# Patient Record
Sex: MALE | Race: WHITE | Employment: OTHER | URBAN - METROPOLITAN AREA
[De-identification: names, ages, dates, MRNs, and addresses within clinical notes are randomized per-mention and may not be internally consistent; named-entity substitution may affect disease eponyms.]

---

## 2017-02-14 ENCOUNTER — HOSPITAL ENCOUNTER (OUTPATIENT)
Dept: DIABETES SERVICES | Age: 67
Discharge: HOME | End: 2017-02-14
Attending: NURSE PRACTITIONER
Payer: MEDICARE

## 2017-02-14 VITALS — HEIGHT: 72 IN | BODY MASS INDEX: 32.37 KG/M2 | WEIGHT: 239 LBS

## 2017-02-14 PROCEDURE — G0108 DIAB MANAGE TRN  PER INDIV: HCPCS

## 2017-02-14 NOTE — PROGRESS NOTES
Came for diabetes educational assessment today. Provided basic information on carbohydrates, proteins and fats. Educational need/plan: Will attend 2 nutrition/2 diabetes group sessions to address the following: diabetes disease process, nutritional management, physical activity, using medications, preventing complications, psychosocial adjustment, goal setting, problem solving, monitoring, behavior change strategies.

## 2017-03-01 ENCOUNTER — HOSPITAL ENCOUNTER (OUTPATIENT)
Dept: DIABETES SERVICES | Age: 67
Discharge: HOME | End: 2017-03-01
Payer: MEDICARE

## 2017-03-01 PROCEDURE — G0109 DIAB MANAGE TRN IND/GROUP: HCPCS

## 2017-03-01 NOTE — PROGRESS NOTES
Attended nutrition diabetes #1 group session today. Topics included: disease process and treatment; carbohydrate choices (emphasizing high fiber carbohydrates); proteins (emphasizing heart healthy choices) and fat food choices (emphasizing unsaturated fats); free foods; combination food choices; nutrition tips for persons with diabetes; snack ideas; resources for diabetes management. Two methods of meal planning were reviewed: carbohydrate choices and carbohydrate counting. Basics of exercise discussed. Voiced /demonstrated understanding of material covered. Goal for next session is: read food labels. Anticipated adherence is good. Problems/barriers may be: none identified at this time.

## 2017-03-09 ENCOUNTER — HOSPITAL ENCOUNTER (OUTPATIENT)
Dept: DIABETES SERVICES | Age: 67
Discharge: HOME | End: 2017-03-09
Payer: MEDICARE

## 2017-03-09 PROCEDURE — G0109 DIAB MANAGE TRN IND/GROUP: HCPCS

## 2017-03-09 NOTE — PROGRESS NOTES
Attended nutrition diabetes #2 group session today. Topics included: plate method for portion control; fiber and sodium guidelines; sugar substitutes; alcohol; eating out; recipe modification; label reading. Voiced/demonstrated understanding of material covered. Anticipated adherence is good. Problems/barriers: none identified. Recommend check pre-meal blood glucose and 2 hour post-prandial blood glucose to see how food choices affect blood glucose. Plan for follow up is attend diabetes medical # 1 class on 3/14/17.

## 2017-03-14 ENCOUNTER — HOSPITAL ENCOUNTER (OUTPATIENT)
Dept: DIABETES SERVICES | Age: 67
Discharge: HOME | End: 2017-03-14
Payer: MEDICARE

## 2017-03-14 PROCEDURE — G0109 DIAB MANAGE TRN IND/GROUP: HCPCS

## 2017-03-14 NOTE — PROGRESS NOTES
Participant attended Diabetes #1 session today. Topics included: Characteristics/pathophysiology type 1/type 2 diabetes; Goal/acceptable blood glucose ranges/Hgb A1C/interpreting/using results; Using medications safely; Sick day management; Prevention/detection/treatment of acute complications. - Verbalized understanding of material covered.   -Goal for next session Diabetes 2  -Anticipated adherence is  Good   -Problems/barriers may be none anticipated

## 2017-03-21 ENCOUNTER — HOSPITAL ENCOUNTER (OUTPATIENT)
Dept: DIABETES SERVICES | Age: 67
Discharge: HOME | End: 2017-03-21
Payer: MEDICARE

## 2017-03-21 PROCEDURE — G0109 DIAB MANAGE TRN IND/GROUP: HCPCS

## 2017-03-21 NOTE — PROGRESS NOTES
Participant attended Diabetes #2 session today. Topics included: Prevention/detection/treatment of chronic complications; sleep apnea; Developing strategies to promote health/change behavior/recommended screenings; Developing strategies to address psychosocial issues; Goal setting. Participants goal/support plan includes Nutrition Goal : To improve diet. I will not skip meals and I will have a protein with most meals and snacks starting 4-1-17 for 30 days one meal daily 3 days a week. Medical Goal : To prevent complications: I will schedule an annual eye exam before 6-15-17 and annually. Problems/barriers may be:none anticipated .   Plan for follow up/Recommendations: mail follow up survey in 3 months

## 2017-05-10 PROBLEM — N40.1 BPH ASSOCIATED WITH NOCTURIA: Status: ACTIVE | Noted: 2017-05-10

## 2017-05-10 PROBLEM — R35.1 BPH ASSOCIATED WITH NOCTURIA: Status: ACTIVE | Noted: 2017-05-10

## 2017-09-18 ENCOUNTER — HOSPITAL ENCOUNTER (OUTPATIENT)
Dept: GENERAL RADIOLOGY | Age: 67
Discharge: HOME | End: 2017-09-18
Attending: INTERNAL MEDICINE
Payer: MEDICARE

## 2017-09-18 DIAGNOSIS — R05.3 PERSISTENT COUGH: ICD-10-CM

## 2017-09-18 PROCEDURE — 71020 XR CHEST PA LAT: CPT

## 2018-07-25 PROBLEM — Z99.89 OSA ON CPAP: Status: ACTIVE | Noted: 2018-07-25

## 2018-07-25 PROBLEM — G47.33 OSA ON CPAP: Status: ACTIVE | Noted: 2018-07-25

## 2020-08-07 PROBLEM — E78.00 HYPERCHOLESTEROLEMIA: Status: ACTIVE | Noted: 2020-08-07

## 2021-04-12 PROBLEM — M25.562 CHRONIC PAIN OF BOTH KNEES: Status: ACTIVE | Noted: 2021-04-12

## 2021-04-12 PROBLEM — M25.561 CHRONIC PAIN OF BOTH KNEES: Status: ACTIVE | Noted: 2021-04-12

## 2021-04-12 PROBLEM — G89.29 CHRONIC PAIN OF BOTH KNEES: Status: ACTIVE | Noted: 2021-04-12

## 2021-04-12 PROBLEM — I82.531 CHRONIC DEEP VEIN THROMBOSIS (DVT) OF POPLITEAL VEIN OF RIGHT LOWER EXTREMITY (HCC): Status: ACTIVE | Noted: 2021-04-12

## 2021-08-23 ENCOUNTER — HOSPITAL ENCOUNTER (OUTPATIENT)
Dept: GENERAL RADIOLOGY | Age: 71
Discharge: HOME | End: 2021-08-23

## 2021-10-07 ENCOUNTER — HOSPITAL ENCOUNTER (OUTPATIENT)
Dept: PHYSICAL THERAPY | Age: 71
Discharge: HOME | End: 2021-10-07
Attending: INTERNAL MEDICINE
Payer: MEDICARE

## 2021-10-07 DIAGNOSIS — M13.80 SERONEGATIVE ARTHRITIS: ICD-10-CM

## 2021-10-07 PROCEDURE — 97163 PT EVAL HIGH COMPLEX 45 MIN: CPT

## 2021-10-07 NOTE — THERAPY EVALUATION
Goldie Ralph A. Paduano  : 1950      [place] / Product Type: Medicare /    [address] [place] [address] at [address] [place] [address]. [facility], [address] [place] [address]  [phone]   [phone]              OUTPATIENT PHYSICAL THERAPY:Initial Assessment: 10/7/2021    ICD-10: Treatment Diagnosis:   Pain in Left Shoulder (M25.512)  Stiffness of Left Shoulder not elsewhere specified (M25.612)  Pain in right knee (M25.561)  Pain in left knee (M25.562)   Muscle Weakness (M62.81)              Precautions/Allergies:   Patient has no known allergies. Fall Risk Score: 6 (? 5 = High Risk)  MD Orders: Eval and Treat  MEDICAL/REFERRING DIAGNOSIS:  Seronegative arthritis [M13.80]   DATE OF ONSET: chronic  REFERRING PHYSICIAN: Sonia Leon MD  RETURN PHYSICIAN APPOINTMENT: TBD by patient      INITIAL ASSESSMENT:   Mr. Yoan Chaudhary presents to physical therapy with decreased strength, ROM,balance and  functional mobility, . These S/S are consistent with referring diagnosis . Patient will benefit from skilled physical therapy for manual therapeutic techniques (as appropriate), therapeutic exercises and activities, neuromuscular re-education, and comprehensive home exercises program to address current impairments and functional limitations. Neisha Chaudhary will benefit from skilled PT (medically necessary) in order to address above deficits affecting participation in basic ADLs and overall functional tolerance. PROBLEM LIST (Impacting functional limitations):  · Decreased Strength  · Decreased ADL/Functional Activities  · Increased Pain  · Decreased Activity Tolerance  · Increased Shortness of Breath  · Decreased Flexibility/Joint Mobility  · Decreased [place] with Home Exercise Program INTERVENTIONS PLANNED:  1. Cold  2. Family Education  3. Home Exercise Program (HEP)  4. Manual Therapy  5. Neuromuscular Re-education/Strengthening  6.  Range of Motion (ROM)  7. Therapeutic Activites  8. Therapeutic Exercise/Strengthening  9. Transfer Training  10. Ultrasound   TREATMENT PLAN:  Effective Dates: 10/7/2021 TO 12/6/2021 (60 days). Frequency/Duration: 2 times a week for 60 Days  GOALS: (Goals have been discussed and agreed upon with patient.)     Short-Term Goals~4 weeks  Goal Met   1. Soco Patel will report <= minimal difficulty with don/doffing clothing as well as minimal/no difficulty. 1.  [] Date:   2. Soco Patel will be able to carry 15 pounds in hands for 100 feet. 2.  [] Date:   3. Soco Paetl will demonstrate demonstrate improvement in active left  shoulder flexion to >115 degrees to increase UE function and participation in ADLs. 3.  [] Date:   4. Soco Patel will show a greater than 8 point decrease on the DASH in order to show an increase in upper extremity function. 4.  [] Date:   5. Vickii Amend A. Paduano  Will improve TUG score by 3 seconds indicating improvement in gait ability. 5.  [] Date:   6. Vickii Amend A. Paduano  Will increase 30 sec STS test by 3 repetitions indicating increase in functional strength 6.  [] Date:         Long Term Goals~8 weeks Goal Met   1. Soco Patel will improve KOOS score by 5 points indicating improvement of function. 1.  [] Date:   2. Soco Patel will show a greater than 10 point decrease on the DASH in order to show an increase in upper extremity function 2. [] Date:   3. Soco Patel will report doing hair/bathing/dressing with minimal difficulty  in order to be independent with ADL's 3.  [] Date:   4. Soco Patel  Will be able to maintain walking speed within normative values for age. 4.  [] Date:            Outcome Measure: Tool Used: Disabilities of the Arm, Shoulder and Hand (DASH) Questionnaire - Quick Version    Score:  Initial: 33/55  Most Recent: X/55 (Date: -- )   Interpretation of Score:  The DASH is designed to measure the activities of daily living in person's with upper extremity dysfunction or pain. Each section is scored on a 1-5 scale, 5 representing the greatest disability. The scores of each section are added together for a total score of 55. Tool Used: Knee Injury and Osteoarthritis Outcome Form (KOOS-JR.)  SCORE: None (0) Mild (1) Moderate (2) Severe (3) Extreme (4)   Stiffness:         1. How severe is your knee stiffness after first waking in the morning? [] [] [x] [] []   Pain:         What amount of knee pain have you experienced in the last week   doing the following activities? 2. Twisting/pivoting on your knee: [] [] [x] [] []   3. Straightening knee fully: [] [] [x] [] []   4. Going up or down stairs [] [] [] [x] []   5. Standing upright [] [] [x] [] []   Function, daily living        6. Rising from sitting [] [] [x] [] []   7. Bending to floor/ an object [] [] [] [x] []     Score:  Initial: 16 (Interval: 47.487) 10/7/2021/28 Most Recent: TBD/28 (Date: -- )   Interpretation of Score: Questions each scored on a 4 point scale with 4 representing the worst possible score and 0 representing the best possible score. The higher the point total, the worse the knee pain translating to a lower percentage of patient functioning. Medical Necessity:   · Skilled intervention continues to be required due to deficits and impairments seen upon initial evaluation affecting patient's participation in ADLs and functional tasks. Reason for Services/Other Comments:  · Patient continues to require skilled intervention due to deficits and impairments seen upon initial evaluation affecting patient's participation in ADLs and functional tasks. Total Treatment Duration:  PT Patient Time In/Time Out  Time In: 5442  Time Out: [address] Potential For Stated Goals: good  Regarding Mordechai Shames A. Paduano's therapy, I certify that the treatment plan above will be carried out by a therapist or under their direction.   Thank you for this referral,  Ivonne Benton PT     Referring Physician Signature: Joe Gómez MD _______________________________ Date _____________            HISTORY:   History of Present Injury/Illness (Reason for Referral): Was diagnosed with RA 7 years ago. He retired due to difficulty performing job duties. Joint pain left shoulder, bilateral hand, ankles, feet and knees. Does some yard work, push mower, plays some with his grandchildren. Has fallen several times in his yard this summer. Used to walk on a regular basis, but not in two years.    -Present symptoms/complaints (on day of evaluation) Multiple joint pains, left shoulder stiffness, difficulty walking, feels unsteady. Pain Scale:  · Current: 6/10  · Best: 2/10  · Worst: 10/10    · Aggravating factors: Lifting, Overhead activities, Reaching for wallet, push/pull and walking, squatting, stairs  · Relieving factors: exercising  · Irritability: Medium (Onset of pain is equal to alleviation)    Dominant Side:  left  Past Medical History/Comorbidities:   Mr. Delvin Tamayo  has a past medical history of Acquired hypothyroidism (3/27/2016), Acute deep vein thrombosis (DVT) ([address] Bilateral knee pain (05/2020), Cataracts, bilateral, Chronic deep vein thrombosis (DVT) of popliteal vein of right lower extremity ([address] (4/12/2021), Chronic pain of both knees (4/12/2021), COVID-19 vaccine series declined (2021), Depression (3/20/2015), Diabetic eye exam ([address] (08/26/2020), Eye exam, routine (09/2020), Graves disease, Hypertension, Obesity (BMI 30.0-34.9) (3/27/2016), ITZ on CPAP (7/25/2018), Psychiatric disorder, Rheumatoid arthritis(714.0), Thyroid disease, and Uncontrolled diabetes mellitus ([address]  He also has no past medical history of Aneurysm ([address] Arrhythmia, Asthma, CAD (coronary artery disease), Cancer ([address] Chronic kidney disease, Chronic obstructive pulmonary disease ([facility] Coagulation disorder ([facility] Endocarditis, GERD (gastroesophageal reflux disease), Heart failure ([facility] Ill-defined condition, Liver disease, PUD (peptic ulcer disease), Rheumatic fever, Seizures ([facility] or Stroke ([facility] Mr. Veda Richard  has a past surgical history that includes hx knee arthroscopy (Left, 1990s); hx bunionectomy (Left, 12/2014); and hx cataract removal (Bilateral). Social History/Living Environment:    retired, , no stairs  Prior Level of Function/Work/Activity:  was walking daily until two years ago. Current Medications:    Current Outpatient Medications:     metFORMIN (GLUCOPHAGE) 1,000 mg tablet, TAKE ONE TABLET BY MOUTH TWICE A DAY WITH A MEAL, Disp: 180 Tablet, Rfl: 3    methotrexate (RHEUMATREX) 2.5 mg tablet, Take 10 Tablets by mouth every Tuesday. Take 5 tabs by mouth every Monday, Disp: 40 Tablet, Rfl: 0    hydrOXYchloroQUINE (PlaqueniL) 200 mg tablet, Take 1 Tablet by mouth two (2) times a day., Disp: 180 Tablet, Rfl: 1    atorvastatin (LIPITOR) 20 mg tablet, Take 1 Tablet by mouth nightly., Disp: 90 Tablet, Rfl: 3    warfarin (COUMADIN) 5 mg tablet, 5mg Qhs with 7.5mg [place], Tu, Th, Saturday, Disp: 114 Tablet, Rfl: 3    insulin regular (NOVOLIN R, HUMULIN R) 100 unit/mL injection, 5 units daily with largest meal (Patient not taking: Reported on 9/27/2021), Disp: 1 Vial, Rfl: 5    folic acid (FOLVITE) 1 mg tablet, Take 1 tablet by mouth once daily, Disp: 90 Tablet, Rfl: 1    sertraline (ZOLOFT) 100 mg tablet, Take 1 Tab by mouth daily. , Disp: 90 Tab, Rfl: 3    amitriptyline (ELAVIL) 25 mg tablet, Take 1 Tab by mouth nightly., Disp: 90 Tab, Rfl: 3    mupirocin (BACTROBAN) 2 % ointment, APPLY OINTMENT TO WOUND DAILY UNDER BANDAGE UNTIL HEALED., Disp: , Rfl:     amLODIPine (NORVASC) 5 mg tablet, TAKE ONE TABLET BY MOUTH ONE TIME DAILY, Disp: 90 Tab, Rfl: 3    lisinopriL (PRINIVIL, ZESTRIL) 20 mg tablet, TAKE ONE TABLET BY MOUTH ONE TIME DAILY, Disp: 90 Tab, Rfl: 3    glimepiride (AMARYL) 4 mg tablet, Take 1 Tab by mouth two (2) times a day., Disp: 180 Tab, Rfl: 3    levothyroxine (SYNTHROID) 150 mcg tablet, Take 1 Tab by mouth Daily (before breakfast). , Disp: 90 Tab, Rfl: 3        Ambulatory/Rehab Services H2 Model Falls Risk Assessment    Risk Factors:       (5)  History of Recent Falls [w/in 3 months] Ability to Rise from Chair:       (0)  Ability to rise in a single movement    Falls Prevention Plan:       Physical Limitations to Exercise (specify):  pain and weakness   Total: (5 or greater = High Risk): 5    ©2010 [facility] [place]. All Rights Reserved. [employer] Patent #[id number]. Federal Law prohibits the replication, distribution or use without written permission from [facility] of [address] [place] [address]       Date Last Reviewed:  10/7/2021   Number of Personal Factors/Comorbidities that affect the Plan of Care: 3+: HIGH COMPLEXITY      EXAMINATION:   Observation/Orthostatic Postural Assessment: Forward Head and Rounded Shoulders, decreased lumbar lordosis, arthritic changes bilateral hands.     ROM:    AROM/PROM         Joint: Eval Date: 10/7/2021  Re-Assess Date:  Re-Assess Date:    ACTIVE ROM (standing) RIGHT LEFT RIGHT LEFT RIGHT LEFT   Lumbar flexion  To ankles            Lumbar extension Scapula to heels            Knee flexion 125 120           Knee extension 10  0           Hip IR/ER 20/45 20/45           Hip flexion 110 105           HIP abduction 40 40           Hip extension 5 5           Shoulder Flexion 160 100           Shoulder Abduction 160 80           Shoulder Internal Rotation (Apley's) T 10 sacrum           Shoulder External Rotation (Apley's) T2 C7           Elbow ROM 0/140 0/ 140 ext/flex           PASSIVE ROM (supine)             Shoulder Flexion             Shoulder Abduction   [facility]      Shoulder Internal Rotation   [facility]    Shoulder External Rotation   [address]             [place]            Strength:    Joint: Eval Date: 10/7/2021  Re-Assess Date:  Re-Assess Date:     RIGHT LEFT RIGHT LEFT RIGHT LEFT   Shoulder Flexion 4/5 3/5           Shoulder Abduction  (C5) 4/5 3-/5           Shoulder Internal Rotation 4+/5 4-/5           Shoulder External Rotation 3+/5 3-/5           Elbow Flexion  (C6) 5/5 4/5           Elbow Extension (C7) 4+/5 4-/5           Wrist Flexion (C7) 5/5 5/5           Wrist Extension (C6) 5/5 5/5           Resisted Thumb Extension/Finger Abduction (C8/T1)             Resisted Cervical Rotation (C1):             Resisted Shoulder Shrug (C2, 3, 4):  4=  /5 4-/5            Strength 78 lbs 70 lbs            Knee extension 4/5 4/5           Knee flexion 4/5 4/5           Hip abduction 3+/5 3+/5           Hip  extension 3+/5 3+/5           Plantar flexion  3+ 3+                   Special Tests:     Weakness of  Left shoulder rotator cuff    Neurological Screen: Assessed @ Initial Visit    Radiating symptoms? Yes/No=glenohumeral region left   Functional Mobility:  Assessed @ Initial Visit   TUG: 10.21 seconds  30 sec Sit to Stand test: 7 x no hands        Body Structures Involved:    1. Joints  2. Muscles   Body Functions Affected:  1. Sensory/Pain  2. Neuromusculoskeletal  3. Movement Related Activities and Participation Affected:  1. Mobility  2. Self Care   Number of elements that affect the Plan of Care: 4+: HIGH COMPLEXITY   CLINICAL PRESENTATION:   Presentation: Evolving clinical presentation with changing clinical characteristics: MODERATE COMPLEXITY   CLINICAL DECISION MAKING:      Use of outcome tool(s) and clinical judgement create a POC that gives a: Questionable prediction of patient's progress: MODERATE COMPLEXITY     See associated treatment note for treatment provided today.     Future Appointments   Date Time Provider Ramona Andrews   10/25/2021  2:20 PM Melba Kilgore MD [facility]   10/26/2021 10:30  [address]  [address], [address] [facility]   11/29/2021 10:20 AM Taylor Eli Ankit Scott MD [address], PT            remember to save as eval

## 2021-10-13 ENCOUNTER — HOSPITAL ENCOUNTER (OUTPATIENT)
Dept: PHYSICAL THERAPY | Age: 71
Discharge: HOME | End: 2021-10-13
Attending: INTERNAL MEDICINE
Payer: MEDICARE

## 2021-10-13 PROCEDURE — 97110 THERAPEUTIC EXERCISES: CPT

## 2021-10-13 NOTE — PROGRESS NOTES
Goldie Ralph A. Paduano  : 1950  [place] / [place] / Product Type: Medicare /  [address] [facility] [address] at [address] [facility]. [facility], [address] [place] [address], [address] [place] [address]  [phone]   [phone]                                                          Sonia Leon MD      OUTPATIENT PHYSICAL THERAPY: Daily Treatment Note 10/13/2021 Visit Count:  2    Tx Diagnosis:  Pain in Left Shoulder (M25.512)  Stiffness of Left Shoulder not elsewhere specified (M25.612)  Pain in right knee (M25.561)  Pain in left knee (M25.562)   Muscle Weakness (M62.81)      Pre-treatment Symptoms/Complaints: Left shoulder is the biggest problem   Pain: Initial:did not rate/10  Medications Last Reviewed:  10/13/2021     Post Session: did not rate/10   Updated Objective Findings: See Initial Eval for more details. left shoulder flexion 90        TREATMENT:   THERAPEUTIC EXERCISE: (40 minutes):  Exercises per grid below to improve mobility, strength and balance. Required minimal visual, verbal and manual cues to promote proper body alignment and promote proper body posture. Progressed resistance and complexity of movement as indicated. Date:  10/13/2021 Date:   Date:     Activity/Exercise Parameters Parameters Parameters   Education HEP, POC, PT goals, anatomy/pathology, safe pain parameters     swiffer stick  Flexion on top of counter 30 x    Circles CCW       scifit 6 min L 6 hills     Shoulder shrugs 2 x 10 5 #     Isometric bilateral ER band shoulder 10 x     Sit to stand 2 x 10 18\" table                 THERAPEUTIC ACTIVITY: ( 0 minutes): Activities per gid below to improve functional movement related mobility, strength and balance to improve neuro-muscular carryover to daily functional activities for improving patient's quality of life. Required visual, verbal and manual cues to promote proper body alignment and promote proper body posture/mechanics.  Progressed resistance and complexity of movement as indicated. Date:  10/13/21 Date:   Date:     Activity/Exercise Parameters Parameters Parameters    scifit  6 min L 6 hills                                                                         MANUAL THERAPY: (0 minutes): Joint mobilization, Soft tissue mobilization was utilized and necessary because of the patient's restricted joint motion and restricted motion of soft tissue mobility. Date  10/13/2021    Technique Used Grade  Level # Time(s) Effect while being performed                                                                 HEP Log Date 1. As above in T ex 10/13/2021   2.  10/13/2021   3. 10/13/2021   4.    5.           [employer] [facility]  Treatment/Session Summary:    Response to Treatment: Pt demonstrated understanding of POC and initial HEP. No increase in pain or adverse reactions. Communication/Consultation:  POC, HEP, PT goals, Faxed initial evaluation to MD.   Equipment provided today: HEP Handout   Recommendations/Intent for next treatment session:   Next visit will focus on Quad strengthening Hip strengthening RTC strengthening range of motion left shoulder, manual techniques as needed, aerobic conditioning. Treatment Plan of Care Effective Dates: 10/7/2021 TO 12/12/2021 (60 days).   Frequency/Duration: 2 times a week for 60 Days             Total Treatment Billable Duration:   40  Rx   PT Patient Time In/Time Out  Time In: 1142  Time Out: 1227 [facility], PT    Future Appointments   Date Time Provider Ramona Andrews   10/13/2021 11:45 AM Margaret Anderson, PT [facility]   10/15/2021 10:30 AM Margaret Anderson, PT [facility]   10/20/2021  8:45 AM Margaret Anderson, PT [facility]   10/22/2021  9:00 AM Margaret Anderson, PT [facility]   10/25/2021  2:20 PM Stefany Guerrero MD [facility]   10/26/2021 10:30  [address]  [address]   10/27/2021  8:45 AM Papenfuss, Charm Brood, PT [facility]   10/29/2021  9:00 AM Papenfuss, Charm Brood, PT [facility]   11/3/2021  8:45 AM Papenfuss, Charm Brood, PT [facility]   11/5/2021  9:00 AM Papenfuss, Charm Brood, PT [facility]   11/10/2021  8:45 AM Papenfuss, Charm Brood, PT [facility]   11/12/2021  9:00 AM Papenfuss, Charm Brood, PT [facility]   11/17/2021  8:45 AM Papenfuss, Charm Brood, PT [facility]   11/19/2021  9:00 AM Papenfuss, Charm Brood, PT [facility]   11/29/2021 10:20 AM Neli Carrasco MD [facility]

## 2021-10-15 ENCOUNTER — HOSPITAL ENCOUNTER (OUTPATIENT)
Dept: PHYSICAL THERAPY | Age: 71
Discharge: HOME | End: 2021-10-15
Attending: INTERNAL MEDICINE
Payer: MEDICARE

## 2021-10-15 PROCEDURE — 97110 THERAPEUTIC EXERCISES: CPT

## 2021-10-15 NOTE — PROGRESS NOTES
Kimmy Pereyra A. Paduano  : 1950  [place] / Product Type: Medicare /  [id number] at 4 [facility]. [facility], [address] [place] [address], [address] [place] [address]  [phone]   [phone]                                                          Sera Monique MD      OUTPATIENT PHYSICAL THERAPY: Daily Treatment Note 10/15/2021 Visit Count:  3    Tx Diagnosis:  Pain in Left Shoulder (M25.512)  Stiffness of Left Shoulder not elsewhere specified (M25.612)  Pain in right knee (M25.561)  Pain in left knee (M25.562)   Muscle Weakness (M62.81)      Pre-treatment Symptoms/Complaints: Left shoulder is the biggest problem   Pain: Initial:did not rate/10  Medications Last Reviewed:  10/15/2021     Post Session: did not rate/10   Updated Objective Findings: See Initial Eval for more details. left shoulder flexion 90        TREATMENT:   THERAPEUTIC EXERCISE: (40 minutes):  Exercises per grid below to improve mobility, strength and balance. Required minimal visual, verbal and manual cues to promote proper body alignment and promote proper body posture. Progressed resistance and complexity of movement as indicated. Date:  10/13/2021 Date:  10/15/21 Date:     Activity/Exercise Parameters Parameters Parameters   Education HEP, POC, PT goals, anatomy/pathology, safe pain parameters     swiffer stick  Flexion on top of counter 30 x    Circles CCW       scifit 6 min L 6 hills 9 min L 6 hills    Shoulder shrugs 2 x 10 5 # 2 x 10 6#     Isometric bilateral ER band shoulder 10 x 10 x 15 sec holds    Sit to stand 2 x 10 18\" table     Table shoulder flexion stretch sitting on stool  4 min     Dead lifts  20# 5 x  30# 5 x  45# 2 x 5          THERAPEUTIC ACTIVITY: ( 0 minutes):  Activities per gid below to improve functional movement related mobility, strength and balance to improve neuro-muscular carryover to daily functional activities for improving patient's quality of life. Required visual, verbal and manual cues to promote proper body alignment and promote proper body posture/mechanics. Progressed resistance and complexity of movement as indicated. Date:  10/13/21 Date:   Date:     Activity/Exercise Parameters Parameters Parameters                                                                                          HEP Log Date 1. As above in T ex 10/13/2021   2.  10/15/2021   3. 10/15/2021   4.    5.           [employer] Portal  Treatment/Session Summary:    Response to Treatment: Decrease in shoulder pain with ex   Communication/Consultation:  Benefits of strength training   Equipment provided today: HEP Handout   Recommendations/Intent for next treatment session:   Next visit will focus on Quad strengthening Hip strengthening RTC strengthening range of motion left shoulder, manual techniques as needed, aerobic conditioning. Treatment Plan of Care Effective Dates: 10/7/2021 TO 12/12/2021 (60 days).   Frequency/Duration: 2 times a week for 60 Days             Total Treatment Billable Duration:   40  Rx   PT Patient Time In/Time Out  Time In: 1030  Time Out: 115 Angelique Zepeda PT    Future Appointments   Date Time Provider Ramona Andrews   10/20/2021  8:45 AM PapenfuFelicity sorenson, PT [facility] AND [place] [facility]   10/22/2021  9:00 AM PapenfuFelicity sorenson, PT [facility]   10/25/2021  2:20 PM Fadia Waldrop MD [facility]   10/26/2021 10:30  [address]  [address] [facility]   10/27/2021  8:45 AM PapenfussFelicity, PT [facility]   10/29/2021  9:00 AM PapenfussFelicity, PT [facility]   11/3/2021  8:45 AM PapenfussFelicity, PT [facility]   11/5/2021  9:00 AM PapenfussFelicity Janeth, PT [facility]   11/10/2021  8:45 AM PapenfussFelicity, PT [facility]   11/12/2021  9:00 AM [facility] Anderson[facility]   11/17/2021  8:45 AM Felicity Anderson, PT [facility]   11/19/2021 9:00 AM Suad Anderson, PT [facility]   11/29/2021 10:20 AM Wally Spencer MD [address] [facility]

## 2021-10-20 ENCOUNTER — HOSPITAL ENCOUNTER (OUTPATIENT)
Dept: PHYSICAL THERAPY | Age: 71
Discharge: HOME | End: 2021-10-20
Attending: INTERNAL MEDICINE
Payer: MEDICARE

## 2021-10-20 PROCEDURE — 97110 THERAPEUTIC EXERCISES: CPT

## 2021-10-20 PROCEDURE — 97530 THERAPEUTIC ACTIVITIES: CPT

## 2021-10-20 NOTE — PROGRESS NOTES
Sarajulian Jetty A. Paduano  : 1950  [place] / Product Type: Medicare /  [address] at 4 [facility]. [facility], [address] [place] [address], [facility], [address] [place] [address]  [phone]   [phone]                                                          Oumar Ruiz MD      OUTPATIENT PHYSICAL THERAPY: Daily Treatment Note 10/20/2021 Visit Count:  4    Tx Diagnosis:  Pain in Left Shoulder (M25.512)  Stiffness of Left Shoulder not elsewhere specified (M25.612)  Pain in right knee (M25.561)  Pain in left knee (M25.562)   Muscle Weakness (M62.81)      Pre-treatment Symptoms/Complaints: Left shoulder is the biggest problem, feels better all over after lifting   Pain: Initial:did not rate/10  Medications Last Reviewed:  10/20/2021     Post Session: did not rate/10   Updated Objective Findings: See Initial Eval for more details. left shoulder flexion 90        TREATMENT:   THERAPEUTIC EXERCISE: (30 minutes):  Exercises per grid below to improve mobility, strength and balance. Required minimal visual, verbal and manual cues to promote proper body alignment and promote proper body posture. Progressed resistance and complexity of movement as indicated.      Date:  10/13/2021 Date:  10/15/21 Date:  10/20/21   Activity/Exercise Parameters Parameters Parameters   Education HEP, POC, PT goals, anatomy/pathology, safe pain parameters  Benefits of strength training for health and wellness   swiffer stick  Flexion on top of counter 30 x    Circles CCW       scifit 6 min L 6 hills 9 min L 6 hills 10 min L 6 hills   Shoulder shrugs 2 x 10 5 # 2 x 10 6#     Isometric bilateral ER band shoulder 10 x 10 x 15 sec holds With shoulder flexion to 45 degrees 3 x 10    Sit to stand 2 x 10 18\" table     Table shoulder flexion stretch sitting on stool  4 min  2 min    Dead lifts  20# 5 x  30# 5 x  45# 2 x 5    Lat activation and hip hinge   Yellow band on pole 3 x 10 extensive manual and verbal cues         THERAPEUTIC ACTIVITY: ( 25 minutes): Activities per gid below to improve functional movement related mobility, strength and balance to improve neuro-muscular carryover to daily functional activities for improving patient's quality of life. Required visual, verbal and manual cues to promote proper body alignment and promote proper body posture/mechanics. Progressed resistance and complexity of movement as indicated. Date:  10/20/21 Date:   Date:     Activity/Exercise Parameters Parameters Parameters    Kaylynn Cruise carry  320' 10#        Dead lifts  Extensive manual and verbal cues    3 x 10 45#                                                                          HEP Log Date 1. As above in T ex 10/13/2021   2.  10/20/2021   3. 10/20/2021   4.    5.           [employer] Portal  Treatment/Session Summary:    Response to Treatment: Decrease in overall  pain with ex   Communication/Consultation:  Benefits of strength training   Equipment provided today: HEP Handout   Recommendations/Intent for next treatment session:   Next visit will focus on Quad strengthening Hip strengthening RTC strengthening range of motion left shoulder, manual techniques as needed, aerobic conditioning. Treatment Plan of Care Effective Dates: 10/7/2021 TO 12/12/2021 (60 days).   Frequency/Duration: 2 times a week for 60 Days             Total Treatment Billable Duration:   55  Rx   PT Patient Time In/Time Out  Time In: 0840  Time Out: [address], PT    Future Appointments   Date Time Provider Ramona Andrews   10/22/2021  9:00 AM Margaret Anderson, PT [facility] AND [facility]   10/25/2021  2:20 PM Stefany Guerrero MD [facility]   10/26/2021 10:30  [address]  [address] [facility]   10/27/2021  8:45 AM Margaret Anderson, PT [facility]   10/29/2021  9:00 AM Margaret Andreson, PT [facility]   11/3/2021  8:45 AM Margaret Anderson, PT [facility]   11/5/2021  9:00 AM Papenfuss, Prosper Raring, PT [facility]   11/10/2021  8:45 AM Papenfuss, Prosper Raring, PT [facility]   11/12/2021  9:00 AM Papenfuss, Prosper Raring, PT [facility]   11/17/2021  8:45 AM Papenfuss, Prosper Raring, PT [facility]   11/19/2021  9:00 AM Papenfuss, Prosper Raring, PT [facility]   11/29/2021 10:20 AM Ofelia Boyle MD [address] [place] [address]

## 2021-10-22 ENCOUNTER — HOSPITAL ENCOUNTER (OUTPATIENT)
Dept: PHYSICAL THERAPY | Age: 71
Discharge: HOME | End: 2021-10-22
Attending: INTERNAL MEDICINE
Payer: MEDICARE

## 2021-10-22 PROCEDURE — 97530 THERAPEUTIC ACTIVITIES: CPT

## 2021-10-22 PROCEDURE — 97110 THERAPEUTIC EXERCISES: CPT

## 2021-10-22 NOTE — PROGRESS NOTES
Benancio Left A. Paduano  : 1950  [place] / Product Type: Medicare /  [id number] at 4 [facility]. [facility], [address] [place] [address], [facility], [address] [place] [address]  [phone]   [phone]                                                          Arnoldo Gutiérrez MD      OUTPATIENT PHYSICAL THERAPY: Daily Treatment Note 10/22/2021 Visit Count:  5    Tx Diagnosis:  Pain in Left Shoulder (M25.512)  Stiffness of Left Shoulder not elsewhere specified (M25.612)  Pain in right knee (M25.561)  Pain in left knee (M25.562)   Muscle Weakness (M62.81)      Pre-treatment Symptoms/Complaints: Left shoulder is the biggest problem, feels better all over after lifting   Pain: Initial:did not rate/10  Medications Last Reviewed:  10/22/2021     Post Session: did not rate/10   Updated Objective Findings: See Initial Eval for more details. left shoulder flexion 90        TREATMENT:   THERAPEUTIC EXERCISE: (15 minutes):  Exercises per grid below to improve mobility, strength and balance. Required minimal visual, verbal and manual cues to promote proper body alignment and promote proper body posture. Progressed resistance and complexity of movement as indicated.      Date:  10/13/2021 Date:  10/15/21 Date:  10/20/21 10/22/21   Activity/Exercise Parameters Parameters Parameters    Education HEP, POC, PT goals, anatomy/pathology, safe pain parameters  Benefits of strength training for health and wellness    swiffer stick  Flexion on top of counter 30 x    Circles CCW        scifit 6 min L 6 hills 9 min L 6 hills 10 min L 6 hills 8 min hills L 6    Shoulder shrugs 2 x 10 5 # 2 x 10 6#      Isometric bilateral ER band shoulder 10 x 10 x 15 sec holds With shoulder flexion to 45 degrees 3 x 10     Sit to stand 2 x 10 18\" table      Table shoulder flexion stretch sitting on stool  4 min  2 min     Dead lifts  20# 5 x  30# 5 x  45# 2 x 5     Lat activation and hip hinge Yellow band on pole 3 x 10 extensive manual and verbal cues 3 x 10 yellow band on pole  Pole on back 30 x    Hip extensor activation with purple band sit to stand             THERAPEUTIC ACTIVITY: ( 25 minutes): Activities per gid below to improve functional movement related mobility, strength and balance to improve neuro-muscular carryover to daily functional activities for improving patient's quality of life. Required visual, verbal and manual cues to promote proper body alignment and promote proper body posture/mechanics. Progressed resistance and complexity of movement as indicated. Date:  10/20/21 Date:  10/22/21 Date:     Activity/Exercise Parameters Parameters Parameters    Vinay Hojennifer carry  320' 10#  320' 10 #               Dead lifts  Extensive manual and verbal cues    3 x 10 45#  5 x 45#    5 x 50#    3 x 5 55#      Walk back with bar     Pulling activity  10' 10 x 27#     Hip extensor activation with purple band sit to stand    10 x                                                    HEP Log Date 1. As above in T ex 10/13/2021   2.  10/22/2021   3. 10/22/2021   4.    5.           [employer] Portal  Treatment/Session Summary:    Response to Treatment: Decrease in overall  pain with ex, required much less cueing today. Communication/Consultation:  Benefits of strength training   Equipment provided today: HEP Handout   Recommendations/Intent for next treatment session:   Next visit will focus on Quad strengthening Hip strengthening RTC strengthening range of motion left shoulder, manual techniques as needed, aerobic conditioning. Treatment Plan of Care Effective Dates: 10/7/2021 TO 12/12/2021 (60 days).   Frequency/Duration: 2 times a week for 60 Days             Total Treatment Billable Duration:  40    Rx   PT Patient Time In/Time Out  Time In: 6074  Time Out: [address], PT    Future Appointments   Date Time Provider Ramona Andrews   10/22/2021  9:00 AM Britni Anderson, PT [facility]   10/25/2021  2:20 PM Virgie Boeck, MD [facility]   10/26/2021 10:30  [address]  [address] [facility]   10/27/2021  8:45 AM Britni Anderson, PT [facility]   10/29/2021  9:00 AM PapBritni zepeda, PT [facility]   11/3/2021  8:45 AM MarkfuBritni sorenson, PT [facility]   11/5/2021  9:00 AM Britni Anderson, PT [facility]   11/10/2021  8:45 AM MarkfuBritni sorenson, PT [facility]   11/12/2021  9:00 AM MarkfuBritni sorenson, PT [facility]   11/17/2021  8:45 AM PaphebertfuBritni sorenson, PT [facility]   11/19/2021  9:00 AM PaphebertfuBritni sorenson, PT [facility]   11/29/2021 10:20 AM Edward Robertson MD [address] [place] [address]

## 2021-10-27 ENCOUNTER — HOSPITAL ENCOUNTER (OUTPATIENT)
Dept: PHYSICAL THERAPY | Age: 71
Discharge: HOME | End: 2021-10-27
Attending: INTERNAL MEDICINE
Payer: MEDICARE

## 2021-10-27 PROCEDURE — 97110 THERAPEUTIC EXERCISES: CPT

## 2021-10-27 PROCEDURE — 97530 THERAPEUTIC ACTIVITIES: CPT

## 2021-10-27 NOTE — PROGRESS NOTES
Rica Saran A. Paduano  : 1950  [place] / Product Type: Medicare /  [address] [place] [address] at [address] [place] [address]. [facility], [address] [place] [address]  [phone]   [phone]                                                          Anjelica Felipe MD      OUTPATIENT PHYSICAL THERAPY: Daily Treatment Note 10/27/2021 Visit Count:  6    Tx Diagnosis:  Pain in Left Shoulder (M25.512)  Stiffness of Left Shoulder not elsewhere specified (M25.612)  Pain in right knee (M25.561)  Pain in left knee (M25.562)   Muscle Weakness (M62.81)      Pre-treatment Symptoms/Complaints: I went to see my doctor yesterday and I told her I feel almost normal.   Pain: Initial:did not rate/10  Medications Last Reviewed:  10/27/2021     Post Session: did not rate/10   Updated Objective Findings: Can bend down to lift barbell without back pain. TREATMENT:   THERAPEUTIC EXERCISE: (15 minutes):  Exercises per grid below to improve mobility, strength and balance. Required minimal visual, verbal and manual cues to promote proper body alignment and promote proper body posture. Progressed resistance and complexity of movement as indicated.      Date:  10/13/2021 Date:  10/15/21 Date:  10/20/21 10/22/21 10/27/21   Activity/Exercise Parameters Parameters Parameters     Education HEP, POC, PT goals, anatomy/pathology, safe pain parameters  Benefits of strength training for health and wellness     swiffer stick  Flexion on top of counter 30 x    Circles CCW         scifit 6 min L 6 hills 9 min L 6 hills 10 min L 6 hills 8 min hills L 6  8 min    Shoulder shrugs 2 x 10 5 # 2 x 10 6#       Isometric bilateral ER band shoulder 10 x 10 x 15 sec holds With shoulder flexion to 45 degrees 3 x 10      Sit to stand 2 x 10 18\" table       Table shoulder flexion stretch sitting on stool  4 min  2 min      Dead lifts  20# 5 x  30# 5 x  45# 2 x 5      Lat activation and hip hinge   Yellow band on pole 3 x 10 extensive manual and verbal cues 3 x 10 yellow band on pole  Pole on back 30 x  3 x 10 ea pole and banded resistance   Hip extensor activation with purple band sit to stand                      THERAPEUTIC ACTIVITY: ( 30 minutes): Activities per gid below to improve functional movement related mobility, strength and balance to improve neuro-muscular carryover to daily functional activities for improving patient's quality of life. Required visual, verbal and manual cues to promote proper body alignment and promote proper body posture/mechanics. Progressed resistance and complexity of movement as indicated. Date:  10/20/21 Date:  10/22/21 Date:  10/27/21   Activity/Exercise Parameters Parameters Parameters    Craig Grant carry  320' 10#  320' 10 #               Dead lifts  Extensive manual and verbal cues    3 x 10 45#  5 x 45#    5 x 50#    3 x 5 55#   Rack pull   1 x 5 45#    1 x 5 50#   Rack pulls:    1 x 5 55#   1 x 3 60#    1 x 5 65#   1 x 3 70#    Walk back with bar     Pulling activity  10' 10 x 27#  30# 10 x   Hip extensor activation with purple band sit to stand    10 x                                                    HEP Log Date 1. As above in T ex 10/13/2021   2.  10/27/2021   3. 10/27/2021   4.    5.           [employer] Portal  Treatment/Session Summary:    Response to Treatment: Increased lifting capacity, no pain complaints   Communication/Consultation:  Benefits of strength training   Equipment provided today: HEP Handout   Recommendations/Intent for next treatment session:   Next visit will focus on Quad strengthening Hip strengthening RTC strengthening range of motion left shoulder, manual techniques as needed, aerobic conditioning. Treatment Plan of Care Effective Dates: 10/7/2021 TO 12/12/2021 (60 days).   Frequency/Duration: 2 times a week for 60 Days             Total Treatment Billable Duration:  45    Rx   PT Patient Time In/Time Out  Time In: 5038  Time Out: [facility], PT    Future Appointments   Date Time Provider Ramona Andrews   10/27/2021  8:45 AM Tony Anderson, PT [facility]   10/29/2021  9:00 AM Tony Anderson, PT [facility]   11/3/2021  8:45 AM Tony Anderson, PT [facility]   11/5/2021  9:00 AM Tony Anderson, PT [facility]   11/10/2021  8:45 AM [facility]   11/12/2021  9:00 AM [facility] Anderson, [facility]   11/17/2021  8:45 AM Tony Anderson, PT [facility]   11/19/2021  9:00 AM [facility] Anderson, [facility]   11/29/2021 10:20 AM Roberto Smyth MD [facility]   2/28/2022  2:00 PM Sintia Phelan MD [facility]

## 2021-10-29 ENCOUNTER — HOSPITAL ENCOUNTER (OUTPATIENT)
Dept: PHYSICAL THERAPY | Age: 71
Discharge: HOME | End: 2021-10-29
Attending: INTERNAL MEDICINE
Payer: MEDICARE

## 2021-10-29 PROCEDURE — 97110 THERAPEUTIC EXERCISES: CPT

## 2021-10-29 PROCEDURE — 97530 THERAPEUTIC ACTIVITIES: CPT

## 2021-10-29 NOTE — PROGRESS NOTES
Lesly Ave A. Paduano  : 1950  [place] / Product Type: Medicare /  [address] [facility] at [address] [place] [address]. [address] [place] [address] [address], [id number] [place] [address] Road  [phone]   [phone]                                                          Eboni Cool MD      OUTPATIENT PHYSICAL THERAPY: Daily Treatment Note 10/29/2021 Visit Count:  7    Tx Diagnosis:  Pain in Left Shoulder (M25.512)  Stiffness of Left Shoulder not elsewhere specified (M25.612)  Pain in right knee (M25.561)  Pain in left knee (M25.562)   Muscle Weakness (M62.81)      Pre-treatment Symptoms/Complaints: I went to see my doctor yesterday and I told her I feel almost normal.   Pain: Initial:did not rate/10  Medications Last Reviewed:  10/29/2021     Post Session: did not rate/10   Updated Objective Findings: Can bend down to lift barbell without back pain. TREATMENT:   THERAPEUTIC EXERCISE: (15 minutes):  Exercises per grid below to improve mobility, strength and balance. Required minimal visual, verbal and manual cues to promote proper body alignment and promote proper body posture. Progressed resistance and complexity of movement as indicated.      Date:  10/13/2021 Date:  10/15/21 Date:  10/20/21 10/22/21 10/27/21 10/29/21   Activity/Exercise Parameters Parameters Parameters      Education HEP, POC, PT goals, anatomy/pathology, safe pain parameters  Benefits of strength training for health and wellness      swiffer stick  Flexion on top of counter 30 x    Circles CCW          scifit 6 min L 6 hills 9 min L 6 hills 10 min L 6 hills 8 min hills L 6  8 min  10 min L 6 hills   Shoulder shrugs 2 x 10 5 # 2 x 10 6#        Isometric bilateral ER band shoulder 10 x 10 x 15 sec holds With shoulder flexion to 45 degrees 3 x 10       Sit to stand 2 x 10 18\" table        Table shoulder flexion stretch sitting on stool  4 min  2 min       Dead lifts  20# 5 x  30# 5 x  45# 2 x 5       Lat activation and hip hinge   Yellow band on pole 3 x 10 extensive manual and verbal cues 3 x 10 yellow band on pole  Pole on back 30 x  3 x 10 ea pole and banded resistance 3 x 10 pole yellow band    Hip extensor activation with purple band sit to stand                        THERAPEUTIC ACTIVITY: ( 30 minutes): Activities per gid below to improve functional movement related mobility, strength and balance to improve neuro-muscular carryover to daily functional activities for improving patient's quality of life. Required visual, verbal and manual cues to promote proper body alignment and promote proper body posture/mechanics. Progressed resistance and complexity of movement as indicated. Date:  10/20/21 Date:  10/22/21 Date:  10/27/21 Date   10/29/21   Activity/Exercise Parameters Parameters Parameters     Radha Jones carry  320' 10#  320' 10 #             10# 320'     Dead lifts  Extensive manual and verbal cues    3 x 10 45#  5 x 45#    5 x 50#    3 x 5 55#   Rack pull   1 x 5 45#    1 x 5 50#   Rack pulls:    1 x 5 55#   1 x 3 60#    1 x 5 65#   1 x 3 70#  Rack pull    1 x 10 45#   1 x 5 55#   1 x 5 65#    Dead lift:             Walk back with bar     Pulling activity  10' 10 x 27#  30# 10 x     Hip extensor activation with purple band sit to stand    10 x        Sit to stand         10# 3 x 5                                             HEP Log Date 1. As above in T ex 10/13/2021   2.  10/29/2021   3. 10/29/2021   4.    5.           [employer] Portal  Treatment/Session Summary:    Response to Treatment: Increased lifting capacity, no pain complaints. Has a tendency to shift back on to heels during squat and dead lift compromising balance. Communication/Consultation:  Instructed to lift without shoes to improve balance.    Equipment provided today: HEP Handout   Recommendations/Intent for next treatment session:   Next visit will focus on [employer] strengthening Hip strengthening RTC strengthening range of motion left shoulder, manual techniques as needed, aerobic conditioning. Treatment Plan of Care Effective Dates: 10/7/2021 TO 12/12/2021 (60 days).   Frequency/Duration: 2 times a week for 60 Days             Total Treatment Billable Duration:  45    Rx   PT Patient Time In/Time Out  Time In: 2742  Time Out: [address], PT    Future Appointments   Date Time Provider Ramona Andrews   10/29/2021  9:00 AM Abby Anderson, PT [facility] [place] [facility]   11/3/2021  8:45 AM Abby Anderson, PT [facility]   11/5/2021  9:00 AM Abby Anderson, PT [facility]   11/10/2021  8:45 AM Abby Anderson, PT [facility]   11/12/2021  9:00 AM Abby Anderson, PT [facility]   11/17/2021  8:45 AM PapAbby zepeda, PT [facility]   11/19/2021  9:00 AM PapAbby zepeda, PT [facility]   11/29/2021 10:20 AM Narciso Duffy MD [facility]   2/28/2022  2:00 PM Josesito Ortiz MD [facility]

## 2021-11-03 ENCOUNTER — HOSPITAL ENCOUNTER (OUTPATIENT)
Dept: PHYSICAL THERAPY | Age: 71
Discharge: HOME | End: 2021-11-03
Attending: INTERNAL MEDICINE
Payer: MEDICARE

## 2021-11-03 PROCEDURE — 97110 THERAPEUTIC EXERCISES: CPT

## 2021-11-03 PROCEDURE — 97530 THERAPEUTIC ACTIVITIES: CPT

## 2021-11-03 NOTE — PROGRESS NOTES
Gaylin Jetty A. Paduano  : 1950  [place] / Product Type: Medicare /  [id number] at [address] [place]. [address], [address] [address]  [phone]   [phone]                                                          Oumar Ruiz MD      OUTPATIENT PHYSICAL THERAPY: Daily Treatment Note 11/3/2021 Visit Count:  8    Tx Diagnosis:  Pain in Left Shoulder (M25.512)  Stiffness of Left Shoulder not elsewhere specified (M25.612)  Pain in right knee (M25.561)  Pain in left knee (M25.562)   Muscle Weakness (M62.81)      Pre-treatment Symptoms/Complaints: Right knee sore today maybe weather   Pain: Initial:did not rate/10  Medications Last Reviewed:  11/3/2021     Post Session: did not rate/10   Updated Objective Findings: Can bend down to lift barbell without back pain. TREATMENT:   THERAPEUTIC EXERCISE: (12 minutes):  Exercises per grid below to improve mobility, strength and balance. Required minimal visual, verbal and manual cues to promote proper body alignment and promote proper body posture. Progressed resistance and complexity of movement as indicated.      Date:  10/13/2021 Date:  10/15/21 Date:  10/20/21 10/22/21 10/27/21 10/29/21 11/3/21   Activity/Exercise Parameters Parameters Parameters       Education HEP, POC, PT goals, anatomy/pathology, safe pain parameters  Benefits of strength training for health and wellness       swiffer stick  Flexion on top of counter 30 x    Circles CCW           scifit 6 min L 6 hills 9 min L 6 hills 10 min L 6 hills 8 min hills L 6  8 min  10 min L 6 hills 8 min L8   Shoulder shrugs 2 x 10 5 # 2 x 10 6#         Isometric bilateral ER band shoulder 10 x 10 x 15 sec holds With shoulder flexion to 45 degrees 3 x 10        Sit to stand 2 x 10 18\" table         Table shoulder flexion stretch sitting on stool  4 min  2 min        Dead lifts  20# 5 x  30# 5 x  45# 2 x 5 Lat activation and hip hinge   Yellow band on pole 3 x 10 extensive manual and verbal cues 3 x 10 yellow band on pole  Pole on back 30 x  3 x 10 ea pole and banded resistance 3 x 10 pole yellow band  3  X 10 yellow   Hip extensor activation with purple band sit to stand                          THERAPEUTIC ACTIVITY: ( 30 minutes): Activities per gid below to improve functional movement related mobility, strength and balance to improve neuro-muscular carryover to daily functional activities for improving patient's quality of life. Required visual, verbal and manual cues to promote proper body alignment and promote proper body posture/mechanics. Progressed resistance and complexity of movement as indicated. Date:  10/20/21 Date:  10/22/21 Date:  10/27/21 Date   10/29/21 Date  11/3/21   Activity/Exercise Parameters Parameters Parameters      Souleymane Cooler carry  320' 10#  65' 10 #             10# 65'   10# 65'    Dead lifts  Extensive manual and verbal cues    3 x 10 45#  5 x 45#    5 x 50#    3 x 5 55#   Rack pull   1 x 5 45#    1 x 5 50#   Rack pulls:    1 x 5 55#   1 x 3 60#    1 x 5 65#   1 x 3 70#  Rack pull    1 x 10 45#   1 x 5 55#   1 x 5 65#    Dead lift:    65# 3 x 5           Dead lift:    45# 1 x 10   65# 1 x 5   75# 1 x 3   in depth instruction    Walk back with bar     Pulling activity  10' 10 x 27#  30# 10 x       Hip extensor activation with purple band sit to stand    10 x          Sit to stand         10# 3 x 5   15# 2 x 10                                                HEP Log Date 1. As above in T ex 10/13/2021   2.  11/3/2021   3. 11/3/2021   4.    5.           [employer] Portal  Treatment/Session Summary:    Response to Treatment: Required indepth instruction for safe technique today, had difficulty achieving stable spine position during lifts. Reduced reps focus on techinque.    Communication/Consultation:  Review of Starting Strength dead lift videos   Equipment provided today: HEP Handout   Recommendations/Intent for next treatment session:   Next visit will focus on Quad strengthening Hip strengthening RTC strengthening range of motion left shoulder, manual techniques as needed, aerobic conditioning. Treatment Plan of Care Effective Dates: 10/7/2021 TO 12/12/2021 (60 days).   Frequency/Duration: 2 times a week for 60 Days             Total Treatment Billable Duration: 42    Rx   PT Patient Time In/Time Out  Time In: 0730  Time Out: 115 Angelique Zepeda, PT    Future Appointments   Date Time Provider Ramona Andrews   11/5/2021  9:00 AM Cherry Anderson, PT [facility]   11/10/2021  8:45 AM Cherry Anderson, PT [facility]   11/12/2021  9:00 AM Cherry Anderson, PT [facility]   11/17/2021  8:45 AM Cherry Anderson, PT [facility]   11/19/2021  9:00 AM Cherry Anderson, PT [facility]   11/29/2021 10:20 AM Mattie Marrufo MD [facility]   2/28/2022  2:00 PM Florie Oppenheim, MD [facility]

## 2021-11-05 ENCOUNTER — APPOINTMENT (OUTPATIENT)
Dept: PHYSICAL THERAPY | Age: 71
End: 2021-11-05
Attending: INTERNAL MEDICINE
Payer: MEDICARE

## 2021-11-10 ENCOUNTER — HOSPITAL ENCOUNTER (OUTPATIENT)
Dept: PHYSICAL THERAPY | Age: 71
Discharge: HOME | End: 2021-11-10
Attending: INTERNAL MEDICINE
Payer: MEDICARE

## 2021-11-10 PROCEDURE — 97110 THERAPEUTIC EXERCISES: CPT

## 2021-11-10 PROCEDURE — 97530 THERAPEUTIC ACTIVITIES: CPT

## 2021-11-10 NOTE — PROGRESS NOTES
Izabella Zunigaleisamaximo A. Paduano  : 1950  [place] / Product Type: Medicare /  Tiffanie Fling at [address] [place] [address]. [address], [id number] [place] [address] Road  [phone]   [phone]                                                          Keshav Vu MD      OUTPATIENT PHYSICAL THERAPY: Daily Treatment Note 11/10/2021 Visit Count:  9    Tx Diagnosis:  Pain in Left Shoulder (M25.512)  Stiffness of Left Shoulder not elsewhere specified (M25.612)  Pain in right knee (M25.561)  Pain in left knee (M25.562)   Muscle Weakness (M62.81)      Pre-treatment Symptoms/Complaints: Right knee sore today maybe weather   Pain: Initial:did not rate/10  Medications Last Reviewed:  11/10/2021     Post Session: did not rate/10   Updated Objective Findings: Can bend down to lift barbell without back pain. TREATMENT:   THERAPEUTIC EXERCISE: (12 minutes):  Exercises per grid below to improve mobility, strength and balance. Required minimal visual, verbal and manual cues to promote proper body alignment and promote proper body posture. Progressed resistance and complexity of movement as indicated.      Date:  10/13/2021 Date:  10/15/21 Date:  10/20/21 10/22/21 10/27/21 10/29/21 11/3/21 11/10/21   Activity/Exercise Parameters Parameters Parameters        Education HEP, POC, PT goals, anatomy/pathology, safe pain parameters  Benefits of strength training for health and wellness        swiffer stick  Flexion on top of counter 30 x    Circles CCW            scifit 6 min L 6 hills 9 min L 6 hills 10 min L 6 hills 8 min hills L 6  8 min  10 min L 6 hills 8 min L8 9 min L 8 hills   Shoulder shrugs 2 x 10 5 # 2 x 10 6#          Isometric bilateral ER band shoulder 10 x 10 x 15 sec holds With shoulder flexion to 45 degrees 3 x 10         Sit to stand 2 x 10 18\" table          Table shoulder flexion stretch sitting on stool  4 min  2 min         Dead lifts 20# 5 x  30# 5 x  45# 2 x 5         Lat activation and hip hinge   Yellow band on pole 3 x 10 extensive manual and verbal cues 3 x 10 yellow band on pole  Pole on back 30 x  3 x 10 ea pole and banded resistance 3 x 10 pole yellow band  3  X 10 yellow 2 x 10 yellow                    THERAPEUTIC ACTIVITY: ( 30 minutes): Activities per gid below to improve functional movement related mobility, strength and balance to improve neuro-muscular carryover to daily functional activities for improving patient's quality of life. Required visual, verbal and manual cues to promote proper body alignment and promote proper body posture/mechanics. Progressed resistance and complexity of movement as indicated. Date:  10/20/21 Date:  10/22/21 Date:  10/27/21 Date   10/29/21 Date  11/3/21 Date   11/10/21   Activity/Exercise Parameters Parameters Parameters       Ruben Savage carry  320' 10#  320' 8 #             10# 65'   10# 65'  15# 320'    Dead lifts  Extensive manual and verbal cues    3 x 10 45#  5 x 45#    5 x 50#    3 x 5 55#   Rack pull   1 x 5 45#    1 x 5 50#   Rack pulls:    1 x 5 55#   1 x 3 60#    1 x 5 65#   1 x 3 70#  Rack pull    1 x 10 45#   1 x 5 55#   1 x 5 65#    Dead lift:    65# 3 x 5           Dead lift:    45# 1 x 10   65# 1 x 5   75# 1 x 3   in depth instruction  Rack pull 45# 1 x 10    95# 1 x 5    100# 3 x 5     Walk back with bar     Pulling activity  10' 10 x 27#  30# 10 x      30# 10 x   Hip extensor activation with purple band sit to stand    10 x        10 x     Sit to stand         10# 3 x 5   15# 2 x 10  15# 1 x 10   1 x 5  17\"bench                                                     HEP Log Date 1.    As above in T ex 10/13/2021   2.  11/10/2021   3. 11/10/2021   4.    5.           [email or web address] Portal  Treatment/Session Summary:    Response to Treatment: Responds well to manual cues using band for hip extension, able to carry over muscle activation to rack pulls   Communication/Consultation:   stress positive achievements since beginning therapy. Equipment provided today: HEP Handout   Recommendations/Intent for next treatment session:   Next visit will focus on Quad strengthening Hip strengthening RTC strengthening range of motion left shoulder, manual techniques as needed, aerobic conditioning. Treatment Plan of Care Effective Dates: 10/7/2021 TO 12/12/2021 (60 days).   Frequency/Duration: 2 times a week for 60 Days             Total Treatment Billable Duration: 42    Rx   PT Patient Time In/Time Out  Time In: 0840  Time Out: 3630 [facility], PT    Future Appointments   Date Time Provider Ramona Andrews   11/12/2021  9:00 AM Silvia Anderson, PT [facility]   11/17/2021  8:45 AM Silvia Anderson, PT [facility]   11/19/2021  9:00 AM Miriam Johnson, PT [facility]   11/29/2021 10:20 AM Keshav Vu MD [facility]   2/28/2022  2:00 PM Nii Mendez MD [facility]

## 2021-11-12 ENCOUNTER — HOSPITAL ENCOUNTER (OUTPATIENT)
Dept: PHYSICAL THERAPY | Age: 71
Discharge: HOME | End: 2021-11-12
Attending: INTERNAL MEDICINE
Payer: MEDICARE

## 2021-11-12 PROCEDURE — 97110 THERAPEUTIC EXERCISES: CPT

## 2021-11-12 PROCEDURE — 97530 THERAPEUTIC ACTIVITIES: CPT

## 2021-11-12 NOTE — PROGRESS NOTES
Brenda Casco A. Paduano  : 1950      [place] / Product Type: Medicare /    [address] [place] [address] at [address] [place] [address]. [facility], [address], [facility], [address] [place] [address]  [phone]   [phone]              OUTPATIENT PHYSICAL THERAPY:Progress Report: 2021    ICD-10: Treatment Diagnosis:   Pain in Left Shoulder (M25.512)  Stiffness of Left Shoulder not elsewhere specified (M25.612)  Pain in right knee (M25.561)  Pain in left knee (M25.562)   Muscle Weakness (M62.81)              Precautions/Allergies:   Patient has no known allergies. Fall Risk Score: 6 (? 5 = High Risk)  MD Orders: Eval and Treat  MEDICAL/REFERRING DIAGNOSIS:  Seronegative arthritis   DATE OF ONSET: chronic  REFERRING PHYSICIAN: Luis Alfredo Mcallister MD  RETURN PHYSICIAN APPOINTMENT: TBD by patient      CURRENT ASSESSMENT:   Mr. Becki Galvan has improved on functional tests measuring walking speed and gross lower extremity strength performing a chair squat. However his self reported outcome scores have changed minimally at this time. The client has a long history of chronic pain and he understands that his function will improve prior to changes in pain sensitivity. Ely Galvan will benefit from continued skilled PT (medically necessary) in order to address above deficits affecting participation in basic ADLs and overall functional tolerance. PROBLEM LIST (Impacting functional limitations):  · Decreased Strength  · Decreased ADL/Functional Activities  · Increased Pain  · Decreased Activity Tolerance  · Increased Shortness of Breath  · Decreased Flexibility/Joint Mobility  · Decreased [place] with Home Exercise Program INTERVENTIONS PLANNED:  1. Cold  2. Family Education  3. Home Exercise Program (HEP)  4. Manual Therapy  5. Neuromuscular Re-education/Strengthening  6. Range of Motion (ROM)  7. Therapeutic Activites  8.  Therapeutic Exercise/Strengthening  9. Transfer Training  10. Ultrasound   TREATMENT PLAN:  Effective Dates: 10/7/2021 TO 12/6/2021 (60 days). Frequency/Duration: 2 times a week for 60 Days  GOALS: (Goals have been discussed and agreed upon with patient.)     Short-Term Goals~4 weeks  Goal Met   1. Robyn Kim will report <= minimal difficulty with don/doffing clothing as well as minimal/no difficulty. 1.  [] Date:   2. Robyn Kim will be able to carry 15 pounds in hands for 100 feet. 2.  [x] Date: 11/12/21   3. Robyn Kim will demonstrate demonstrate improvement in active left  shoulder flexion to >115 degrees to increase UE function and participation in ADLs. 3.  [] Date:   4. Robyn Kim will show a greater than 8 point decrease on the DASH in order to show an increase in upper extremity function. 4.  [] Date:   5. Drucilla Noel A. Paduano  Will improve TUG score by 3 seconds indicating improvement in gait ability. 5.  [x] Date: 11/12/21   6. Drucilla Noel A. Paduano  Will increase 30 sec STS test by 3 repetitions indicating increase in functional strength 6. [x] Date: 11/12/21         Long Term Goals~8 weeks Goal Met   1. Robyn Kim will improve KOOS score by 5 points indicating improvement of function. 1.  [] Date:   2. Robyn Kim will show a greater than 10 point decrease on the DASH in order to show an increase in upper extremity function 2. [] Date:   3. Robyn Kim will report doing hair/bathing/dressing with minimal difficulty  in order to be independent with ADL's 3. [x] Date: 11/12/21   4. Robyn Kim  Will be able to maintain walking speed within normative values for age. 4.  [x] Date: 11/12/21            Outcome Measure: Tool Used: Disabilities of the Arm, Shoulder and Hand (DASH) Questionnaire - Quick Version    Score:  Initial: 33/55  Most Recent: 31/55 (Date: 11/12/21 )   Interpretation of Score:  The DASH is designed to measure the activities of daily living in person's with upper extremity dysfunction or pain. Each section is scored on a 1-5 scale, 5 representing the greatest disability. The scores of each section are added together for a total score of 55. Tool Used: Knee Injury and Osteoarthritis Outcome Form (KOOS-JR.) 11/12/21  SCORE: None (0) Mild (1) Moderate (2) Severe (3) Extreme (4)   Stiffness:         1. How severe is your knee stiffness after first waking in the morning? [] [] [x] [] []   Pain:         What amount of knee pain have you experienced in the last week   doing the following activities? 2. Twisting/pivoting on your knee: [] [] [] [x] []   3. Straightening knee fully: [] [] [x] [] []   4. Going up or down stairs [] [] [] [] [x]   5. Standing upright [] [] [x] [] []   Function, daily living        6. Rising from sitting [] [] [x] [] []   7. Bending to floor/ an object [] [] [] [x] []     Score:  Initial: 16 (Interval: 47.487) 10/7/2021/28 Most Recent: 17/28 (Interval: 44.905) Date: 11/12/21      Interpretation of Score: Questions each scored on a 4 point scale with 4 representing the worst possible score and 0 representing the best possible score. The higher the point total, the worse the knee pain translating to a lower percentage of patient functioning. Medical Necessity:   · Skilled intervention continues to be required due to deficits and impairments seen upon initial evaluation affecting patient's participation in ADLs and functional tasks. Reason for Services/Other Comments:  · Patient continues to require skilled intervention due to deficits and impairments seen upon initial evaluation affecting patient's participation in ADLs and functional tasks. Total Treatment Duration: 40 min       Rehabilitation Potential For Stated Goals: good  Regarding Estrella Schuller A. Paduano's therapy, I certify that the treatment plan above will be carried out by a therapist or under their direction.   Thank you for this referral,  Niels Watson PT     Referring Physician Signature: Jhoana Rose MD _______________________________ Date _____________            HISTORY:   History of Present Injury/Illness (Reason for Referral): Was diagnosed with RA 7 years ago. He retired due to difficulty performing job duties. Joint pain left shoulder, bilateral hand, ankles, feet and knees. Does some yard work, push mower, plays some with his grandchildren. Has fallen several times in his yard this summer. Used to walk on a regular basis, but not in two years.    -Present symptoms/complaints (on day of reevaluation) Multiple joint pains, left shoulder stiffness, difficulty walking, feels unsteady. Pain Scale:  · Current: 4/10  · Best: 2/10  · Worst: 5/10 in last week  ·     · Aggravating factors: Lifting, Overhead activities, Reaching for wallet, push/pull and walking, squatting, stairs  · Relieving factors: exercising  · Irritability: Medium (Onset of pain is equal to alleviation)    Dominant Side:  left  Past Medical History/Comorbidities:   Mr. Kirill Bush  has a past medical history of Acquired hypothyroidism (3/27/2016), Acute deep vein thrombosis (DVT) ([address] Bilateral knee pain (05/2020), Cataracts, bilateral, Chronic deep vein thrombosis (DVT) of popliteal vein of right lower extremity ([address] (4/12/2021), Chronic pain of both knees (4/12/2021), COVID-19 vaccine series declined (2021), Depression (3/20/2015), Diabetic eye exam ([address] (08/26/2020), Eye exam, routine (09/2020), Graves disease, Hypertension, Obesity (BMI 30.0-34.9) (3/27/2016), ITZ on CPAP (7/25/2018), Psychiatric disorder, Rheumatoid arthritis(714.0), Thyroid disease, and Uncontrolled diabetes mellitus ([address]  He also has no past medical history of Aneurysm ([address] Arrhythmia, Asthma, CAD (coronary artery disease), Cancer ([facility] Chronic kidney disease, Chronic obstructive pulmonary disease ([facility] Coagulation disorder ([facility] Endocarditis, GERD (gastroesophageal reflux disease), Heart failure ([facility] Ill-defined condition, Liver disease, PUD (peptic ulcer disease), Rheumatic fever, Seizures ([facility] or Stroke ([facility] Mr. Kwesi Sanchez  has a past surgical history that includes hx knee arthroscopy (Left, 1990s); hx bunionectomy (Left, 12/2014); and hx cataract removal (Bilateral). Social History/Living Environment:    retired, , no stairs  Prior Level of Function/Work/Activity:  was walking daily until two years ago. Current Medications:    Current Outpatient Medications:     levothyroxine (SYNTHROID) 150 mcg tablet, Take 1 Tablet by mouth Daily (before breakfast). , Disp: 90 Tablet, Rfl: 3    methotrexate (RHEUMATREX) 2.5 mg tablet, Take 10 Tablets by mouth every Tuesday. , Disp: 40 Tablet, Rfl: 0    metFORMIN (GLUCOPHAGE) 1,000 mg tablet, TAKE ONE TABLET BY MOUTH TWICE A DAY WITH A MEAL, Disp: 180 Tablet, Rfl: 3    hydrOXYchloroQUINE (PlaqueniL) 200 mg tablet, Take 1 Tablet by mouth two (2) times a day., Disp: 180 Tablet, Rfl: 1    atorvastatin (LIPITOR) 20 mg tablet, Take 1 Tablet by mouth nightly., Disp: 90 Tablet, Rfl: 3    warfarin (COUMADIN) 5 mg tablet, 5mg Qhs with 7.5mg Sosa, Tu, Th, Saturday, Disp: 114 Tablet, Rfl: 3    folic acid (FOLVITE) 1 mg tablet, Take 1 tablet by mouth once daily, Disp: 90 Tablet, Rfl: 1    sertraline (ZOLOFT) 100 mg tablet, Take 1 Tab by mouth daily. , Disp: 90 Tab, Rfl: 3    amitriptyline (ELAVIL) 25 mg tablet, Take 1 Tab by mouth nightly., Disp: 90 Tab, Rfl: 3    mupirocin (BACTROBAN) 2 % ointment, APPLY OINTMENT TO WOUND DAILY UNDER BANDAGE UNTIL HEALED., Disp: , Rfl:     amLODIPine (NORVASC) 5 mg tablet, TAKE ONE TABLET BY MOUTH ONE TIME DAILY, Disp: 90 Tab, Rfl: 3    lisinopriL (PRINIVIL, ZESTRIL) 20 mg tablet, TAKE ONE TABLET BY MOUTH ONE TIME DAILY, Disp: 90 Tab, Rfl: 3    glimepiride (AMARYL) 4 mg tablet, Take 1 Tab by mouth two (2) times a day., Disp: 180 Tab, Rfl: 3           Date Last Reviewed:  11/12/2021   EXAMINATION: Observation/Orthostatic Postural Assessment: Forward Head and Rounded Shoulders, decreased lumbar lordosis, arthritic changes bilateral hands.     ROM:    AROM/PROM         Joint: Eval Date: 10/7/2021  Re-Assess Date: 11/12/21  Re-Assess Date:    ACTIVE ROM (standing) RIGHT LEFT RIGHT LEFT RIGHT LEFT   Lumbar flexion  To ankles            Lumbar extension Scapula to heels            Knee flexion 125 120           Knee extension 10  0           Hip IR/ER 20/45 20/45           Hip flexion 110 105           HIP abduction 40 40           Hip extension 5 5           Shoulder Flexion 160 100  170  110       Shoulder Abduction 160 80  170  114       Shoulder Internal Rotation (Apley's) T 10 sacrum  T 10  L 5       Shoulder External Rotation (Apley's) T2 C7  T2   C 7       Elbow ROM 0/140 0/ 140 ext/flex           PASSIVE ROM (supine)             Shoulder Flexion             Shoulder Abduction             Shoulder Internal Rotation     [facility]    Shoulder External Rotation     [facility]               [facility]            Strength:    Joint: Eval Date: 10/7/2021  Re-Assess Date: 11/12/21  Re-Assess Date:     RIGHT LEFT RIGHT LEFT RIGHT LEFT   Shoulder Flexion 4/5 3/5  4+  3+       Shoulder Abduction  (C5) 4/5 3-/5  4  3       Shoulder Internal Rotation 4+/5 4-/5  4+  4-       Shoulder External Rotation 3+/5 3-/5  3+  3       Elbow Flexion  (C6) 5/5 4/5  5  4+       Elbow Extension (C7) 4+/5 4-/5  5-  4       Wrist Flexion (C7) 5/5 5/5           Wrist Extension (C6) 5/5 5/5           Resisted Thumb Extension/Finger Abduction (C8/T1)             Resisted Cervical Rotation (C1):             Resisted Shoulder Shrug (C2, 3, 4):  4=  /5 4-/5  4+  4+        Strength 78 lbs 70 lbs   82  75       Knee extension 4/5 4/5  4+  4+       Knee flexion 4/5 4/5  4  4       Hip abduction 3+/5 3+/5  4-  4-    Hip  extension 3+/5 3+/5  4-  4-       Plantar flexion  3+ 3+  3+  3+               Special Tests:     Weakness of  Left shoulder rotator cuff    Neurological Screen: Assessed @ Initial Visit    Radiating symptoms? Yes/No=glenohumeral region left   Functional Mobility:  Assessed @ Initial Visit   TUG: 10.21 seconds (10/7/21)             7.53   30 sec Sit to Stand test: 7 x no hands (10/7/21)                                         11 x               (11/12/21)        Body Structures Involved:    1. Joints  2. Muscles   Body Functions Affected:  1. Sensory/Pain  2. Neuromusculoskeletal  3. Movement Related Activities and Participation Affected:  1. Mobility  2. Self Care     See associated treatment note for treatment provided today.     Future Appointments   Date Time Provider Ramona Andrews   11/17/2021  8:45 AM Fracisco Anderson, [facility]   11/19/2021  9:00 AM Malick Cunningham PT [facility]   11/29/2021 10:20 AM Lucianne Fabry, MD [facility]   2/28/2022  2:00 PM Venessa Deal MD [facility] OBDULIA Anderson, SHAWN            remember to save as todd

## 2021-11-12 NOTE — PROGRESS NOTES
Abbie Christmas A. Paduano  : 1950  [place] / Product Type: Medicare /  Suad Pilon at [address] [facility]. Doc Drew, [address], [address]  [phone]   [phone]                                                          Katelyn Crystal MD      OUTPATIENT PHYSICAL THERAPY: Daily Treatment Note 2021 Visit Count:  10    Tx Diagnosis:  Pain in Left Shoulder (M25.512)  Stiffness of Left Shoulder not elsewhere specified (M25.612)  Pain in right knee (M25.561)  Pain in left knee (M25.562)   Muscle Weakness (M62.81)      Pre-treatment Symptoms/Complaints: See progress note   Pain: Initial:did not rate/10  Medications Last Reviewed:  2021     Post Session:  See progress note   Updated Objective Findings: Can bend down to lift barbell without back pain. TREATMENT:   THERAPEUTIC EXERCISE: (12 minutes):  Exercises per grid below to improve mobility, strength and balance. Required minimal visual, verbal and manual cues to promote proper body alignment and promote proper body posture. Progressed resistance and complexity of movement as indicated.      Date:  10/13/2021 Date:  10/15/21 Date:  10/20/21 10/22/21 10/27/21 10/29/21 11/3/21 11/10/21 11/12/21   Activity/Exercise Parameters Parameters Parameters         Education HEP, POC, PT goals, anatomy/pathology, safe pain parameters  Benefits of strength training for health and wellness         swiffer stick  Flexion on top of counter 30 x    Circles CCW             scifit 6 min L 6 hills 9 min L 6 hills 10 min L 6 hills 8 min hills L 6  8 min  10 min L 6 hills 8 min L8 9 min L 8 hills 8 min   L 8 hills   Shoulder shrugs 2 x 10 5 # 2 x 10 6#           Isometric bilateral ER band shoulder 10 x 10 x 15 sec holds With shoulder flexion to 45 degrees 3 x 10          Sit to stand 2 x 10 18\" table           Table shoulder flexion stretch sitting on stool  4 min  2 min Dead lifts  20# 5 x  30# 5 x  45# 2 x 5          Lat activation and hip hinge   Yellow band on pole 3 x 10 extensive manual and verbal cues 3 x 10 yellow band on pole  Pole on back 30 x  3 x 10 ea pole and banded resistance 3 x 10 pole yellow band  3  X 10 yellow 2 x 10 yellow 2 x 10                      THERAPEUTIC ACTIVITY: ( 30 minutes): Activities per gid below to improve functional movement related mobility, strength and balance to improve neuro-muscular carryover to daily functional activities for improving patient's quality of life. Required visual, verbal and manual cues to promote proper body alignment and promote proper body posture/mechanics. Progressed resistance and complexity of movement as indicated. Date:  10/20/21 Date:  10/22/21 Date:  10/27/21 Date   10/29/21 Date  11/3/21 Date   11/10/21 Date  11/12/21   Activity/Exercise Parameters Parameters Parameters        Serge Alley carry  320' 10#  320' 8 #             10# 65'   10# 65'  15# 320'  20 # 320'    Dead lifts  Extensive manual and verbal cues    3 x 10 45#  5 x 45#    5 x 50#    3 x 5 55#   Rack pull   1 x 5 45#    1 x 5 50#   Rack pulls:    1 x 5 55#   1 x 3 60#    1 x 5 65#   1 x 3 70#  Rack pull    1 x 10 45#   1 x 5 55#   1 x 5 65#    Dead lift:    65# 3 x 5           Dead lift:    45# 1 x 10   65# 1 x 5   75# 1 x 3   in depth instruction  Rack pull 45# 1 x 10    95# 1 x 5    100# 3 x 5   Rack pull   45# 10 x       Walk back with bar     Pulling activity  10' 10 x 27#  30# 10 x      30# 10 x  30# 10 x    Hip extensor activation with purple band sit to stand    10 x        10 x   10 x     Sit to stand         10# 3 x 5   15# 2 x 10  15# 1 x 10   1 x 5  17\"bench   3 x 10                                        45#                  HEP Log Date 1.    As above in T ex 10/13/2021   2.  11/12/2021   3. 11/12/2021   4.    5.           [facility] Portal  Treatment/Session Summary:    Response to Treatment: Responds well to manual cues using band for hip extension, able to carry over muscle activation to rack pulls. See progress note   Communication/Consultation:   stress positive achievements since beginning therapy. Equipment provided today: HEP Handout   Recommendations/Intent for next treatment session:   Next visit will focus on Quad strengthening Hip strengthening RTC strengthening range of motion left shoulder, manual techniques as needed, aerobic conditioning. Treatment Plan of Care Effective Dates: 10/7/2021 TO 12/12/2021 (60 days).   Frequency/Duration: 2 times a week for 60 Days             Total Treatment Billable Duration: 42    Rx      Desirae Rosario, PT    Future Appointments   Date Time Provider Ramona Andrews   11/17/2021  8:45 AM Cherry Anderson, PT [facility]   11/19/2021  9:00 AM Anjel Coburn, PT [facility]   11/29/2021 10:20 AM Mattie Marrufo MD [facility]   2/28/2022  2:00 PM Florie Oppenheim, MD [facility]

## 2021-11-17 ENCOUNTER — HOSPITAL ENCOUNTER (OUTPATIENT)
Dept: PHYSICAL THERAPY | Age: 71
Discharge: HOME | End: 2021-11-17
Attending: INTERNAL MEDICINE
Payer: MEDICARE

## 2021-11-17 PROCEDURE — 97110 THERAPEUTIC EXERCISES: CPT

## 2021-11-17 PROCEDURE — 97530 THERAPEUTIC ACTIVITIES: CPT

## 2021-11-17 NOTE — PROGRESS NOTES
Anell Peaks A. Paduano  : 1950  [place] / Product Type: Medicare /  [address] [place] [address] at [address] [place] [address]. Nata ConcepcionJayce, [address], [address] [place] [address]  [phone]   [phone]                                                          Jaye Tirado MD      OUTPATIENT PHYSICAL THERAPY: Daily Treatment Note 2021 Visit Count:  11    Tx Diagnosis:  Pain in Left Shoulder (M25.512)  Stiffness of Left Shoulder not elsewhere specified (M25.612)  Pain in right knee (M25.561)  Pain in left knee (M25.562)   Muscle Weakness (M62.81)      Pre-treatment Symptoms/Complaints: See progress note   Pain: Initial:did not rate/10  Medications Last Reviewed:  2021     Post Session:  See progress note   Updated Objective Findings: Can bend down to lift barbell without back pain. TREATMENT:   THERAPEUTIC EXERCISE: (15 minutes):  Exercises per grid below to improve mobility, strength and balance. Required minimal visual, verbal and manual cues to promote proper body alignment and promote proper body posture. Progressed resistance and complexity of movement as indicated. 10/29/21 11/3/21 11/10/21 11/12/21 11/17/21     Activity/Exercise        Education     Benefits of strength training video   Ring press range of motion     20 x   scifit 10 min L 6 hills 8 min L8 9 min L 8 hills 8 min   L 8 hills 8 min    Shoulder shrugs        Isometric bilateral ER band shoulder        Sit to stand        Table shoulder flexion stretch sitting on stool        Dead lifts        Lat activation and hip hinge 3 x 10 pole yellow band  3  X 10 yellow 2 x 10 yellow 2 x 10     L stretch     5 x         THERAPEUTIC ACTIVITY: (40 minutes): Activities per gid below to improve functional movement related mobility, strength and balance to improve neuro-muscular carryover to daily functional activities for improving patient's quality of life.  Required visual, verbal and manual cues to promote proper body alignment and promote proper body posture/mechanics. Progressed resistance and complexity of movement as indicated. Date   10/29/21 Date  11/3/21 Date   11/10/21 Date  11/12/21 Date  11/17/21   Activity/Exercise         Ranell Ink carry  10# 65'   10# 320'  15# 320'  20 # 65'      Dead lifts  Rack pull    1 x 10 45#   1 x 5 55#   1 x 5 65#    Dead lift:    65# 3 x 5           Dead lift:    45# 1 x 10   65# 1 x 5   75# 1 x 3   in depth instruction  Rack pull 45# 1 x 10    95# 1 x 5    100# 3 x 5   Rack pull   45# 10 x   65#3 x   85# 2 x   95# 1 x   100# 3 x 5  Rack pull 45# 10 x   65# 5x    DL 75# 3 x   Rack pull 85# 2 x   100# 3 x 5    Walk back with bar       30# 10 x  30# 10 x   30# 10 x   Hip extensor activation with purple band sit to stand      10 x   10 x       Sit to stand   10# 3 x 5   15# 2 x 10  15# 1 x 10   1 x 5  17\"bench   3 x 10   20# 5 x    30# 2 x 17\"    30# 2 x 10 20.5\" table    Suit case carry           20#  140' ea side                                     HEP Log Date 1. As above in T ex 10/13/2021   2.  11/17/2021   3. 11/17/2021   4.    5.           [employer] Portal  Treatment/Session Summary:    Response to Treatment: Patient has difficutly with maintaining correct form with DL and has some back soreness, therefore went back to rack pull. Tried progressing to 30# squat from bench. Too difficult, had to go to higher table to complete. Balance and strength continue to be issues. Needs close supervision during lifts. Communication/Consultation:   patient watched [employer] video by [employer] called [employer] on the benefits of weight training during his bike session. Talked today about long term plans for his ex once therapy ends. Recommended looking into the [facility].    Equipment provided today: None today   Recommendations/Intent for next treatment session:   Next visit will focus on Quad strengthening Hip strengthening RTC strengthening range of motion left shoulder,  aerobic conditioning. Treatment Plan of Care Effective Dates: 10/7/2021 TO 12/12/2021 (60 days).   Frequency/Duration: 2 times a week for 60 Days             Total Treatment Billable Duration: 55    Rx   PT Patient Time In/Time Out  Time In: 0935  Time Out: [facility], PT    Future Appointments   Date Time Provider Ramona Juliana   11/19/2021  9:00 AM Fiona Hassan PT [facility]   11/29/2021 10:20 AM Franklyn Hunt MD [facility]   2/28/2022  2:00 PM Heather Duffy MD [facility]

## 2021-11-19 ENCOUNTER — APPOINTMENT (OUTPATIENT)
Dept: PHYSICAL THERAPY | Age: 71
End: 2021-11-19
Attending: INTERNAL MEDICINE
Payer: MEDICARE

## 2021-11-22 ENCOUNTER — HOSPITAL ENCOUNTER (OUTPATIENT)
Dept: PHYSICAL THERAPY | Age: 71
Discharge: HOME | End: 2021-11-22
Attending: INTERNAL MEDICINE
Payer: MEDICARE

## 2021-11-22 PROCEDURE — 97110 THERAPEUTIC EXERCISES: CPT

## 2021-11-22 PROCEDURE — 97530 THERAPEUTIC ACTIVITIES: CPT

## 2021-11-22 NOTE — PROGRESS NOTES
Nerissa Delaine A. Paduano  : 1950  [place] / Product Type: Medicare /  [address] at [address] [facility]. Nya Tessy, [address], [address]  [phone]   [phone]                                                          Olivia Arcos MD      OUTPATIENT PHYSICAL THERAPY: Daily Treatment Note 2021 Visit Count:  12    Tx Diagnosis:  Pain in Left Shoulder (M25.512)  Stiffness of Left Shoulder not elsewhere specified (M25.612)  Pain in right knee (M25.561)  Pain in left knee (M25.562)   Muscle Weakness (M62.81)      Pre-treatment Symptoms/Complaints: See progress note   Pain: Initial:did not rate/10  Medications Last Reviewed:  2021     Post Session:  See progress note   Updated Objective Findings: Can bend down to lift barbell without back pain. TREATMENT:   THERAPEUTIC EXERCISE: (15 minutes):  Exercises per grid below to improve mobility, strength and balance. Required minimal visual, verbal and manual cues to promote proper body alignment and promote proper body posture. Progressed resistance and complexity of movement as indicated. 10/29/21 11/3/21 11/10/21 11/12/21 11/17/21   11/22/21   Activity/Exercise         Education     Benefits of strength training video    Ring press range of motion     20 x    scifit 10 min L 6 hills 8 min L8 9 min L 8 hills 8 min   L 8 hills 8 min  8 min    Shoulder shrugs         Isometric bilateral ER band shoulder         Sit to stand         Table shoulder flexion stretch sitting on stool         Dead lifts         Lat activation and hip hinge 3 x 10 pole yellow band  3  X 10 yellow 2 x 10 yellow 2 x 10   10 x  Red band   L stretch     5 x 5 x   Shuttle squat      100# 3 x 10          THERAPEUTIC ACTIVITY: (25 minutes):  Activities per gid below to improve functional movement related mobility, strength and balance to improve neuro-muscular carryover to daily functional activities for improving patient's quality of life. Required visual, verbal and manual cues to promote proper body alignment and promote proper body posture/mechanics. Progressed resistance and complexity of movement as indicated. Date   10/29/21 Date  11/3/21 Date   11/10/21 Date  11/12/21 Date  11/17/21 Date  11/22/21   Activity/Exercise          Chencho Berkowitz carry  10# 65'   10# 320'  15# 320'  20 # 320'    20# 320'     Dead lifts  Rack pull    1 x 10 45#   1 x 5 55#   1 x 5 65#    Dead lift:    65# 3 x 5           Dead lift:    45# 1 x 10   65# 1 x 5   75# 1 x 3   in depth instruction  Rack pull 45# 1 x 10    95# 1 x 5    100# 3 x 5   Rack pull   45# 10 x   65#3 x   85# 2 x   95# 1 x   100# 3 x 5  Rack pull 45# 10 x   65# 5x    DL 75# 3 x   Rack pull 85# 2 x   100# 3 x 5  Rack pull   45# 10 x   75# 4 x    95# 3 x     Walk back with bar       30# 10 x  30# 10 x   30# 10 x  27# 10 x   Hip extensor activation with purple band sit to stand      10 x   10 x         Sit to stand   10# 3 x 5   15# 2 x 10  15# 1 x 10   1 x 5  17\"bench   3 x 10   20# 5 x    30# 2 x 17\"    30# 2 x 10 20.5\" table  25# 5 x   30# 3 x 5     Suit case carry           20#  140' ea side                                         HEP Log Date 1. As above in T ex 10/13/2021   2.  11/22/2021   3. 11/22/2021   4.    5.           "[employer] Portal  Treatment/Session Summary:    Response to Treatment: Mild complaints of soreness left shoulder   Communication/Consultation:   patient    Equipment provided today: None today   Recommendations/Intent for next treatment session:   Next visit will focus on Quad strengthening Hip strengthening RTC strengthening range of motion left shoulder,  aerobic conditioning. Treatment Plan of Care Effective Dates: 10/7/2021 TO 12/12/2021 (60 days).   Frequency/Duration: 2 times a week for 60 Days             Total Treatment Billable Duration: 40    Rx   PT Patient Time In/Time Out  Time In: 6985  Time Out: [address], PT    Future Appointments   Date Time Provider Ramona Andrews   11/29/2021 10:20 AM Gail Buck MD [address] [facility]   12/1/2021  9:15 AM Pap[facility] Sacks[facility]   12/8/2021  9:15 AM Papenfuss, Reina Sacks, PT [facility]   12/10/2021  9:45 AM Papenfuss, Reina Sacks, PT [facility]   2/28/2022  2:00 PM Mariza Garsia MD [facility]

## 2021-12-01 ENCOUNTER — HOSPITAL ENCOUNTER (OUTPATIENT)
Dept: PHYSICAL THERAPY | Age: 71
Discharge: HOME | End: 2021-12-01
Attending: INTERNAL MEDICINE
Payer: MEDICARE

## 2021-12-01 PROCEDURE — 97530 THERAPEUTIC ACTIVITIES: CPT

## 2021-12-01 PROCEDURE — 97110 THERAPEUTIC EXERCISES: CPT

## 2021-12-01 NOTE — PROGRESS NOTES
Soco Amend A. Paduano  : 1950  [place] / Product Type: Medicare /  Rober Goldberg at [address] [place] [address]. [facility], [address] [place] [address]  [phone]   [phone]                                                          Gail Buck MD      OUTPATIENT PHYSICAL THERAPY: Daily Treatment Note 2021 Visit Count:  13    Tx Diagnosis:  Pain in Left Shoulder (M25.512)  Stiffness of Left Shoulder not elsewhere specified (M25.612)  Pain in right knee (M25.561)  Pain in left knee (M25.562)   Muscle Weakness (M62.81)      Pre-treatment Symptoms/Complaints: Saw his MD this week, told her that her feels the best he has in years. Pain: Initial:did not rate/10  Medications Last Reviewed:  2021     Post Session:  No complaints   Updated Objective Findings: Can bend down to lift barbell without back pain. TREATMENT:   THERAPEUTIC EXERCISE: (15 minutes):  Exercises per grid below to improve mobility, strength and balance. Required minimal visual, verbal and manual cues to promote proper body alignment and promote proper body posture. Progressed resistance and complexity of movement as indicated. 10/29/21 11/3/21 11/10/21 11/12/21 11/17/21   11/22/21 12/1/21   Activity/Exercise          Education     Benefits of strength training video     Ring press range of motion     20 x     scifit 10 min L 6 hills 8 min L8 9 min L 8 hills 8 min   L 8 hills 8 min  8 min  8 min L 8   Shoulder shrugs          Isometric bilateral ER band shoulder          Sit to stand          Table shoulder flexion stretch sitting on stool          Dead lifts          Lat activation and hip hinge 3 x 10 pole yellow band  3  X 10 yellow 2 x 10 yellow 2 x 10   10 x  Red band    L stretch     5 x 5 x 10 x    Shuttle squat      100# 3 x 10  125# 3 x 10          THERAPEUTIC ACTIVITY: (25 minutes):  Activities per gid below to improve functional movement related mobility, strength and balance to improve neuro-muscular carryover to daily functional activities for improving patient's quality of life. Required visual, verbal and manual cues to promote proper body alignment and promote proper body posture/mechanics. Progressed resistance and complexity of movement as indicated. Date  11/17/21 Date  11/22/21 Date  12/1/21   Activity/Exercise       Rony Apple carry    20# 65'   20# 26'    Dead lifts  Rack pull 45# 10 x   65# 5x    DL 75# 3 x   Rack pull 85# 2 x   100# 3 x 5  Rack pull   45# 10 x   75# 4 x    95# 3 x   45# 10 x    65# 3 x   85# 2 x    100# 3 x 5    Walk back with bar   30# 10 x  27# 10 x  30# 15 x   Hip extensor activation with purple band sit to stand          Sit to stand   20# 5 x    30# 2 x 17\"    30# 2 x 10 20.5\" table  25# 5 x   30# 3 x 5   15# 10 x   20# 3 x 5    Suit case carry   20#  140' ea side       15# 140' ea                              HEP Log Date 1. As above in T ex 10/13/2021   2.  12/1/2021   3. 12/1/2021   4.    5.           [employer] Portal  Treatment/Session Summary:    Response to Treatment: No pain complaints   Communication/Consultation:   patient    Equipment provided today: None today   Recommendations/Intent for next treatment session:   Next visit will focus on Quad strengthening Hip strengthening RTC strengthening range of motion left shoulder,  aerobic conditioning. Treatment Plan of Care Effective Dates: 10/7/2021 TO 12/12/2021 (60 days).   Frequency/Duration: 2 times a week for 60 Days             Total Treatment Billable Duration: 40    Rx   PT Patient Time In/Time Out  Time In: 0915  Time Out: [address] [facility], PT    Future Appointments   Date Time Provider Ramona Andrews   12/8/2021  9:15 AM Isabelle Anderson, PT [facility]   12/10/2021  9:45 AM Isabelle Anderson, PT [facility]   2/14/2022  9:30 AM Dameon Alonso , 3 [facility]   2/21/2022 1:20 PM Angela Sunshine MD [facility]   2/28/2022  2:00 PM Kallie Ayala MD [place] [facility]

## 2021-12-08 ENCOUNTER — HOSPITAL ENCOUNTER (OUTPATIENT)
Dept: PHYSICAL THERAPY | Age: 71
Discharge: HOME | End: 2021-12-08
Attending: INTERNAL MEDICINE
Payer: MEDICARE

## 2021-12-08 PROCEDURE — 97530 THERAPEUTIC ACTIVITIES: CPT

## 2021-12-08 PROCEDURE — 97110 THERAPEUTIC EXERCISES: CPT

## 2021-12-10 ENCOUNTER — HOSPITAL ENCOUNTER (OUTPATIENT)
Dept: PHYSICAL THERAPY | Age: 71
Discharge: HOME | End: 2021-12-10
Attending: INTERNAL MEDICINE
Payer: MEDICARE

## 2021-12-10 PROCEDURE — 97110 THERAPEUTIC EXERCISES: CPT

## 2021-12-10 NOTE — THERAPY EVALUATION
Garnet Hutchinson A. Paduano  : 1950      [place] / [place] / Product Type: Medicare /    [id number] at [place] [address]. [facility], [address] [place] [address], [address] [place] [address]  [phone]   [phone]              OUTPATIENT PHYSICAL THERAPY:Initial Assessment: 12/10/2021    ICD-10: Treatment Diagnosis:   Pain in Left Shoulder (M25.512)  Stiffness of Left Shoulder not elsewhere specified (M25.612)  Pain in right knee (M25.561)  Pain in left knee (M25.562)   Muscle Weakness (M62.81)              Precautions/Allergies:   Patient has no known allergies. Fall Risk Score: 5 (? 5 = High Risk) fell a month ago  MD Orders: Eval and Treat  MEDICAL/REFERRING DIAGNOSIS:  Seronegative arthritis   DATE OF ONSET: chronic  REFERRING PHYSICIAN: Price Sauceda MD  RETURN PHYSICIAN APPOINTMENT: TBD by patient      INITIAL ASSESSMENT:   Mr. Key Moody has been discharged to a independent gym program. He has made excellent progress and achieved the majority of his goals. GOALS: (Goals have been discussed and agreed upon with patient.)     Short-Term Goals~4 weeks  Goal Met   1. Karlos Moody will report <= minimal difficulty with don/doffing clothing as well as minimal/no difficulty. 1.  [x] Date: 12/10/21   2. Karlos Moody will be able to carry 15 pounds in hands for 100 feet. 2.  [x] Date:   3. Karlos Moody will demonstrate demonstrate improvement in active left  shoulder flexion to >115 degrees to increase UE function and participation in ADLs. 3.  [x] Date:   4. Karlos Moody will show a greater than 8 point decrease on the DASH in order to show an increase in upper extremity function. 4.  [] Date:   5. Garnet Hutchinson A. Paduano  Will improve TUG score by 3 seconds indicating improvement in gait ability. 5.  [x] Date:   6. Garnet Hutchinson A. Paduano  Will increase 30 sec STS test by 3 repetitions indicating increase in functional strength 6.   [x] Date: Long Term Goals~8 weeks Goal Met   1. Keith Gayle will improve KOOS score by 5 points indicating improvement of function. 1.  [x] Date: 12/10/21   2. Keith Gayle will show a greater than 10 point decrease on the DASH in order to show an increase in upper extremity function 2. [] Date:   3. Keith Gayle will report doing hair/bathing/dressing with minimal difficulty  in order to be independent with ADL's 3. [x] Date:   4. Keith Gayle  Will be able to maintain walking speed within normative values for age. 4.  [x] Date:            Outcome Measure: Tool Used: Disabilities of the Arm, Shoulder and Hand (DASH) Questionnaire - Quick Version    Score:  Initial: 33/55  Most Recent:28/55 (Date:12/10/21)   Interpretation of Score: The DASH is designed to measure the activities of daily living in person's with upper extremity dysfunction or pain. Each section is scored on a 1-5 scale, 5 representing the greatest disability. The scores of each section are added together for a total score of 55. Tool Used: Knee Injury and Osteoarthritis Outcome Form (KOOS-JR.)  SCORE: None (0) Mild (1) Moderate (2) Severe (3) Extreme (4)   Stiffness:         1. How severe is your knee stiffness after first waking in the morning? [] [] [x] [] []   Pain:         What amount of knee pain have you experienced in the last week   doing the following activities? 2. Twisting/pivoting on your knee: [] [] [x] [] []   3. Straightening knee fully: [] [] [x] [] []   4. Going up or down stairs [] [] [] [x] []   5. Standing upright [x] [] [] [] []   Function, daily living        6. Rising from sitting [] [x] [] [] []   7.  Bending to floor/ an object [] [x] [] [] []     Score:  Initial: 16 (Interval: 47.487) 10/7/2021 Most Recent: 11 (Interval: 59.381) Date: 12/10/21   Interpretation of Score: Questions each scored on a 4 point scale with 4 representing the worst possible score and 0 representing the best possible score. The higher the point total, the worse the knee pain translating to a lower percentage of patient functioning. Thank you for this referral,  Shania Eller PT     Referring Physician Signature: Sonia Leon MD             HISTORY:   History of Present Injury/Illness (Reason for Referral): Was diagnosed with RA 7 years ago. He retired due to difficulty performing job duties. Joint pain left shoulder, bilateral hand, ankles, feet and knees. Does some yard work, push mower, plays some with his grandchildren. Has fallen several times in his yard this summer. Used to walk on a regular basis, but not in two years.    -Present symptoms/complaints (on day of evaluation) Multiple joint pains, left shoulder stiffness, difficulty walking, feels unsteady. Pain Scale:  · Current: 4/10  · Best: 1/10  · Worst: 7/10    · Aggravating factors: Lifting, Overhead activities and squatting  · Relieving factors: exercising  · Irritability: Medium (Onset of pain is equal to alleviation)    Dominant Side:  left  Past Medical History/Comorbidities:   Mr. Yoan Chaudhary  has a past medical history of Acquired hypothyroidism (3/27/2016), Acute deep vein thrombosis (DVT) ([address] Bilateral knee pain (05/2020), Cataracts, bilateral, Chronic deep vein thrombosis (DVT) of popliteal vein of right lower extremity ([address] (4/12/2021), Chronic pain of both knees (4/12/2021), COVID-19 vaccine series declined (2021), Depression (3/20/2015), Diabetic eye exam ([address] (08/26/2020), Eye exam, routine (09/2020), Graves disease, Hypertension, Obesity (BMI 30.0-34.9) (3/27/2016), ITZ on CPAP (7/25/2018), Psychiatric disorder, Rheumatoid arthritis(714.0), Thyroid disease, and Uncontrolled diabetes mellitus ([address]  He also has no past medical history of Aneurysm ([address] Arrhythmia, Asthma, CAD (coronary artery disease), Cancer ([address] Chronic kidney disease, Chronic obstructive pulmonary disease ([address] Coagulation disorder ([address] Endocarditis, GERD (gastroesophageal reflux disease), Heart failure (HCC), Ill-defined condition, Liver disease, PUD (peptic ulcer disease), Rheumatic fever, Seizures ([facility] or Stroke ([facility] Mr. Governor Shafer  has a past surgical history that includes hx knee arthroscopy (Left, 1990s); hx bunionectomy (Left, 12/2014); and hx cataract removal (Bilateral). Social History/Living Environment:    retired, , no stairs  Prior Level of Function/Work/Activity:  was walking daily until two years ago. Ambulatory/Rehab Services H2 Model Falls Risk Assessment    Risk Factors:       (5)  History of Recent Falls [w/in 3 months] Ability to Rise from Chair:       (0)  Ability to rise in a single movement    Falls Prevention Plan:       Physical Limitations to Exercise (specify):  pain and weakness   Total: (5 or greater = High Risk): 5    ©2010 [facility]. All Rights Reserved. [facility] [place] Patent #[id number]. Federal Law prohibits the replication, distribution or use without written permission from [facility] of [address] [place] [address]       Date Last Reviewed:  12/10/2021   EXAMINATION:   Observation/Orthostatic Postural Assessment: Forward Head and Rounded Shoulders, decreased lumbar lordosis, arthritic changes bilateral hands.     ROM:    AROM/PROM         Joint: Eval Date: 10/7/2021  Re-Assess Date: 12/10/21  Re-Assess Date:    ACTIVE ROM (standing) RIGHT LEFT RIGHT LEFT RIGHT LEFT   Lumbar flexion  To ankles            Lumbar extension Scapula to heels            Knee flexion 125 120  125  125       Knee extension 10  0  5  0       Hip IR/ER 20/45 20/45  20/60  20/60       Hip flexion 110 105  110  110       HIP abduction 40 40  40  40       Hip extension 5 5  10  10       Shoulder Flexion 160 100  170  125       Shoulder Abduction 160 80  170  125       Shoulder Internal Rotation (Apley's) T 10 sacrum  T10  L2       Shoulder External Rotation (Apley's) T2 C7  T2  C7      Elbow ROM 0/140 0/ 140 ext/flex  0/145  0/145       PASSIVE ROM (supine)             Shoulder Flexion             Shoulder Abduction             Shoulder Internal Rotation     [place]      Shoulder External Rotation   [address]               [place]            Strength:    Joint: Eval Date: 10/7/2021  Re-Assess Date: 12/10/21  Re-Assess Date:     RIGHT LEFT RIGHT LEFT RIGHT LEFT   Shoulder Flexion 4/5 3/5  4+  4-       Shoulder Abduction  (C5) 4/5 3-/5  4+  3+       Shoulder Internal Rotation 4+/5 4-/5  5  4+       Shoulder External Rotation 3+/5 3-/5  4  4       Elbow Flexion  (C6) 5/5 4/5  5  4+       Elbow Extension (C7) 4+/5 4-/5  5  5       Wrist Flexion (C7) 5/5 5/5  5  5       Wrist Extension (C6) 5/5 5/5  5  5       Resisted Thumb Extension/Finger Abduction (C8/T1)             Resisted Cervical Rotation (C1):             Resisted Shoulder Shrug (C2, 3, 4):  4=  /5 4-/5 5-  5-        Strength 78 lbs 70 lbs   87 lbs   82 lbs       Knee extension 4/5 4/5  5-  5-       Knee flexion 4/5 4/5  5-  5-       Hip abduction 3+/5 3+/5  4  4       Hip  extension 3+/5 3+/5  4  4       Plantar flexion  3+ 3+  4  4             Functional Mobility:  Can stand without use of hands   TUG: 10.21 seconds 10/7/21              6.38 seconds  12/10/21  30 sec Sit to Stand test: 7 x no hands (10/7/21)                                         12 x               (12/10/21)          See associated treatment note for treatment provided today.     Future Appointments   Date Time Provider Ramona Andrews   12/10/2021  9:45 AM Nora Anderson Notice, PT [facility]   2/14/2022  9:30  [address] LAB [address] [facility]   2/21/2022  1:20 PM Serg Knott  [address]   2/28/2022  2:00 PM Bernie Arias MD [facility] SIM OBDULIA Melendez Notice Justin, PT

## 2021-12-10 NOTE — PROGRESS NOTES
Wannetta Cutting A. Paduano  : 1950  [place] / Product Type: Medicare /  Cait Orozco at [address] [facility]. [address] [place], [address]  [phone]   [phone]                                                          Yehuda Albarran MD      OUTPATIENT PHYSICAL THERAPY: Daily Treatment Note 12/10/2021 Visit Count:  15    Tx Diagnosis:  Pain in Left Shoulder (M25.512)  Stiffness of Left Shoulder not elsewhere specified (M25.612)  Pain in right knee (M25.561)  Pain in left knee (M25.562)   Muscle Weakness (M62.81)      Pre-treatment Symptoms/Complaints: See DC note   Pain: Initial:did not rate/10  Medications Last Reviewed:  12/10/2021     Post Session:  No complaints   Updated Objective Findings: See DC note        TREATMENT:   THERAPEUTIC EXERCISE: (15 minutes):  Exercises per grid below to improve mobility, strength and balance. Required minimal visual, verbal and manual cues to promote proper body alignment and promote proper body posture. Progressed resistance and complexity of movement as indicated. 10/29/21 11/3/21 11/10/21 11/12/21 11/17/21   11/22/21 12/1/21 12/8/21 12/10/21   Activity/Exercise            Education     Benefits of strength training video    Review of HEP   Ring press range of motion     20 x       scifit 10 min L 6 hills 8 min L8 9 min L 8 hills 8 min   L 8 hills 8 min  8 min  8 min L 8 8 min 8 min   Shoulder shrugs            Isometric bilateral ER band shoulder            Sit to stand            Table shoulder flexion stretch sitting on stool            Dead lifts            Lat activation and hip hinge 3 x 10 pole yellow band  3  X 10 yellow 2 x 10 yellow 2 x 10   10 x  Red band      L stretch     5 x 5 x 10 x  10 x 10 x   Shuttle squat      100# 3 x 10  125# 3 x 10  125# 3 x 10 125# 10 x  150# 3 x 10         THERAPEUTIC ACTIVITY: (25 minutes):  Activities per gid below to improve functional movement related mobility, strength and balance to improve neuro-muscular carryover to daily functional activities for improving patient's quality of life. Required visual, verbal and manual cues to promote proper body alignment and promote proper body posture/mechanics. Progressed resistance and complexity of movement as indicated. Date  11/17/21 Date  11/22/21 Date  12/1/21 Date  12/8/21 Date  12/8/21   Activity/Exercise         Lowanda Beatris carry    20# 65'   20# 26'  20# 280'  20# 26'     Dead lifts  Rack pull 45# 10 x   65# 5x    DL 75# 3 x   Rack pull 85# 2 x   100# 3 x 5  Rack pull   45# 10 x   75# 4 x    95# 3 x   45# 10 x    65# 3 x   85# 2 x    100# 3 x 5  Rack pull 45# 10 x 75# 3 x 10  Rack pull 45# 10 x   75# 3 x 10     Walk back with bar   30# 10 x  27# 10 x  30# 15 x  30# 15 x  30# 10 x   Hip extensor activation with purple band sit to stand              Sit to stand   20# 5 x    30# 2 x 17\"    30# 2 x 10 20.5\" table  25# 5 x   30# 3 x 5   15# 10 x   20# 3 x 5  20# 3 x 10  2 x 10    Suit case carry   20#  140' ea side       15# 140' ea                                      HEP Log Date 1. As above in T ex 10/13/2021   2.  12/10/2021   3. 12/10/2021   4.    5.           [facility] Portal  Treatment/Session Summary:    Response to Treatment: Patient has achieved majority of goals   Communication/Consultation:   DC plan   Equipment provided today: None today   Recommendations/Intent for next treatment session:   Attend community gym          Treatment Plan of Care Effective Dates: 10/7/2021 TO 12/12/2021 (60 days).   Frequency/Duration: 2 times a week for 60 Days             Total Treatment Billable Duration: 40    Rx      Franklin Ebbs, PT    Future Appointments   Date Time Provider Ramona Andrews   2/14/2022  9:30  [address] [facility] [address] [facility]   2/21/2022  1:20 PM Pastor Zaman  [address], [address]   2/28/2022  2:00 PM Zabrina Perkins Joy Chua MD [facility] SIM

## 2022-03-18 PROBLEM — Z99.89 OSA ON CPAP: Status: ACTIVE | Noted: 2018-07-25

## 2022-03-18 PROBLEM — G47.33 OSA ON CPAP: Status: ACTIVE | Noted: 2018-07-25

## 2022-03-19 PROBLEM — M25.562 CHRONIC PAIN OF BOTH KNEES: Status: ACTIVE | Noted: 2021-04-12

## 2022-03-19 PROBLEM — M25.561 CHRONIC PAIN OF BOTH KNEES: Status: ACTIVE | Noted: 2021-04-12

## 2022-03-19 PROBLEM — G89.29 CHRONIC PAIN OF BOTH KNEES: Status: ACTIVE | Noted: 2021-04-12

## 2022-03-19 PROBLEM — I82.531 CHRONIC DEEP VEIN THROMBOSIS (DVT) OF POPLITEAL VEIN OF RIGHT LOWER EXTREMITY (HCC): Status: ACTIVE | Noted: 2021-04-12

## 2022-03-19 PROBLEM — R35.1 BPH ASSOCIATED WITH NOCTURIA: Status: ACTIVE | Noted: 2017-05-10

## 2022-03-19 PROBLEM — N40.1 BPH ASSOCIATED WITH NOCTURIA: Status: ACTIVE | Noted: 2017-05-10

## 2022-03-19 PROBLEM — E78.00 HYPERCHOLESTEROLEMIA: Status: ACTIVE | Noted: 2020-08-07

## 2022-05-09 PROBLEM — E11.9 TYPE 2 DIABETES MELLITUS (HCC): Status: ACTIVE | Noted: 2022-05-09

## 2022-05-20 ENCOUNTER — HOSPITAL ENCOUNTER (OUTPATIENT)
Dept: GENERAL RADIOLOGY | Age: 72
Discharge: HOME | End: 2022-05-20

## 2022-05-20 DIAGNOSIS — M17.0 PRIMARY OSTEOARTHRITIS OF BOTH KNEES: ICD-10-CM

## 2022-07-03 DIAGNOSIS — I10 ESSENTIAL HYPERTENSION: ICD-10-CM

## 2022-07-03 DIAGNOSIS — E11.9 TYPE 2 DIABETES MELLITUS WITHOUT COMPLICATION, WITHOUT LONG-TERM CURRENT USE OF INSULIN (HCC): Primary | ICD-10-CM

## 2022-07-03 RX ORDER — AMLODIPINE BESYLATE 5 MG/1
TABLET ORAL
Qty: 90 TABLET | Refills: 3 | Status: SHIPPED | OUTPATIENT
Start: 2022-07-03

## 2022-07-03 RX ORDER — LISINOPRIL 20 MG/1
TABLET ORAL
Qty: 90 TABLET | Refills: 3 | Status: SHIPPED | OUTPATIENT
Start: 2022-07-03

## 2022-07-03 NOTE — TELEPHONE ENCOUNTER
Requested Prescriptions     Signed Prescriptions Disp Refills    lisinopril (PRINIVIL;ZESTRIL) 20 MG tablet 90 tablet 3     Sig: TAKE ONE TABLET BY MOUTH ONE TIME DAILY     Authorizing Provider: Louann Landau SCHMIDT    amLODIPine (NORVASC) 5 MG tablet 90 tablet 3     Sig: TAKE ONE TABLET BY MOUTH ONE TIME DAILY     Authorizing Provider: Louann Landau SCHMIDT    metFORMIN (GLUCOPHAGE) 1000 MG tablet 180 tablet 3     Sig: TAKE ONE TABLET BY MOUTH TWICE A DAY WITH A MEAL     Authorizing Provider: Serina Aden

## 2022-07-18 ENCOUNTER — OFFICE VISIT (OUTPATIENT)
Dept: INTERNAL MEDICINE CLINIC | Facility: CLINIC | Age: 72
End: 2022-07-18
Payer: MEDICARE

## 2022-07-18 VITALS
BODY MASS INDEX: 28.23 KG/M2 | SYSTOLIC BLOOD PRESSURE: 122 MMHG | DIASTOLIC BLOOD PRESSURE: 80 MMHG | WEIGHT: 208.4 LBS | HEIGHT: 72 IN

## 2022-07-18 DIAGNOSIS — I10 ESSENTIAL HYPERTENSION: ICD-10-CM

## 2022-07-18 DIAGNOSIS — M06.9 RHEUMATOID ARTHRITIS INVOLVING MULTIPLE JOINTS (HCC): ICD-10-CM

## 2022-07-18 DIAGNOSIS — M25.561 CHRONIC PAIN OF RIGHT KNEE: ICD-10-CM

## 2022-07-18 DIAGNOSIS — E89.0 HYPOTHYROIDISM FOLLOWING RADIOIODINE THERAPY: ICD-10-CM

## 2022-07-18 DIAGNOSIS — E78.00 HYPERCHOLESTEROLEMIA: ICD-10-CM

## 2022-07-18 DIAGNOSIS — G89.29 CHRONIC PAIN OF RIGHT KNEE: ICD-10-CM

## 2022-07-18 DIAGNOSIS — E11.9 TYPE 2 DIABETES MELLITUS WITHOUT COMPLICATION, WITHOUT LONG-TERM CURRENT USE OF INSULIN (HCC): Primary | ICD-10-CM

## 2022-07-18 PROCEDURE — 2022F DILAT RTA XM EVC RTNOPTHY: CPT | Performed by: INTERNAL MEDICINE

## 2022-07-18 PROCEDURE — 3051F HG A1C>EQUAL 7.0%<8.0%: CPT | Performed by: INTERNAL MEDICINE

## 2022-07-18 PROCEDURE — G8417 CALC BMI ABV UP PARAM F/U: HCPCS | Performed by: INTERNAL MEDICINE

## 2022-07-18 PROCEDURE — 3017F COLORECTAL CA SCREEN DOC REV: CPT | Performed by: INTERNAL MEDICINE

## 2022-07-18 PROCEDURE — 1123F ACP DISCUSS/DSCN MKR DOCD: CPT | Performed by: INTERNAL MEDICINE

## 2022-07-18 PROCEDURE — 1036F TOBACCO NON-USER: CPT | Performed by: INTERNAL MEDICINE

## 2022-07-18 PROCEDURE — 99214 OFFICE O/P EST MOD 30 MIN: CPT | Performed by: INTERNAL MEDICINE

## 2022-07-18 PROCEDURE — G8427 DOCREV CUR MEDS BY ELIG CLIN: HCPCS | Performed by: INTERNAL MEDICINE

## 2022-07-18 ASSESSMENT — PATIENT HEALTH QUESTIONNAIRE - PHQ9
SUM OF ALL RESPONSES TO PHQ QUESTIONS 1-9: 0
2. FEELING DOWN, DEPRESSED OR HOPELESS: 0
1. LITTLE INTEREST OR PLEASURE IN DOING THINGS: 0
SUM OF ALL RESPONSES TO PHQ QUESTIONS 1-9: 0
SUM OF ALL RESPONSES TO PHQ9 QUESTIONS 1 & 2: 0

## 2022-07-18 NOTE — PROGRESS NOTES
HPI: Lori Dos Santose. Paduano (: 1950)    Has been having more pain in right knee and harder to walk and mobility is worsening  Also with pain in left knee and shoulder    RA is under better control with meds and seeing Rheum  Needs labs again in Aug    Will give Handicap placard    Discussed going to eval at [facility] prior to knee surgery      Problem List:  Patient Active Problem List   Diagnosis    STEFFEN on CPAP    Essential hypertension    Depression    Hypercholesterolemia    Uncontrolled type 2 diabetes mellitus with diabetic polyneuropathy, without long-term current use of insulin ([address]    Obesity (BMI 30.0-34. 9)    BPH associated with nocturia    Chronic deep vein thrombosis (DVT) of popliteal vein of right lower extremity (HCC)    Chronic pain of both knees    Rheumatoid arthritis involving multiple joints (HCC)    Hypothyroidism following radioiodine therapy    Rheumatoid arthritis ([address]    Type 2 diabetes mellitus ([address]       History:  Past Medical History:   Diagnosis Date    Acquired hypothyroidism 3/27/2016    Acute deep vein thrombosis (DVT) (HCC)     right    Bilateral knee pain 2020    Dr. Mayra Talbert     Cataracts, bilateral     Chronic deep vein thrombosis (DVT) of popliteal vein of right lower extremity ([address] 2021    Chronic pain of both knees 2021    COVID-19 vaccine series declined     Depression 3/20/2015    Diabetic eye exam [facility]) 2020    [facility] exam, routine 2020    [facility]    Graves disease     S/p Ablation therapy    Hypertension     managed with meds    Obesity (BMI 30.0-34.9) 3/27/2016    STEFFEN on CPAP 2018    Psychiatric disorder     anxiety    Rheumatoid arthritis(714.0)     Thyroid disease     rad.  uptake- takes synthroid daily    Uncontrolled diabetes mellitus (HCC)        Allergies:  No Known Allergies    Current Medications:  Current Outpatient Medications   Medication Sig Dispense Refill    lisinopril (PRINIVIL;ZESTRIL) 20 MG tablet TAKE ONE TABLET BY MOUTH ONE TIME DAILY 90 tablet 3    amLODIPine (NORVASC) 5 MG tablet TAKE ONE TABLET BY MOUTH ONE TIME DAILY 90 tablet 3    metFORMIN (GLUCOPHAGE) 1000 MG tablet TAKE ONE TABLET BY MOUTH TWICE A DAY WITH A MEAL 180 tablet 3    amitriptyline (ELAVIL) 25 MG tablet Take 25 mg by mouth      atorvastatin (LIPITOR) 20 MG tablet Take 20 mg by mouth      folic acid (FOLVITE) 1 MG tablet Take 1 tablet by mouth once daily      glimepiride (AMARYL) 4 MG tablet Take 4 mg by mouth 2 times daily      hydroxychloroquine (PLAQUENIL) 200 MG tablet Take 200 mg by mouth 2 times daily      levothyroxine (SYNTHROID) 150 MCG tablet Take 150 mcg by mouth every morning (before breakfast)      methotrexate (RHEUMATREX) 2.5 MG chemo tablet Take 25 mg by mouth      mupirocin (BACTROBAN) 2 % ointment APPLY OINTMENT TO WOUND DAILY UNDER BANDAGE UNTIL HEALED. sertraline (ZOLOFT) 100 MG tablet Take 100 mg by mouth daily       No current facility-administered medications for this visit. Review of Systems:  Review of Systems   Constitutional:  Positive for fatigue. Negative for unexpected weight change. Cardiovascular:  Negative for leg swelling. Musculoskeletal:  Positive for arthralgias. All other systems reviewed and are negative. Vitals:  /80   Ht 6' (1.829 m)   Wt 208 lb 6.4 oz (94.5 kg)   BMI 28.26 kg/m²     Physical Exam:  Physical Exam  Vitals reviewed. Constitutional:       Appearance: Normal appearance. HENT:      Head: Normocephalic and atraumatic. Cardiovascular:      Rate and Rhythm: Normal rate and regular rhythm. Heart sounds: Normal heart sounds. Pulmonary:      Effort: Pulmonary effort is normal.      Breath sounds: Normal breath sounds. Musculoskeletal:         General: Deformity present. Normal range of motion. Cervical back: Normal range of motion and neck supple. Comments: Right knee scar and knee brace   Skin:     General: Skin is warm and dry. Neurological:      General: No focal deficit present. Mental Status: He is alert and oriented to person, place, and time. Psychiatric:         Mood and Affect: Mood normal.         Behavior: Behavior normal.         Thought Content: Thought content normal.         Judgment: Judgment normal.        Assessment/Plan:   Kat Lopez was seen today for follow-up. Diagnoses and all orders for this visit:    Type 2 diabetes mellitus without complication, without long-term current use of insulin ([facility])  Need to update labs and continue to watch diet  Rheumatoid arthritis involving multiple joints ([facility]  Follow with Rheum and will get labs drawn  Essential hypertension  BP controlled  Chronic pain of right knee    Needs to have surgery on his right knee- has seen Dr Michelle Barajas in the past and told then he needed surgery  Now pt realizes he needs it and ready to be evaluated    Sleep apnea with CPAP use- using nightly with good benefit  Current medications are therapeutic at this time; continue as prescribed.         Gabe Vigil MD

## 2022-08-01 DIAGNOSIS — G89.29 CHRONIC PAIN OF RIGHT KNEE: ICD-10-CM

## 2022-08-01 DIAGNOSIS — I10 ESSENTIAL HYPERTENSION: ICD-10-CM

## 2022-08-01 DIAGNOSIS — M06.9 RHEUMATOID ARTHRITIS INVOLVING MULTIPLE JOINTS (HCC): ICD-10-CM

## 2022-08-01 DIAGNOSIS — E78.00 HYPERCHOLESTEROLEMIA: ICD-10-CM

## 2022-08-01 DIAGNOSIS — E89.0 HYPOTHYROIDISM FOLLOWING RADIOIODINE THERAPY: ICD-10-CM

## 2022-08-01 DIAGNOSIS — M25.561 CHRONIC PAIN OF RIGHT KNEE: ICD-10-CM

## 2022-08-01 DIAGNOSIS — E11.9 TYPE 2 DIABETES MELLITUS WITHOUT COMPLICATION, WITHOUT LONG-TERM CURRENT USE OF INSULIN (HCC): ICD-10-CM

## 2022-08-01 LAB
EST. AVERAGE GLUCOSE BLD GHB EST-MCNC: 151 MG/DL
HBA1C MFR BLD: 6.9 % (ref 4.8–5.6)

## 2022-08-02 LAB
ALBUMIN SERPL-MCNC: 4.2 G/DL (ref 3.2–4.6)
ALBUMIN/GLOB SERPL: 1.3 {RATIO} (ref 1.2–3.5)
ALP SERPL-CCNC: 78 U/L (ref 50–136)
ALT SERPL-CCNC: 30 U/L (ref 12–65)
ANION GAP SERPL CALC-SCNC: 2 MMOL/L (ref 7–16)
AST SERPL-CCNC: 16 U/L (ref 15–37)
BASOPHILS # BLD: 0.2 K/UL (ref 0–0.2)
BASOPHILS NFR BLD: 2 % (ref 0–2)
BILIRUB SERPL-MCNC: 0.4 MG/DL (ref 0.2–1.1)
BUN SERPL-MCNC: 23 MG/DL (ref 8–23)
CALCIUM SERPL-MCNC: 9.7 MG/DL (ref 8.3–10.4)
CHLORIDE SERPL-SCNC: 108 MMOL/L (ref 98–107)
CHOLEST SERPL-MCNC: 101 MG/DL
CO2 SERPL-SCNC: 28 MMOL/L (ref 21–32)
CREAT SERPL-MCNC: 1.1 MG/DL (ref 0.8–1.5)
CRP SERPL-MCNC: <0.3 MG/DL (ref 0–0.9)
DIFFERENTIAL METHOD BLD: ABNORMAL
EOSINOPHIL # BLD: 0.2 K/UL (ref 0–0.8)
EOSINOPHIL NFR BLD: 3 % (ref 0.5–7.8)
ERYTHROCYTE [DISTWIDTH] IN BLOOD BY AUTOMATED COUNT: 14.9 % (ref 11.9–14.6)
ERYTHROCYTE [SEDIMENTATION RATE] IN BLOOD: 6 MM/HR
GLOBULIN SER CALC-MCNC: 3.2 G/DL (ref 2.3–3.5)
GLUCOSE SERPL-MCNC: 109 MG/DL (ref 65–100)
HCT VFR BLD AUTO: 42.9 % (ref 41.1–50.3)
HDLC SERPL-MCNC: 56 MG/DL (ref 40–60)
HDLC SERPL: 1.8 {RATIO}
HGB BLD-MCNC: 13.2 G/DL (ref 13.6–17.2)
IMM GRANULOCYTES # BLD AUTO: 0.1 K/UL (ref 0–0.5)
IMM GRANULOCYTES NFR BLD AUTO: 1 % (ref 0–5)
LDLC SERPL CALC-MCNC: 26 MG/DL
LYMPHOCYTES # BLD: 1.3 K/UL (ref 0.5–4.6)
LYMPHOCYTES NFR BLD: 17 % (ref 13–44)
MCH RBC QN AUTO: 32.2 PG (ref 26.1–32.9)
MCHC RBC AUTO-ENTMCNC: 30.8 G/DL (ref 31.4–35)
MCV RBC AUTO: 104.6 FL (ref 79.6–97.8)
MONOCYTES # BLD: 1.1 K/UL (ref 0.1–1.3)
MONOCYTES NFR BLD: 14 % (ref 4–12)
NEUTS SEG # BLD: 4.9 K/UL (ref 1.7–8.2)
NEUTS SEG NFR BLD: 63 % (ref 43–78)
NRBC # BLD: 0 K/UL (ref 0–0.2)
PLATELET # BLD AUTO: 296 K/UL (ref 150–450)
PMV BLD AUTO: 10 FL (ref 9.4–12.3)
POTASSIUM SERPL-SCNC: 5.1 MMOL/L (ref 3.5–5.1)
PROT SERPL-MCNC: 7.4 G/DL (ref 6.3–8.2)
RBC # BLD AUTO: 4.1 M/UL (ref 4.23–5.6)
SODIUM SERPL-SCNC: 138 MMOL/L (ref 138–145)
TRIGL SERPL-MCNC: 95 MG/DL (ref 35–150)
TSH, 3RD GENERATION: 0.44 UIU/ML (ref 0.36–3.74)
URATE SERPL-MCNC: 5.5 MG/DL (ref 2.6–6)
VLDLC SERPL CALC-MCNC: 19 MG/DL (ref 6–23)
WBC # BLD AUTO: 7.7 K/UL (ref 4.3–11.1)

## 2022-08-08 ENCOUNTER — OFFICE VISIT (OUTPATIENT)
Dept: RHEUMATOLOGY | Age: 72
End: 2022-08-08
Payer: MEDICARE

## 2022-08-08 VITALS
HEIGHT: 72 IN | RESPIRATION RATE: 16 BRPM | SYSTOLIC BLOOD PRESSURE: 122 MMHG | HEART RATE: 84 BPM | DIASTOLIC BLOOD PRESSURE: 64 MMHG | BODY MASS INDEX: 28.33 KG/M2 | WEIGHT: 209.2 LBS

## 2022-08-08 DIAGNOSIS — M06.00 SERONEGATIVE RHEUMATOID ARTHRITIS (HCC): Primary | ICD-10-CM

## 2022-08-08 DIAGNOSIS — M17.11 PRIMARY OSTEOARTHRITIS OF RIGHT KNEE: ICD-10-CM

## 2022-08-08 DIAGNOSIS — Z79.631 LONG TERM METHOTREXATE USER: ICD-10-CM

## 2022-08-08 DIAGNOSIS — Z79.899 LONG-TERM USE OF PLAQUENIL: ICD-10-CM

## 2022-08-08 DIAGNOSIS — Z23 ENCOUNTER FOR VACCINATION: ICD-10-CM

## 2022-08-08 PROCEDURE — 3017F COLORECTAL CA SCREEN DOC REV: CPT | Performed by: INTERNAL MEDICINE

## 2022-08-08 PROCEDURE — 20611 DRAIN/INJ JOINT/BURSA W/US: CPT | Performed by: INTERNAL MEDICINE

## 2022-08-08 PROCEDURE — 1123F ACP DISCUSS/DSCN MKR DOCD: CPT | Performed by: INTERNAL MEDICINE

## 2022-08-08 PROCEDURE — 1036F TOBACCO NON-USER: CPT | Performed by: INTERNAL MEDICINE

## 2022-08-08 PROCEDURE — G8417 CALC BMI ABV UP PARAM F/U: HCPCS | Performed by: INTERNAL MEDICINE

## 2022-08-08 PROCEDURE — 99214 OFFICE O/P EST MOD 30 MIN: CPT | Performed by: INTERNAL MEDICINE

## 2022-08-08 PROCEDURE — G8427 DOCREV CUR MEDS BY ELIG CLIN: HCPCS | Performed by: INTERNAL MEDICINE

## 2022-08-08 NOTE — PROGRESS NOTES
8/8/22      SUBJECTIVE:  Mannie PRESLEYBharat Brooks is a 80 y.o. male that is here for follow-up of:  Seronegative rheumatoid arthritis Dx ~2016   MTX, HCQ (started Oct 2021)   Failed: humira, simponi aria - improvement in pain but not swelling - changed due to cost/convenience   PMH:  - R DVT - 2020 - ongoing coumadin use  - Graves s/p ablation  - HTN HL DM2      Dx: rheumatoid arthritis ~2015.  ---  Last OV May 2022. MTX maintained at mtx 25 mg -split weekly + FA 1 mg with  mg bid. Continues to feel well    Labs reviewed 8-1-2022: CBC with diff, CMP ESR CRP  Ophtho: completed in June 2022 and normal - [facility]    Pain and swelling in DIPs with activity     No joint swelling. Chronic R knee pain - pain with overexerting - worse if climbing ladder/mowing lawn, with walking, more walking than usual.  Pain is increased and he asks for steroid injection today. Previously did left knee steroid injection in May 2022 with significant improvement in symptoms. He does not think that repeat steroid injection in left knee is appropriate today. Using braces as needed. MRI R knee completed 2020 from [facility].        Am stiffness : less than 15 mins      REVIEW OF SYSTEMS:  A total of 10 systems (musculoskeletal system as stated in the HPI and the following 9 systems) were reviewed with patient today and were negative except as stated in the HPI and except for the following (depicted with an \"X\"):        \"X\" Constitutional  \"X\" HEENT /Mouth  \"X\" Cardiovascular and  Respiratory (2 systems)  \"X\" Gastrointestinal    Fever/chills   Hair loss   Shortness of breath   Upset stomach    Falls   Dry mouth   Coughing   Diarrhea / constipation    Wt loss   Mouth sores   Wheezing   Heartburn    Wt gain   Ringing ears   Chest pain   Dark or bloody stools    Night sweats   Diff. swallowing  X None of above   Nausea or vomiting   x None of above  X None of above     X None of above                \"X\" Integumentary  \"X\" Neurological \"X\" Genitourinary  \"X\" Psychiatric   x Easy bruising   Numbness/ tingling   Female problems   Depression    Rashes   Weakness   Problems with urination   Feeling anxious    Sun sensitivity   Headaches  X None of above   Problems sleeping    None of above  X None of above     x None of above         The following portions of the patient's history were reviewed and updated as appropriate:   Current Outpatient Medications   Medication Sig Dispense Refill    methotrexate (RHEUMATREX) 2.5 MG chemo tablet Take 10 tablets by mouth once a week 120 tablet 0    lisinopril (PRINIVIL;ZESTRIL) 20 MG tablet TAKE ONE TABLET BY MOUTH ONE TIME DAILY 90 tablet 3    amLODIPine (NORVASC) 5 MG tablet TAKE ONE TABLET BY MOUTH ONE TIME DAILY 90 tablet 3    metFORMIN (GLUCOPHAGE) 1000 MG tablet TAKE ONE TABLET BY MOUTH TWICE A DAY WITH A MEAL 180 tablet 3    amitriptyline (ELAVIL) 25 MG tablet Take 25 mg by mouth      atorvastatin (LIPITOR) 20 MG tablet Take 20 mg by mouth      folic acid (FOLVITE) 1 MG tablet Take 1 tablet by mouth once daily      glimepiride (AMARYL) 4 MG tablet Take 4 mg by mouth 2 times daily      hydroxychloroquine (PLAQUENIL) 200 MG tablet Take 200 mg by mouth 2 times daily      levothyroxine (SYNTHROID) 150 MCG tablet Take 150 mcg by mouth every morning (before breakfast)      mupirocin (BACTROBAN) 2 % ointment APPLY OINTMENT TO WOUND DAILY UNDER BANDAGE UNTIL HEALED. sertraline (ZOLOFT) 100 MG tablet Take 100 mg by mouth daily       No current facility-administered medications for this visit. PHYSICAL EXAM:  Vitals:    08/08/22 0909   BP: 122/64   Pulse: 84   Resp: 16   Weight: 209 lb 3.2 oz (94.9 kg)   Height: 6' (1.829 m)       CONSTITUTIONAL:  The patient was in NAD.  ENT:  The OPC, MMM, lips/teeth/gums without abnormalities. NECK:  No masses or thyromegaly  LYMPH NODES:  No cervical or axillary lymphadenopathy. CV:  RRR no r/m/g. Normal peripheral pulses  RESP:  CTA bilaterally.   Normal respiratory effort. GI:  Abdomen soft, non tender, no hepatosplenomegaly  Skin:  No rashes, nodules, or other lesions. MUSCULOSKELETAL :  All joints including neck, back bilateral shoulders, elbows, wrists, MCP's, PIP's, DIP's, hips, knees, ankles, toes are within normal limits including normal gross examination, no synovitis, no tenderness, n'l ROM EXCEPT:   Bony changes bilateral knees  Heberden's and lawanda's nodules    T: 6  S: 2    CDAI  Date:  [facility] Pt-Global MD-Global Score:  8-2022  6 2 4  5  17 - mtx, hcq  5/2022  3 4 4  4  15  2/2022  10 0 4  4  18 - MTX 25,  \"    11/2021 12 0 5.5  5  MTX 25,  \"  9/2021  8 0 5.5  5  18.5 MTX 20 mg    Scoring:  Remission CDAI ? 2.8   Low Disease Activity CDAI > 2.8 and ? 10   Moderate Disease Activity CDAI > 10 and ? 22   High Disease Activity CDAI > 22   A CDAI reduction of 6.5 represents moderate improvement      ASSESSMENT AND PLAN:  Keven Chakraborty VY Farley is a 80 y.o. male that is here for evaluation of rheumatoid arthritis. his problems include:    1. Seronegative arthritis  2. Long-term methotrexate, Plaquenil use      Cont mtx to 10 tabs split dosing plus daily folic acid,  mg bid. Request ophthalmology evaluation from [facility] summer 2022  Encouraged flu, COVID vaccines and reviewed timing with methotrexate  Labs in 12, 24 weeks      4. Primary knee osteoarthritis right  X-ray bilateral knees May 2022 shows moderate to severe joint space narrowing medial compartment in particular  Declines physical therapy at this time but will let me know if he chooses in the future  Using sterile technique the right knee was sterilized with chloraprep. Risks included pain bleeding infection were discussed with pt who was agreeable to injection. Ethyl chloride was used for anesthetic and 25G 1.5\" needle used to inject 1% lidocaine 1mL + depot medrol 40 mg into the right knee. Post injection instructions given to pt.  The procedure was well tolerated    Standing Labs in 12, 24 weeks with office visit at 25 weeks    [address] Rheumatology  [address] [place] [address] [address]  [phone]

## 2022-08-09 RX ORDER — METHYLPREDNISOLONE ACETATE 40 MG/ML
40 INJECTION, SUSPENSION INTRA-ARTICULAR; INTRALESIONAL; INTRAMUSCULAR; SOFT TISSUE ONCE
Status: SHIPPED | OUTPATIENT
Start: 2022-08-09

## 2022-08-10 ENCOUNTER — TELEPHONE (OUTPATIENT)
Dept: RHEUMATOLOGY | Age: 72
End: 2022-08-10

## 2022-08-20 DIAGNOSIS — M06.00 SERONEGATIVE RHEUMATOID ARTHRITIS (HCC): Primary | ICD-10-CM

## 2022-08-22 RX ORDER — HYDROXYCHLOROQUINE SULFATE 200 MG/1
200 TABLET, FILM COATED ORAL 2 TIMES DAILY
Qty: 180 TABLET | Refills: 1 | Status: SHIPPED | OUTPATIENT
Start: 2022-08-22

## 2022-08-22 NOTE — TELEPHONE ENCOUNTER
Pt tx for seroneg RA. Does labs at PCP. Last labs 8/1/22. Last ov 8/8/22. Requested refill of hcq as pended below. Thank you.

## 2022-09-17 ENCOUNTER — TELEPHONE (OUTPATIENT)
Dept: INTERNAL MEDICINE CLINIC | Facility: CLINIC | Age: 72
End: 2022-09-17

## 2022-09-17 DIAGNOSIS — E11.9 TYPE 2 DIABETES MELLITUS WITHOUT COMPLICATION, WITHOUT LONG-TERM CURRENT USE OF INSULIN (HCC): Primary | ICD-10-CM

## 2022-09-17 DIAGNOSIS — E78.00 HYPERCHOLESTEROLEMIA: ICD-10-CM

## 2022-09-17 RX ORDER — ATORVASTATIN CALCIUM 20 MG/1
20 TABLET, FILM COATED ORAL NIGHTLY
Qty: 90 TABLET | Refills: 3 | Status: SHIPPED | OUTPATIENT
Start: 2022-09-17

## 2022-09-18 ENCOUNTER — TELEPHONE (OUTPATIENT)
Dept: INTERNAL MEDICINE CLINIC | Facility: CLINIC | Age: 72
End: 2022-09-18

## 2022-09-18 NOTE — TELEPHONE ENCOUNTER
Requested Prescriptions     Signed Prescriptions Disp Refills    atorvastatin (LIPITOR) 20 MG tablet 90 tablet 3     Sig: Take 1 tablet by mouth at bedtime     Authorizing Provider: Marcia Goldberg

## 2022-10-08 ENCOUNTER — TELEPHONE (OUTPATIENT)
Dept: INTERNAL MEDICINE CLINIC | Facility: CLINIC | Age: 72
End: 2022-10-08

## 2022-10-08 RX ORDER — NAPROXEN 500 MG/1
500 TABLET ORAL 2 TIMES DAILY WITH MEALS
Qty: 180 TABLET | Refills: 1 | Status: SHIPPED | OUTPATIENT
Start: 2022-10-08

## 2022-10-09 NOTE — TELEPHONE ENCOUNTER
Requested Prescriptions     Signed Prescriptions Disp Refills    naproxen (NAPROSYN) 500 MG tablet 180 tablet 1     Sig: Take 1 tablet by mouth 2 times daily (with meals)     Authorizing Provider: Mariangel Harris

## 2022-10-22 ENCOUNTER — TELEPHONE (OUTPATIENT)
Dept: INTERNAL MEDICINE CLINIC | Facility: CLINIC | Age: 72
End: 2022-10-22

## 2022-10-22 DIAGNOSIS — M06.9 RHEUMATOID ARTHRITIS INVOLVING MULTIPLE JOINTS (HCC): Primary | ICD-10-CM

## 2022-10-23 NOTE — TELEPHONE ENCOUNTER
Orders Placed This Encounter   Procedures    Sedimentation Rate     Standing Status:   Future     Standing Expiration Date:   10/22/2023    CBC with Auto Differential     Standing Status:   Future     Standing Expiration Date:   10/22/2023    C-Reactive Protein     Standing Status:   Future     Standing Expiration Date:   10/22/2023    Comprehensive Metabolic Panel     Standing Status:   Future     Standing Expiration Date:   10/22/2023     Lab order placed.

## 2022-10-24 DIAGNOSIS — M06.9 RHEUMATOID ARTHRITIS INVOLVING MULTIPLE JOINTS (HCC): ICD-10-CM

## 2022-10-25 LAB
ALBUMIN SERPL-MCNC: 4 G/DL (ref 3.2–4.6)
ALBUMIN/GLOB SERPL: 1.4 {RATIO} (ref 0.4–1.6)
ALP SERPL-CCNC: 72 U/L (ref 50–136)
ALT SERPL-CCNC: 31 U/L (ref 12–65)
ANION GAP SERPL CALC-SCNC: 12 MMOL/L (ref 2–11)
AST SERPL-CCNC: 17 U/L (ref 15–37)
BASOPHILS # BLD: 0.1 K/UL (ref 0–0.2)
BASOPHILS NFR BLD: 2 % (ref 0–2)
BILIRUB SERPL-MCNC: 0.2 MG/DL (ref 0.2–1.1)
BUN SERPL-MCNC: 25 MG/DL (ref 8–23)
CALCIUM SERPL-MCNC: 9.7 MG/DL (ref 8.3–10.4)
CHLORIDE SERPL-SCNC: 110 MMOL/L (ref 101–110)
CO2 SERPL-SCNC: 21 MMOL/L (ref 21–32)
CREAT SERPL-MCNC: 1.1 MG/DL (ref 0.8–1.5)
CRP SERPL-MCNC: 0.4 MG/DL (ref 0–0.9)
DIFFERENTIAL METHOD BLD: ABNORMAL
EOSINOPHIL # BLD: 0.3 K/UL (ref 0–0.8)
EOSINOPHIL NFR BLD: 4 % (ref 0.5–7.8)
ERYTHROCYTE [DISTWIDTH] IN BLOOD BY AUTOMATED COUNT: 15 % (ref 11.9–14.6)
ERYTHROCYTE [SEDIMENTATION RATE] IN BLOOD: 13 MM/HR
GLOBULIN SER CALC-MCNC: 2.9 G/DL (ref 2.8–4.5)
GLUCOSE SERPL-MCNC: 132 MG/DL (ref 65–100)
HCT VFR BLD AUTO: 38.4 % (ref 41.1–50.3)
HGB BLD-MCNC: 12.2 G/DL (ref 13.6–17.2)
IMM GRANULOCYTES # BLD AUTO: 0.1 K/UL (ref 0–0.5)
IMM GRANULOCYTES NFR BLD AUTO: 1 % (ref 0–5)
LYMPHOCYTES # BLD: 1.4 K/UL (ref 0.5–4.6)
LYMPHOCYTES NFR BLD: 17 % (ref 13–44)
MCH RBC QN AUTO: 32.5 PG (ref 26.1–32.9)
MCHC RBC AUTO-ENTMCNC: 31.8 G/DL (ref 31.4–35)
MCV RBC AUTO: 102.4 FL (ref 82–102)
MONOCYTES # BLD: 1.2 K/UL (ref 0.1–1.3)
MONOCYTES NFR BLD: 14 % (ref 4–12)
NEUTS SEG # BLD: 5.1 K/UL (ref 1.7–8.2)
NEUTS SEG NFR BLD: 62 % (ref 43–78)
NRBC # BLD: 0 K/UL (ref 0–0.2)
PLATELET # BLD AUTO: 319 K/UL (ref 150–450)
PMV BLD AUTO: 9.7 FL (ref 9.4–12.3)
POTASSIUM SERPL-SCNC: 4.6 MMOL/L (ref 3.5–5.1)
PROT SERPL-MCNC: 6.9 G/DL (ref 6.3–8.2)
RBC # BLD AUTO: 3.75 M/UL (ref 4.23–5.6)
SODIUM SERPL-SCNC: 143 MMOL/L (ref 133–143)
WBC # BLD AUTO: 8.2 K/UL (ref 4.3–11.1)

## 2022-10-27 DIAGNOSIS — M06.00 SERONEGATIVE RHEUMATOID ARTHRITIS (HCC): ICD-10-CM

## 2022-10-27 NOTE — TELEPHONE ENCOUNTER
Dr. Eric Holder patient. Pt called requesting refill of MTX. He is tx for RA. Last visit 8/8/22, next visit 2/13/23. He does labs with PCP.  Labs done 10/24/22 copied below:    Component      Latest Ref Rng & Units 10/24/2022 10/24/2022          10:29 AM 10:29 AM   CRP      0.0 - 0.9 mg/dL 0.4    Sed Rate, Automated      15 mm/hr  13 (L)     Component      Latest Ref Rng & Units 10/24/2022 10/24/2022          10:29 AM 10:29 AM   WBC      4.3 - 11.1 K/uL  8.2   RBC      4.23 - 5.6 M/uL  3.75 (L)   Hemoglobin Quant      13.6 - 17.2 g/dL  12.2 (L)   Hematocrit      41.1 - 50.3 %  38.4 (L)   MCV      82 - 102 FL  102.4 (H)   MCH      26.1 - 32.9 PG  32.5   MCHC      31.4 - 35.0 g/dL  31.8   RDW      11.9 - 14.6 %  15.0 (H)   Platelet Count      105 - 450 K/uL  319   MPV      9.4 - 12.3 FL  9.7   Nucleated Red Blood Cells      0.0 - 0.2 K/uL  0.00   Differential Type        AUTOMATED   Seg Neutrophils      43 - 78 %  62   Lymphocytes      13 - 44 %  17   Monocytes      4.0 - 12.0 %  14 (H)   Eosinophils %      0.5 - 7.8 %  4   Basophils      0.0 - 2.0 %  2   Immature Granulocytes      0.0 - 5.0 %  1   Segs Absolute      1.7 - 8.2 K/UL  5.1   Absolute Lymph #      0.5 - 4.6 K/UL  1.4   Absolute Mono #      0.1 - 1.3 K/UL  1.2   Absolute Eos #      0.0 - 0.8 K/UL  0.3   Basophils Absolute      0.0 - 0.2 K/UL  0.1   Absolute Immature Granulocyte      0.0 - 0.5 K/UL  0.1   Sodium      133 - 143 mmol/L 143    Potassium      3.5 - 5.1 mmol/L 4.6    Chloride      101 - 110 mmol/L 110    CO2      21 - 32 mmol/L 21    Anion Gap      2 - 11 mmol/L 12 (H)    Glucose, Random      65 - 100 mg/dL 132 (H)    BUN,BUNPL      8 - 23 MG/DL 25 (H)    Creatinine      0.8 - 1.5 MG/DL 1.10    Est, Glom Filt Rate      >60 ml/min/1.73m2 >60    CALCIUM, SERUM, [id number]      8.3 - 10.4 MG/DL 9.7    Bilirubin      0.2 - 1.1 MG/DL 0.2    ALT      12 - 65 U/L 31    AST      15 - 37 U/L 17    Alk Phosphatase      50 - 136 U/L 72    Total Protein 6.3 - 8.2 g/dL 6.9    Albumin      3.2 - 4.6 g/dL 4.0    Globulin      2.8 - 4.5 g/dL 2.9    ALBUMIN/GLOBULIN RATIO      0.4 - 1.6   1.4

## 2022-11-05 ENCOUNTER — TELEPHONE (OUTPATIENT)
Dept: INTERNAL MEDICINE CLINIC | Facility: CLINIC | Age: 72
End: 2022-11-05

## 2022-11-05 RX ORDER — ERYTHROMYCIN 5 MG/G
OINTMENT OPHTHALMIC
Qty: 1 G | Refills: 0 | Status: SHIPPED | OUTPATIENT
Start: 2022-11-05 | End: 2022-11-15

## 2022-11-07 NOTE — TELEPHONE ENCOUNTER
Pt with right lower eyelid lesion c/w stye or skin tag. Requested Prescriptions     Signed Prescriptions Disp Refills    erythromycin (ROMYCIN) 5 MG/GM ophthalmic ointment 1 g 0     Sig: Apply 1 inch ribbon to affected every 3 hours as needed     Authorizing Provider: Myrtie Nageotte     Follow up with ophthalmologist without improvement throughout the week.     Juvenal Guzmán NP, APRN - CNP

## 2022-11-22 ENCOUNTER — OFFICE VISIT (OUTPATIENT)
Dept: INTERNAL MEDICINE CLINIC | Facility: CLINIC | Age: 72
End: 2022-11-22
Payer: MEDICARE

## 2022-11-22 VITALS
DIASTOLIC BLOOD PRESSURE: 70 MMHG | SYSTOLIC BLOOD PRESSURE: 124 MMHG | HEIGHT: 72 IN | WEIGHT: 207.8 LBS | BODY MASS INDEX: 28.15 KG/M2

## 2022-11-22 DIAGNOSIS — G47.33 OSA ON CPAP: ICD-10-CM

## 2022-11-22 DIAGNOSIS — I10 ESSENTIAL HYPERTENSION: ICD-10-CM

## 2022-11-22 DIAGNOSIS — I82.501 CHRONIC DEEP VEIN THROMBOSIS (DVT) OF RIGHT LOWER EXTREMITY, UNSPECIFIED VEIN (HCC): ICD-10-CM

## 2022-11-22 DIAGNOSIS — G89.29 CHRONIC PAIN OF BOTH KNEES: ICD-10-CM

## 2022-11-22 DIAGNOSIS — M06.9 RHEUMATOID ARTHRITIS INVOLVING MULTIPLE JOINTS (HCC): ICD-10-CM

## 2022-11-22 DIAGNOSIS — M25.561 CHRONIC PAIN OF BOTH KNEES: ICD-10-CM

## 2022-11-22 DIAGNOSIS — E11.9 TYPE 2 DIABETES MELLITUS WITHOUT COMPLICATION, WITHOUT LONG-TERM CURRENT USE OF INSULIN (HCC): Primary | ICD-10-CM

## 2022-11-22 DIAGNOSIS — Z99.89 OSA ON CPAP: ICD-10-CM

## 2022-11-22 DIAGNOSIS — E89.0 HYPOTHYROIDISM FOLLOWING RADIOIODINE THERAPY: ICD-10-CM

## 2022-11-22 DIAGNOSIS — E78.00 HYPERCHOLESTEROLEMIA: ICD-10-CM

## 2022-11-22 DIAGNOSIS — M25.562 CHRONIC PAIN OF BOTH KNEES: ICD-10-CM

## 2022-11-22 PROCEDURE — 3074F SYST BP LT 130 MM HG: CPT | Performed by: INTERNAL MEDICINE

## 2022-11-22 PROCEDURE — 99214 OFFICE O/P EST MOD 30 MIN: CPT | Performed by: INTERNAL MEDICINE

## 2022-11-22 PROCEDURE — 3078F DIAST BP <80 MM HG: CPT | Performed by: INTERNAL MEDICINE

## 2022-11-22 PROCEDURE — 1036F TOBACCO NON-USER: CPT | Performed by: INTERNAL MEDICINE

## 2022-11-22 PROCEDURE — G8427 DOCREV CUR MEDS BY ELIG CLIN: HCPCS | Performed by: INTERNAL MEDICINE

## 2022-11-22 PROCEDURE — G8484 FLU IMMUNIZE NO ADMIN: HCPCS | Performed by: INTERNAL MEDICINE

## 2022-11-22 PROCEDURE — 1123F ACP DISCUSS/DSCN MKR DOCD: CPT | Performed by: INTERNAL MEDICINE

## 2022-11-22 PROCEDURE — 3017F COLORECTAL CA SCREEN DOC REV: CPT | Performed by: INTERNAL MEDICINE

## 2022-11-22 PROCEDURE — 3044F HG A1C LEVEL LT 7.0%: CPT | Performed by: INTERNAL MEDICINE

## 2022-11-22 PROCEDURE — 2022F DILAT RTA XM EVC RTNOPTHY: CPT | Performed by: INTERNAL MEDICINE

## 2022-11-22 PROCEDURE — G8417 CALC BMI ABV UP PARAM F/U: HCPCS | Performed by: INTERNAL MEDICINE

## 2022-11-22 ASSESSMENT — PATIENT HEALTH QUESTIONNAIRE - PHQ9
SUM OF ALL RESPONSES TO PHQ9 QUESTIONS 1 & 2: 0
2. FEELING DOWN, DEPRESSED OR HOPELESS: 0
1. LITTLE INTEREST OR PLEASURE IN DOING THINGS: 0
SUM OF ALL RESPONSES TO PHQ QUESTIONS 1-9: 0

## 2022-11-22 NOTE — PROGRESS NOTES
HPI: Dudley Side III (: 1950)    Has spoken with Dr Gurpreet Gorman and may be looking at knee surgery in near future  Has been able to lose wt and get his blood sugar under control  But very limited mobility due to knee pain and recently had a fall while cutting grass    Ortho wants DVT recheck as well on right leg  Problem List:  Patient Active Problem List   Diagnosis    STEFFEN on CPAP    Essential hypertension    Depression    Hypercholesterolemia    Obesity (BMI 30.0-34. 9)    BPH associated with nocturia    Chronic deep vein thrombosis (DVT) of popliteal vein of right lower extremity (HCC)    Chronic pain of both knees    Rheumatoid arthritis involving multiple joints (HCC)    Hypothyroidism following radioiodine therapy    Type 2 diabetes mellitus (HCC)    Chronic deep vein thrombosis (DVT) of right lower extremity ([address]       History:  Past Medical History:   Diagnosis Date    Acquired hypothyroidism 3/27/2016    Acute deep vein thrombosis (DVT) (HCC)     right    Bilateral knee pain 2020    Dr. Job Odell     Cataracts, bilateral     Chronic deep vein thrombosis (DVT) of popliteal vein of right lower extremity ([address] 2021    Chronic pain of both knees 2021    COVID-19 vaccine series declined     Depression 3/20/2015    Diabetic eye exam [facility]) 2020    [facility] exam, routine 2020    [facility]    Graves disease     S/p Ablation therapy    Hypertension     managed with meds    Obesity (BMI 30.0-34.9) 3/27/2016    STEFFEN on CPAP 2018    Psychiatric disorder     anxiety    Rheumatoid arthritis(714.0)     Thyroid disease     rad.  uptake- takes synthroid daily    Uncontrolled diabetes mellitus        Allergies:  No Known Allergies    Current Medications:  Current Outpatient Medications   Medication Sig Dispense Refill    methotrexate (RHEUMATREX) 2.5 MG chemo tablet Take 10 tablets by mouth once a week 120 tablet 0    naproxen (NAPROSYN) 500 MG tablet Take 1 tablet by mouth 2 times daily (with meals) 180 tablet 1    atorvastatin (LIPITOR) 20 MG tablet Take 1 tablet by mouth at bedtime 90 tablet 3    hydroxychloroquine (PLAQUENIL) 200 MG tablet Take 1 tablet by mouth 2 times daily 180 tablet 1    lisinopril (PRINIVIL;ZESTRIL) 20 MG tablet TAKE ONE TABLET BY MOUTH ONE TIME DAILY 90 tablet 3    amLODIPine (NORVASC) 5 MG tablet TAKE ONE TABLET BY MOUTH ONE TIME DAILY 90 tablet 3    metFORMIN (GLUCOPHAGE) 1000 MG tablet TAKE ONE TABLET BY MOUTH TWICE A DAY WITH A MEAL 180 tablet 3    amitriptyline (ELAVIL) 25 MG tablet Take 25 mg by mouth      folic acid (FOLVITE) 1 MG tablet Take 1 tablet by mouth once daily      glimepiride (AMARYL) 4 MG tablet Take 4 mg by mouth 2 times daily      levothyroxine (SYNTHROID) 150 MCG tablet Take 150 mcg by mouth every morning (before breakfast)      mupirocin (BACTROBAN) 2 % ointment APPLY OINTMENT TO WOUND DAILY UNDER BANDAGE UNTIL HEALED.  sertraline (ZOLOFT) 100 MG tablet Take 100 mg by mouth daily       No current facility-administered medications for this visit. Review of Systems:  Review of Systems   Constitutional:  Positive for unexpected weight change. Intentional wt loss   Musculoskeletal:  Positive for arthralgias and gait problem. All other systems reviewed and are negative. Vitals:  /70   Ht 6' (1.829 m)   Wt 207 lb 12.8 oz (94.3 kg)   BMI 28.18 kg/m²     Physical Exam:  Physical Exam  Vitals reviewed. Constitutional:       Appearance: Normal appearance. HENT:      Head: Normocephalic and atraumatic. Eyes:      Extraocular Movements: Extraocular movements intact. Pupils: Pupils are equal, round, and reactive to light. Cardiovascular:      Rate and Rhythm: Normal rate and regular rhythm. Heart sounds: Normal heart sounds. Pulmonary:      Effort: Pulmonary effort is normal.      Breath sounds: Normal breath sounds.    Musculoskeletal: General: Deformity present. Cervical back: Normal range of motion and neck supple. Comments: Both knees with decrease ROM   Skin:     General: Skin is warm and dry. Neurological:      General: No focal deficit present. Mental Status: He is alert and oriented to person, place, and time. Psychiatric:         Mood and Affect: Mood normal.         Behavior: Behavior normal.         Thought Content: Thought content normal.         Judgment: Judgment normal.        Assessment/Plan:   Renzo Murrell was seen today for follow-up. Diagnoses and all orders for this visit:    Type 2 diabetes mellitus without complication, without long-term current use of insulin (HCC)  Continue to keep A1C less than 7%  Essential hypertension  BP controlled  Hypercholesterolemia  On statin therapy with Lipitor  STEFFEN on CPAP  Using nightly with good benefit  Hypothyroidism following radioiodine therapy  Continue meds  Rheumatoid arthritis involving multiple joints (HCC)  Follow with Rheum  Recently had labs  Chronic deep vein thrombosis (DVT) of right lower extremity, unspecified vein ([facility]  -     Vascular duplex lower extremity venous right; Future  Update ultrasound  Chronic pain of both knees    Will need replacement in near future  Follow with Ortho      Current medications are therapeutic at this time; continue as prescribed.         Davina Zambrano MD

## 2022-12-22 DIAGNOSIS — U07.1 COVID-19: Primary | ICD-10-CM

## 2022-12-23 NOTE — TELEPHONE ENCOUNTER
Pt COVID npositive via rapid test today. Requested Prescriptions     Signed Prescriptions Disp Refills    nirmatrelvir/ritonavir (PAXLOVID) 20 x 150 MG & 10 x 100MG TBPK 30 tablet 0     Sig: Take 3 tablets (two 150 mg nirmatrelvir and one 100 mg ritonavir tablets) by mouth every 12 hours for 5 days. Authorizing Provider: Madison Price     Recommended he sleep prone or on side. Deep breathing exercises and/or incentive spirometry hourly on the waking hour and every 3 hours at night. Pulse oximeter purchase recommended; readings < 90% oxygen saturation require in person evaluation in ER - he is agreeable. Encouraged he continue to self monitor their symptoms in home isolation. Avoid leaving home unless completely necessary. Mask at all times and maintain social distance (6 ft) in public. Rest; drink plenty of fluids. Supplement immune system with Vitamin D 2000 international units  daily, Vitamin C 500mg daily and Zinc 50mg daily. If you have trouble breathing, a fever > 100.4 without reduction with medication (Tylenol/Advil/ibuprofen), or any other concerning symptoms, please travel to urgent care and/or ER for consultation.

## 2022-12-29 ENCOUNTER — APPOINTMENT (OUTPATIENT)
Dept: GENERAL RADIOLOGY | Age: 72
End: 2022-12-29
Payer: MEDICARE

## 2022-12-29 ENCOUNTER — HOSPITAL ENCOUNTER (EMERGENCY)
Age: 72
Discharge: HOME | End: 2022-12-30
Attending: EMERGENCY MEDICINE
Payer: MEDICARE

## 2022-12-29 ENCOUNTER — APPOINTMENT (OUTPATIENT)
Dept: CT IMAGING | Age: 72
End: 2022-12-29
Payer: MEDICARE

## 2022-12-29 DIAGNOSIS — E86.0 DEHYDRATION: ICD-10-CM

## 2022-12-29 DIAGNOSIS — N17.9 AKI (ACUTE KIDNEY INJURY) (HCC): Primary | ICD-10-CM

## 2022-12-29 DIAGNOSIS — R40.4 TRANSIENT ALTERATION OF AWARENESS: ICD-10-CM

## 2022-12-29 LAB
APPEARANCE UR: CLEAR
BACTERIA URNS QL MICRO: ABNORMAL /HPF
BASOPHILS # BLD: 0.1 K/UL (ref 0–0.2)
BASOPHILS NFR BLD: 1 % (ref 0–2)
BILIRUB UR QL: NEGATIVE
CASTS URNS QL MICRO: ABNORMAL /LPF
COLOR UR: YELLOW
CRYSTALS URNS QL MICRO: 0 /LPF
DIFFERENTIAL METHOD BLD: ABNORMAL
EOSINOPHIL # BLD: 0.1 K/UL (ref 0–0.8)
EOSINOPHIL NFR BLD: 1 % (ref 0.5–7.8)
EPI CELLS #/AREA URNS HPF: ABNORMAL /HPF
ERYTHROCYTE [DISTWIDTH] IN BLOOD BY AUTOMATED COUNT: 14 % (ref 11.9–14.6)
GLUCOSE UR STRIP.AUTO-MCNC: NEGATIVE MG/DL
HCT VFR BLD AUTO: 35.9 % (ref 41.1–50.3)
HGB BLD-MCNC: 11.8 G/DL (ref 13.6–17.2)
HGB UR QL STRIP: NEGATIVE
IMM GRANULOCYTES # BLD AUTO: 0 K/UL (ref 0–0.5)
IMM GRANULOCYTES NFR BLD AUTO: 0 % (ref 0–5)
KETONES UR QL STRIP.AUTO: 15 MG/DL
LACTATE SERPL-SCNC: 2.6 MMOL/L (ref 0.4–2)
LEUKOCYTE ESTERASE UR QL STRIP.AUTO: NEGATIVE
LYMPHOCYTES # BLD: 1 K/UL (ref 0.5–4.6)
LYMPHOCYTES NFR BLD: 9 % (ref 13–44)
MCH RBC QN AUTO: 32 PG (ref 26.1–32.9)
MCHC RBC AUTO-ENTMCNC: 32.9 G/DL (ref 31.4–35)
MCV RBC AUTO: 97.3 FL (ref 82–102)
MONOCYTES # BLD: 0.6 K/UL (ref 0.1–1.3)
MONOCYTES NFR BLD: 6 % (ref 4–12)
MUCOUS THREADS URNS QL MICRO: 0 /LPF
NEUTS SEG # BLD: 9.3 K/UL (ref 1.7–8.2)
NEUTS SEG NFR BLD: 84 % (ref 43–78)
NITRITE UR QL STRIP.AUTO: NEGATIVE
NRBC # BLD: 0 K/UL (ref 0–0.2)
OTHER OBSERVATIONS: ABNORMAL
PH UR STRIP: 5.5 [PH] (ref 5–9)
PLATELET # BLD AUTO: 359 K/UL (ref 150–450)
PMV BLD AUTO: 9.9 FL (ref 9.4–12.3)
PROT UR STRIP-MCNC: 100 MG/DL
RBC # BLD AUTO: 3.69 M/UL (ref 4.23–5.6)
RBC #/AREA URNS HPF: ABNORMAL /HPF
SP GR UR REFRACTOMETRY: >=1.03 (ref 1–1.02)
TSH, 3RD GENERATION: 0.75 UIU/ML (ref 0.58–3.7)
UROBILINOGEN UR QL STRIP.AUTO: 0.2 EU/DL (ref 0.2–1)
WBC # BLD AUTO: 11.1 K/UL (ref 4.3–11.1)
WBC URNS QL MICRO: 0 /HPF

## 2022-12-29 PROCEDURE — 99285 EMERGENCY DEPT VISIT HI MDM: CPT

## 2022-12-29 PROCEDURE — 85025 COMPLETE CBC W/AUTO DIFF WBC: CPT

## 2022-12-29 PROCEDURE — 84443 ASSAY THYROID STIM HORMONE: CPT

## 2022-12-29 PROCEDURE — 81001 URINALYSIS AUTO W/SCOPE: CPT

## 2022-12-29 PROCEDURE — 80053 COMPREHEN METABOLIC PANEL: CPT

## 2022-12-29 PROCEDURE — 96361 HYDRATE IV INFUSION ADD-ON: CPT

## 2022-12-29 PROCEDURE — 71045 X-RAY EXAM CHEST 1 VIEW: CPT

## 2022-12-29 PROCEDURE — 83605 ASSAY OF LACTIC ACID: CPT

## 2022-12-29 PROCEDURE — 96360 HYDRATION IV INFUSION INIT: CPT

## 2022-12-29 PROCEDURE — 2580000003 HC RX 258: Performed by: EMERGENCY MEDICINE

## 2022-12-29 PROCEDURE — 70450 CT HEAD/BRAIN W/O DYE: CPT

## 2022-12-29 RX ORDER — 0.9 % SODIUM CHLORIDE 0.9 %
1000 INTRAVENOUS SOLUTION INTRAVENOUS
Status: COMPLETED | OUTPATIENT
Start: 2022-12-29 | End: 2022-12-29

## 2022-12-29 RX ORDER — 0.9 % SODIUM CHLORIDE 0.9 %
1000 INTRAVENOUS SOLUTION INTRAVENOUS
Status: COMPLETED | OUTPATIENT
Start: 2022-12-30 | End: 2022-12-30

## 2022-12-29 RX ADMIN — SODIUM CHLORIDE 1000 ML: 9 INJECTION, SOLUTION INTRAVENOUS at 23:37

## 2022-12-29 RX ADMIN — SODIUM CHLORIDE 1000 ML: 9 INJECTION, SOLUTION INTRAVENOUS at 22:13

## 2022-12-29 ASSESSMENT — PAIN - FUNCTIONAL ASSESSMENT: PAIN_FUNCTIONAL_ASSESSMENT: NONE - DENIES PAIN

## 2022-12-30 VITALS
BODY MASS INDEX: 27.22 KG/M2 | OXYGEN SATURATION: 98 % | RESPIRATION RATE: 23 BRPM | HEART RATE: 86 BPM | HEIGHT: 72 IN | WEIGHT: 201 LBS | SYSTOLIC BLOOD PRESSURE: 120 MMHG | DIASTOLIC BLOOD PRESSURE: 78 MMHG | TEMPERATURE: 99 F

## 2022-12-30 LAB
ALBUMIN SERPL-MCNC: 4.1 G/DL (ref 3.2–4.6)
ALBUMIN/GLOB SERPL: 1.1 {RATIO} (ref 0.4–1.6)
ALP SERPL-CCNC: 71 U/L (ref 45–117)
ALT SERPL-CCNC: 28 U/L (ref 13–61)
ANION GAP SERPL CALC-SCNC: 16 MMOL/L (ref 2–11)
AST SERPL-CCNC: 24 U/L (ref 15–37)
BILIRUB SERPL-MCNC: 0.3 MG/DL (ref 0.2–1.1)
BUN SERPL-MCNC: 29 MG/DL (ref 7–18)
CALCIUM SERPL-MCNC: 10.2 MG/DL (ref 8.3–10.4)
CHLORIDE SERPL-SCNC: 103 MMOL/L (ref 98–107)
CO2 SERPL-SCNC: 23 MMOL/L (ref 21–32)
CREAT SERPL-MCNC: 1.72 MG/DL (ref 0.8–1.5)
GLOBULIN SER CALC-MCNC: 3.6 G/DL (ref 2.8–4.5)
GLUCOSE SERPL-MCNC: 162 MG/DL (ref 65–100)
LACTATE SERPL-SCNC: 1.3 MMOL/L (ref 0.4–2)
POTASSIUM SERPL-SCNC: 4.6 MMOL/L (ref 3.5–5.1)
PROT SERPL-MCNC: 7.7 G/DL (ref 6.4–8.2)
SODIUM SERPL-SCNC: 142 MMOL/L (ref 133–143)

## 2022-12-30 ASSESSMENT — PAIN SCALES - GENERAL: PAINLEVEL_OUTOF10: 0

## 2022-12-30 ASSESSMENT — PAIN - FUNCTIONAL ASSESSMENT: PAIN_FUNCTIONAL_ASSESSMENT: 0-10

## 2022-12-30 NOTE — ED NOTES
I have reviewed discharge instructions with the patient. The patient verbalized understanding. Patient left ED via Discharge Method: ambulatory to Home with family. Opportunity for questions and clarification provided. Patient given 0 scripts. To continue your aftercare when you leave the hospital, you may receive an automated call from our care team to check in on how you are doing. This is a free service and part of our promise to provide the best care and service to meet your aftercare needs.  If you have questions, or wish to unsubscribe from this service please call [phone]. Thank you for Choosing our [facility] Emergency Department.         Marcelo Nath RN  12/30/22 0021

## 2022-12-30 NOTE — ED TRIAGE NOTES
Pt daughter states a week ago today pt was sitting in chair at home and was disoriented to where he was and daughter states pt left arm was droopy and left side of face had a droop. Daughter states pt tested positive for COVID on 12/22. Pt spouse states pt was disoriented today, wife says they were driving to [address] and pt was confused as to where they were going and why. Spouse also states pt night and bedtime medications were already gone this evening and spouse states pt wanted to take tomorrow medications tonight. Pt ambulatory to triage. Pt alert and oriented x4.

## 2022-12-30 NOTE — DISCHARGE INSTRUCTIONS
You had an acute kidney injury noticed here in the emergency department today. You were given 2 L of IV fluids in the ER and you need to have your kidney function rechecked on Monday at your family doctor's office. If you begin developing any new or concerning symptoms in the meantime you should return immediately to the ER.

## 2022-12-30 NOTE — ED PROVIDER NOTES
Emergency Department Provider Note                   PCP:                Minnie Boateng MD               Age: 71 y.o. Sex: male       ICD-10-CM    1. HITESH (acute kidney injury) ([facility] [address]  N17.9       2. Dehydration  E86.0       3. Transient alteration of awareness  R40.4           DISPOSITION Decision To Discharge 12/29/2022 11:19:42 PM       MDM  Number of Diagnoses or Management Options  HITESH (acute kidney injury) ([facility] [address]  Dehydration  Transient alteration of awareness  Diagnosis management comments: Hypoglycemia, electrolyte abnormality, UTI, dehydration, hyperammonemia, acute liver failure, uremia, dementia, sepsis, pneumonia, hyponatremia, hypertensive encephalopathy,    CVA, TIA, brain tumor, subarachnoid hemorrhage, CNS vasculitis, meningitis, encephalitis,    Polysubstance abuse, alcohol withdrawal, acute alcohol intoxication,    Acute psychosis, psychiatric illness, hallucinations, suicidal ideations, homicidal ideations,    Multiple sclerosis, thyrotoxicosis, addisonian crisis,            Amount and/or Complexity of Data Reviewed  Clinical lab tests: ordered and reviewed  Tests in the radiology section of CPT®: ordered and reviewed  Tests in the medicine section of CPT®: reviewed and ordered  Review and summarize past medical records: yes  Independent visualization of images, tracings, or specimens: yes       Complexity of Problem: 1 acute illness with systemic symptoms. (4)  The patients assessment required an independent historian: I spoke with a family member. I have conducted an independent ordering and review of Labs. I have conducted an independent ordering and review of EKG. I have conducted an independent ordering and review of X-rays. I have reviewed records from an external source: provider visit notes from PCP. Considerations: Shared decision making was utilized in the care of this patient. Patient was discharged risks and benefits of hospitalization were discussed.    ED were doing. Episode lasted for about an hour today and then by the time he arrived to the ER his symptoms had improved he is able to answer questions better. Daughter states that over the past year she has noticed brief episodes similar to this lasting for about a minute at a time but never anything quite as long. Patient has no acute complaints. All other systems reviewed and are negative unless otherwise stated in the history of present illness section. Review of Systems   Psychiatric/Behavioral:  Positive for confusion. All other systems reviewed and are negative. Past Medical History:   Diagnosis Date    Acquired hypothyroidism 3/27/2016    Acute deep vein thrombosis (DVT) (HCC)     right    Bilateral knee pain 05/2020    Dr. Yaneli Glover     Cataracts, bilateral     Chronic deep vein thrombosis (DVT) of popliteal vein of right lower extremity ([address] 4/12/2021    Chronic pain of both knees 4/12/2021    COVID-19 vaccine series declined 2021    Depression 3/20/2015    Diabetic eye exam [facility]) 08/26/2020    [facility]    [facility]    Graves disease     S/p Ablation therapy    Hypertension     managed with meds    Obesity (BMI 30.0-34.9) 3/27/2016    STEFFEN on CPAP 7/25/2018    Psychiatric disorder     anxiety    Rheumatoid arthritis(714.0)     Thyroid disease     rad.  uptake- takes synthroid daily    Uncontrolled diabetes mellitus         Past Surgical History:   Procedure Laterality Date    BUNIONECTOMY Left 12/2014    CATARACT REMOVAL Bilateral     KNEE ARTHROSCOPY Left 1990s        Family History   Problem Relation Age of Onset    Other Son         esophagus stretched    No Known Problems Daughter     Hypertension Brother     Hypertension Sister     Osteoarthritis Father     Hypertension Father     No Known Problems Son     Hypertension Mother     Osteoarthritis Mother     Diabetes Father     Lung Cancer Father         Social History     Socioeconomic History    Marital status:      Spouse name: None    Number of children: None    Years of education: None    Highest education level: None   Tobacco Use    Smoking status: Former     Packs/day: 0.50     Types: Cigarettes     Quit date: 2012     Years since quittin.0    Smokeless tobacco: Never    Tobacco comments:     Quit smoking: former smoker \"Smoke when [employer] nervous\"   Substance and Sexual Activity    Alcohol use: No    Drug use: No        Allergies: Patient has no known allergies. Previous Medications    AMITRIPTYLINE (ELAVIL) 25 MG TABLET    Take 25 mg by mouth    AMLODIPINE (NORVASC) 5 MG TABLET    TAKE ONE TABLET BY MOUTH ONE TIME DAILY    ATORVASTATIN (LIPITOR) 20 MG TABLET    Take 1 tablet by mouth at bedtime    FOLIC ACID (FOLVITE) 1 MG TABLET    Take 1 tablet by mouth once daily    GLIMEPIRIDE (AMARYL) 4 MG TABLET    Take 4 mg by mouth 2 times daily    HYDROXYCHLOROQUINE (PLAQUENIL) 200 MG TABLET    Take 1 tablet by mouth 2 times daily    LEVOTHYROXINE (SYNTHROID) 150 MCG TABLET    Take 150 mcg by mouth every morning (before breakfast)    LISINOPRIL (PRINIVIL;ZESTRIL) 20 MG TABLET    TAKE ONE TABLET BY MOUTH ONE TIME DAILY    METFORMIN (GLUCOPHAGE) 1000 MG TABLET    TAKE ONE TABLET BY MOUTH TWICE A DAY WITH A MEAL    METHOTREXATE (RHEUMATREX) 2.5 MG CHEMO TABLET    Take 10 tablets by mouth once a week    MUPIROCIN (BACTROBAN) 2 % OINTMENT    APPLY OINTMENT TO WOUND DAILY UNDER BANDAGE UNTIL HEALED. NAPROXEN (NAPROSYN) 500 MG TABLET    Take 1 tablet by mouth 2 times daily (with meals)    SERTRALINE (ZOLOFT) 100 MG TABLET    Take 100 mg by mouth daily        Vitals signs and nursing note reviewed.    Patient Vitals for the past 4 hrs:   Temp Pulse Resp BP SpO2   22 2218 -- 85 22 122/80 98 %   228 -- 84 23 127/70 98 %   228 -- 87 22 117/66 97 %   228 -- 85 15 118/65 98 %   22 -- 85 16 125/76 97 %   22 -- 87 27 124/68 98 %   22 1947 99 °F (37.2 °C) 94 16 108/88 97 %          Physical Exam     GENERAL:The patient has Body mass index is 27.26 kg/m². Well-hydrated. VITAL SIGNS: Heart rate, blood pressure, respiratory rate reviewed as recorded in  nurse's notes  EYES: Pupils reactive. Extraocular motion intact. No conjunctival redness or drainage. EARS: No external masses or lesions. NOSE: No nasal drainage or epistaxis. MOUTH/THROAT: Pharynx clear; airway patent. NECK: Supple, no meningeal signs. Trachea midline. No masses or thyromegaly. LUNGS: Breath sounds clear and equal bilaterally no accessory muscle use. CHEST: No deformity  CARDIOVASCULAR: Regular rate and rhythm  ABDOMEN: Soft without tenderness. No palpable masses or organomegaly. No  peritoneal signs. No rigidity. EXTREMITIES: No clubbing or cyanosis. No joint swelling. Normal muscle tone. No  restricted range of motion appreciated. NEUROLOGIC: Sensation is grossly intact. Cranial nerve exam reveals face is  symmetrical, tongue is midline speech is clear. SKIN: No rash or petechiae. Good skin turgor palpated. PSYCHIATRIC: Alert and oriented. Appropriate behavior and judgment. Procedures    ED EKG Interpretation  EKG was interpreted in the absence of a cardiologist.    Rate: 88  EKG Interpretation: EKG Interpretation: sinus rhythm  ST Segments: Normal ST segments - NO STEMI    Results for orders placed or performed during the hospital encounter of 12/29/22   XR CHEST PORTABLE    Narrative    EXAM: XR CHEST PORTABLE    HISTORY: Altered Mental Status. TECHNIQUE: Frontal chest.    COMPARISON: 9/18/2017     FINDINGS:   The cardiac silhouette, mediastinum, and pulmonary vasculature are within normal  limits. There is no consolidation, pleural effusion, or pneumothorax. No significant osseous abnormalities are observed. Impression    No evidence of an acute intrathoracic process.      CT HEAD WO CONTRAST    Narrative    CT HEAD WO CONTRAST 12/29/2022 9:08 PM    HISTORY: Altered mental status. COMPARISON: None available. TECHNIQUE: Multiple axial images obtained through the brain without intravenous  contrast.  Radiation dose reduction techniques were used for this study: All CT  scans performed at this facility use one or all of the following: Automated  exposure control, adjustment of the mA and/or kVp according to patient's size,  iterative reconstruction. FINDINGS: No areas of abnormal attenuation are seen in the brain. There is no CT  evidence of acute hemorrhage or infarction. The ventricles are normal in size. There are no extra-axial fluid collections. No masses are seen. The sinuses are  clear. There are no bony lesions. Impression    No CT evidence of acute intracranial abnormality.      CBC with Auto Differential   Result Value Ref Range    WBC 11.1 4.3 - 11.1 K/uL    RBC 3.69 (L) 4.23 - 5.60 M/uL    Hemoglobin 11.8 (L) 13.6 - 17.2 g/dL    Hematocrit 35.9 (L) 41.1 - 50.3 %    MCV 97.3 82.0 - 102.0 FL    MCH 32.0 26.1 - 32.9 PG    MCHC 32.9 31.4 - 35.0 g/dL    RDW 14.0 11.9 - 14.6 %    Platelets 655 045 - 866 K/uL    MPV 9.9 9.4 - 12.3 FL    nRBC 0.00 0.0 - 0.2 K/uL    Differential Type AUTOMATED      Seg Neutrophils 84 (H) 43 - 78 %    Lymphocytes 9 (L) 13 - 44 %    Monocytes 6 4.0 - 12.0 %    Eosinophils % 1 0.5 - 7.8 %    Basophils 1 0.0 - 2.0 %    Immature Granulocytes 0 0.0 - 5.0 %    Segs Absolute 9.3 (H) 1.7 - 8.2 K/UL    Absolute Lymph # 1.0 0.5 - 4.6 K/UL    Absolute Mono # 0.6 0.1 - 1.3 K/UL    Absolute Eos # 0.1 0.0 - 0.8 K/UL    Basophils Absolute 0.1 0.0 - 0.2 K/UL    Absolute Immature Granulocyte 0.0 0.0 - 0.5 K/UL   CMP   Result Value Ref Range    Sodium 142 133 - 143 mmol/L    Potassium 4.6 3.5 - 5.1 mmol/L    Chloride 103 98 - 107 mmol/L    CO2 23 21 - 32 mmol/L    Anion Gap 16 (H) 2 - 11 mmol/L    Glucose 162 (H) 65 - 100 mg/dL    BUN 29 (H) 7.0 - 18.0 MG/DL    Creatinine 1.72 (H) 0.8 - 1.5 MG/DL    Est, Glom Filt Rate 42 (L) >60 ml/min/1.73m2    Calcium 10.2 8.3 - 10.4 MG/DL    Total Bilirubin 0.3 0.2 - 1.1 MG/DL    ALT 28 13.0 - 61.0 U/L    AST 24 15 - 37 U/L    Alk Phosphatase 71 45.0 - 117.0 U/L    Total Protein 7.7 6.4 - 8.2 g/dL    Albumin 4.1 3.2 - 4.6 g/dL    Globulin 3.6 2.8 - 4.5 g/dL    Albumin/Globulin Ratio 1.1 0.4 - 1.6     Lactic Acid Now and in 2 Hours   Result Value Ref Range    Lactic Acid 2.60 (H) 0.4 - 2.0 mmol/L   Urinalysis w rflx microscopic   Result Value Ref Range    Color, UA YELLOW      Appearance CLEAR      Specific Gravity, UA >=1.030 1.001 - 1.023    pH, Urine 5.5 5.0 - 9.0      Protein,  (A) NEG mg/dL    Glucose, UA Negative mg/dL    Ketones, Urine 15 (A) NEG mg/dL    Bilirubin Urine Negative NEG      Blood, Urine Negative NEG      Urobilinogen, Urine 0.2 0.2 - 1.0 EU/dL    Nitrite, Urine Negative NEG      Leukocyte Esterase, Urine Negative NEG     TSH   Result Value Ref Range    TSH, 3RD GENERATION 0.753 0.58 - 3.70 uIU/mL   Urinalysis, Micro   Result Value Ref Range    WBC, UA 0 0 /hpf    RBC, UA 0-3 0 /hpf    Epithelial Cells UA 0-3 0 /hpf    BACTERIA, URINE 1+ (H) 0 /hpf    Casts HYALINE 0 /lpf    Crystals 0 0 /LPF    Mucus, UA 0 0 /lpf    Other observations RESULTS VERIFIED MANUALLY          XR CHEST PORTABLE   Final Result   No evidence of an acute intrathoracic process. CT HEAD WO CONTRAST   Final Result   No CT evidence of acute intracranial abnormality. NIH Stroke Scale  Interval: Baseline  Level of Consciousness (1a): Alert  LOC Questions (1b): Answers both correctly  LOC Commands (1c): Performs both tasks correctly  Best Gaze (2): Normal  Visual (3): (!) Partial hemianopia  Facial Palsy (4): Normal symmetrical movement  Motor Arm, Left (5a): No drift  Motor Arm, Right (5b): No drift  Motor Leg, Left (6a): No drift  Motor Leg, Right (6b):  No drift  Limb Ataxia (7): Absent  Sensory (8): Normal  Best Language (9): No aphasia  Dysarthria (10): Normal  Extinction and Inattention (11): No abnormality  Total: 1                Voice dictation software was used during the making of this note. This software is not perfect and grammatical and other typographical errors may be present. This note has not been completely proofread for errors.        Valentín Funez,   12/29/22 3936

## 2023-01-01 ENCOUNTER — APPOINTMENT (OUTPATIENT)
Dept: CT IMAGING | Age: 73
DRG: 177 | End: 2023-01-01
Payer: MEDICARE

## 2023-01-01 ENCOUNTER — APPOINTMENT (OUTPATIENT)
Dept: GENERAL RADIOLOGY | Age: 73
DRG: 177 | End: 2023-01-01
Payer: MEDICARE

## 2023-01-01 ENCOUNTER — HOSPITAL ENCOUNTER (EMERGENCY)
Age: 73
Discharge: ANOTHER HOSPITAL | DRG: 177 | End: 2023-01-01
Attending: EMERGENCY MEDICINE
Payer: MEDICARE

## 2023-01-01 VITALS
BODY MASS INDEX: 27.22 KG/M2 | HEIGHT: 72 IN | OXYGEN SATURATION: 95 % | HEART RATE: 89 BPM | RESPIRATION RATE: 17 BRPM | DIASTOLIC BLOOD PRESSURE: 71 MMHG | SYSTOLIC BLOOD PRESSURE: 114 MMHG | WEIGHT: 201 LBS | TEMPERATURE: 99.5 F

## 2023-01-01 DIAGNOSIS — R41.82 ALTERED MENTAL STATUS, UNSPECIFIED ALTERED MENTAL STATUS TYPE: Primary | ICD-10-CM

## 2023-01-01 LAB
ALBUMIN SERPL-MCNC: 4 G/DL (ref 3.2–4.6)
ALBUMIN/GLOB SERPL: 1.2 (ref 0.4–1.6)
ALP SERPL-CCNC: 72 U/L (ref 45–117)
ALT SERPL-CCNC: 64 U/L (ref 13–61)
ANION GAP SERPL CALC-SCNC: 15 MMOL/L (ref 2–11)
APPEARANCE UR: NORMAL
AST SERPL-CCNC: 51 U/L (ref 15–37)
BASOPHILS # BLD: 0.1 K/UL (ref 0–0.2)
BASOPHILS NFR BLD: 1 % (ref 0–2)
BILIRUB SERPL-MCNC: 0.2 MG/DL (ref 0.2–1.1)
BILIRUB UR QL: NEGATIVE
BUN SERPL-MCNC: 17 MG/DL (ref 7–18)
CALCIUM SERPL-MCNC: 9.6 MG/DL (ref 8.3–10.4)
CHLORIDE SERPL-SCNC: 102 MMOL/L (ref 98–107)
CO2 SERPL-SCNC: 22 MMOL/L (ref 21–32)
COLOR UR: YELLOW
CREAT SERPL-MCNC: 0.98 MG/DL (ref 0.8–1.5)
DIFFERENTIAL METHOD BLD: ABNORMAL
EOSINOPHIL # BLD: 0.1 K/UL (ref 0–0.8)
EOSINOPHIL NFR BLD: 2 % (ref 0.5–7.8)
ERYTHROCYTE [DISTWIDTH] IN BLOOD BY AUTOMATED COUNT: 13.9 % (ref 11.9–14.6)
GLOBULIN SER CALC-MCNC: 3.3 G/DL (ref 2.8–4.5)
GLUCOSE SERPL-MCNC: 123 MG/DL (ref 65–100)
GLUCOSE UR STRIP.AUTO-MCNC: NEGATIVE MG/DL
HCT VFR BLD AUTO: 32.2 % (ref 41.1–50.3)
HGB BLD-MCNC: 10.6 G/DL (ref 13.6–17.2)
HGB UR QL STRIP: NEGATIVE
IMM GRANULOCYTES # BLD AUTO: 0.1 K/UL (ref 0–0.5)
IMM GRANULOCYTES NFR BLD AUTO: 1 % (ref 0–5)
KETONES UR QL STRIP.AUTO: NEGATIVE MG/DL
LACTATE SERPL-SCNC: 1.2 MMOL/L (ref 0.4–2)
LEUKOCYTE ESTERASE UR QL STRIP.AUTO: NEGATIVE
LYMPHOCYTES # BLD: 0.6 K/UL (ref 0.5–4.6)
LYMPHOCYTES NFR BLD: 10 % (ref 13–44)
MCH RBC QN AUTO: 31.4 PG (ref 26.1–32.9)
MCHC RBC AUTO-ENTMCNC: 32.9 G/DL (ref 31.4–35)
MCV RBC AUTO: 95.3 FL (ref 82–102)
MONOCYTES # BLD: 1.5 K/UL (ref 0.1–1.3)
MONOCYTES NFR BLD: 23 % (ref 4–12)
NEUTS SEG # BLD: 4.2 K/UL (ref 1.7–8.2)
NEUTS SEG NFR BLD: 64 % (ref 43–78)
NITRITE UR QL STRIP.AUTO: NEGATIVE
NRBC # BLD: 0 K/UL (ref 0–0.2)
PH UR STRIP: 5.5 (ref 5–9)
PLATELET # BLD AUTO: 350 K/UL (ref 150–450)
PMV BLD AUTO: 9.5 FL (ref 9.4–12.3)
POTASSIUM SERPL-SCNC: 4.6 MMOL/L (ref 3.5–5.1)
PROT SERPL-MCNC: 7.3 G/DL (ref 6.4–8.2)
PROT UR STRIP-MCNC: NEGATIVE MG/DL
RBC # BLD AUTO: 3.38 M/UL (ref 4.23–5.6)
SODIUM SERPL-SCNC: 139 MMOL/L (ref 133–143)
SP GR UR REFRACTOMETRY: >=1.03 (ref 1–1.02)
TSH, 3RD GENERATION: 0.68 UIU/ML (ref 0.58–3.7)
UROBILINOGEN UR QL STRIP.AUTO: 0.2 EU/DL (ref 0.2–1)
WBC # BLD AUTO: 6.6 K/UL (ref 4.3–11.1)

## 2023-01-01 PROCEDURE — 83605 ASSAY OF LACTIC ACID: CPT

## 2023-01-01 PROCEDURE — 84443 ASSAY THYROID STIM HORMONE: CPT

## 2023-01-01 PROCEDURE — 2580000003 HC RX 258: Performed by: EMERGENCY MEDICINE

## 2023-01-01 PROCEDURE — 71045 X-RAY EXAM CHEST 1 VIEW: CPT

## 2023-01-01 PROCEDURE — 85025 COMPLETE CBC W/AUTO DIFF WBC: CPT

## 2023-01-01 PROCEDURE — 80053 COMPREHEN METABOLIC PANEL: CPT

## 2023-01-01 PROCEDURE — 70450 CT HEAD/BRAIN W/O DYE: CPT

## 2023-01-01 PROCEDURE — 6370000000 HC RX 637 (ALT 250 FOR IP): Performed by: EMERGENCY MEDICINE

## 2023-01-01 PROCEDURE — 81003 URINALYSIS AUTO W/O SCOPE: CPT

## 2023-01-01 RX ORDER — ASPIRIN 81 MG/1
324 TABLET, CHEWABLE ORAL
Status: COMPLETED | OUTPATIENT
Start: 2023-01-01 | End: 2023-01-01

## 2023-01-01 RX ORDER — SODIUM CHLORIDE, SODIUM LACTATE, POTASSIUM CHLORIDE, AND CALCIUM CHLORIDE .6; .31; .03; .02 G/100ML; G/100ML; G/100ML; G/100ML
1000 INJECTION, SOLUTION INTRAVENOUS ONCE
Status: COMPLETED | OUTPATIENT
Start: 2023-01-01 | End: 2023-01-01

## 2023-01-01 RX ADMIN — ASPIRIN 81 MG 324 MG: 81 TABLET ORAL at 22:06

## 2023-01-01 RX ADMIN — SODIUM CHLORIDE, POTASSIUM CHLORIDE, SODIUM LACTATE AND CALCIUM CHLORIDE 1000 ML: 600; 310; 30; 20 INJECTION, SOLUTION INTRAVENOUS at 19:26

## 2023-01-01 ASSESSMENT — ENCOUNTER SYMPTOMS
DIARRHEA: 0
VOMITING: 0
EYE DISCHARGE: 0
CHEST TIGHTNESS: 0
COUGH: 0
EYE ITCHING: 0
ABDOMINAL PAIN: 0
SHORTNESS OF BREATH: 0
NAUSEA: 0

## 2023-01-01 ASSESSMENT — PAIN - FUNCTIONAL ASSESSMENT: PAIN_FUNCTIONAL_ASSESSMENT: 0-10

## 2023-01-01 ASSESSMENT — PAIN SCALES - GENERAL: PAINLEVEL_OUTOF10: 5

## 2023-01-01 NOTE — ED TRIAGE NOTES
Pt arrives for continued AMS that has been present for the past few day. Pt was seen for this last Friday for the same and diagnosed with renal insufficiency, was give fluids and dispo'd home. As above, pt arrives for same complaints with family. Reports poor PO intake for this time.

## 2023-01-02 ENCOUNTER — APPOINTMENT (OUTPATIENT)
Dept: CT IMAGING | Age: 73
DRG: 177 | End: 2023-01-02
Attending: FAMILY MEDICINE
Payer: MEDICARE

## 2023-01-02 ENCOUNTER — HOSPITAL ENCOUNTER (INPATIENT)
Age: 73
LOS: 1 days | Discharge: HOME | DRG: 177 | End: 2023-01-03
Attending: FAMILY MEDICINE | Admitting: FAMILY MEDICINE
Payer: MEDICARE

## 2023-01-02 ENCOUNTER — APPOINTMENT (OUTPATIENT)
Dept: MRI IMAGING | Age: 73
DRG: 177 | End: 2023-01-02
Attending: FAMILY MEDICINE
Payer: MEDICARE

## 2023-01-02 DIAGNOSIS — I63.9 ACUTE CEREBROVASCULAR ACCIDENT (CVA) (HCC): Primary | ICD-10-CM

## 2023-01-02 DIAGNOSIS — Z09 HOSPITAL DISCHARGE FOLLOW-UP: ICD-10-CM

## 2023-01-02 PROBLEM — Z99.89 OSA ON CPAP: Status: ACTIVE | Noted: 2018-07-25

## 2023-01-02 PROBLEM — G93.40 ENCEPHALOPATHY: Status: ACTIVE | Noted: 2023-01-02

## 2023-01-02 PROBLEM — E11.9 TYPE 2 DIABETES MELLITUS (HCC): Status: ACTIVE | Noted: 2022-05-09

## 2023-01-02 PROBLEM — R79.89 ELEVATED LFTS: Status: ACTIVE | Noted: 2023-01-02

## 2023-01-02 PROBLEM — G47.33 OSA ON CPAP: Status: ACTIVE | Noted: 2018-07-25

## 2023-01-02 PROBLEM — D64.9 NORMOCYTIC ANEMIA: Status: ACTIVE | Noted: 2023-01-02

## 2023-01-02 PROBLEM — E78.00 HYPERCHOLESTEROLEMIA: Status: ACTIVE | Noted: 2020-08-07

## 2023-01-02 LAB
AMMONIA PLAS-SCNC: <10 UMOL/L (ref 24.9–68)
AMPHET UR QL SCN: NEGATIVE
B PERT DNA SPEC QL NAA+PROBE: NOT DETECTED
BENZODIAZ UR QL: NEGATIVE
BORDETELLA PARAPERTUSSIS BY PCR: NOT DETECTED
C PNEUM DNA SPEC QL NAA+PROBE: NOT DETECTED
CANNABINOIDS UR QL SCN: NEGATIVE
COCAINE UR QL SCN: NEGATIVE
FLUAV SUBTYP SPEC NAA+PROBE: NOT DETECTED
FLUBV RNA SPEC QL NAA+PROBE: NOT DETECTED
FOLATE SERPL-MCNC: 17.8 NG/ML (ref 3.1–17.5)
GLUCOSE BLD STRIP.AUTO-MCNC: 118 MG/DL (ref 65–100)
GLUCOSE BLD STRIP.AUTO-MCNC: 133 MG/DL (ref 65–100)
GLUCOSE BLD STRIP.AUTO-MCNC: 147 MG/DL (ref 65–100)
GLUCOSE BLD STRIP.AUTO-MCNC: 85 MG/DL (ref 65–100)
GLUCOSE BLD STRIP.AUTO-MCNC: 90 MG/DL (ref 65–100)
HADV DNA SPEC QL NAA+PROBE: NOT DETECTED
HCOV 229E RNA SPEC QL NAA+PROBE: NOT DETECTED
HCOV HKU1 RNA SPEC QL NAA+PROBE: NOT DETECTED
HCOV NL63 RNA SPEC QL NAA+PROBE: NOT DETECTED
HCOV OC43 RNA SPEC QL NAA+PROBE: NOT DETECTED
HIV 1+2 AB+HIV1 P24 AG SERPL QL IA: NONREACTIVE
HIV 1/2 RESULT COMMENT: NORMAL
HMPV RNA SPEC QL NAA+PROBE: NOT DETECTED
HPIV1 RNA SPEC QL NAA+PROBE: NOT DETECTED
HPIV2 RNA SPEC QL NAA+PROBE: NOT DETECTED
HPIV3 RNA SPEC QL NAA+PROBE: NOT DETECTED
HPIV4 RNA SPEC QL NAA+PROBE: NOT DETECTED
M PNEUMO DNA SPEC QL NAA+PROBE: NOT DETECTED
OPIATES UR QL: NEGATIVE
RPR SER QL: NONREACTIVE
RSV RNA SPEC QL NAA+PROBE: NOT DETECTED
RV+EV RNA SPEC QL NAA+PROBE: NOT DETECTED
SARS-COV-2 RNA RESP QL NAA+PROBE: DETECTED
SERVICE CMNT-IMP: ABNORMAL
SERVICE CMNT-IMP: NORMAL
SERVICE CMNT-IMP: NORMAL
TSH, 3RD GENERATION: 0.64 UIU/ML (ref 0.36–3.74)
VIT B12 SERPL-MCNC: 339 PG/ML (ref 193–986)

## 2023-01-02 PROCEDURE — 97161 PT EVAL LOW COMPLEX 20 MIN: CPT

## 2023-01-02 PROCEDURE — 99222 1ST HOSP IP/OBS MODERATE 55: CPT | Performed by: PSYCHIATRY & NEUROLOGY

## 2023-01-02 PROCEDURE — 6360000004 HC RX CONTRAST MEDICATION: Performed by: FAMILY MEDICINE

## 2023-01-02 PROCEDURE — 80307 DRUG TEST PRSMV CHEM ANLYZR: CPT

## 2023-01-02 PROCEDURE — 97535 SELF CARE MNGMENT TRAINING: CPT

## 2023-01-02 PROCEDURE — 94760 N-INVAS EAR/PLS OXIMETRY 1: CPT

## 2023-01-02 PROCEDURE — 6370000000 HC RX 637 (ALT 250 FOR IP): Performed by: FAMILY MEDICINE

## 2023-01-02 PROCEDURE — 87389 HIV-1 AG W/HIV-1&-2 AB AG IA: CPT

## 2023-01-02 PROCEDURE — 2580000003 HC RX 258: Performed by: FAMILY MEDICINE

## 2023-01-02 PROCEDURE — 84443 ASSAY THYROID STIM HORMONE: CPT

## 2023-01-02 PROCEDURE — 0202U NFCT DS 22 TRGT SARS-COV-2: CPT

## 2023-01-02 PROCEDURE — 97530 THERAPEUTIC ACTIVITIES: CPT

## 2023-01-02 PROCEDURE — 82746 ASSAY OF FOLIC ACID SERUM: CPT

## 2023-01-02 PROCEDURE — 86592 SYPHILIS TEST NON-TREP QUAL: CPT

## 2023-01-02 PROCEDURE — 70551 MRI BRAIN STEM W/O DYE: CPT

## 2023-01-02 PROCEDURE — 70496 CT ANGIOGRAPHY HEAD: CPT

## 2023-01-02 PROCEDURE — 82140 ASSAY OF AMMONIA: CPT

## 2023-01-02 PROCEDURE — 82962 GLUCOSE BLOOD TEST: CPT

## 2023-01-02 PROCEDURE — 82607 VITAMIN B-12: CPT

## 2023-01-02 PROCEDURE — 6360000002 HC RX W HCPCS: Performed by: FAMILY MEDICINE

## 2023-01-02 PROCEDURE — 36415 COLL VENOUS BLD VENIPUNCTURE: CPT

## 2023-01-02 PROCEDURE — 97165 OT EVAL LOW COMPLEX 30 MIN: CPT

## 2023-01-02 PROCEDURE — 1100000000 HC RM PRIVATE

## 2023-01-02 RX ORDER — ASPIRIN 300 MG/1
300 SUPPOSITORY RECTAL DAILY
Status: DISCONTINUED | OUTPATIENT
Start: 2023-01-02 | End: 2023-01-03 | Stop reason: HOSPADM

## 2023-01-02 RX ORDER — LEVOTHYROXINE SODIUM 0.07 MG/1
150 TABLET ORAL
Status: DISCONTINUED | OUTPATIENT
Start: 2023-01-02 | End: 2023-01-03 | Stop reason: HOSPADM

## 2023-01-02 RX ORDER — ONDANSETRON 2 MG/ML
4 INJECTION INTRAMUSCULAR; INTRAVENOUS EVERY 6 HOURS PRN
Status: DISCONTINUED | OUTPATIENT
Start: 2023-01-02 | End: 2023-01-03 | Stop reason: HOSPADM

## 2023-01-02 RX ORDER — FOLIC ACID 1 MG/1
1 TABLET ORAL DAILY
Status: DISCONTINUED | OUTPATIENT
Start: 2023-01-02 | End: 2023-01-03 | Stop reason: HOSPADM

## 2023-01-02 RX ORDER — DEXTROSE MONOHYDRATE 100 MG/ML
INJECTION, SOLUTION INTRAVENOUS CONTINUOUS PRN
Status: DISCONTINUED | OUTPATIENT
Start: 2023-01-02 | End: 2023-01-02

## 2023-01-02 RX ORDER — DEXTROSE MONOHYDRATE 100 MG/ML
INJECTION, SOLUTION INTRAVENOUS CONTINUOUS PRN
Status: DISCONTINUED | OUTPATIENT
Start: 2023-01-02 | End: 2023-01-03 | Stop reason: HOSPADM

## 2023-01-02 RX ORDER — GLIPIZIDE 5 MG/1
10 TABLET ORAL
Status: DISCONTINUED | OUTPATIENT
Start: 2023-01-02 | End: 2023-01-03 | Stop reason: HOSPADM

## 2023-01-02 RX ORDER — HYDROXYCHLOROQUINE SULFATE 200 MG/1
200 TABLET, FILM COATED ORAL 2 TIMES DAILY
Status: DISCONTINUED | OUTPATIENT
Start: 2023-01-02 | End: 2023-01-02

## 2023-01-02 RX ORDER — ACETAMINOPHEN 325 MG/1
650 TABLET ORAL EVERY 4 HOURS PRN
Status: DISCONTINUED | OUTPATIENT
Start: 2023-01-02 | End: 2023-01-03 | Stop reason: HOSPADM

## 2023-01-02 RX ORDER — ASPIRIN 81 MG/1
81 TABLET ORAL DAILY
Status: DISCONTINUED | OUTPATIENT
Start: 2023-01-02 | End: 2023-01-03 | Stop reason: HOSPADM

## 2023-01-02 RX ORDER — HYDROXYCHLOROQUINE SULFATE 200 MG/1
200 TABLET, FILM COATED ORAL 2 TIMES DAILY
Status: DISCONTINUED | OUTPATIENT
Start: 2023-01-02 | End: 2023-01-03 | Stop reason: HOSPADM

## 2023-01-02 RX ORDER — SODIUM CHLORIDE 9 MG/ML
INJECTION, SOLUTION INTRAVENOUS CONTINUOUS
Status: DISCONTINUED | OUTPATIENT
Start: 2023-01-02 | End: 2023-01-03 | Stop reason: HOSPADM

## 2023-01-02 RX ORDER — INSULIN LISPRO 100 [IU]/ML
0-4 INJECTION, SOLUTION INTRAVENOUS; SUBCUTANEOUS NIGHTLY
Status: DISCONTINUED | OUTPATIENT
Start: 2023-01-02 | End: 2023-01-03 | Stop reason: HOSPADM

## 2023-01-02 RX ORDER — INSULIN LISPRO 100 [IU]/ML
0-8 INJECTION, SOLUTION INTRAVENOUS; SUBCUTANEOUS
Status: DISCONTINUED | OUTPATIENT
Start: 2023-01-02 | End: 2023-01-03 | Stop reason: HOSPADM

## 2023-01-02 RX ORDER — ENOXAPARIN SODIUM 100 MG/ML
40 INJECTION SUBCUTANEOUS DAILY
Status: DISCONTINUED | OUTPATIENT
Start: 2023-01-02 | End: 2023-01-03 | Stop reason: HOSPADM

## 2023-01-02 RX ORDER — LISINOPRIL 20 MG/1
20 TABLET ORAL DAILY
Status: DISCONTINUED | OUTPATIENT
Start: 2023-01-02 | End: 2023-01-03 | Stop reason: HOSPADM

## 2023-01-02 RX ORDER — ATORVASTATIN CALCIUM 40 MG/1
40 TABLET, FILM COATED ORAL NIGHTLY
Status: DISCONTINUED | OUTPATIENT
Start: 2023-01-02 | End: 2023-01-03

## 2023-01-02 RX ORDER — POLYETHYLENE GLYCOL 3350 17 G/17G
17 POWDER, FOR SOLUTION ORAL DAILY PRN
Status: DISCONTINUED | OUTPATIENT
Start: 2023-01-02 | End: 2023-01-03 | Stop reason: HOSPADM

## 2023-01-02 RX ORDER — SERTRALINE HYDROCHLORIDE 100 MG/1
100 TABLET, FILM COATED ORAL DAILY
Status: DISCONTINUED | OUTPATIENT
Start: 2023-01-02 | End: 2023-01-03 | Stop reason: HOSPADM

## 2023-01-02 RX ORDER — ATORVASTATIN CALCIUM 10 MG/1
20 TABLET, FILM COATED ORAL NIGHTLY
Status: DISCONTINUED | OUTPATIENT
Start: 2023-01-02 | End: 2023-01-02 | Stop reason: SDUPTHER

## 2023-01-02 RX ORDER — AMLODIPINE BESYLATE 5 MG/1
5 TABLET ORAL DAILY
Status: DISCONTINUED | OUTPATIENT
Start: 2023-01-02 | End: 2023-01-03 | Stop reason: HOSPADM

## 2023-01-02 RX ORDER — ONDANSETRON 4 MG/1
4 TABLET, ORALLY DISINTEGRATING ORAL EVERY 8 HOURS PRN
Status: DISCONTINUED | OUTPATIENT
Start: 2023-01-02 | End: 2023-01-03 | Stop reason: HOSPADM

## 2023-01-02 RX ADMIN — ENOXAPARIN SODIUM 40 MG: 100 INJECTION SUBCUTANEOUS at 08:51

## 2023-01-02 RX ADMIN — IOPAMIDOL 70 ML: 755 INJECTION, SOLUTION INTRAVENOUS at 12:23

## 2023-01-02 RX ADMIN — ATORVASTATIN CALCIUM 40 MG: 40 TABLET, FILM COATED ORAL at 20:26

## 2023-01-02 RX ADMIN — ATORVASTATIN CALCIUM 40 MG: 40 TABLET, FILM COATED ORAL at 02:33

## 2023-01-02 RX ADMIN — FOLIC ACID 1 MG: 1 TABLET ORAL at 08:50

## 2023-01-02 RX ADMIN — LEVOTHYROXINE SODIUM 150 MCG: 0.07 TABLET ORAL at 07:21

## 2023-01-02 RX ADMIN — SODIUM CHLORIDE: 900 INJECTION, SOLUTION INTRAVENOUS at 20:51

## 2023-01-02 RX ADMIN — SERTRALINE 100 MG: 100 TABLET, FILM COATED ORAL at 08:49

## 2023-01-02 RX ADMIN — AMLODIPINE BESYLATE 5 MG: 5 TABLET ORAL at 08:49

## 2023-01-02 RX ADMIN — SODIUM CHLORIDE: 900 INJECTION, SOLUTION INTRAVENOUS at 02:33

## 2023-01-02 RX ADMIN — HYDROXYCHLOROQUINE SULFATE 200 MG: 200 TABLET ORAL at 20:26

## 2023-01-02 RX ADMIN — LISINOPRIL 20 MG: 20 TABLET ORAL at 08:50

## 2023-01-02 RX ADMIN — GLIPIZIDE 10 MG: 5 TABLET ORAL at 07:26

## 2023-01-02 RX ADMIN — ASPIRIN 81 MG: 81 TABLET, COATED ORAL at 08:49

## 2023-01-02 NOTE — ED NOTES
TRANSFER - OUT REPORT:    Verbal report given to Asha DE JESUS on Van Carp III  being transferred to [facility] for routine progression of patient care       Report consisted of patient's Situation, Background, Assessment and   Recommendations(SBAR). Information from the following report(s) Nurse Handoff Report, ED Encounter Summary, ED SBAR, [facility], and Recent Results was reviewed with the receiving nurse. Lines:   Peripheral IV Right Forearm (Active)        Opportunity for questions and clarification was provided.       Patient transported with:  Jean Marie Torres RN  01/01/23 2537

## 2023-01-02 NOTE — DISCHARGE INSTRUCTIONS

## 2023-01-02 NOTE — PROGRESS NOTES
ACUTE OCCUPATIONAL THERAPY GOALS:   (Developed with and agreed upon by patient and/or caregiver.)  1. Patient will perform grooming with SPV and adaptive equipment as needed. 2. Patient will perform upper body dressing with SPV and adaptive equipment as needed. 3. Patient will perform lower body dressing with SPV and adaptive equipment as needed. 4. Patient will perform bathing with SPV and adaptive equipment as needed. 5. Patient will perform toileting and toilet transfer with SPV and adaptive equipment as needed. 6. Patient will perform ADL functional mobility and tranfers in room with SPV and adaptive equipment as needed. 7. Patient/family to demonstrate knowledge of home safety and DME recommendations. Timeframe: 7 visits     OCCUPATIONAL THERAPY Initial Assessment, Daily Note, and AM       OT Visit Days: 1  Acknowledge Orders  Time  OT Charge Capture  Rehab Caseload Tracker      Arina Orozco III is a 71 y.o. male   PRIMARY DIAGNOSIS: Acute cerebrovascular accident (CVA) ([facility] [address]  Acute cerebrovascular accident (CVA) ([facility] [address] [I63.9]       Reason for Referral: Generalized Muscle Weakness (M62.81)  Other lack of cordination (R27.8)  Difficulty in walking, Not elsewhere classified (R26.2)  Inpatient: Payor: MEDICARE / Plan: MEDICARE PART A AND B / Product Type: *No Product type* /     ASSESSMENT:     REHAB RECOMMENDATIONS:   Recommendation to date pending progress:  Setting:  Home Health Therapy    Equipment:    To Be Determined     ASSESSMENT:  Mr. Bushra Chow is admitted for the above diagnoses and presents with overall deficits in strength, functional mobility and ADL performance. At baseline, he lives with spouse in a 1 level home with 1 step to enter. His children are involved as well. He reports independence with ADLs and independence with mobility. Per family, he has had ongoing balance and confusion challenges for the last 10 days and was C19+ on 12/22/22.  Today, he was able to perform bed mobility and functional household mobility with CGA. Family has been assisting him to the bathroom as needed during this admission. He was left in recliner chair with daughter right beside him with all needs met and in reach. Pt is functioning below his baseline and will benefit from skilled OT during hospital stay. Anticipate pt will benefit from [facility] therapy and family assistance upon discharge.      [address] [facility] [address] [address] AM-PAC 6 Clicks Daily Activity Inpatient Short Form:    AM-PAC Daily Activity Inpatient   How much help for putting on and taking off regular lower body clothing?: A Little  How much help for Bathing?: A Little  How much help for Toileting?: A Little  How much help for putting on and taking off regular upper body clothing?: None  How much help for taking care of personal grooming?: None  How much help for eating meals?: None  AM-PAC Inpatient Daily Activity Raw Score: 21  AM-PAC Inpatient ADL T-Scale Score : 44.27  ADL Inpatient CMS 0-100% Score: 32.79  ADL Inpatient CMS G-Code Modifier : CJ           SUBJECTIVE:     Mr. Weber Draft states, \"That MRI was very loud\"     Social/Functional Lives With: Spouse  Type of Home: House  Home Layout: One level  Home Access: Stairs to enter without rails  Entrance Stairs - Number of Steps: 1  Bathroom Shower/Tub: Tub/Shower unit  Bathroom Equipment: Grab bars in shower  Home Equipment: [employer]  ADL Assistance: Independent  Ambulation Assistance: Independent    OBJECTIVE:     Clint Hamming / M[place] Jg / Veria Proud: None    RESTRICTIONS/PRECAUTIONS:  Restrictions/Precautions: Fall Risk    PAIN: VITALS / O2:   Pre Treatment:          Post Treatment: no c/o pain during session       Vitals          Oxygen            GROSS EVALUATION: INTACT IMPAIRED   (See Comments)   UE AROM [x] []   UE PROM [x] []   Strength []  Generalized weakness     Posture / Balance []  CGA for occasional steadying assist with no AD, somewhat shuffling gait   Sensation []     Coordination [x]       Tone [] Edema []    Activity Tolerance []  Decreased from baseline     Hand Dominance R [] L []      COGNITION/  PERCEPTION: INTACT IMPAIRED   (See Comments)   Orientation [] Oriented to person, Oriented to situation, Oriented to time, Disoriented to place   Vision [x]     Hearing [x]     Cognition  []  Increased confusion over the last 10 days or so, per family; today he felt somewhat \"foggy\" after MRI   Perception []       MOBILITY: I Mod I S SBA CGA Min Mod Max Total  NT x2 Comments:   Bed Mobility    Rolling [] [] [] [] [] [] [] [] [] [] []    Supine to Sit [] [] [] [] [x] [] [] [] [] [] []    Scooting [] [] [] [] [] [] [] [] [] [] []    Sit to Supine [] [] [] [] [x] [] [] [] [] [] []    Transfers    Sit to Stand [] [] [] [] [x] [] [] [] [] [] []    Bed to Chair [] [] [] [] [x] [] [] [] [] [] []    Stand to Sit [] [] [] [] [x] [] [] [] [] [] []    Tub/Shower [] [] [] [] [] [] [] [] [] [] []     Toilet [] [] [] [] [x] [] [] [] [] [] []      [] [] [] [] [] [] [] [] [] [] []    I=Independent, Mod I=Modified Independent, S=Supervision/Setup, SBA=Standby Assistance, CGA=Contact Guard Assistance, Min=Minimal Assistance, Mod=Moderate Assistance, Max=Maximal Assistance, Total=Total Assistance, NT=Not Tested    ACTIVITIES OF DAILY LIVING: I Mod I S SBA CGA Min Mod Max Total NT Comments   BASIC ADLs:              Upper Body Bathing  [] [] [] [x] [] [] [] [] [] []    Lower Body Bathing [] [] [] [] [x] [] [] [] [] []    Toileting [] [] [] [] [x] [] [] [] [] []    Upper Body Dressing [] [] [x] [] [] [] [] [] [] []    Lower Body Dressing [] [] [] [] [x] [] [] [] [] []    Feeding [x] [] [] [] [] [] [] [] [] []    Grooming [] [] [x] [] [] [] [] [] [] []    Personal Device Care [] [] [] [] [] [] [] [] [] []    Functional Mobility [] [] [] [] [x] [] [] [] [] []    I=Independent, Mod I=Modified Independent, S=Supervision/Setup, SBA=Standby Assistance, CGA=Contact Guard Assistance, Min=Minimal Assistance, Mod=Moderate Assistance, Max=Maximal Assistance, Total=Total Assistance, NT=Not Tested    PLAN:   FREQUENCY/DURATION   OT Plan of Care: 3 times/week for duration of hospital stay or until stated goals are met, whichever comes first.    PROBLEM LIST:   (Skilled intervention is medically necessary to address:)  Decreased ADL/Functional Activities  Decreased Activity Tolerance  Decreased Balance  Decreased Cognition  Decreased Coordination  Decreased Gait Ability  Decreased Safety Awareness  Decreased Strength  Decreased Transfer Abilities   INTERVENTIONS PLANNED:  (Benefits and precautions of occupational therapy have been discussed with the patient.)  Self Care Training  Therapeutic Activity  Therapeutic Exercise/HEP  Neuromuscular Re-education  Manual Therapy  Education         TREATMENT:     EVALUATION: LOW COMPLEXITY: (Untimed Charge)    TREATMENT:   Co-Treatment PT/OT necessary due to patient's decreased overall endurance/tolerance levels, as well as need for high level skilled assistance to complete functional transfers/mobility and functional tasks  Self Care (15 minutes): Patient participated in lower body dressing ADLs in unsupported sitting and standing with minimal visual and verbal cueing to increase independence, increase activity tolerance, and increase safety awareness. Patient also participated in functional mobility and functional transfer training to increase independence, increase activity tolerance, and increase safety awareness. TREATMENT GRID:  N/A    AFTER TREATMENT PRECAUTIONS: Bed/Chair Locked, Call light within reach, Chair, Needs within reach, RN notified, and Visitors at bedside    INTERDISCIPLINARY COLLABORATION:  RN/ PCT and PT/ PTA    EDUCATION:  Education Given To: Patient; Family  Education Provided: Role of Therapy;Plan of Care;ADL Adaptive Strategies;Transfer Training;Energy Conservation;Equipment; Fall Prevention Strategies  Education Method: Demonstration;Verbal  Barriers to Learning: None  Education Outcome: Verbalized understanding;Demonstrated understanding;Continued education needed    TOTAL TREATMENT DURATION AND TIME:  Time In: 1020  Time Out: 805 Parveen Shannon  Minutes: 757 Artem Medina

## 2023-01-02 NOTE — PROGRESS NOTES
Hospitalist Progress Note   Admit Date:  2023 12:04 AM   Name:  Jeoffrey Gottron III   Age:  71 y.o. Sex:  male  :  1950   MRN:  [id number]   Room:  [facility]/    Presenting Complaint: No chief complaint on file. Reason(s) for Admission: Acute cerebrovascular accident (CVA) [facility]) [I63.9]     Hospital Course:   71year old CM PMH  Acquired hypothyroidism, Acute deep vein thrombosis (DVT) (HCC), Bilateral knee pain, Cataracts, bilateral, Chronic deep vein thrombosis (DVT) of popliteal vein of right lower extremity (HCC), Chronic pain of both knees, COVID-19 vaccine series declined, Depression, Diabetic eye exam ([facility] [address] Eye exam, routine, Graves disease, Hypertension, Obesity (BMI 30.0-34.9), STEFFEN on CPAP, Psychiatric disorder, Rheumatoid arthritis(714.0), Thyroid disease, and Uncontrolled diabetes mellitus admitted  for acute encephalopathy, noted to test positive fro COVID  which is when AMS was noted. He received Paxlovid for 5 days, last dose . Per family, confusion did improve but then returned past few days. CT head unremarkable. Subjective & 24hr Events (23): Alert and oriented to name, knows he is in the hospital but unsure of which one. Pleasant. Moving all extremities. Assessment & Plan:     Principal Problem:    Acute encephalopathy(HCC)  Plan: CT head unremarkable  MRI brain pending  CTA head/neck  ASA, statin  Neuro checks per unit standard  Telemetry  Echo  Neuro consult  Folate, vitamin b 12 normal findings, RPR, TSH, ammonia normal findings.   UA normal    Active Problems:    Elevated LFTs  Plan: likely from statin, Paxlovid (statin held while on Paxlovid)  No abd pain, will obtain US liver   Trend      Normocytic anemia  Plan: 10.6  Trend      Encephalopathy  Plan: as listed in #1      STEFFEN on CPAP  Plan: CPAP ongoing      Essential hypertension  Plan: controlled, continue Norvasc, ACE      Depression  Plan: Zoloft ongoing Hypercholesterolemia  Plan: statin      Obesity (BMI 30.0-34. 9)  Plan: education      Hypothyroidism following radioiodine therapy  Plan: TSH normal  Continue with Synthroid      Type 2 diabetes mellitus ([facility] [address]  Plan: SSI  Hold oral agents for now      Anticipated discharge needs:      Pending PT/OT eval    Diet:  ADULT DIET; Regular; 3 carb choices (45 gm/meal)  DVT PPx: Lovenox SQ  Code status: Full Code    Hospital Problems:  Principal Problem:    Acute cerebrovascular accident (CVA) ([facility] [address]  Active Problems:    Elevated LFTs    Normocytic anemia    Encephalopathy    STEFFEN on CPAP    Essential hypertension    Depression    Hypercholesterolemia    Obesity (BMI 30.0-34. 9)    Hypothyroidism following radioiodine therapy    Type 2 diabetes mellitus ([facility] [address]  Resolved Problems:    * No resolved hospital problems. *      Objective:   Patient Vitals for the past 24 hrs:   Temp Pulse Resp BP SpO2   01/02/23 0753 98.8 °F (37.1 °C) 85 18 129/65 95 %   01/02/23 0413 98.4 °F (36.9 °C) 86 18 (!) 145/77 95 %   01/02/23 0101 -- 90 18 -- 96 %   01/02/23 0007 98.6 °F (37 °C) 89 18 108/84 97 %       Oxygen Therapy  SpO2: 95 %  Pulse Oximeter Device Mode: Intermittent  O2 Device: None (Room air)    Estimated body mass index is 27.26 kg/m² as calculated from the following:    Height as of 1/1/23: 6' (1.829 m). Weight as of 1/1/23: 201 lb (91.2 kg). Intake/Output Summary (Last 24 hours) at 1/2/2023 1031  Last data filed at 1/2/2023 0517  Gross per 24 hour   Intake --   Output 550 ml   Net -550 ml         Physical Exam:   Blood pressure 129/65, pulse 85, temperature 98.8 °F (37.1 °C), temperature source Oral, resp. rate 18, SpO2 95 %. General:    Well nourished. Head:  Normocephalic, atraumatic  CV:   RRR. No m/r/g. No jugular venous distension. Lungs:   CTAB. No wheezing, rhonchi, or rales. Symmetric expansion. Abdomen:   Soft, nontender, nondistended. Extremities: No cyanosis or clubbing.   No edema  Skin:     No rashes and normal coloration. Warm and dry. Neuro:  CN II-XII grossly intact. Sensation intact. Alert and oriented x 1  Psych:  Normal mood and affect.       I have personally reviewed labs and tests showing:  Recent Labs:  Recent Results (from the past 48 hour(s))   CBC with Auto Differential    Collection Time: 01/01/23  7:05 PM   Result Value Ref Range    WBC 6.6 4.3 - 11.1 K/uL    RBC 3.38 (L) 4.23 - 5.60 M/uL    Hemoglobin 10.6 (L) 13.6 - 17.2 g/dL    Hematocrit 32.2 (L) 41.1 - 50.3 %    MCV 95.3 82.0 - 102.0 FL    MCH 31.4 26.1 - 32.9 PG    MCHC 32.9 31.4 - 35.0 g/dL    RDW 13.9 11.9 - 14.6 %    Platelets 062 721 - 440 K/uL    MPV 9.5 9.4 - 12.3 FL    nRBC 0.00 0.0 - 0.2 K/uL    Differential Type AUTOMATED      Seg Neutrophils 64 43 - 78 %    Lymphocytes 10 (L) 13 - 44 %    Monocytes 23 (H) 4.0 - 12.0 %    Eosinophils % 2 0.5 - 7.8 %    Basophils 1 0.0 - 2.0 %    Immature Granulocytes 1 0.0 - 5.0 %    Segs Absolute 4.2 1.7 - 8.2 K/UL    Absolute Lymph # 0.6 0.5 - 4.6 K/UL    Absolute Mono # 1.5 (H) 0.1 - 1.3 K/UL    Absolute Eos # 0.1 0.0 - 0.8 K/UL    Basophils Absolute 0.1 0.0 - 0.2 K/UL    Absolute Immature Granulocyte 0.1 0.0 - 0.5 K/UL   CMP    Collection Time: 01/01/23  7:05 PM   Result Value Ref Range    Sodium 139 133 - 143 mmol/L    Potassium 4.6 3.5 - 5.1 mmol/L    Chloride 102 98 - 107 mmol/L    CO2 22 21 - 32 mmol/L    Anion Gap 15 (H) 2 - 11 mmol/L    Glucose 123 (H) 65 - 100 mg/dL    BUN 17 7.0 - 18.0 MG/DL    Creatinine 0.98 0.8 - 1.5 MG/DL    Est, Glom Filt Rate >60 >60 ml/min/1.73m2    Calcium 9.6 8.3 - 10.4 MG/DL    Total Bilirubin 0.2 0.2 - 1.1 MG/DL    ALT 64 (H) 13.0 - 61.0 U/L    AST 51 (H) 15 - 37 U/L    Alk Phosphatase 72 45.0 - 117.0 U/L    Total Protein 7.3 6.4 - 8.2 g/dL    Albumin 4.0 3.2 - 4.6 g/dL    Globulin 3.3 2.8 - 4.5 g/dL    Albumin/Globulin Ratio 1.2 0.4 - 1.6     Lactic Acid Now and in 2 Hours    Collection Time: 01/01/23  7:05 PM   Result Value Ref Range    Lactic Acid 1.20 0.4 - 2.0 mmol/L   TSH    Collection Time: 01/01/23  7:05 PM   Result Value Ref Range    TSH, 3RD GENERATION 0.684 0.58 - 3.70 uIU/mL   Urinalysis w rflx microscopic    Collection Time: 01/01/23  8:50 PM   Result Value Ref Range    Color, UA YELLOW      Appearance SLIGHTLY CLOUDY      Specific Gravity, UA >=1.030 1.001 - 1.023    pH, Urine 5.5 5.0 - 9.0      Protein, UA Negative NEG mg/dL    Glucose, UA Negative mg/dL    Ketones, Urine Negative NEG mg/dL    Bilirubin Urine Negative NEG      Blood, Urine Negative NEG      Urobilinogen, Urine 0.2 0.2 - 1.0 EU/dL    Nitrite, Urine Negative NEG      Leukocyte Esterase, Urine Negative NEG     Ammonia    Collection Time: 01/02/23 12:51 AM   Result Value Ref Range    Ammonia <10 (L) 24.9 - 68 UMOL/L   Vitamin B12    Collection Time: 01/02/23 12:51 AM   Result Value Ref Range    Vitamin B-12 339 193 - 986 pg/mL   Folate    Collection Time: 01/02/23 12:51 AM   Result Value Ref Range    Folate 17.8 (H) 3.1 - 17.5 ng/mL   RPR    Collection Time: 01/02/23 12:51 AM   Result Value Ref Range    RPR NONREACTIVE NR   TSH    Collection Time: 01/02/23 12:51 AM   Result Value Ref Range    TSH, 3RD GENERATION 0.644 0.358 - 3.740 uIU/mL   POCT Glucose    Collection Time: 01/02/23  2:29 AM   Result Value Ref Range    POC Glucose 90 65 - 100 mg/dL    Performed by: Leland    HIV 1/2 Ag/Ab, 4TH Generation,W Rflx Confirm    Collection Time: 01/02/23  4:09 AM   Result Value Ref Range    HIV 1/2 Interp NONREACTIVE NR      HIV 1/2 Result Comment SEE NOTE     POCT Glucose    Collection Time: 01/02/23  7:54 AM   Result Value Ref Range    POC Glucose 118 (H) 65 - 100 mg/dL    Performed by: Surya Ignacio        I have personally reviewed imaging studies showing: Other Studies:  US ABDOMEN LIMITED Specify organ?  LIVER    (Results Pending)   MRI brain without contrast    (Results Pending)       Current Meds:  Current Facility-Administered Medications   Medication Dose Route Frequency    amLODIPine (NORVASC) tablet 5 mg  5 mg Oral Daily    folic acid (FOLVITE) tablet 1 mg  1 mg Oral Daily    [Held by provider] glipiZIDE (GLUCOTROL) tablet 10 mg  10 mg Oral BID AC    hydroxychloroquine (PLAQUENIL) tablet 200 mg  200 mg Oral BID    levothyroxine (SYNTHROID) tablet 150 mcg  150 mcg Oral QAM AC    lisinopril (PRINIVIL;ZESTRIL) tablet 20 mg  20 mg Oral Daily    [Held by provider] metFORMIN (GLUCOPHAGE) tablet 1,000 mg  1,000 mg Oral BID WC    sertraline (ZOLOFT) tablet 100 mg  100 mg Oral Daily    ondansetron (ZOFRAN-ODT) disintegrating tablet 4 mg  4 mg Oral Q8H PRN    Or    ondansetron (ZOFRAN) injection 4 mg  4 mg IntraVENous Q6H PRN    polyethylene glycol (GLYCOLAX) packet 17 g  17 g Oral Daily PRN    aspirin EC tablet 81 mg  81 mg Oral Daily    Or    aspirin suppository 300 mg  300 mg Rectal Daily    atorvastatin (LIPITOR) tablet 40 mg  40 mg Oral Nightly    0.9 % sodium chloride infusion   IntraVENous Continuous    enoxaparin (LOVENOX) injection 40 mg  40 mg SubCUTAneous Daily    glucose chewable tablet 16 g  4 tablet Oral PRN    dextrose bolus 10% 125 mL  125 mL IntraVENous PRN    Or    dextrose bolus 10% 250 mL  250 mL IntraVENous PRN    glucagon (rDNA) injection 1 mg  1 mg SubCUTAneous PRN    dextrose 10 % infusion   IntraVENous Continuous PRN       Signed:  Tere Mathur, APRN - CNP

## 2023-01-02 NOTE — H&P
[facility] Hospitalist Initial History and Physical Note    Patient: Tate Lang III Date: 1/2/2023  male, 71 y.o. Admit Date: 1/2/2023  Attending: Maura Henderson MD     ASSESSMENT AND PLAN:     Principal Problem:    Acute cerebrovascular accident (CVA) [facility])  Plan: Concerning given clinical findings. CT head unremarkable. - Not a candidate for tPA given onset of symptoms days ago  - Observe on remote telemetry.   - Neurochecks Q4H, bedside swallow test.   - Check A1c, lipids, TSH  - MRI brain, TTE with bubble study in AM.  - PT/OT/ST consults. - Start ASA 81 mg daily, high intensity statin. - Permissive hypertension up to  unless patient has substantial symptoms  - Smoking/diabetes education if necessary  - Depression Screening prior to discharge   Active Problems:    Encephalopathy  Plan: Possibly related to CVA. MRI brain, UA unremarkable. UDS/HIV/RPR/B12/folate/ammonia pending. Elevated LFTs  Plan: Mildly elevated, follow CMP. RUQ US. Normocytic anemia  Plan: Follow CBC    STEFFEN on CPAP  Plan: Stable. Essential hypertension  Plan: Stable. Continue home meds. Depression  Plan: Stable. Continue home meds. Hypercholesterolemia  Plan: Stable. Continue home meds. Obesity (BMI 30.0-34. 9)  Plan: Stable. Hypothyroidism following radioiodine therapy  Plan: Stable. Continue home meds. Type 2 diabetes mellitus ([address]  Plan: Stable. Continue home meds. DVT Prophylaxis: Lovenox      Code Status: FULL CODE      Disposition: Admit to remote tele for evaluation and treatment as per above. Anticipated discharge: 2-3 days     CHIEF COMPLAINT:  confusion, facial droop    HISTORY OF PRESENT ILLNESS:      Patient Active Problem List   Diagnosis    STEFFEN on CPAP    Essential hypertension    Depression    Hypercholesterolemia    Obesity (BMI 30.0-34. 9)    BPH associated with nocturia    Chronic deep vein thrombosis (DVT) of popliteal vein of right lower extremity (HCC)    Chronic pain of both knees    Rheumatoid arthritis involving multiple joints (HCC)    Hypothyroidism following radioiodine therapy    Type 2 diabetes mellitus (HCC)    Chronic deep vein thrombosis (DVT) of right lower extremity (HCC)    Acute cerebrovascular accident (CVA) ([facility] [address]    Elevated LFTs    Normocytic anemia    Encephalopathy       Yenifer Stein III is a 71 y.o. male, with a history of  has a past medical history of Acquired hypothyroidism, Acute deep vein thrombosis (DVT) ([facility] [address] Bilateral knee pain, Cataracts, bilateral, Chronic deep vein thrombosis (DVT) of popliteal vein of right lower extremity ([address] Chronic pain of both knees, COVID-19 vaccine series declined, Depression, Diabetic eye exam ([facility] [address] Eye exam, routine, Graves disease, Hypertension, Obesity (BMI 30.0-34.9), STEFFEN on CPAP, Psychiatric disorder, Rheumatoid arthritis(714.0), Thyroid disease, and Uncontrolled diabetes mellitus. ,  has a past surgical history that includes Bunionectomy (Left, 12/2014); Knee arthroscopy (Left, 1990s); and Cataract removal (Bilateral). who presents to the ER with report of confusion. Per report, the patient's family noticed that he had some left facial droop and confusion over a week ago. This seemed to improve, but the symptoms have come back over the past few days. Patient is unable to provide additional significant history.  Denies any pain, nausea, fever, chills    Allergy  No Known Allergies    Medication list  Medications Prior to Admission: methotrexate (RHEUMATREX) 2.5 MG chemo tablet, Take 10 tablets by mouth once a week  naproxen (NAPROSYN) 500 MG tablet, Take 1 tablet by mouth 2 times daily (with meals)  atorvastatin (LIPITOR) 20 MG tablet, Take 1 tablet by mouth at bedtime  hydroxychloroquine (PLAQUENIL) 200 MG tablet, Take 1 tablet by mouth 2 times daily  lisinopril (PRINIVIL;ZESTRIL) 20 MG tablet, TAKE ONE TABLET BY MOUTH ONE TIME DAILY  amLODIPine (NORVASC) 5 MG tablet, TAKE ONE TABLET BY MOUTH ONE TIME DAILY  metFORMIN (GLUCOPHAGE) 1000 MG tablet, TAKE ONE TABLET BY MOUTH TWICE A DAY WITH A MEAL  amitriptyline (ELAVIL) 25 MG tablet, Take 25 mg by mouth  folic acid (FOLVITE) 1 MG tablet, Take 1 tablet by mouth once daily  glimepiride (AMARYL) 4 MG tablet, Take 4 mg by mouth 2 times daily  levothyroxine (SYNTHROID) 150 MCG tablet, Take 150 mcg by mouth every morning (before breakfast)  mupirocin (BACTROBAN) 2 % ointment, APPLY OINTMENT TO WOUND DAILY UNDER BANDAGE UNTIL HEALED. sertraline (ZOLOFT) 100 MG tablet, Take 100 mg by mouth daily     Past Medical History   Past Medical History:   Diagnosis Date    Acquired hypothyroidism 3/27/2016    Acute deep vein thrombosis (DVT) (HCC)     right    Bilateral knee pain 2020    Dr. Kassi Hinkle     Cataracts, bilateral     Chronic deep vein thrombosis (DVT) of popliteal vein of right lower extremity ([address] 2021    Chronic pain of both knees 2021    COVID-19 vaccine series declined     Depression 3/20/2015    Diabetic eye exam [facility]) 2020    [facility] exam, routine 2020    [place] [facility]    Graves disease     S/p Ablation therapy    Hypertension     managed with meds    Obesity (BMI 30.0-34.9) 3/27/2016    STEFFEN on CPAP 2018    Psychiatric disorder     anxiety    Rheumatoid arthritis(714.0)     Thyroid disease     rad.  uptake- takes synthroid daily    Uncontrolled diabetes mellitus        Past Surgical History:   Procedure Laterality Date    BUNIONECTOMY Left 2014    CATARACT REMOVAL Bilateral     KNEE ARTHROSCOPY Left        Social History   Social History     Socioeconomic History    Marital status:    Tobacco Use    Smoking status: Former     Packs/day: 0.50     Types: Cigarettes     Quit date: 2012     Years since quittin.0    Smokeless tobacco: Never    Tobacco comments:     Quit smoking: former smoker \"Smoke when [employer] nervous\"   Substance and Sexual Activity    Alcohol use: No    Drug use: No       Family History: Family History   Problem Relation Age of Onset    Other Son         esophagus stretched    No Known Problems Daughter     Hypertension Brother     Hypertension Sister     Osteoarthritis Father     Hypertension Father     No Known Problems Son     Hypertension Mother     Osteoarthritis Mother     Diabetes Father     Lung Cancer Father        REVIEW OF SYSTEMS:   A 14 point review of systems was taken and pertinent positive as per HPI. PHYSICAL EXAMINATION:  Vital 24 Hour Range Most Recent Value  Temperature Temp  Min: 98.6 °F (37 °C)  Max: 99.5 °F (37.5 °C) 98.6 °F (37 °C)    Pulse Pulse  Min: 86  Max: 89 89  Respiratory Resp  Min: 17  Max: 18 18  Blood Pressure BP  Min: 108/84  Max: 132/61 108/84  Pulse Oximetry SpO2  Min: 95 %  Max: 98 % 97 %  O2 No data recorded      Vital Most Recent Value First Value  Weight      Height      BMI   N/A    Physical Exam:   General:     No acute distress, disoriented  Eyes:   No palpebral pallor or scleral icterus. ENT:   External auricular and nasal exam within normal limits. Cardiovascular: No cyanosis or edema of extremities. Respiratory:    No respiratory distress or accessory muscle use. Gastrointestinal:   Not actively vomiting, abdomen non-distended   Skin:      Normal color. No rashes, lesions, or jaundice. Neurologic:  CN II-XII grossly intact and symmetrical.      Moving all four extremities well with no focal deficits.   Psychiatric:  Disoriented    Intake/Output last 3 shifts:  No intake or output data in the 24 hours ending 01/02/23 0042     Labs:  Lab Results   Component Value Date     01/01/2023    K 4.6 01/01/2023     01/01/2023    CO2 22 01/01/2023    BUN 17 01/01/2023    CREATININE 0.98 01/01/2023    GLUCOSE 123 (H) 01/01/2023    CALCIUM 9.6 01/01/2023    PROT 7.3 01/01/2023    LABALBU 4.0 01/01/2023    BILITOT 0.2 01/01/2023    ALKPHOS 72 01/01/2023    AST 51 (H) 01/01/2023    ALT 64 (H) 01/01/2023    LABGLOM >60 01/01/2023    GFRAA >60 08/01/2022    AGRATIO 2.3 (H) 05/09/2022    GLOB 3.3 01/01/2023      No results found for: MG  Lab Results   Component Value Date    CALCIUM 9.6 01/01/2023        Lab Results   Component Value Date    WBC 6.6 01/01/2023    HGB 10.6 (L) 01/01/2023    HCT 32.2 (L) 01/01/2023    MCV 95.3 01/01/2023     01/01/2023        Lab Results   Component Value Date    INR 2.0 06/21/2021    INR 1.8 04/12/2021    INR 2.5 12/14/2020        Lab Results   Component Value Date    CKTOTAL 141 10/20/2020        Imagining & Other Studies  CT HEAD WO CONTRAST  Narrative: CT HEAD WITHOUT CONTRAST, 1/1/2023     History: Altered mental status. Comparison: CT head without contrast 12/29/2022     Technique:   5 mm axial scans from the skull base to the vertex. All CT scans  performed at this facility use one or all of the following: Automated exposure  control, adjustment of the mA and/or kVp according to patient's size, iterative  reconstruction. Findings:  No evidence of intracranial hemorrhage is seen. No abnormal  extra-axial fluid collections are seen. Age-related chronic cortical volume  loss is seen. No evidence for acute hydrocephalus is seen. No evidence of  midline shift or herniation is seen. No abnormal edema pattern is seen in a  vascular distribution to suggest large artery infarction. Evaluation with bone windows shows no acute osseous changes. The visualized  sinuses, middle ears, and mastoid air cells are well aerated. Impression: 1. No acute intracranial process evident by noncontrast CT study of the head. This report was made using voice transcription. Despite my best efforts to avoid  any, transcription errors may persist. If there is any question about the  accuracy of the report or need for clarification, then please call [phone], or text me through perfectserv for clarification or correction.    XR CHEST PORTABLE  Narrative: CHEST X-RAY, single portable view  1/1/2023    History: Altered mental status. Technique: Single frontal view of the chest.    Comparison: Chest x-ray 12/29/2020    Findings: The cardiac silhouette is mildly enlarged although grossly unchanged from the  prior study. The lungs are expanded without evidence for pneumothorax. No  evolving consolidative airspace process or pleural effusion is seen. Impression: 1. Stable mild cardiomegaly without evolving acute changes evident by plain  film. This report was made using voice transcription. Despite my best efforts to avoid  any, transcription errors may persist. If there is any question about the  accuracy of the report or need for clarification, then please call [phone], or text me through [employer] for clarification or correction. No results found for this or any previous visit (from the past 4464 hour(s)).      Yoko Castellanos MD  1/2/2023 12:42 AM

## 2023-01-02 NOTE — PROGRESS NOTES
SPEECH LANGUAGE PATHOLOGY NOTE    Orders received for dysphagia evaluation. Patient off floor for imaging this afternoon. Speech therapy will follow up for evaluation tomorrow. Discussed with RN.         Mohinder [address] CCC-SLP

## 2023-01-02 NOTE — PROGRESS NOTES
This RN called lab about patient stat HIV screen. Lab stated that they could not see the order and to discontinue and reorder it. This RN discontinued the order. When attempting to reorder, \"HIV Screen\" was not an option in the order search bar. Perfectserve sent to MD who agreed to reorder the screening.

## 2023-01-02 NOTE — CONSULTS
[facility] Neurology [facility]  [address] [facility], [place]  [address]  [place], [address] [facility]            Shaquille Mendoza III is a 71 y.o. male who presents on referral from Dr. Munira Llanes in the [facility] service secondary to concern with regards to change in neurologic function. Concern with regards left-sided weakness. Multiple other underlying diagnoses recent diagnosis of COVID-19. Prior active problems included obstructive sleep apnea hypertension mood disorder BPH DVT rheumatoid arthritis elevated LFTs normocytic anemia and encephalopathic change  Patient's daughter describes her finding of her dad lying down seemingly weak on the left side but for the most part encephalopathic  No history of prior focal neurologic dysfunction      Past Medical History:   Diagnosis Date    Acquired hypothyroidism 3/27/2016    Acute deep vein thrombosis (DVT) (HCC)     right    Bilateral knee pain 05/2020    Dr. Marium Eldridge     Cataracts, bilateral     Chronic deep vein thrombosis (DVT) of popliteal vein of right lower extremity ([address] 4/12/2021    Chronic pain of both knees 4/12/2021    COVID-19 vaccine series declined 2021    Depression 3/20/2015    Diabetic eye exam [facility]) 08/26/2020    [facility] exam, routine 09/2020    [facility]    Graves disease     S/p Ablation therapy    Hypertension     managed with meds    Obesity (BMI 30.0-34.9) 3/27/2016    STEFFEN on CPAP 7/25/2018    Psychiatric disorder     anxiety    Rheumatoid arthritis(714.0)     Thyroid disease     rad.  uptake- takes synthroid daily    Uncontrolled diabetes mellitus        Past Surgical History:   Procedure Laterality Date    BUNIONECTOMY Left 12/2014    CATARACT REMOVAL Bilateral     KNEE ARTHROSCOPY Left 1990s        Family History   Problem Relation Age of Onset    Other Son         esophagus stretched    No Known Problems Daughter     Hypertension Brother     Hypertension Sister     Osteoarthritis Father     Hypertension Father     No Known Problems Son     Hypertension Mother     Osteoarthritis Mother     Diabetes Father     Lung Cancer Father         Social History     Socioeconomic History    Marital status:    Tobacco Use    Smoking status: Former     Packs/day: 0.50     Types: Cigarettes     Quit date: 2012     Years since quittin.0    Smokeless tobacco: Never    Tobacco comments:     Quit smoking: former smoker \"Smoke when [employer] nervous\"   Substance and Sexual Activity    Alcohol use: No    Drug use: No         Current Facility-Administered Medications   Medication Dose Route Frequency Provider Last Rate Last Admin    amLODIPine (NORVASC) tablet 5 mg  5 mg Oral Daily Hipolito Cardona MD   5 mg at 69/35/05 0060    folic acid (FOLVITE) tablet 1 mg  1 mg Oral Daily Hipolito Cardona MD   1 mg at 23 0850    [Held by provider] glipiZIDE (GLUCOTROL) tablet 10 mg  10 mg Oral BID AC Hipolito Cardona MD   10 mg at 23 7254    hydroxychloroquine (PLAQUENIL) tablet 200 mg  200 mg Oral BID Hipolito Cardona MD        levothyroxine (SYNTHROID) tablet 150 mcg  150 mcg Oral QAM AC Hipolito Cardona MD   150 mcg at 23 0721    lisinopril (PRINIVIL;ZESTRIL) tablet 20 mg  20 mg Oral Daily Hipolito Cardona MD   20 mg at 23 0850    [Held by provider] metFORMIN (GLUCOPHAGE) tablet 1,000 mg  1,000 mg Oral BID WC Hipolito Cardona MD        sertraline (ZOLOFT) tablet 100 mg  100 mg Oral Daily Hipolito Cardona MD   100 mg at 23 0849    ondansetron (ZOFRAN-ODT) disintegrating tablet 4 mg  4 mg Oral Q8H PRN Hipolito Cardona MD        Or    ondansetron [facility]) injection 4 mg  4 mg IntraVENous Q6H PRN Hipolito Cardona MD        polyethylene glycol (GLYCOLAX) packet 17 g  17 g Oral Daily PRN Hipolito Cardona MD        aspirin EC tablet 81 mg  81 mg Oral Daily Hipolito Cardona MD   81 mg at 23 3798    Or    aspirin suppository 300 mg  300 mg Rectal Daily Hipolito Cardona MD        atorvastatin (LIPITOR) tablet 40 mg  40 mg Oral Nightly Hipolito Cardona MD   40 mg at 01/02/23 0233    0.9 % sodium chloride infusion   IntraVENous Continuous Julia Arias MD 75 mL/hr at 01/02/23 0233 New Bag at 01/02/23 0233    enoxaparin (LOVENOX) injection 40 mg  40 mg SubCUTAneous Daily Julia Arias MD   40 mg at 01/02/23 0851    glucose chewable tablet 16 g  4 tablet Oral PRN Julia Arias MD        insulin lispro (HUMALOG) injection vial 0-8 Units  0-8 Units SubCUTAneous TID WC SWATHI Nguyen CNP        insulin lispro (HUMALOG) injection vial 0-4 Units  0-4 Units SubCUTAneous Nightly SWATHI Nguyen - CNP        glucose chewable tablet 16 g  4 tablet Oral PRN SWATHI Nguyen - CNP        dextrose bolus 10% 125 mL  125 mL IntraVENous PRN SWATHI Nguyen - CNP        Or    dextrose bolus 10% 250 mL  250 mL IntraVENous PRN SWATHI Nguyen - CNP        glucagon (rDNA) injection 1 mg  1 mg SubCUTAneous PRN SWATHI Nguyen - CNP        dextrose 10 % infusion   IntraVENous Continuous PRN SWATHI Nguyen - LILIANA        acetaminophen (TYLENOL) tablet 650 mg  650 mg Oral Q4H PRN SWATHI Nguyen - CNP            No Known Allergies    Review of Systems    /79   Pulse 81   Temp 98.2 °F (36.8 °C) (Oral)   Resp 18   SpO2 96%     Neurologic Exam  The patient is alert cooperative and oriented. Cranial nerve examination normal visual fields. Normal random eye movements. No ptosis. No lid lag  Face moves strongly symmetrically and equally  Speech is normal but he is mildly hypophonic  Motor  Upper extremities  Normal bulk strength tone and symmetric arm function as observed on camera without drift  Lower extremities able to get up well from a chair without support  Casual standing balance in front of the camera is normal  Fine motor coordination is normal in the hands bilaterally    Peripheral sensation light touch appreciated subjectively is normal  Vital signs are reviewed      Most recent MRI   Reviewed  no acute infarct.   Diffusion-weighted imaging shows no area of restricted diffusion there is a modest amount of deep white matter T2 prolongation and there is moderate generalized atrophy.  Otherwise the study is unremarkable with no acute pathology await however formal interpretation by neuroradiology       Most recent MRA   No results found for this or any previous visit.        Most recent CTA  Results for orders placed during the hospital encounter of 01/01/23    CT HEAD WO CONTRAST    Narrative  CT HEAD WITHOUT CONTRAST, 1/1/2023    History: Altered mental status.    Comparison: CT head without contrast 12/29/2022    Technique:   5 mm axial scans from the skull base to the vertex. All CT scans  performed at this facility use one or all of the following: Automated exposure  control, adjustment of the mA and/or kVp according to patient's size, iterative  reconstruction.    Findings:  No evidence of intracranial hemorrhage is seen. No abnormal  extra-axial fluid collections are seen.  Age-related chronic cortical volume  loss is seen.  No evidence for acute hydrocephalus is seen.  No evidence of  midline shift or herniation is seen.  No abnormal edema pattern is seen in a  vascular distribution to suggest large artery infarction.    Evaluation with bone windows shows no acute osseous changes.  The visualized  sinuses, middle ears, and mastoid air cells are well aerated.    Impression  1.  No acute intracranial process evident by noncontrast CT study of the head.      This report was made using voice transcription. Despite my best efforts to avoid  any, transcription errors may persist. If there is any question about the  accuracy of the report or need for clarification, then please call [phone], or text me through perfectserv for clarification or correction.       Most recent Echo  No results found for this or any previous visit.         Most recent lipid panels  Lab Results   Component Value Date/Time    CHOL 101 08/01/2022 09:30 AM    HDL 56 08/01/2022 09:30 AM    VLDL 17  02/14/2022 12:43 PM       Most recent Hgb A1C  Lab Results   Component Value Date/Time    NPU0URDJ 7.6 11/09/2020 01:40 PM         Assessment/Plan: This appears to be more an acute toxic metabolic encephalopathy related to effects of COVID-19 and he does have a degree of mild renal failure.   Agree with metabolic work-up for his anemia hypothyroidism  No additional neurologic work-up is required        Wilfrido Kitchen MD

## 2023-01-02 NOTE — PROGRESS NOTES
ACUTE PHYSICAL THERAPY GOALS:   (Developed with and agreed upon by patient and/or caregiver.)  STG:  (1.)Mr. Luis James will move from supine to sit and sit to supine  with SUPERVISION within 2-5 treatment day(s). (2.)Mr. Luis James will transfer from bed to chair and chair to bed with SUPERVISION using the least restrictive device within 2-5 treatment day(s). (3.)Mr. Luis James will ambulate with SUPERVISION for 500 feet with the least restrictive device within 2-5 treatment day(s). ________________________________________________________________________________________________     PHYSICAL THERAPY Initial Assessment  (Link to Caseload Tracking: PT Visit Days : 1  Acknowledge Orders  Time In/Out  PT Charge Capture  Rehab Caseload Tracker    Shaquille Mendoza III is a 71 y.o. male   PRIMARY DIAGNOSIS: Acute cerebrovascular accident (CVA) ([facility] [address]  Acute cerebrovascular accident (CVA) ([facility] [address] [I63.9]       Reason for Referral: Difficulty in walking, Not elsewhere classified (R26.2)  Other abnormalities of gait and mobility (R26.89)  Inpatient: Payor: MEDICARE / Plan: MEDICARE PART A AND B / Product Type: *No Product type* /     ASSESSMENT:     REHAB RECOMMENDATIONS:   Recommendation to date pending progress:  Setting:  [facility] 13:    None     ASSESSMENT:  Mr. Luis James was admitted with AMS 1/1/23. He presents with some confusion, decreased gait ability and decreased endurance. Was initially seen in ER 12/29/22 for AMS; was diagnosed with renal insufficiency, given fluids and sent home. Family reports continued episodes of AMS and limited p.o. intake. CT unremarkable. MRI results pending. At baseline, patient ambulates independently with shuffling gait per family and no AD. Has had a couple of recent episodes of LOB and near falls per family. Independent with ADLs. Lives with spouse in one level home.  Today, patient performed all transfers and gait with CGA to min A. Gait was shuffling and unsteady. Delayed response when answering questions. Knew he was in the hospital but unsure of which one. Family present and supportive. Will follow to increase independence with functional mobility. [address] [address] AM-PAC 6 Clicks Basic Mobility Inpatient Short Form  AM-PAC Mobility Inpatient   How much difficulty turning over in bed?: None  How much difficulty sitting down on / standing up from a chair with arms?: A Little  How much difficulty moving from lying on back to sitting on side of bed?: None  How much help from another person moving to and from a bed to a chair?: A Little  How much help from another person needed to walk in hospital room?: A Little  How much help from another person for climbing 3-5 steps with a railing?: A Little  AM-[facility] Inpatient Mobility Raw Score : 20  AM-PAC Inpatient T-Scale Score : 47.67  Mobility Inpatient CMS 0-100% Score: 35.83  Mobility Inpatient CMS G-Code Modifier : CJ    SUBJECTIVE:   Mr. Miles Farley was agreeable to therapy.      Social/Functional Lives With: Spouse  Type of Home: House  Home Layout: One level  Home Access: Stairs to enter without rails  Entrance Stairs - Number of Steps: 1  Bathroom Shower/Tub: Tub/Shower unit  Bathroom Equipment: Grab bars in shower  Home Equipment: [employer]  ADL Assistance: Independent  Ambulation Assistance: Independent    OBJECTIVE:     PAIN: D[facility] / O2: Estephania Postin / Alex Im / DRAINS:   Pre Treatment:   Pain Assessment: None - Denies Pain      Post Treatment: 0 Vitals        Oxygen      None    RESTRICTIONS/PRECAUTIONS:  Restrictions/Precautions: Fall Risk                 GROSS EVALUATION: Intact Impaired (Comments):   AROM [x]     PROM []    Strength []  Generally decreased   Balance [] Sitting - Static: Good  Sitting - Dynamic: Good  Standing - Static: Fair, +  Standing - Dynamic: Fair, +   Posture [] Forward Head  Rounded Shoulders   Sensation [x]     Coordination []      Tone []     Edema []    Activity Tolerance []  decreased []      COGNITION/  PERCEPTION: Intact Impaired (Comments):   Orientation []  Some confusion; knew he was in hospital but unsure of which one   Vision [x]     Hearing [x]     Cognition  []       MOBILITY: I Mod I S SBA CGA Min Mod Max Total  NT x2 Comments:   Bed Mobility    Rolling [] [] [] [] [] [] [] [] [] [] []    Supine to Sit [] [] [] [] [] [] [] [] [] [x] [] Sitting EOB upon contact   Scooting [] [] [] [] [] [] [] [] [] [] []    Sit to Supine [] [] [] [] [] [] [] [] [] [x] []    Transfers    Sit to Stand [] [] [] [] [x] [] [] [] [] [] []    Bed to Chair [] [] [] [] [x] [] [] [] [] [] []    Stand to Sit [] [] [] [] [x] [] [] [] [] [] []     [] [] [] [] [] [] [] [] [] [] []    I=Independent, Mod I=Modified Independent, S=Supervision, SBA=Standby Assistance, CGA=Contact Guard Assistance,   Min=Minimal Assistance, Mod=Moderate Assistance, Max=Maximal Assistance, Total=Total Assistance, NT=Not Tested    GAIT: I Mod I S SBA CGA Min Mod Max Total  NT x2 Comments:   Level of Assistance [] [] [] [] [x] [x] [] [] [] [] []    Distance 150 feet    DME None    Gait Quality Decreased karla , Decreased step clearance, Decreased step length, and Shuffling     Weightbearing Status Restrictions/Precautions  Restrictions/Precautions: Fall Risk    Stairs      I=Independent, Mod I=Modified Independent, S=Supervision, SBA=Standby Assistance, CGA=Contact Guard Assistance,   Min=Minimal Assistance, Mod=Moderate Assistance, Max=Maximal Assistance, Total=Total Assistance, NT=Not Tested    PLAN:   FREQUENCY AND DURATION: Daily for duration of hospital stay or until stated goals are met, whichever comes first.    THERAPY PROGNOSIS: Good    PROBLEM LIST:   (Skilled intervention is medically necessary to address:)  Decreased Activity Tolerance  Decreased Balance  Decreased Cognition  Decreased Coordination  Decreased Gait Ability  Decreased Safety Awareness  Decreased Transfer Abilities INTERVENTIONS PLANNED:   (Benefits and precautions of physical therapy have been discussed with the patient.)  Therapeutic Activity  Gait Training  Education       TREATMENT:   EVALUATION: LOW COMPLEXITY: (Untimed Charge)    TREATMENT:   Therapeutic Activity (10 Minutes): Therapeutic activity included Scooting, Transfer Training, Ambulation on level ground, Sitting balance , and Standing balance to improve functional Activity tolerance, Balance, Coordination, Mobility, and Strength. TREATMENT GRID:  N/A    AFTER TREATMENT PRECAUTIONS: Bed/Chair Locked, Call light within reach, Chair, Needs within reach, RN notified, and Visitors at bedside    INTERDISCIPLINARY COLLABORATION:  RN/ PCT and OT/ [facility]    EDUCATION: Education Given To: Patient; Family  Education Provided: Role of Therapy;Plan of Care;Transfer Training; Fall Prevention Strategies  Education Method: Demonstration;Verbal  Education Outcome: Verbalized understanding;Demonstrated understanding    TIME IN/OUT:  Time In: 1020  Time Out: [address] Shannon  Minutes: Du Michael PT

## 2023-01-02 NOTE — ED PROVIDER NOTES
Emergency Department Provider Note                   PCP:                Liusito Dowd MD               Age: 71 y.o. Sex: male       ICD-10-CM    1. Altered mental status, unspecified altered mental status type  R41.82           DISPOSITION Decision To Transfer 01/01/2023 09:41:59 PM       MDM  Number of Diagnoses or Management Options  Altered mental status, unspecified altered mental status type: new, needed workup  Diagnosis management comments: 80-year-old male patient here with concerns over recurrent altered mental status  Recent episode of volume depletion/dehydration. Family ports that he has had minimal p.o. intake over the last 48 hours    I reviewed the chest x-ray today and found no acute findings  I reviewed patient's EKG and compared with prior tracings and there are no interval changes  I reviewed office records from his primary care provider       Amount and/or Complexity of Data Reviewed  Clinical lab tests: ordered and reviewed  Tests in the radiology section of CPT®: ordered and reviewed  Tests in the medicine section of CPT®: ordered and reviewed  Decide to obtain previous medical records or to obtain history from someone other than the patient: yes  Obtain history from someone other than the patient: yes  Review and summarize past medical records: yes  Discuss the patient with other providers: yes  Independent visualization of images, tracings, or specimens: yes    Risk of Complications, Morbidity, and/or Mortality  Presenting problems: high  Diagnostic procedures: moderate  Management options: moderate  General comments: Elements of this note have been dictated via voice recognition software. Text and phrases may be limited by the accuracy of the software. The chart has been reviewed, but errors may still be present. Patient Progress  Patient progress: stable     Complexity of Problem: 2 or more stable chronic illnesses.  (4)  Complexity of Problem: 1 acute complicated illness or injury. (4)  The patients assessment required an independent historian: I spoke with a family member. I have conducted an independent ordering and review of Labs. I have conducted an independent ordering and review of EKG. I have conducted an independent ordering and review of X-rays. I have conducted an independent ordering and review of CT Scan. I have reviewed records from an external source: provider visit notes from PCP. Considerations: Hospitalization was considered. I discussed disposition with another provider. Patient was admitted I have communication with admitting physician. Orders Placed This Encounter   Procedures    XR CHEST PORTABLE    CT HEAD WO CONTRAST    CBC with Auto Differential    CMP    TSH    Urinalysis w rflx microscopic    Pulse Oximetry    EKG 12 Lead    Saline lock IV        Medications   aspirin chewable tablet 324 mg (has no administration in time range)   lactated ringers bolus (1,000 mLs IntraVENous New Bag 1/1/23 1926)       New Prescriptions    No medications on file        Miles Dexter is a 71 y.o. male who presents to the Emergency Department with chief complaint of    Chief Complaint   Patient presents with    Altered Mental Status      29-year-old male patient presents to the ER with concerns over recurrent altered mental status confusion  Patient's recent past medical history significant for COVID-19 which was treated with Paxil bid on or about 12/22/2022  Completed that medication without incident  Family reports that the patient had an episode of left-sided weakness and decreased responsiveness about 10 days ago. There is no seizure activity    Patient was seen in our department on 12/29/2022  Review of those records indicate that the patient had an acute kidney injury and likely fairly significant volume depletion  No other acute findings on his work-up  After IV hydration the patient improved.   After reviewing disposition options, the patient decided to go home    Since that time, he has had minimal p.o. intake remains with intermittent episodes of being confused  Family states that he remains far from his normal baseline  He has been less compliant with his medications and treatments, specifically not wearing his CPAP at night    The history is provided by the patient and a relative. Altered Mental Status  Presenting symptoms: confusion, disorientation and memory loss    Severity:  Moderate  Most recent episode:  2 days ago  Episode history:  Multiple  Timing:  Constant  Progression:  Waxing and waning  Chronicity:  New  Context: recent illness    Context: not dementia, not drug use, not head injury, not homeless, not nursing home resident and not recent change in medication    Associated symptoms: decreased appetite    Associated symptoms: no abdominal pain, no fever, no headaches, no nausea, no palpitations, no rash, no seizures and no vomiting      All other systems reviewed and are negative unless otherwise stated in the history of present illness section. Review of Systems   Constitutional:  Positive for decreased appetite. Negative for chills and fever. HENT:  Negative for hearing loss, sneezing and tinnitus. Eyes:  Negative for discharge and itching. Respiratory:  Negative for cough, chest tightness and shortness of breath. Cardiovascular:  Negative for chest pain and palpitations. Gastrointestinal:  Negative for abdominal pain, diarrhea, nausea and vomiting. Endocrine: Negative for cold intolerance, heat intolerance, polydipsia, polyphagia and polyuria. Genitourinary:  Negative for dysuria, hematuria and urgency. Musculoskeletal:  Positive for arthralgias. Negative for myalgias. Skin:  Negative for rash and wound. Allergic/Immunologic: Negative for immunocompromised state. Neurological:  Negative for seizures, syncope and headaches. Hematological: Negative.     Psychiatric/Behavioral:  Positive for confusion and memory loss. Negative for dysphoric mood and self-injury. The patient is not nervous/anxious. All other systems reviewed and are negative. Past Medical History:   Diagnosis Date    Acquired hypothyroidism 3/27/2016    Acute deep vein thrombosis (DVT) (HCC)     right    Bilateral knee pain 2020    Dr. Vinny Sultana     Cataracts, bilateral     Chronic deep vein thrombosis (DVT) of popliteal vein of right lower extremity ([address] 2021    Chronic pain of both knees 2021    COVID-19 vaccine series declined     Depression 3/20/2015    Diabetic eye exam [facility]) 2020    [facility] exam, routine 2020    [facility]    Graves disease     S/p Ablation therapy    Hypertension     managed with meds    Obesity (BMI 30.0-34.9) 3/27/2016    STEFFEN on CPAP 2018    Psychiatric disorder     anxiety    Rheumatoid arthritis(714.0)     Thyroid disease     rad. uptake- takes synthroid daily    Uncontrolled diabetes mellitus         Past Surgical History:   Procedure Laterality Date    BUNIONECTOMY Left 2014    CATARACT REMOVAL Bilateral     KNEE ARTHROSCOPY Left         Family History   Problem Relation Age of Onset    Other Son         esophagus stretched    No Known Problems Daughter     Hypertension Brother     Hypertension Sister     Osteoarthritis Father     Hypertension Father     No Known Problems Son     Hypertension Mother     Osteoarthritis Mother     Diabetes Father     Lung Cancer Father         Social History     Socioeconomic History    Marital status:      Spouse name: None    Number of children: None    Years of education: None    Highest education level: None   Tobacco Use    Smoking status: Former     Packs/day: 0.50     Types: Cigarettes     Quit date: 2012     Years since quittin.0    Smokeless tobacco: Never    Tobacco comments:     Quit smoking: former smoker \"Smoke when [employer] nervous\"   Substance and Sexual Activity    Alcohol use: No    Drug use:  No Allergies: Patient has no known allergies. Previous Medications    AMITRIPTYLINE (ELAVIL) 25 MG TABLET    Take 25 mg by mouth    AMLODIPINE (NORVASC) 5 MG TABLET    TAKE ONE TABLET BY MOUTH ONE TIME DAILY    ATORVASTATIN (LIPITOR) 20 MG TABLET    Take 1 tablet by mouth at bedtime    FOLIC ACID (FOLVITE) 1 MG TABLET    Take 1 tablet by mouth once daily    GLIMEPIRIDE (AMARYL) 4 MG TABLET    Take 4 mg by mouth 2 times daily    HYDROXYCHLOROQUINE (PLAQUENIL) 200 MG TABLET    Take 1 tablet by mouth 2 times daily    LEVOTHYROXINE (SYNTHROID) 150 MCG TABLET    Take 150 mcg by mouth every morning (before breakfast)    LISINOPRIL (PRINIVIL;ZESTRIL) 20 MG TABLET    TAKE ONE TABLET BY MOUTH ONE TIME DAILY    METFORMIN (GLUCOPHAGE) 1000 MG TABLET    TAKE ONE TABLET BY MOUTH TWICE A DAY WITH A MEAL    METHOTREXATE (RHEUMATREX) 2.5 MG CHEMO TABLET    Take 10 tablets by mouth once a week    MUPIROCIN (BACTROBAN) 2 % OINTMENT    APPLY OINTMENT TO WOUND DAILY UNDER BANDAGE UNTIL HEALED. NAPROXEN (NAPROSYN) 500 MG TABLET    Take 1 tablet by mouth 2 times daily (with meals)    SERTRALINE (ZOLOFT) 100 MG TABLET    Take 100 mg by mouth daily        Vitals signs and nursing note reviewed. Patient Vitals for the past 4 hrs:   Temp Pulse Resp BP SpO2   01/01/23 1839 99.5 °F (37.5 °C) 86 18 132/61 98 %          Physical Exam  Vitals and nursing note reviewed. Constitutional:       General: He is in acute distress. Appearance: Normal appearance. He is well-developed and normal weight. Comments: Oral temperature 99.5   HENT:      Head: Normocephalic and atraumatic. Right Ear: External ear normal.      Left Ear: External ear normal.      Nose: Nose normal.      Mouth/Throat:      Mouth: Mucous membranes are moist.      Pharynx: Oropharynx is clear. Eyes:      General: No scleral icterus. Extraocular Movements: Extraocular movements intact.       Conjunctiva/sclera: Conjunctivae normal.      Pupils: Pupils are equal, round, and reactive to light. Cardiovascular:      Rate and Rhythm: Normal rate and regular rhythm. Pulses: Normal pulses. Heart sounds: Normal heart sounds. Pulmonary:      Effort: Pulmonary effort is normal. No respiratory distress. Breath sounds: Normal breath sounds. Abdominal:      General: Abdomen is flat. Bowel sounds are normal. There is no distension. Palpations: Abdomen is soft. There is no mass. Tenderness: There is no abdominal tenderness. Musculoskeletal:         General: No deformity or signs of injury. Normal range of motion. Cervical back: Normal range of motion and neck supple. Skin:     General: Skin is warm and dry. Capillary Refill: Capillary refill takes less than 2 seconds. Neurological:      General: No focal deficit present. Mental Status: He is alert. He is confused. GCS: GCS eye subscore is 4. GCS verbal subscore is 4. GCS motor subscore is 6. Psychiatric:         Mood and Affect: Mood and affect normal.         Speech: Speech is delayed. Behavior: Behavior is slowed. Behavior is cooperative. Cognition and Memory: He exhibits impaired recent memory.         EKG 12 Lead    Date/Time: 1/1/2023 7:36 PM  Performed by: Rao White MD  Authorized by: SANDRA Heller     ECG reviewed by ED Physician in the absence of a cardiologist: yes    Previous ECG:     Previous ECG:  Compared to current    Similarity:  No change  Interpretation:     Interpretation: non-specific    Rate:     ECG rate:  86    ECG rate assessment: normal    Rhythm:     Rhythm: sinus rhythm    Ectopy:     Ectopy: none    QRS:     QRS axis:  Left    QRS conduction: non-specific conduction delay    ST segments:     ST segments:  Normal  T waves:     T waves: normal    Comments:      No acute ischemic changes  No significant interval change from prior tracing      ED EKG Interpretation  EKG was interpreted in the absence of a cardiologist.      Results for orders placed or performed during the hospital encounter of 01/01/23   XR CHEST PORTABLE    Narrative    CHEST X-RAY, single portable view  1/1/2023    History: Altered mental status. Technique: Single frontal view of the chest.    Comparison: Chest x-ray 12/29/2020    Findings: The cardiac silhouette is mildly enlarged although grossly unchanged from the  prior study. The lungs are expanded without evidence for pneumothorax. No  evolving consolidative airspace process or pleural effusion is seen. Impression    1. Stable mild cardiomegaly without evolving acute changes evident by plain  film. This report was made using voice transcription. Despite my best efforts to avoid  any, transcription errors may persist. If there is any question about the  accuracy of the report or need for clarification, then please call [phone], or text me through [employer] for clarification or correction. CT HEAD WO CONTRAST    Narrative    CT HEAD WITHOUT CONTRAST, 1/1/2023     History: Altered mental status. Comparison: CT head without contrast 12/29/2022     Technique:   5 mm axial scans from the skull base to the vertex. All CT scans  performed at this facility use one or all of the following: Automated exposure  control, adjustment of the mA and/or kVp according to patient's size, iterative  reconstruction. Findings:  No evidence of intracranial hemorrhage is seen. No abnormal  extra-axial fluid collections are seen. Age-related chronic cortical volume  loss is seen. No evidence for acute hydrocephalus is seen. No evidence of  midline shift or herniation is seen. No abnormal edema pattern is seen in a  vascular distribution to suggest large artery infarction. Evaluation with bone windows shows no acute osseous changes. The visualized  sinuses, middle ears, and mastoid air cells are well aerated. Impression    1.   No acute intracranial process evident by noncontrast CT study of the head. This report was made using voice transcription. Despite my best efforts to avoid  any, transcription errors may persist. If there is any question about the  accuracy of the report or need for clarification, then please call [phone], or text me through [employer] for clarification or correction.     CBC with Auto Differential   Result Value Ref Range    WBC 6.6 4.3 - 11.1 K/uL    RBC 3.38 (L) 4.23 - 5.60 M/uL    Hemoglobin 10.6 (L) 13.6 - 17.2 g/dL    Hematocrit 32.2 (L) 41.1 - 50.3 %    MCV 95.3 82.0 - 102.0 FL    MCH 31.4 26.1 - 32.9 PG    MCHC 32.9 31.4 - 35.0 g/dL    RDW 13.9 11.9 - 14.6 %    Platelets 921 947 - 583 K/uL    MPV 9.5 9.4 - 12.3 FL    nRBC 0.00 0.0 - 0.2 K/uL    Differential Type AUTOMATED      Seg Neutrophils 64 43 - 78 %    Lymphocytes 10 (L) 13 - 44 %    Monocytes 23 (H) 4.0 - 12.0 %    Eosinophils % 2 0.5 - 7.8 %    Basophils 1 0.0 - 2.0 %    Immature Granulocytes 1 0.0 - 5.0 %    Segs Absolute 4.2 1.7 - 8.2 K/UL    Absolute Lymph # 0.6 0.5 - 4.6 K/UL    Absolute Mono # 1.5 (H) 0.1 - 1.3 K/UL    Absolute Eos # 0.1 0.0 - 0.8 K/UL    Basophils Absolute 0.1 0.0 - 0.2 K/UL    Absolute Immature Granulocyte 0.1 0.0 - 0.5 K/UL   CMP   Result Value Ref Range    Sodium 139 133 - 143 mmol/L    Potassium 4.6 3.5 - 5.1 mmol/L    Chloride 102 98 - 107 mmol/L    CO2 22 21 - 32 mmol/L    Anion Gap 15 (H) 2 - 11 mmol/L    Glucose 123 (H) 65 - 100 mg/dL    BUN 17 7.0 - 18.0 MG/DL    Creatinine 0.98 0.8 - 1.5 MG/DL    Est, Glom Filt Rate >60 >60 ml/min/1.73m2    Calcium 9.6 8.3 - 10.4 MG/DL    Total Bilirubin 0.2 0.2 - 1.1 MG/DL    ALT 64 (H) 13.0 - 61.0 U/L    AST 51 (H) 15 - 37 U/L    Alk Phosphatase 72 45.0 - 117.0 U/L    Total Protein 7.3 6.4 - 8.2 g/dL    Albumin 4.0 3.2 - 4.6 g/dL    Globulin 3.3 2.8 - 4.5 g/dL    Albumin/Globulin Ratio 1.2 0.4 - 1.6     Lactic Acid Now and in 2 Hours   Result Value Ref Range    Lactic Acid 1.20 0.4 - 2.0 mmol/L   TSH Result Value Ref Range    TSH, 3RD GENERATION 0.684 0.58 - 3.70 uIU/mL   Urinalysis w rflx microscopic   Result Value Ref Range    Color, UA YELLOW      Appearance SLIGHTLY CLOUDY      Specific Gravity, UA >=1.030 1.001 - 1.023    pH, Urine 5.5 5.0 - 9.0      Protein, UA Negative NEG mg/dL    Glucose, UA Negative mg/dL    Ketones, Urine Negative NEG mg/dL    Bilirubin Urine Negative NEG      Blood, Urine Negative NEG      Urobilinogen, Urine 0.2 0.2 - 1.0 EU/dL    Nitrite, Urine Negative NEG      Leukocyte Esterase, Urine Negative NEG          CT HEAD WO CONTRAST   Final Result   1. No acute intracranial process evident by noncontrast CT study of the head. This report was made using voice transcription. Despite my best efforts to avoid   any, transcription errors may persist. If there is any question about the   accuracy of the report or need for clarification, then please call [phone], or text me through [employer] for clarification or correction. XR CHEST PORTABLE   Final Result   1. Stable mild cardiomegaly without evolving acute changes evident by plain   film. This report was made using voice transcription. Despite my best efforts to avoid   any, transcription errors may persist. If there is any question about the   accuracy of the report or need for clarification, then please call [phone], or text me through [employer] for clarification or correction. Voice dictation software was used during the making of this note. This software is not perfect and grammatical and other typographical errors may be present. This note has not been completely proofread for errors.      Shahbaz Doe MD  01/01/23 2619

## 2023-01-02 NOTE — PROGRESS NOTES
0700 glipizide not loaded in pyxis. Pharmacy called and stated they would sent up a dose. Dayshift RN aware and agreed to administer.

## 2023-01-02 NOTE — PROGRESS NOTES
Patient and wife do not know what medications patient is currently taking. Wife instructed to bring a list of patient medications to the hospital for medication reconciliation.

## 2023-01-02 NOTE — CARE COORDINATION
SW met with patient who confirmed demographic information, lives with his wife and has local family for support. Patient is seen by Dr. Eleno Gutierrez for primary care and is current. Patient uses a cane to ambulate, has \"stumbled\" a lot recently, and is independent at home. Patient seen by therapy, recommendation for [facility] at discharge. Patient given choice and referral sent to [address] [place] [address] for PT/OT/RN. ASSESSMENT NOTE    Attending Physician: Melanie Sotomayor MD  Admit Problem: Acute cerebrovascular accident (CVA) [facility]) [I63.9]  Date/Time of Admission: 1/2/2023 12:04 AM  Problem List:  Patient Active Problem List   Diagnosis    STEFFEN on CPAP    Essential hypertension    Depression    Hypercholesterolemia    Obesity (BMI 30.0-34. 9)    BPH associated with nocturia    Chronic deep vein thrombosis (DVT) of popliteal vein of right lower extremity (HCC)    Chronic pain of both knees    Rheumatoid arthritis involving multiple joints (HCC)    Hypothyroidism following radioiodine therapy    Type 2 diabetes mellitus (HCC)    Chronic deep vein thrombosis (DVT) of right lower extremity (HCC)    Acute cerebrovascular accident (CVA) ([facility] [address]    Elevated LFTs    Normocytic anemia    Encephalopathy       Service Assessment  Patient Orientation Alert and Oriented, Person, Place, Situation   Cognition Alert   History Provided By Patient   Primary Caregiver Self   Accompanied By/Relationship     Support Systems Spouse/Significant Other, Chris Goyal [phone] is:     PCP Verified by CM Yes   Last Visit to PCP     Prior Functional Level Independent in ADLs/IADLs   Current Functional Level Independent in ADLs/IADLs   Can patient return to prior living arrangement Yes   Ability to make needs known: Good   Family able to assist with home care needs: Yes   Would you like for me to discuss the discharge plan with any other family members/significant others, and if so, who?  Yes   Financial Resources [employer] None   CM/SW Referral       Social/Functional History  Lives With Spouse   Type of [address] [facility] One level   Home Access Stairs to enter without rails   Entrance Stairs - Number of Steps 1   Entrance Stairs - Rails     Bathroom Shower/Tub Tub/Shower unit   Bathroom Toilet     Bathroom Equipment Grab bars in shower   [address] Work     Driving     Shopping          Other (Comment)     Homemaking Responsibilities     Meal Prep Responsibility     Laundry Responsibility     Cleaning Responsibility     Bill Paying/Finance [id number] Management     Other (Comment)     Ambulation Assistance Independent   Transfer Assistance     Active      Patient's  Info     Mode of Transportation     Education     Occupation     Type of Occupation       Discharge Planning   Type of [address] [facility] Spouse/Significant Other, Children   Current Services Prior To Admission     Potential Assistance Needed     DME     DME     DME Ordered? Potential Assistance Purchasing Medications     Meds-to-Beds: Does the patient want to have any new prescriptions delivered to bedside prior to discharge? Type of Home Care Services     Patient expects to be discharged to: House   Follow Up Appointment: Best Day/Time     One/Two Story Residence:     # of Interior Steps     Height of Each Step (in)     [employer] Available     History of Falls? Services At/After Discharge  Transition of Care Consult (CM Consult):      Internal Home Health     Internal Hospice     Reason Outside Agency [address]     Partner SNF     Reason Why Partner SNF Not Chosen     Internal Comfort Care     Reason Outside [address] Discharge     [address] Provided? Mode of Transport at [facility] Time of Discharge     Confirm Follow Up Transport       Condition of Participation: Discharge Planning  The plan for Transition of Care is related to the following treatment goals: [facility] [place] PT/OT/RN   The Patient and/or Patient Representative was provided with a Choice of Provider? Patient   Name of the Patient Representative who was provided with the Choice of Provider and agrees with the Discharge Plan? The Patient and/or Patient Representative Agree with the Discharge Plan? Yes   Freedom of Choice list was provided with basic dialogue that supports the individualized plan of care/goals, treatment preferences, and shares the quality data associated with the providers?  Yes     Documentation for Discharge Appeal  Discharge Appealed by     Date notified by [employer]IO of appeal request:     Time notified by QIO of appeal request:     Detailed Notice of Discharge given to:     Date Notice of Discharge given:     Time Notice of Discharge given:     Date records sent to [address]     Time records sent to [address]     Date Notified of Outcome     Time Notified of Outcome     Outcome of appeal           MIRYAM Marley 01/02/23 1:32 PM      Gerald Bethea LMSW    [address] [facility]

## 2023-01-03 ENCOUNTER — APPOINTMENT (OUTPATIENT)
Dept: ULTRASOUND IMAGING | Age: 73
DRG: 177 | End: 2023-01-03
Attending: FAMILY MEDICINE
Payer: MEDICARE

## 2023-01-03 VITALS
DIASTOLIC BLOOD PRESSURE: 75 MMHG | RESPIRATION RATE: 22 BRPM | TEMPERATURE: 97.7 F | HEART RATE: 73 BPM | OXYGEN SATURATION: 94 % | SYSTOLIC BLOOD PRESSURE: 118 MMHG

## 2023-01-03 LAB
ALBUMIN SERPL-MCNC: 2.8 G/DL (ref 3.2–4.6)
ALBUMIN/GLOB SERPL: 0.7 (ref 0.4–1.6)
ALP SERPL-CCNC: 61 U/L (ref 50–136)
ALT SERPL-CCNC: 65 U/L (ref 12–65)
ANION GAP SERPL CALC-SCNC: 9 MMOL/L (ref 2–11)
AST SERPL-CCNC: 38 U/L (ref 15–37)
BILIRUB SERPL-MCNC: 0.3 MG/DL (ref 0.2–1.1)
BUN SERPL-MCNC: 20 MG/DL (ref 8–23)
CALCIUM SERPL-MCNC: 8.5 MG/DL (ref 8.3–10.4)
CHLORIDE SERPL-SCNC: 107 MMOL/L (ref 101–110)
CHOLEST SERPL-MCNC: 108 MG/DL
CO2 SERPL-SCNC: 22 MMOL/L (ref 21–32)
CREAT SERPL-MCNC: 0.89 MG/DL (ref 0.8–1.5)
ERYTHROCYTE [DISTWIDTH] IN BLOOD BY AUTOMATED COUNT: 13.6 % (ref 11.9–14.6)
EST. AVERAGE GLUCOSE BLD GHB EST-MCNC: 154 MG/DL
GLOBULIN SER CALC-MCNC: 4 G/DL (ref 2.8–4.5)
GLUCOSE SERPL-MCNC: 127 MG/DL (ref 65–100)
HBA1C MFR BLD: 7 % (ref 4.8–5.6)
HCT VFR BLD AUTO: 30.7 % (ref 41.1–50.3)
HDLC SERPL-MCNC: 43 MG/DL (ref 40–60)
HDLC SERPL: 2.5
HGB BLD-MCNC: 10.1 G/DL (ref 13.6–17.2)
LDLC SERPL CALC-MCNC: 53.6 MG/DL
MCH RBC QN AUTO: 31.6 PG (ref 26.1–32.9)
MCHC RBC AUTO-ENTMCNC: 32.9 G/DL (ref 31.4–35)
MCV RBC AUTO: 95.9 FL (ref 82–102)
NRBC # BLD: 0 K/UL (ref 0–0.2)
PLATELET # BLD AUTO: 350 K/UL (ref 150–450)
PMV BLD AUTO: 9.3 FL (ref 9.4–12.3)
POTASSIUM SERPL-SCNC: 4 MMOL/L (ref 3.5–5.1)
PROT SERPL-MCNC: 6.8 G/DL (ref 6.3–8.2)
RBC # BLD AUTO: 3.2 M/UL (ref 4.23–5.6)
SODIUM SERPL-SCNC: 138 MMOL/L (ref 133–143)
TRIGL SERPL-MCNC: 57 MG/DL (ref 35–150)
VLDLC SERPL CALC-MCNC: 11.4 MG/DL (ref 6–23)
WBC # BLD AUTO: 7.6 K/UL (ref 4.3–11.1)

## 2023-01-03 PROCEDURE — 2580000003 HC RX 258: Performed by: FAMILY MEDICINE

## 2023-01-03 PROCEDURE — 76705 ECHO EXAM OF ABDOMEN: CPT

## 2023-01-03 PROCEDURE — 80061 LIPID PANEL: CPT

## 2023-01-03 PROCEDURE — 6370000000 HC RX 637 (ALT 250 FOR IP): Performed by: NURSE PRACTITIONER

## 2023-01-03 PROCEDURE — 83036 HEMOGLOBIN GLYCOSYLATED A1C: CPT

## 2023-01-03 PROCEDURE — 6360000002 HC RX W HCPCS: Performed by: FAMILY MEDICINE

## 2023-01-03 PROCEDURE — 36415 COLL VENOUS BLD VENIPUNCTURE: CPT

## 2023-01-03 PROCEDURE — 80053 COMPREHEN METABOLIC PANEL: CPT

## 2023-01-03 PROCEDURE — 85027 COMPLETE CBC AUTOMATED: CPT

## 2023-01-03 PROCEDURE — 6370000000 HC RX 637 (ALT 250 FOR IP): Performed by: FAMILY MEDICINE

## 2023-01-03 RX ORDER — ATORVASTATIN CALCIUM 40 MG/1
80 TABLET, FILM COATED ORAL NIGHTLY
Status: DISCONTINUED | OUTPATIENT
Start: 2023-01-03 | End: 2023-01-03 | Stop reason: HOSPADM

## 2023-01-03 RX ORDER — ATORVASTATIN CALCIUM 80 MG/1
80 TABLET, FILM COATED ORAL NIGHTLY
Qty: 30 TABLET | Refills: 3 | Status: SHIPPED | OUTPATIENT
Start: 2023-01-03 | End: 2023-04-27 | Stop reason: SDUPTHER

## 2023-01-03 RX ADMIN — LISINOPRIL 20 MG: 20 TABLET ORAL at 09:54

## 2023-01-03 RX ADMIN — ENOXAPARIN SODIUM 40 MG: 100 INJECTION SUBCUTANEOUS at 09:54

## 2023-01-03 RX ADMIN — FOLIC ACID 1 MG: 1 TABLET ORAL at 09:54

## 2023-01-03 RX ADMIN — SERTRALINE 100 MG: 100 TABLET, FILM COATED ORAL at 09:54

## 2023-01-03 RX ADMIN — ACETAMINOPHEN 650 MG: 325 TABLET ORAL at 10:02

## 2023-01-03 RX ADMIN — LEVOTHYROXINE SODIUM 150 MCG: 0.07 TABLET ORAL at 05:26

## 2023-01-03 RX ADMIN — HYDROXYCHLOROQUINE SULFATE 200 MG: 200 TABLET ORAL at 09:54

## 2023-01-03 RX ADMIN — SODIUM CHLORIDE: 900 INJECTION, SOLUTION INTRAVENOUS at 07:24

## 2023-01-03 RX ADMIN — AMLODIPINE BESYLATE 5 MG: 5 TABLET ORAL at 09:54

## 2023-01-03 RX ADMIN — ASPIRIN 81 MG: 81 TABLET, COATED ORAL at 09:54

## 2023-01-03 ASSESSMENT — PAIN SCALES - GENERAL: PAINLEVEL_OUTOF10: 5

## 2023-01-03 ASSESSMENT — PAIN DESCRIPTION - LOCATION: LOCATION: HEAD

## 2023-01-03 NOTE — CARE COORDINATION
Patient care plan reviewed in interdisciplinary rounds with the following: MD, nursing, case management, therapy, and nutrition services. Pt is medically stable and ready for discharge home today. CM met with patient and wife to confirm plans. Pt will return home with his wife and home health services from [address] [place] [address]. CM spoke with Faiza Kenzie at [facility] to advise of discharge today. No additional discharge planning needs noted. 01/02/23 1158   Service Assessment   Patient Orientation Alert and Oriented;Person;Place;Situation   Cognition Alert   History Provided By Patient   Primary Caregiver Self   Support Systems Spouse/Significant Other;Children   PCP Verified by CM Yes   Prior Functional Level Independent in ADLs/IADLs   Current Functional Level Independent in ADLs/IADLs   Can patient return to prior living arrangement Yes   Ability to make needs known: Good   Family able to assist with home care needs: Yes   Would you like for me to discuss the discharge plan with any other family members/significant others, and if so, who? Yes   Financial Resources Medicare   Community Resources None   Social/Functional History   Lives With Spouse   Type of [address] One level   [address] to enter without rails   Entrance Stairs - Number of Steps 1   Bathroom Shower/Tub Tub/Shower unit   [employer] Grab bars in [address] [facility]   Discharge Planning   Patient expects to be discharged to: House   Condition of Participation: Discharge [address] for Transition of Care is related to the following treatment goals: [facility] PT/OT/RN   The Patient and/or Patient Representative was provided with a Choice of Provider? Patient   The Patient and/Or Patient Representative agree with the Discharge Plan?  Yes   Freedom of Choice list was provided with basic dialogue that supports the patient's individualized plan of care/goals, treatment preferences, and shares the quality data associated with the providers?   Yes

## 2023-01-03 NOTE — PROGRESS NOTES
Pt discharged to home with spouse. PIV removed. To follow up with PCP as instructed. Return to ED with worsening symptoms   No new complaints at this time.  Lauren diet, ambulatory in room

## 2023-01-03 NOTE — DISCHARGE SUMMARY
Hospitalist Discharge Summary   Admit Date:  2023 12:04 AM   DC Note date: 1/3/2023  Name:  Valerie Ham III   Age:  71 y.o. Sex:  male  :  1950   MRN:  [id number]   Room:  [facility]  PCP:  Vannessa De La Garza MD    Presenting Complaint: No chief complaint on file. Initial Admission Diagnosis: Acute cerebrovascular accident (CVA) ([facility] [I63.9]     Problem List for this Hospitalization (present on admission):    Principal Problem:    Acute cerebrovascular accident (CVA) ([facility]  Active Problems:    Elevated LFTs    Normocytic anemia    Encephalopathy    STEFFEN on CPAP    Essential hypertension    Depression    Hypercholesterolemia    Obesity (BMI 30.0-34. 9)    Hypothyroidism following radioiodine therapy    Type 2 diabetes mellitus ([facility]  Resolved Problems:    * No resolved hospital problems. *      Hospital Course:  71year old CM PMH  Acquired hypothyroidism, Acute deep vein thrombosis (DVT) (HCC), Bilateral knee pain, Cataracts, bilateral, Chronic deep vein thrombosis (DVT) of popliteal vein of right lower extremity (HCC), Chronic pain of both knees, COVID-19 vaccine series declined, Depression, Diabetic eye exam ([facility] Eye exam, routine, Graves disease, Hypertension, Obesity (BMI 30.0-34.9), STEFFEN on CPAP, Psychiatric disorder, Rheumatoid arthritis(714.0), Thyroid disease, and Uncontrolled diabetes mellitus admitted 1/2 for acute encephalopathy, noted to test positive fro COVID  which is when AMS was noted. He received Paxlovid for 5 days, last dose . Per family, confusion did improve but then returned past few days. CT head unremarkable. Seen by neuro, felt to be COVID related. MRI brain no acute findings. 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 Patent posterior communicating arteries bilaterally. Atherosclerosis at the carotid siphon bilaterally and at the carotid bulbs   bilaterally.  No hemodynamically significant stenosis in the cervical segments of   the internal carotid arteries. I did speak with vascular surgery, no surgical intervention, continue with ASA and high dose statin. US hepatomegaly, ALT normal on dc day, AST improved. No abdominal pain. Respiratory panel remains + for COVID. Day of dc afebrile, no leukocytosis. Disposition: home  Diet: ADULT DIET; Regular; 3 carb choices (45 gm/meal)  Code Status: Full Code    Follow Ups:   Contact information for after-discharge care     Discharge [facility]) . Service: [address] [place] [address] information:  [address] [place] [address]  [phone]                           Time spent in patient discharge and coordination 45 minutes. Follow up labs/diagnostics (ultimately defer to outpatient provider):  Per PCP    Plan was discussed with nursing, CM, family. All questions answered. Patient was stable at time of discharge. Instructions given to call a physician or return if any concerns.     Current Discharge Medication List        CONTINUE these medications which have CHANGED    Details   atorvastatin (LIPITOR) 80 MG tablet Take 1 tablet by mouth nightly  Qty: 30 tablet, Refills: 3           CONTINUE these medications which have NOT CHANGED    Details   methotrexate (RHEUMATREX) 2.5 MG chemo tablet Take 10 tablets by mouth once a week  Qty: 120 tablet, Refills: 0    Associated Diagnoses: Seronegative rheumatoid arthritis (HCC)      naproxen (NAPROSYN) 500 MG tablet Take 1 tablet by mouth 2 times daily (with meals)  Qty: 180 tablet, Refills: 1      hydroxychloroquine (PLAQUENIL) 200 MG tablet Take 1 tablet by mouth 2 times daily  Qty: 180 tablet, Refills: 1    Associated Diagnoses: Seronegative rheumatoid arthritis (HCC)      lisinopril (PRINIVIL;ZESTRIL) 20 MG tablet TAKE ONE TABLET BY MOUTH ONE TIME DAILY  Qty: 90 tablet, Refills: 3 Associated Diagnoses: Essential hypertension      amLODIPine (NORVASC) 5 MG tablet TAKE ONE TABLET BY MOUTH ONE TIME DAILY  Qty: 90 tablet, Refills: 3    Associated Diagnoses: Essential hypertension      metFORMIN (GLUCOPHAGE) 1000 MG tablet TAKE ONE TABLET BY MOUTH TWICE A DAY WITH A MEAL  Qty: 180 tablet, Refills: 3    Associated Diagnoses: Type 2 diabetes mellitus without complication, without long-term current use of insulin (HCC)      folic acid (FOLVITE) 1 MG tablet Take 1 tablet by mouth once daily      glimepiride (AMARYL) 4 MG tablet Take 4 mg by mouth 2 times daily      levothyroxine (SYNTHROID) 150 MCG tablet Take 150 mcg by mouth every morning (before breakfast)           STOP taking these medications       amitriptyline (ELAVIL) 25 MG tablet Comments:   Reason for Stopping:         mupirocin (BACTROBAN) 2 % ointment Comments:   Reason for Stopping:         sertraline (ZOLOFT) 100 MG tablet Comments:   Reason for Stopping:               Procedures done this admission:  * No surgery found *    Consults this admission:  IP CONSULT TO NEUROLOGY  IP CONSULT HOME HEALTH    Echocardiogram results:  No results found for this or any previous visit.      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 Patent posterior communicating arteries bilaterally. Atherosclerosis at the carotid siphon bilaterally and at the carotid bulbs bilaterally. No hemodynamically significant stenosis in the cervical segments of the internal carotid arteries.     CT HEAD WO CONTRAST    Result Date: 1/1/2023  1.  No acute intracranial process evident by noncontrast CT study of the head. This report was made using voice transcription. Despite my best efforts to avoid any, transcription errors may persist. If there is any question about the accuracy of the report or need for clarification, then please call  [phone], or text me through [email or web address] for clarification or correction. CT HEAD WO CONTRAST    Result Date: 12/29/2022  No CT evidence of acute intracranial abnormality. XR CHEST PORTABLE    Result Date: 1/1/2023  1. Stable mild cardiomegaly without evolving acute changes evident by plain film. This report was made using voice transcription. Despite my best efforts to avoid any, transcription errors may persist. If there is any question about the accuracy of the report or need for clarification, then please call [phone], or text me through [email or web address] for clarification or correction. XR CHEST PORTABLE    Result Date: 12/29/2022  No evidence of an acute intrathoracic process. US ABDOMEN LIMITED Specify organ? LIVER    Result Date: 1/3/2023  Hepatomegaly with heterogeneous hepatic echotexture. MRI brain without contrast    Result Date: 1/2/2023  NO ACUTE INTRACRANIAL ABNORMALITY IDENTIFIED.        Labs: Results:       BMP, Mg, Phos Recent Labs     01/01/23  1905 01/03/23  0609    138   K 4.6 4.0    107   CO2 22 22   ANIONGAP 15* 9   BUN 17 20   CREATININE 0.98 0.89   LABGLOM >60 >60   CALCIUM 9.6 8.5   GLUCOSE 123* 127*      CBC Recent Labs     01/01/23  1905 01/03/23  0609   WBC 6.6 7.6   RBC 3.38* 3.20*   HGB 10.6* 10.1*   HCT 32.2* 30.7*   MCV 95.3 95.9   MCH 31.4 31.6   MCHC 32.9 32.9   RDW 13.9 13.6    350   MPV 9.5 9.3*   NRBC 0.00 0.00   SEGS 64  --    LYMPHOPCT 10*  --    EOSRELPCT 2  --    MONOPCT 23*  --    BASOPCT 1  --    IMMGRAN 1  --    SEGSABS 4.2  --    LYMPHSABS 0.6  --    EOSABS 0.1  --    MONOSABS 1.5*  --    BASOSABS 0.1  --    ABSIMMGRAN 0.1  --       LFT Recent Labs     01/01/23  1905 01/03/23  0609   BILITOT 0.2 0.3   ALKPHOS 72 61   AST 51* 38*   ALT 64* 65   PROT 7.3 6.8   LABALBU 4.0 2.8*   GLOB 3.3 4.0      Cardiac  No results found for: NTPROBNP, TROPHS   Coags Lab Results   Component Value Date/Time    INR 2.0 06/21/2021 12:45 PM    INR 1.8 04/12/2021 11:30 AM    INR 2.5 12/14/2020 10:00 AM      A1c Lab Results   Component Value Date/Time    LABA1C 7.0 01/03/2023 06:09 AM    LABA1C 6.9 08/01/2022 09:30 AM    LABA1C 7.2 02/14/2022 12:43 PM     01/03/2023 06:09 AM     08/01/2022 09:30 AM     02/14/2022 12:43 PM      Lipids Lab Results   Component Value Date/Time    CHOL 108 01/03/2023 06:09 AM    LDLCALC 53.6 01/03/2023 06:09 AM    LABVLDL 11.4 01/03/2023 06:09 AM    HDL 43 01/03/2023 06:09 AM    CHOLHDLRATIO 2.5 01/03/2023 06:09 AM    TRIG 57 01/03/2023 06:09 AM      Thyroid  Lab Results   Component Value Date/Time    ISN5LCP 0.644 01/02/2023 12:51 AM        Most Recent UA Lab Results   Component Value Date/Time    COLORU YELLOW 01/01/2023 08:50 PM    APPEARANCE SLIGHTLY CLOUDY 01/01/2023 08:50 PM    SPECGRAV >=1.030 01/01/2023 08:50 PM    LABPH 5.5 01/01/2023 08:50 PM    PROTEINU Negative 01/01/2023 08:50 PM    GLUCOSEU Negative 01/01/2023 08:50 PM    KETUA Negative 01/01/2023 08:50 PM    BILIRUBINUR Negative 01/01/2023 08:50 PM    BLOODU Negative 01/01/2023 08:50 PM    UROBILINOGEN 0.2 01/01/2023 08:50 PM    NITRU Negative 01/01/2023 08:50 PM    LEUKOCYTESUR Negative 01/01/2023 08:50 PM    WBCUA 0 12/29/2022 10:41 PM    RBCUA 0-3 12/29/2022 10:41 PM    EPITHUA 0-3 12/29/2022 10:41 PM    BACTERIA 1+ 12/29/2022 10:41 PM    LABCAST HYALINE 12/29/2022 10:41 PM    MUCUS 0 12/29/2022 10:41 PM        No results for input(s): CULTURE in the last 720 hours.     All Labs from Last 24 Hrs:  Recent Results (from the past 24 hour(s))   POCT Glucose    Collection Time: 01/02/23  3:35 PM   Result Value Ref Range    POC Glucose 85 65 - 100 mg/dL    Performed by: Bere    Respiratory Panel, Molecular, with COVID-19 (Restricted: peds pts or suitable admitted adults)    Collection Time: 01/02/23  4:48 PM    Specimen: Nasopharyngeal   Result Value Ref Range    Adenovirus by PCR NOT DETECTED NOTDET      Coronavirus 229E by PCR NOT DETECTED NOTDET      Coronavirus HKU1 by PCR NOT DETECTED NOTDET      Coronavirus NL63 by PCR NOT DETECTED NOTDET      Coronavirus OC43 by PCR NOT DETECTED NOTDET      SARS-CoV-2, PCR Detected (A) NOTDET      Human Metapneumovirus by PCR NOT DETECTED NOTDET      Rhinovirus Enterovirus PCR NOT DETECTED NOTDET      Influenza A by PCR NOT DETECTED NOTDET      Influenza B PCR NOT DETECTED NOTDET      Parainfluenza 1 PCR NOT DETECTED NOTDET      Parainfluenza 2 PCR NOT DETECTED NOTDET      Parainfluenza 3 PCR NOT DETECTED NOTDET      Parainfluenza 4 PCR NOT DETECTED NOTDET      Respiratory Syncytial Virus by PCR NOT DETECTED NOTDET      Bordetella parapertussis by PCR NOT DETECTED NOTDET      Bordetella pertussis by PCR NOT DETECTED NOTDET      Chlamydophila Pneumonia PCR NOT DETECTED NOTDET      Mycoplasma pneumo by PCR NOT DETECTED NOTDET     Drug Screen Psych, Urine    Collection Time: 01/02/23  5:54 PM   Result Value Ref Range    Benzodiazepines, Urine Negative      Opiates, Urine Negative      Amphetamine, Urine Negative      Cocaine, Urine Negative      THC, TH-Cannabinol, Urine Negative     POCT Glucose    Collection Time: 01/02/23  9:08 PM   Result Value Ref Range    POC Glucose 133 (H) 65 - 100 mg/dL    Performed by: Leland    CBC    Collection Time: 01/03/23  6:09 AM   Result Value Ref Range    WBC 7.6 4.3 - 11.1 K/uL    RBC 3.20 (L) 4.23 - 5.6 M/uL    Hemoglobin 10.1 (L) 13.6 - 17.2 g/dL    Hematocrit 30.7 (L) 41.1 - 50.3 %    MCV 95.9 82.0 - 102.0 FL    MCH 31.6 26.1 - 32.9 PG    MCHC 32.9 31.4 - 35.0 g/dL    RDW 13.6 11.9 - 14.6 %    Platelets 159 961 - 294 K/uL    MPV 9.3 (L) 9.4 - 12.3 FL    nRBC 0.00 0.0 - 0.2 K/uL   Hemoglobin A1c    Collection Time: 01/03/23  6:09 AM   Result Value Ref Range    Hemoglobin A1C 7.0 (H) 4.8 - 5.6 %    eAG 154 mg/dL   Lipid Panel    Collection Time: 01/03/23  6:09 AM   Result Value Ref Range    Cholesterol, Total 108 MG/DL    Triglycerides 57 35 - 150 MG/DL    HDL 43 40 - 60 MG/DL    LDL Calculated 53.6 <100 MG/DL    VLDL Cholesterol Calculated 11.4 6.0 - 23.0 MG/DL    Chol/HDL Ratio 2.5 <200     Comprehensive Metabolic Panel w/ Reflex to MG    Collection Time: 01/03/23  6:09 AM   Result Value Ref Range    Sodium 138 133 - 143 mmol/L    Potassium 4.0 3.5 - 5.1 mmol/L    Chloride 107 101 - 110 mmol/L    CO2 22 21 - 32 mmol/L    Anion Gap 9 2 - 11 mmol/L    Glucose 127 (H) 65 - 100 mg/dL    BUN 20 8 - 23 MG/DL    Creatinine 0.89 0.8 - 1.5 MG/DL    Est, Glom Filt Rate >60 >60 ml/min/1.73m2    Calcium 8.5 8.3 - 10.4 MG/DL    Total Bilirubin 0.3 0.2 - 1.1 MG/DL    ALT 65 12 - 65 U/L    AST 38 (H) 15 - 37 U/L    Alk Phosphatase 61 50 - 136 U/L    Total Protein 6.8 6.3 - 8.2 g/dL    Albumin 2.8 (L) 3.2 - 4.6 g/dL    Globulin 4.0 2.8 - 4.5 g/dL    Albumin/Globulin Ratio 0.7 0.4 - 1.6         No Known Allergies  Immunization History   Administered Date(s) Administered    Influenza Virus Vaccine 09/30/2014    Influenza, FLUAD, (age 72 y+), Adjuvanted, 0.5mL 09/08/2020    Influenza, FLUCELVAX, (age 10 mo+), MDCK, PF, 0.5mL 10/04/2017, 09/24/2019    Influenza, High Dose (Fluzone 65 yrs and older) 09/21/2018    Influenza, Trivalent PF 10/15/2015, 10/10/2016    Pneumococcal Conjugate 13-valent (Polnxgm13) 10/10/2016    Pneumococcal Polysaccharide ([id number]) 05/21/2018, 12/01/2020    Tdap (Boostrix, Adacel) 07/23/2020       Recent Vital Data:  Patient Vitals for the past 24 hrs:   Temp Pulse Resp BP SpO2   01/03/23 1121 97.7 °F (36.5 °C) 73 22 118/75 94 %   01/03/23 0723 97.7 °F (36.5 °C) 67 18 139/68 95 %   01/03/23 0303 97.5 °F (36.4 °C) 77 18 112/64 96 %   01/02/23 2349 99.3 °F (37.4 °C) 77 18 (!) 146/79 96 %   01/02/23 1936 97.3 °F (36.3 °C) 82 18 131/68 96 %   01/02/23 1533 98.2 °F (36.8 °C) 72 18 123/69 92 %       Oxygen Therapy  SpO2: 94 %  Pulse Oximeter Device Mode: Intermittent  O2 Device: None (Room air)    Estimated body mass index is 27.26 kg/m² as calculated from the following:    Height as of 1/1/23: 6' (1.829 m). Weight as of 1/1/23: 201 lb (91.2 kg). Intake/Output Summary (Last 24 hours) at 1/3/2023 1128  Last data filed at 1/2/2023 1200  Gross per 24 hour   Intake 240 ml   Output --   Net 240 ml         Physical Exam:  General:          Well nourished. Head:               Normocephalic, atraumatic  CV:                  RRR. No m/r/g. No jugular venous distension. Lungs:             CTAB. No wheezing, rhonchi, or rales. Symmetric expansion. Abdomen:        Soft, nontender, nondistended. Extremities:     No cyanosis or clubbing. No edema  Skin:                No rashes and normal coloration. Warm and dry. Neuro:             CN II-XII grossly intact. Sensation intact. Alert and oriented x 2  Psych:             Normal mood and affect.       Signed:  SWATHI Barrett - LILIANA

## 2023-01-04 ENCOUNTER — CARE COORDINATION (OUTPATIENT)
Dept: OTHER | Facility: CLINIC | Age: 73
End: 2023-01-04

## 2023-01-04 NOTE — CARE COORDINATION
[facility] Care Transitions Initial Follow Up Call    Call within 2 business days of discharge: Yes    Care Transition Nurse contacted the patient by telephone to perform post hospital discharge assessment. Verified name and  with patient as identifiers. Provided introduction to self, and explanation of the Care Transition Nurse role. Patient: Sergey Edmonds III Patient : 1950   MRN: [id number]  Reason for Admission: Acute CVA, COVID  Discharge Date: 1/3/23 RARS: Readmission Risk Score: 14.4      Last Discharge  Street       Date Complaint Diagnosis Description Type Department Provider    23  Acute cerebrovascular accident (CVA) ([facility] . .. Admission (Discharged) Constantin Mccoy MD            Was this an external facility discharge? No Discharge Facility: N/A    Challenges to be reviewed by the provider   Additional needs identified to be addressed with provider: No  none               Method of communication with provider: phone. Reports that he is feeling 100% better. No concerns or questions at this time. Medications reviewed. Planning to attend follow up appointment on 2023. Care Transition Nurse reviewed discharge instructions, medical action plan, and red flags with patient who verbalized understanding. The patient was given an opportunity to ask questions and does not have any further questions or concerns at this time. Were discharge instructions available to patient? Yes. Reviewed appropriate site of care based on symptoms and resources available to patient including: PCP  Specialist  Urgent care clinics. The patient agrees to contact the PCP office for questions related to their healthcare. Advance Care Planning:   Does patient have an Advance Directive:  no ACP docs . Medication reconciliation was performed with patient, who verbalizes understanding of administration of home medications.  Medications reviewed, 1111F entered: yes    Was patient discharged with a pulse oximeter? no    Non-face-to-face services provided:  Obtained and reviewed discharge summary and/or continuity of care documents  Education of patient to support self-management-voices understanding. Assessment and support for treatment adherence and medication management-voices understanding. Offered patient enrollment in the Remote Patient Monitoring (RPM) program for in-home monitoring: NA.    Care Transitions 24 Hour Call    Do you have a copy of your discharge instructions?: Yes  Do you have all of your prescriptions and are they filled?: Yes  Have you been contacted by a [facility] Pharmacist?: No  Have you scheduled your follow up appointment?: Yes  How are you going to get to your appointment?: Car - family or friend to transport  Do you feel like you have everything you need to keep you well at home?: Yes  Are you an active caregiver in your home?: No  Care Transitions Interventions         Follow Up  Future Appointments   Date Time Provider Bre Elias   1/6/2023 10:00 AM Abril Hector MD [facility]   2/13/2023 10:30 AM Sindhu Solo MD [facility] [place], [address] [place] [address]       Care Transition Nurse provided contact information. Plan for follow-up call in 5-7 days based on severity of symptoms and risk factors.   Plan for next call:  goals, discuss outcome of follow up appointment    Izzy Brooks RN

## 2023-01-06 ENCOUNTER — OFFICE VISIT (OUTPATIENT)
Dept: INTERNAL MEDICINE CLINIC | Facility: CLINIC | Age: 73
End: 2023-01-06
Payer: MEDICARE

## 2023-01-06 VITALS
DIASTOLIC BLOOD PRESSURE: 70 MMHG | SYSTOLIC BLOOD PRESSURE: 110 MMHG | WEIGHT: 201 LBS | BODY MASS INDEX: 27.22 KG/M2 | HEIGHT: 72 IN

## 2023-01-06 DIAGNOSIS — Z09 HOSPITAL DISCHARGE FOLLOW-UP: ICD-10-CM

## 2023-01-06 DIAGNOSIS — M06.9 RHEUMATOID ARTHRITIS INVOLVING MULTIPLE JOINTS (HCC): ICD-10-CM

## 2023-01-06 DIAGNOSIS — E11.65 TYPE 2 DIABETES MELLITUS WITH HYPERGLYCEMIA, WITH LONG-TERM CURRENT USE OF INSULIN (HCC): Primary | ICD-10-CM

## 2023-01-06 DIAGNOSIS — I10 ESSENTIAL HYPERTENSION: ICD-10-CM

## 2023-01-06 DIAGNOSIS — F33.41 MAJOR DEPRESSIVE DISORDER, RECURRENT, IN PARTIAL REMISSION (HCC): ICD-10-CM

## 2023-01-06 DIAGNOSIS — D64.9 ANEMIA, UNSPECIFIED TYPE: ICD-10-CM

## 2023-01-06 DIAGNOSIS — U07.1 COVID-19: ICD-10-CM

## 2023-01-06 DIAGNOSIS — Z79.4 TYPE 2 DIABETES MELLITUS WITH HYPERGLYCEMIA, WITH LONG-TERM CURRENT USE OF INSULIN (HCC): Primary | ICD-10-CM

## 2023-01-06 DIAGNOSIS — I82.501 CHRONIC DEEP VEIN THROMBOSIS (DVT) OF RIGHT LOWER EXTREMITY, UNSPECIFIED VEIN (HCC): ICD-10-CM

## 2023-01-06 PROBLEM — I63.9 ACUTE CEREBROVASCULAR ACCIDENT (CVA) (HCC): Status: RESOLVED | Noted: 2023-01-02 | Resolved: 2023-01-06

## 2023-01-06 PROCEDURE — 3074F SYST BP LT 130 MM HG: CPT | Performed by: INTERNAL MEDICINE

## 2023-01-06 PROCEDURE — 3078F DIAST BP <80 MM HG: CPT | Performed by: INTERNAL MEDICINE

## 2023-01-06 PROCEDURE — 1036F TOBACCO NON-USER: CPT | Performed by: INTERNAL MEDICINE

## 2023-01-06 PROCEDURE — 1123F ACP DISCUSS/DSCN MKR DOCD: CPT | Performed by: INTERNAL MEDICINE

## 2023-01-06 PROCEDURE — G8427 DOCREV CUR MEDS BY ELIG CLIN: HCPCS | Performed by: INTERNAL MEDICINE

## 2023-01-06 PROCEDURE — G8484 FLU IMMUNIZE NO ADMIN: HCPCS | Performed by: INTERNAL MEDICINE

## 2023-01-06 PROCEDURE — 3051F HG A1C>EQUAL 7.0%<8.0%: CPT | Performed by: INTERNAL MEDICINE

## 2023-01-06 PROCEDURE — G8417 CALC BMI ABV UP PARAM F/U: HCPCS | Performed by: INTERNAL MEDICINE

## 2023-01-06 PROCEDURE — 1111F DSCHRG MED/CURRENT MED MERGE: CPT | Performed by: INTERNAL MEDICINE

## 2023-01-06 PROCEDURE — 2022F DILAT RTA XM EVC RTNOPTHY: CPT | Performed by: INTERNAL MEDICINE

## 2023-01-06 PROCEDURE — 3017F COLORECTAL CA SCREEN DOC REV: CPT | Performed by: INTERNAL MEDICINE

## 2023-01-06 PROCEDURE — 99214 OFFICE O/P EST MOD 30 MIN: CPT | Performed by: INTERNAL MEDICINE

## 2023-01-06 RX ORDER — SERTRALINE HYDROCHLORIDE 100 MG/1
100 TABLET, FILM COATED ORAL DAILY
COMMUNITY

## 2023-01-06 ASSESSMENT — PATIENT HEALTH QUESTIONNAIRE - PHQ9
SUM OF ALL RESPONSES TO PHQ QUESTIONS 1-9: 0
2. FEELING DOWN, DEPRESSED OR HOPELESS: 0
SUM OF ALL RESPONSES TO PHQ QUESTIONS 1-9: 0
SUM OF ALL RESPONSES TO PHQ QUESTIONS 1-9: 0
SUM OF ALL RESPONSES TO PHQ9 QUESTIONS 1 & 2: 0
SUM OF ALL RESPONSES TO PHQ QUESTIONS 1-9: 0
1. LITTLE INTEREST OR PLEASURE IN DOING THINGS: 0

## 2023-01-06 NOTE — PROGRESS NOTES
Post-Discharge Transitional Care Follow Up      Lenny Lyle III   YOB: 1950    Date of Office Visit:  1/6/2023  Date of Hospital Admission: 1/2/23  Date of Hospital Discharge: 1/3/23  Readmission Risk Score (high >=14%. Medium >=10%):Readmission Risk Score: 14.4      Care management risk score Rising risk (score 2-5) and Complex Care (Scores >=6): No Risk Score On File     Non face to face  following discharge, date last encounter closed (first attempt may have been earlier): 01/04/2023     Call initiated 2 business days of discharge: Yes     Type 2 diabetes mellitus with hyperglycemia, with long-term current use of insulin (HCC)  Major depressive disorder, recurrent, in partial remission ([address]  Rheumatoid arthritis involving multiple joints ([address]  Chronic deep vein thrombosis (DVT) of right lower extremity, unspecified vein (HCC)  COVID-19  Anemia, unspecified type  Essential hypertension      Medical Decision Making: moderate complexity  Return in about 4 months (around 5/6/2023). On this date 1/6/2023 I have spent 30 minutes reviewing previous notes, test results and face to face with the patient discussing the diagnosis and importance of compliance with the treatment plan as well as documenting on the day of the visit. Subjective:   HPI  Covid with AMS  Inpatient course: Discharge summary reviewed- see chart.     Interval history/Current status: improving    Patient Active Problem List   Diagnosis    STEFFEN on CPAP    Essential hypertension    Depression    Hypercholesterolemia    BPH associated with nocturia    Chronic deep vein thrombosis (DVT) of popliteal vein of right lower extremity (HCC)    Chronic pain of both knees    Rheumatoid arthritis involving multiple joints (HCC)    Hypothyroidism following radioiodine therapy    Type 2 diabetes mellitus (HCC)    Chronic deep vein thrombosis (DVT) of right lower extremity (HCC)    Elevated LFTs    Anemia    Encephalopathy    Major depressive disorder, recurrent, in partial remission ([address]    Type 2 diabetes mellitus with hyperglycemia, with long-term current use of insulin ([address]       Medications listed as ordered at the time of discharge from hospital     Medication List            Accurate as of January 6, 2023  2:10 PM. If you have any questions, ask your nurse or doctor. CONTINUE taking these medications      amLODIPine 5 MG tablet  Commonly known as: NORVASC  TAKE ONE TABLET BY MOUTH ONE TIME DAILY     atorvastatin 80 MG tablet  Commonly known as: LIPITOR  Take 1 tablet by mouth nightly     folic acid 1 MG tablet  Commonly known as: FOLVITE     glimepiride 4 MG tablet  Commonly known as: AMARYL     hydroxychloroquine 200 MG tablet  Commonly known as: PLAQUENIL  Take 1 tablet by mouth 2 times daily     levothyroxine 150 MCG tablet  Commonly known as: SYNTHROID     lisinopril 20 MG tablet  Commonly known as: PRINIVIL;ZESTRIL  TAKE ONE TABLET BY MOUTH ONE TIME DAILY     metFORMIN 1000 MG tablet  Commonly known as: GLUCOPHAGE  TAKE ONE TABLET BY MOUTH TWICE A DAY WITH A MEAL     methotrexate 2.5 MG chemo tablet  Commonly known as: RHEUMATREX  Take 10 tablets by mouth once a week     naproxen 500 MG tablet  Commonly known as: NAPROSYN  Take 1 tablet by mouth 2 times daily (with meals)     sertraline 100 MG tablet  Commonly known as: ZOLOFT               Medications marked \"taking\" at this time  Outpatient Medications Marked as Taking for the 1/6/23 encounter (Office Visit) with Josie Travis MD   Medication Sig Dispense Refill    sertraline (ZOLOFT) 100 MG tablet Take 100 mg by mouth daily 1/2 tablet daily.       atorvastatin (LIPITOR) 80 MG tablet Take 1 tablet by mouth nightly 30 tablet 3    methotrexate (RHEUMATREX) 2.5 MG chemo tablet Take 10 tablets by mouth once a week 120 tablet 0    naproxen (NAPROSYN) 500 MG tablet Take 1 tablet by mouth 2 times daily (with meals) 180 tablet 1    hydroxychloroquine (PLAQUENIL) 200 MG tablet Take 1 tablet by mouth 2 times daily 180 tablet 1    lisinopril (PRINIVIL;ZESTRIL) 20 MG tablet TAKE ONE TABLET BY MOUTH ONE TIME DAILY 90 tablet 3    amLODIPine (NORVASC) 5 MG tablet TAKE ONE TABLET BY MOUTH ONE TIME DAILY 90 tablet 3    metFORMIN (GLUCOPHAGE) 1000 MG tablet TAKE ONE TABLET BY MOUTH TWICE A DAY WITH A MEAL 275 tablet 3    folic acid (FOLVITE) 1 MG tablet Take 1 tablet by mouth once daily      glimepiride (AMARYL) 4 MG tablet Take 4 mg by mouth 2 times daily      levothyroxine (SYNTHROID) 150 MCG tablet Take 150 mcg by mouth every morning (before breakfast)          Medications patient taking as of now reconciled against medications ordered at time of hospital discharge: Yes    Review of Systems: fatigue, AMS  Low blood sugars  SOB but no severe cough  Appetite change    Objective:    /70   Ht 6' (1.829 m)   Wt 201 lb (91.2 kg)   BMI 27.26 kg/m²   Physical Exam  Vitals reviewed. Constitutional:       Appearance: Normal appearance. HENT:      Head: Normocephalic and atraumatic. Cardiovascular:      Rate and Rhythm: Normal rate and regular rhythm. Heart sounds: Normal heart sounds. Pulmonary:      Effort: Pulmonary effort is normal. No respiratory distress. Breath sounds: Normal breath sounds. No wheezing. Skin:     General: Skin is warm and dry. Neurological:      General: No focal deficit present. Mental Status: He is alert and oriented to person, place, and time. Psychiatric:         Mood and Affect: Mood normal.         Behavior: Behavior normal.         Thought Content: Thought content normal.         Judgment: Judgment normal.   Physical Exam  Vitals reviewed. Constitutional:       Appearance: Normal appearance. HENT:      Head: Normocephalic and atraumatic. Cardiovascular:      Rate and Rhythm: Normal rate and regular rhythm. Heart sounds: Normal heart sounds.    Pulmonary:      Effort: Pulmonary effort is normal. No respiratory distress. Breath sounds: Normal breath sounds. No wheezing. Skin:     General: Skin is warm and dry. Neurological:      General: No focal deficit present. Mental Status: He is alert and oriented to person, place, and time. Psychiatric:         Mood and Affect: Mood normal.         Behavior: Behavior normal.         Thought Content: Thought content normal.         Judgment: Judgment normal.     An electronic signature was used to authenticate this note.     Covid 23 - with AMS  Due to age and recent AMS agree with stopping the Elavil long term  For now need to resume Zoloft at 100mg daily  For the diabetes cut the Amaryl to 2mg in am and 2mg in pm and monitor BS  Keep protein shake on hand to drink and needs to eat several small meals daily  --Vikas Mcallister MD

## 2023-01-06 NOTE — PROGRESS NOTES
HPI: Lenny Valdo III (: 1950)    Hospital f/u for Covid      Problem List:  Patient Active Problem List   Diagnosis    STEFFEN on CPAP    Essential hypertension    Depression    Hypercholesterolemia    BPH associated with nocturia    Chronic deep vein thrombosis (DVT) of popliteal vein of right lower extremity (HCC)    Chronic pain of both knees    Rheumatoid arthritis involving multiple joints (HCC)    Hypothyroidism following radioiodine therapy    Type 2 diabetes mellitus (HCC)    Chronic deep vein thrombosis (DVT) of right lower extremity (HCC)    Elevated LFTs    Anemia    Encephalopathy    Major depressive disorder, recurrent, in partial remission ([address]    Type 2 diabetes mellitus with hyperglycemia, with long-term current use of insulin ([address]       History:  Past Medical History:   Diagnosis Date    Acquired hypothyroidism 3/27/2016    Acute deep vein thrombosis (DVT) (HCC)     right    Bilateral knee pain 2020    Dr. Tank Sullivan     Cataracts, bilateral     Chronic deep vein thrombosis (DVT) of popliteal vein of right lower extremity ([address] 2021    Chronic pain of both knees 2021    COVID-19 vaccine series declined     Depression 3/20/2015    Diabetic eye exam [facility]) 2020    [facility], routine 2020    [facility]    Graves disease     S/p Ablation therapy    Hypertension     managed with meds    Obesity (BMI 30.0-34.9) 3/27/2016    STEFFEN on CPAP 2018    Psychiatric disorder     anxiety    Rheumatoid arthritis(714.0)     Thyroid disease     rad. uptake- takes synthroid daily    Uncontrolled diabetes mellitus        Allergies:  No Known Allergies    Current Medications:  Current Outpatient Medications   Medication Sig Dispense Refill    sertraline (ZOLOFT) 100 MG tablet Take 100 mg by mouth daily 1/2 tablet daily.       atorvastatin (LIPITOR) 80 MG tablet Take 1 tablet by mouth nightly 30 tablet 3    methotrexate (RHEUMATREX) 2.5 MG chemo tablet Take 10 tablets by mouth once a week 120 tablet 0    naproxen (NAPROSYN) 500 MG tablet Take 1 tablet by mouth 2 times daily (with meals) 180 tablet 1    hydroxychloroquine (PLAQUENIL) 200 MG tablet Take 1 tablet by mouth 2 times daily 180 tablet 1    lisinopril (PRINIVIL;ZESTRIL) 20 MG tablet TAKE ONE TABLET BY MOUTH ONE TIME DAILY 90 tablet 3    amLODIPine (NORVASC) 5 MG tablet TAKE ONE TABLET BY MOUTH ONE TIME DAILY 90 tablet 3    metFORMIN (GLUCOPHAGE) 1000 MG tablet TAKE ONE TABLET BY MOUTH TWICE A DAY WITH A MEAL 003 tablet 3    folic acid (FOLVITE) 1 MG tablet Take 1 tablet by mouth once daily      glimepiride (AMARYL) 4 MG tablet Take 4 mg by mouth 2 times daily      levothyroxine (SYNTHROID) 150 MCG tablet Take 150 mcg by mouth every morning (before breakfast)       No current facility-administered medications for this visit. Review of Systems:  Review of Systems    Vitals:  /70   Ht 6' (1.829 m)   Wt 201 lb (91.2 kg)   BMI 27.26 kg/m²     Physical Exam:  Physical Exam     Assessment/Plan:   Sherrie Lees was seen today for follow-up. Diagnoses and all orders for this visit:    Type 2 diabetes mellitus with hyperglycemia, with long-term current use of insulin (HCC)    Major depressive disorder, recurrent, in partial remission (HCC)    Rheumatoid arthritis involving multiple joints (HCC)    Chronic deep vein thrombosis (DVT) of right lower extremity, unspecified vein (HCC)    COVID-19    Anemia, unspecified type    Essential hypertension          Current medications are therapeutic at this time; continue as prescribed.         Juan Luis Brown MD

## 2023-01-12 ENCOUNTER — CARE COORDINATION (OUTPATIENT)
Dept: OTHER | Facility: CLINIC | Age: 73
End: 2023-01-12

## 2023-01-12 NOTE — CARE COORDINATION
[facility] Care Transitions Follow Up Call    Care Transition Nurse contacted the  spouse  by telephone to follow up after admission on 2023.  Verified name and  with  spouse  as identifiers.    Patient: Michael Archie Paduano III  Patient : 1950   MRN: [id number]  Reason for Admission: Acute CVA  Discharge Date: 1/3/23 RARS: Readmission Risk Score: 14.4          Spoke briefly with patient's spouse. She states that patient is doing good. Patient is unavailable to speak with CTN at this time.    Addressed changes since last contact:   attended PCP/hospital follow up appointment.  Discussed follow-up appointments. If no appointment was previously scheduled, appointment scheduling offered: Yes.   Is follow up appointment scheduled within 7 days of discharge? Yes.    Follow Up  Future Appointments   Date Time Provider Department Center   2023 10:30 AM Katia Perez MD [facility] AMB   2023 11:00 AM Gaby Hamlin MD [facility] AMB     Non-[facility] follow up appointment(s): none noted         Advance Care Planning:   No ACP docs .     Patients top risk factors for readmission: medical condition-recent COVID, CVA    Offered patient enrollment in the Remote Patient Monitoring (RPM) program for in-home monitoring: NA.     Care Transitions Subsequent and Final Call    Subsequent and Final Calls  Care Transitions Interventions  Other Interventions:             Care Transition Nurse provided contact information for future needs. Plan for follow-up call in 10-14 days based on severity of symptoms and risk factors.  Plan for next call:  self management of chronic disease including follow up with providers, call providers when needed, take medications as prescribed.    Deisi Corona RN

## 2023-01-26 DIAGNOSIS — M06.00 SERONEGATIVE RHEUMATOID ARTHRITIS (HCC): Primary | ICD-10-CM

## 2023-01-26 DIAGNOSIS — Z79.631 LONG TERM METHOTREXATE USER: ICD-10-CM

## 2023-01-26 NOTE — TELEPHONE ENCOUNTER
Pt needs refill of mtx prior to coming in for office visit on 2/13/23. Doing labs at [facility] [place], no open orders, entered lab order for 1 occurrence. Last labs varied. Was in hospital for covid recently, had cbc, cmp done this month. Last esr/crp was 10/24/22.

## 2023-01-29 ENCOUNTER — TELEPHONE (OUTPATIENT)
Dept: INTERNAL MEDICINE CLINIC | Facility: CLINIC | Age: 73
End: 2023-01-29

## 2023-01-29 DIAGNOSIS — E89.0 POSTPROCEDURAL HYPOTHYROIDISM: Primary | ICD-10-CM

## 2023-01-29 RX ORDER — LEVOTHYROXINE SODIUM 0.15 MG/1
150 TABLET ORAL
Qty: 90 TABLET | Refills: 3 | Status: SHIPPED | OUTPATIENT
Start: 2023-01-29

## 2023-01-30 DIAGNOSIS — M06.00 SERONEGATIVE RHEUMATOID ARTHRITIS (HCC): ICD-10-CM

## 2023-01-30 DIAGNOSIS — Z79.631 LONG TERM METHOTREXATE USER: ICD-10-CM

## 2023-01-30 LAB
ALBUMIN SERPL-MCNC: 3.9 G/DL (ref 3.2–4.6)
ALBUMIN/GLOB SERPL: 1.2 (ref 0.4–1.6)
ALP SERPL-CCNC: 77 U/L (ref 50–136)
ALT SERPL-CCNC: 25 U/L (ref 12–65)
ANION GAP SERPL CALC-SCNC: 4 MMOL/L (ref 2–11)
AST SERPL-CCNC: 12 U/L (ref 15–37)
BASOPHILS # BLD: 0.2 K/UL (ref 0–0.2)
BASOPHILS NFR BLD: 2 % (ref 0–2)
BILIRUB SERPL-MCNC: 0.4 MG/DL (ref 0.2–1.1)
BUN SERPL-MCNC: 30 MG/DL (ref 8–23)
CALCIUM SERPL-MCNC: 9.9 MG/DL (ref 8.3–10.4)
CHLORIDE SERPL-SCNC: 108 MMOL/L (ref 101–110)
CO2 SERPL-SCNC: 28 MMOL/L (ref 21–32)
CREAT SERPL-MCNC: 1.1 MG/DL (ref 0.8–1.5)
CRP SERPL-MCNC: <0.3 MG/DL (ref 0–0.9)
DIFFERENTIAL METHOD BLD: ABNORMAL
EOSINOPHIL # BLD: 0.4 K/UL (ref 0–0.8)
EOSINOPHIL NFR BLD: 5 % (ref 0.5–7.8)
ERYTHROCYTE [DISTWIDTH] IN BLOOD BY AUTOMATED COUNT: 15.3 % (ref 11.9–14.6)
ERYTHROCYTE [SEDIMENTATION RATE] IN BLOOD: 13 MM/HR
GLOBULIN SER CALC-MCNC: 3.3 G/DL (ref 2.8–4.5)
GLUCOSE SERPL-MCNC: 219 MG/DL (ref 65–100)
HCT VFR BLD AUTO: 39.6 % (ref 41.1–50.3)
HGB BLD-MCNC: 12.6 G/DL (ref 13.6–17.2)
IMM GRANULOCYTES # BLD AUTO: 0.1 K/UL (ref 0–0.5)
IMM GRANULOCYTES NFR BLD AUTO: 1 % (ref 0–5)
LYMPHOCYTES # BLD: 1.6 K/UL (ref 0.5–4.6)
LYMPHOCYTES NFR BLD: 21 % (ref 13–44)
MCH RBC QN AUTO: 31.9 PG (ref 26.1–32.9)
MCHC RBC AUTO-ENTMCNC: 31.8 G/DL (ref 31.4–35)
MCV RBC AUTO: 100.3 FL (ref 82–102)
MONOCYTES # BLD: 1.1 K/UL (ref 0.1–1.3)
MONOCYTES NFR BLD: 14 % (ref 4–12)
NEUTS SEG # BLD: 4.4 K/UL (ref 1.7–8.2)
NEUTS SEG NFR BLD: 57 % (ref 43–78)
NRBC # BLD: 0 K/UL (ref 0–0.2)
PLATELET # BLD AUTO: 269 K/UL (ref 150–450)
PMV BLD AUTO: 10.3 FL (ref 9.4–12.3)
POTASSIUM SERPL-SCNC: 5.2 MMOL/L (ref 3.5–5.1)
PROT SERPL-MCNC: 7.2 G/DL (ref 6.3–8.2)
RBC # BLD AUTO: 3.95 M/UL (ref 4.23–5.6)
SODIUM SERPL-SCNC: 140 MMOL/L (ref 133–143)
WBC # BLD AUTO: 7.6 K/UL (ref 4.3–11.1)

## 2023-01-30 NOTE — TELEPHONE ENCOUNTER
Requested Prescriptions     Signed Prescriptions Disp Refills    levothyroxine (SYNTHROID) 150 MCG tablet 90 tablet 3     Sig: Take 1 tablet by mouth every morning (before breakfast)     Authorizing Provider: Gwenn Romberg

## 2023-02-09 ENCOUNTER — CARE COORDINATION (OUTPATIENT)
Dept: CARE COORDINATION | Facility: CLINIC | Age: 73
End: 2023-02-09

## 2023-02-09 NOTE — CARE COORDINATION
Care Transitions Outreach Attempt    Call within 2 business days of discharge: Yes   Attempted to reach patient for transitions of care follow up. Unable to reach patient. Patient: Geovanny Miller III Patient : 1950 MRN: [id number]    Last Discharge [address]       Date Complaint Diagnosis Description Type Department Provider    23  Acute cerebrovascular accident (CVA) ([facility] . .. Admission (Discharged) Evans Miller MD              Was this an external facility discharge?  No Discharge Facility: N/A    Noted following upcoming appointments from discharge chart review:   [facility] follow up appointment(s):   Future Appointments   Date Time Provider Bre Elias   2023 10:30 AM Jake Paredes  [address], [address] [place] [address] [facility] AMB   2023 11:00 AM Gildardo Dinh MD [facility] AMB     Non-[facility] follow up appointment(s): none noted

## 2023-02-13 ENCOUNTER — OFFICE VISIT (OUTPATIENT)
Dept: RHEUMATOLOGY | Age: 73
End: 2023-02-13
Payer: MEDICARE

## 2023-02-13 VITALS
WEIGHT: 206.8 LBS | DIASTOLIC BLOOD PRESSURE: 74 MMHG | HEIGHT: 72 IN | BODY MASS INDEX: 28.01 KG/M2 | SYSTOLIC BLOOD PRESSURE: 122 MMHG | HEART RATE: 84 BPM

## 2023-02-13 DIAGNOSIS — Z79.631 LONG TERM METHOTREXATE USER: ICD-10-CM

## 2023-02-13 DIAGNOSIS — Z79.899 LONG-TERM USE OF PLAQUENIL: ICD-10-CM

## 2023-02-13 DIAGNOSIS — M06.00 SERONEGATIVE RHEUMATOID ARTHRITIS (HCC): Primary | ICD-10-CM

## 2023-02-13 DIAGNOSIS — M17.0 BILATERAL PRIMARY OSTEOARTHRITIS OF KNEE: ICD-10-CM

## 2023-02-13 PROCEDURE — 99214 OFFICE O/P EST MOD 30 MIN: CPT | Performed by: INTERNAL MEDICINE

## 2023-02-13 PROCEDURE — 20610 DRAIN/INJ JOINT/BURSA W/O US: CPT | Performed by: INTERNAL MEDICINE

## 2023-02-13 PROCEDURE — 3074F SYST BP LT 130 MM HG: CPT | Performed by: INTERNAL MEDICINE

## 2023-02-13 PROCEDURE — G8417 CALC BMI ABV UP PARAM F/U: HCPCS | Performed by: INTERNAL MEDICINE

## 2023-02-13 PROCEDURE — 3017F COLORECTAL CA SCREEN DOC REV: CPT | Performed by: INTERNAL MEDICINE

## 2023-02-13 PROCEDURE — 1036F TOBACCO NON-USER: CPT | Performed by: INTERNAL MEDICINE

## 2023-02-13 PROCEDURE — G8484 FLU IMMUNIZE NO ADMIN: HCPCS | Performed by: INTERNAL MEDICINE

## 2023-02-13 PROCEDURE — 3078F DIAST BP <80 MM HG: CPT | Performed by: INTERNAL MEDICINE

## 2023-02-13 PROCEDURE — G8427 DOCREV CUR MEDS BY ELIG CLIN: HCPCS | Performed by: INTERNAL MEDICINE

## 2023-02-13 PROCEDURE — 1123F ACP DISCUSS/DSCN MKR DOCD: CPT | Performed by: INTERNAL MEDICINE

## 2023-02-13 RX ORDER — METHYLPREDNISOLONE ACETATE 80 MG/ML
40 INJECTION, SUSPENSION INTRA-ARTICULAR; INTRALESIONAL; INTRAMUSCULAR; SOFT TISSUE ONCE
Status: CANCELLED | OUTPATIENT
Start: 2023-02-13 | End: 2023-02-13

## 2023-02-13 RX ORDER — SULFASALAZINE 500 MG/1
500 TABLET, DELAYED RELEASE ORAL 2 TIMES DAILY
Qty: 60 TABLET | Refills: 0 | Status: SHIPPED | OUTPATIENT
Start: 2023-02-13

## 2023-02-13 RX ORDER — METHYLPREDNISOLONE ACETATE 40 MG/ML
40 INJECTION, SUSPENSION INTRA-ARTICULAR; INTRALESIONAL; INTRAMUSCULAR; SOFT TISSUE ONCE
Status: COMPLETED | OUTPATIENT
Start: 2023-02-13 | End: 2023-02-13

## 2023-02-13 RX ADMIN — METHYLPREDNISOLONE ACETATE 40 MG: 40 INJECTION, SUSPENSION INTRA-ARTICULAR; INTRALESIONAL; INTRAMUSCULAR; SOFT TISSUE at 15:11

## 2023-02-13 ASSESSMENT — ROUTINE ASSESSMENT OF PATIENT INDEX DATA (RAPID3)
WHEN YOU AWAKENED IN THE MORNING OVER THE LAST WEEK, PLEASE INDICATE THE AMOUNT OF TIME IT TAKES UNTIL YOU ARE AS LIMBER AS YOU WILL BE FOR THE DAY: 30 MIN
ON A SCALE OF ONE TO TEN, HOW MUCH PAIN HAVE YOU HAD BECAUSE OF YOUR CONDITION OVER THE PAST WEEK?: 6
ON A SCALE OF ONE TO TEN, HOW MUCH OF A PROBLEM HAS UNUSUAL FATIGUE OR TIREDNESS BEEN FOR YOU OVER THE PAST WEEK?: 4
ON A SCALE OF ONE TO TEN, CONSIDERING ALL THE WAYS IN WHICH ILLNESS AND HEALTH CONDITIONS MAY AFFECT YOU AT THIS TIME, PLEASE INDICATE BELOW HOW YOU ARE DOING:: 4
ON A SCALE OF ONE TO TEN, HOW DIFFICULT WAS IT FOR YOU TO COMPLETE THE LISTED DAILY PHYSICAL TASKS OVER THE LAST WEEK: 0.8

## 2023-02-13 NOTE — PROGRESS NOTES
2/13/23      SUBJECTIVE:  Adolph PRESLEYBharat Becerra is a 80 y.o. male that is here for follow-up of:  Seronegative rheumatoid arthritis Dx ~2016   MTX, HCQ (started Oct 2021)   Failed: humira, simponi aria - improvement in pain but not swelling - changed due to cost/convenience   PMH:  - R DVT - 2020 - ongoing coumadin use  - Graves s/p ablation  - HTN HL DM2      Dx: rheumatoid arthritis ~2015.  ---  Last OV August 8 , 2022. MTX maintained at mtx 25 mg -split weekly + FA 1 mg with  mg bid. Last eye exam was fall of 2022 @ [facility] -diabetic screening    Had covid Jan 2023 - hospitalized for 3 days - assoc delirium - he doesn't recall the first few days. Recovered from this. Labs reviewed 1-: CBC with diff, CMP ESR CRP  Ophtho: completed in June 2022 and normal - [facility]    Walking 5 miles 3 days/week. Knee pain is major concern bilaterally - 24h/day. Today, L>R - worse in colder weather and based on length of standing. No swelling. Asks for steroid injection bilateral knee today. Previously did left knee steroid injection in May 2022 with significant improvement in symptoms. Using braces as needed. MRI R knee completed 2020 [facility]. Pain and swelling in DIPs with activity   Hand pain with activity and he is using them more    Am stiffness 30 mins    Using naproxen 500 mg 1-2 times every other day. Might take tylenol for symptoms.          REVIEW OF SYSTEMS:  A total of 10 systems (musculoskeletal system as stated in the HPI and the following 9 systems) were reviewed with patient today and were negative except as stated in the HPI and except for the following (depicted with an \"X\"):        \"X\" Constitutional  \"X\" HEENT /Mouth  \"X\" Cardiovascular and  Respiratory (2 systems)  \"X\" Gastrointestinal    Fever/chills   Hair loss   Shortness of breath   Upset stomach    Falls   Dry mouth   Coughing   Diarrhea / constipation    Wt loss   Mouth sores   Wheezing   Heartburn    Wt gain   Ringing ears   Chest pain   Dark or bloody stools    Night sweats   Diff. swallowing  X None of above   Nausea or vomiting   x None of above  X None of above     X None of above                \"X\" Integumentary  \"X\" Neurological  \"X\" Genitourinary  \"X\" Psychiatric   x Easy bruising   Numbness/ tingling   Female problems   Depression    Rashes   Weakness   Problems with urination   Feeling anxious    Sun sensitivity   Headaches  X None of above   Problems sleeping    None of above  X None of above     x None of above         The following portions of the patient's history were reviewed and updated as appropriate:   Current Outpatient Medications   Medication Sig Dispense Refill    methotrexate (RHEUMATREX) 2.5 MG chemo tablet Take 10 tablets by mouth once a week 120 tablet 0    levothyroxine (SYNTHROID) 150 MCG tablet Take 1 tablet by mouth every morning (before breakfast) 90 tablet 3    sertraline (ZOLOFT) 100 MG tablet Take 100 mg by mouth daily 1/2 tablet daily. atorvastatin (LIPITOR) 80 MG tablet Take 1 tablet by mouth nightly 30 tablet 3    naproxen (NAPROSYN) 500 MG tablet Take 1 tablet by mouth 2 times daily (with meals) 180 tablet 1    hydroxychloroquine (PLAQUENIL) 200 MG tablet Take 1 tablet by mouth 2 times daily 180 tablet 1    lisinopril (PRINIVIL;ZESTRIL) 20 MG tablet TAKE ONE TABLET BY MOUTH ONE TIME DAILY 90 tablet 3    amLODIPine (NORVASC) 5 MG tablet TAKE ONE TABLET BY MOUTH ONE TIME DAILY 90 tablet 3    metFORMIN (GLUCOPHAGE) 1000 MG tablet TAKE ONE TABLET BY MOUTH TWICE A DAY WITH A MEAL 490 tablet 3    folic acid (FOLVITE) 1 MG tablet Take 1 tablet by mouth once daily      glimepiride (AMARYL) 4 MG tablet Take 4 mg by mouth 2 times daily       No current facility-administered medications for this visit.            PHYSICAL EXAM:  Vitals:    02/13/23 1004   BP: 122/74   Pulse: 84   Weight: 206 lb 12.8 oz (93.8 kg)   Height: 6' (1.829 m)         CONSTITUTIONAL:  The patient was in NAD.  ENT:  The OPC, MMM, lips/teeth/gums without abnormalities. NECK:  No masses or thyromegaly  LYMPH NODES:  No cervical or axillary lymphadenopathy. CV:  RRR no r/m/g. Normal peripheral pulses  RESP:  CTA bilaterally. Normal respiratory effort. GI:  Abdomen soft, non tender, no hepatosplenomegaly  Skin:  No rashes, nodules, or other lesions. MUSCULOSKELETAL :  All joints including neck, back bilateral shoulders, elbows, wrists, MCP's, PIP's, DIP's, hips, knees, ankles, toes are within normal limits including normal gross examination, no synovitis, no tenderness, n'l ROM EXCEPT:   Bony changes bilateral knees  Heberden's and lawanda's nodules    T: 8  S: 3    CDAI  Date:  [facility] Pt-Global MD-Global Score:  2/2023  8 3 5.5  6    8-2022  6 2 4  5  17 - mtx, hcq  5/2022  3 4 4  4  15  2/2022  10 0 4  4  18 - MTX 25,  \"    11/2021 12 0 5.5  5  MTX 25,  \"  9/2021  8 0 5.5  5  18.5 MTX 20 mg    Scoring:  Remission CDAI ? 2.8   Low Disease Activity CDAI > 2.8 and ? 10   Moderate Disease Activity CDAI > 10 and ? 22   High Disease Activity CDAI > 22   A CDAI reduction of 6.5 represents moderate improvement      ASSESSMENT AND PLAN:  Olga MCCLELLAN Giancarlo Capellan is a 80 y.o. male that is here for evaluation of rheumatoid arthritis. his problems include:    1. Seronegative arthritis  2. Long-term methotrexate, Plaquenil use      Cont mtx to 10 tabs split dosing plus daily folic acid,  mg bid. ophthalmology evaluation from [facility] September 2022  Encouraged flu, COVID vaccines and reviewed timing with methotrexate-he declines at this time  -Due to elevated disease activity, start sulfasalazine 500 mg twice daily. Side effects including but not limited to GI upset, cytopenias, transaminitis, skin rash, headaches, allergic reactions discussed. Patient voiced understanding and is willing to proceed forward with therapy.         4.  Primary knee osteoarthritis bilateral  X-ray bilateral knees May 2022 shows moderate to severe joint space narrowing medial compartment in particular    Patient request steroid injections in bilateral knees but due to diabetes, we elected to complete the most painful side today which is left. Using sterile technique the left knee was sterilized with chloraprep. Risks included pain bleeding infection were discussed with pt who was agreeable to injection. Ethyl chloride was used for anesthetic and 25G 1.5\" needle used to inject 1% lidocaine 1mL + depot medrol 40 mg into the right knee. Post injection instructions given to pt.  The procedure was well tolerated        Standing Labs in 4 weeks with office visit    Laila Porter MD  [facility]  [address] [place] [address] Rd  [phone]

## 2023-02-15 DIAGNOSIS — M06.00 SERONEGATIVE RHEUMATOID ARTHRITIS (HCC): ICD-10-CM

## 2023-02-22 ENCOUNTER — CARE COORDINATION (OUTPATIENT)
Dept: CARE COORDINATION | Facility: CLINIC | Age: 73
End: 2023-02-22

## 2023-02-22 RX ORDER — SERTRALINE HYDROCHLORIDE 100 MG/1
100 TABLET, FILM COATED ORAL DAILY
Qty: 90 TABLET | Refills: 2 | Status: SHIPPED | OUTPATIENT
Start: 2023-02-22

## 2023-03-07 DIAGNOSIS — M06.00 SERONEGATIVE RHEUMATOID ARTHRITIS (HCC): ICD-10-CM

## 2023-03-07 RX ORDER — HYDROXYCHLOROQUINE SULFATE 200 MG/1
200 TABLET, FILM COATED ORAL 2 TIMES DAILY
Qty: 180 TABLET | Refills: 1 | Status: SHIPPED | OUTPATIENT
Start: 2023-03-07

## 2023-03-07 NOTE — TELEPHONE ENCOUNTER
PC needs refill Plaquenil , rx pend has appt 4/12/23  Result Notes    1 HM Topic       Component Ref Range & Units 2 mo ago   Sodium 133 - 143 mmol/L 138    Potassium 3.5 - 5.1 mmol/L 4.0    Chloride 101 - 110 mmol/L 107    CO2 21 - 32 mmol/L 22    Anion Gap 2 - 11 mmol/L 9    Glucose 65 - 100 mg/dL 127 High     BUN 8 - 23 MG/DL 20    Creatinine 0.8 - 1.5 MG/DL 0.89    Est, Glom Filt Rate >60 ml/min/1.73m2 >60    Comment:      Pediatric calculator link: Simone.at. org/professionals/kdoqi/gfr_calculatorped         These results are not intended for use in patients <25years of age. eGFR results are calculated without a race factor using  the 2021 CKD-EPI equation. Careful clinical correlation is recommended, particularly when comparing to results calculated using previous equations. The CKD-EPI equation is less accurate in patients with extremes of muscle mass, extra-renal metabolism of creatinine, excessive creatine ingestion, or following therapy that affects renal tubular secretion.     Calcium 8.3 - 10.4 MG/DL 8.5    Total Bilirubin 0.2 - 1.1 MG/DL 0.3    ALT 12 - 65 U/L 65    AST 15 - 37 U/L 38 High     Alk Phosphatase 50 - 136 U/L 61    Total Protein 6.3 - 8.2 g/dL 6.8    Albumin 3.2 - 4.6 g/dL 2.8 Low     Globulin 2.8 - 4.5 g/dL 4.0    Albumin/Globulin Ratio 0.4 - 1.6   0.7         View Full Report

## 2023-04-05 ENCOUNTER — TELEPHONE (OUTPATIENT)
Dept: RHEUMATOLOGY | Age: 73
End: 2023-04-05

## 2023-04-05 DIAGNOSIS — Z79.899 LONG-TERM USE OF PLAQUENIL: ICD-10-CM

## 2023-04-05 DIAGNOSIS — M06.00 SERONEGATIVE RHEUMATOID ARTHRITIS (HCC): Primary | ICD-10-CM

## 2023-04-05 NOTE — TELEPHONE ENCOUNTER
Lvm for patient to update labs. No orders in chart. Placing x4 for monitoring labs. Pt was to do labs @ 4wk interval due to ssz start. Included locations for draw sites in vm. Pt has never logged into new [employer].

## 2023-04-06 DIAGNOSIS — Z79.899 LONG-TERM USE OF PLAQUENIL: ICD-10-CM

## 2023-04-06 DIAGNOSIS — M06.00 SERONEGATIVE RHEUMATOID ARTHRITIS (HCC): ICD-10-CM

## 2023-04-06 LAB
ALBUMIN SERPL-MCNC: 3.9 G/DL (ref 3.2–4.6)
ALBUMIN/GLOB SERPL: 1.3 (ref 0.4–1.6)
ALP SERPL-CCNC: 72 U/L (ref 50–136)
ALT SERPL-CCNC: 38 U/L (ref 12–65)
ANION GAP SERPL CALC-SCNC: 6 MMOL/L (ref 2–11)
AST SERPL-CCNC: 21 U/L (ref 15–37)
BASOPHILS # BLD: 0.1 K/UL (ref 0–0.2)
BASOPHILS NFR BLD: 1 % (ref 0–2)
BILIRUB SERPL-MCNC: 0.3 MG/DL (ref 0.2–1.1)
BUN SERPL-MCNC: 32 MG/DL (ref 8–23)
CALCIUM SERPL-MCNC: 9.6 MG/DL (ref 8.3–10.4)
CHLORIDE SERPL-SCNC: 107 MMOL/L (ref 101–110)
CO2 SERPL-SCNC: 26 MMOL/L (ref 21–32)
CREAT SERPL-MCNC: 1.1 MG/DL (ref 0.8–1.5)
CRP SERPL-MCNC: <0.3 MG/DL (ref 0–0.9)
DIFFERENTIAL METHOD BLD: ABNORMAL
EOSINOPHIL # BLD: 0.2 K/UL (ref 0–0.8)
EOSINOPHIL NFR BLD: 3 % (ref 0.5–7.8)
ERYTHROCYTE [DISTWIDTH] IN BLOOD BY AUTOMATED COUNT: 15.8 % (ref 11.9–14.6)
ERYTHROCYTE [SEDIMENTATION RATE] IN BLOOD: 6 MM/HR
GLOBULIN SER CALC-MCNC: 2.9 G/DL (ref 2.8–4.5)
GLUCOSE SERPL-MCNC: 195 MG/DL (ref 65–100)
HCT VFR BLD AUTO: 40.3 % (ref 41.1–50.3)
HGB BLD-MCNC: 12.7 G/DL (ref 13.6–17.2)
IMM GRANULOCYTES # BLD AUTO: 0 K/UL (ref 0–0.5)
IMM GRANULOCYTES NFR BLD AUTO: 1 % (ref 0–5)
LYMPHOCYTES # BLD: 1.9 K/UL (ref 0.5–4.6)
LYMPHOCYTES NFR BLD: 22 % (ref 13–44)
MCH RBC QN AUTO: 32.2 PG (ref 26.1–32.9)
MCHC RBC AUTO-ENTMCNC: 31.5 G/DL (ref 31.4–35)
MCV RBC AUTO: 102.3 FL (ref 82–102)
MONOCYTES # BLD: 0.9 K/UL (ref 0.1–1.3)
MONOCYTES NFR BLD: 10 % (ref 4–12)
NEUTS SEG # BLD: 5.5 K/UL (ref 1.7–8.2)
NEUTS SEG NFR BLD: 63 % (ref 43–78)
NRBC # BLD: 0 K/UL (ref 0–0.2)
PLATELET # BLD AUTO: 359 K/UL (ref 150–450)
PMV BLD AUTO: 9.8 FL (ref 9.4–12.3)
POTASSIUM SERPL-SCNC: 4.8 MMOL/L (ref 3.5–5.1)
PROT SERPL-MCNC: 6.8 G/DL (ref 6.3–8.2)
RBC # BLD AUTO: 3.94 M/UL (ref 4.23–5.6)
SODIUM SERPL-SCNC: 139 MMOL/L (ref 133–143)
WBC # BLD AUTO: 8.7 K/UL (ref 4.3–11.1)

## 2023-04-24 ENCOUNTER — TELEPHONE (OUTPATIENT)
Dept: RHEUMATOLOGY | Age: 73
End: 2023-04-24

## 2023-04-24 DIAGNOSIS — Z79.631 LONG TERM METHOTREXATE USER: Primary | ICD-10-CM

## 2023-04-24 NOTE — TELEPHONE ENCOUNTER
Lvm for pt that lab orders were updated in his chart so he can now do labs a few days prior to his next office visit scheduled for 5/9/23.

## 2023-04-27 DIAGNOSIS — E78.2 MIXED HYPERLIPIDEMIA: Primary | ICD-10-CM

## 2023-04-27 RX ORDER — ATORVASTATIN CALCIUM 80 MG/1
80 TABLET, FILM COATED ORAL NIGHTLY
Qty: 30 TABLET | Refills: 3 | Status: SHIPPED | OUTPATIENT
Start: 2023-04-27

## 2023-04-28 DIAGNOSIS — Z79.631 LONG TERM METHOTREXATE USER: ICD-10-CM

## 2023-04-28 LAB
BASOPHILS # BLD: 0.1 K/UL (ref 0–0.2)
BASOPHILS NFR BLD: 1 % (ref 0–2)
DIFFERENTIAL METHOD BLD: ABNORMAL
EOSINOPHIL # BLD: 0.3 K/UL (ref 0–0.8)
EOSINOPHIL NFR BLD: 3 % (ref 0.5–7.8)
ERYTHROCYTE [DISTWIDTH] IN BLOOD BY AUTOMATED COUNT: 14.8 % (ref 11.9–14.6)
ERYTHROCYTE [SEDIMENTATION RATE] IN BLOOD: 4 MM/HR
HCT VFR BLD AUTO: 37.7 % (ref 41.1–50.3)
HGB BLD-MCNC: 12.1 G/DL (ref 13.6–17.2)
IMM GRANULOCYTES # BLD AUTO: 0.1 K/UL (ref 0–0.5)
IMM GRANULOCYTES NFR BLD AUTO: 1 % (ref 0–5)
LYMPHOCYTES # BLD: 1.7 K/UL (ref 0.5–4.6)
LYMPHOCYTES NFR BLD: 18 % (ref 13–44)
MCH RBC QN AUTO: 32.5 PG (ref 26.1–32.9)
MCHC RBC AUTO-ENTMCNC: 32.1 G/DL (ref 31.4–35)
MCV RBC AUTO: 101.3 FL (ref 82–102)
MONOCYTES # BLD: 1 K/UL (ref 0.1–1.3)
MONOCYTES NFR BLD: 10 % (ref 4–12)
NEUTS SEG # BLD: 6.3 K/UL (ref 1.7–8.2)
NEUTS SEG NFR BLD: 67 % (ref 43–78)
NRBC # BLD: 0 K/UL (ref 0–0.2)
PLATELET # BLD AUTO: 318 K/UL (ref 150–450)
PMV BLD AUTO: 10 FL (ref 9.4–12.3)
RBC # BLD AUTO: 3.72 M/UL (ref 4.23–5.6)
WBC # BLD AUTO: 9.4 K/UL (ref 4.3–11.1)

## 2023-04-28 NOTE — TELEPHONE ENCOUNTER
Requested Prescriptions     Signed Prescriptions Disp Refills    atorvastatin (LIPITOR) 80 MG tablet 30 tablet 3     Sig: Take 1 tablet by mouth nightly     Authorizing Provider: Marylee Norfolk

## 2023-04-29 LAB
ALBUMIN SERPL-MCNC: 3.9 G/DL (ref 3.2–4.6)
ALBUMIN/GLOB SERPL: 1.3 (ref 0.4–1.6)
ALP SERPL-CCNC: 62 U/L (ref 50–136)
ALT SERPL-CCNC: 30 U/L (ref 12–65)
ANION GAP SERPL CALC-SCNC: 0 MMOL/L (ref 2–11)
AST SERPL-CCNC: 18 U/L (ref 15–37)
BILIRUB SERPL-MCNC: 0.3 MG/DL (ref 0.2–1.1)
BUN SERPL-MCNC: 19 MG/DL (ref 8–23)
CALCIUM SERPL-MCNC: 9.3 MG/DL (ref 8.3–10.4)
CHLORIDE SERPL-SCNC: 109 MMOL/L (ref 101–110)
CO2 SERPL-SCNC: 25 MMOL/L (ref 21–32)
CREAT SERPL-MCNC: 0.9 MG/DL (ref 0.8–1.5)
CRP SERPL-MCNC: <0.3 MG/DL (ref 0–0.9)
GLOBULIN SER CALC-MCNC: 2.9 G/DL (ref 2.8–4.5)
GLUCOSE SERPL-MCNC: 160 MG/DL (ref 65–100)
POTASSIUM SERPL-SCNC: 4.5 MMOL/L (ref 3.5–5.1)
PROT SERPL-MCNC: 6.8 G/DL (ref 6.3–8.2)
SODIUM SERPL-SCNC: 134 MMOL/L (ref 133–143)

## 2023-05-15 DIAGNOSIS — I82.501 CHRONIC DEEP VEIN THROMBOSIS (DVT) OF RIGHT LOWER EXTREMITY, UNSPECIFIED VEIN (HCC): ICD-10-CM

## 2023-05-17 ENCOUNTER — OFFICE VISIT (OUTPATIENT)
Dept: RHEUMATOLOGY | Age: 73
End: 2023-05-17
Payer: MEDICARE

## 2023-05-17 VITALS
BODY MASS INDEX: 27.33 KG/M2 | HEART RATE: 76 BPM | DIASTOLIC BLOOD PRESSURE: 72 MMHG | WEIGHT: 201.8 LBS | RESPIRATION RATE: 18 BRPM | SYSTOLIC BLOOD PRESSURE: 140 MMHG | HEIGHT: 72 IN

## 2023-05-17 DIAGNOSIS — Z79.899 ON SULFASALAZINE THERAPY: ICD-10-CM

## 2023-05-17 DIAGNOSIS — G89.29 BILATERAL CHRONIC KNEE PAIN: ICD-10-CM

## 2023-05-17 DIAGNOSIS — M25.562 BILATERAL CHRONIC KNEE PAIN: ICD-10-CM

## 2023-05-17 DIAGNOSIS — Z79.899 LONG-TERM USE OF PLAQUENIL: ICD-10-CM

## 2023-05-17 DIAGNOSIS — I10 ESSENTIAL HYPERTENSION: ICD-10-CM

## 2023-05-17 DIAGNOSIS — Z79.631 LONG TERM METHOTREXATE USER: ICD-10-CM

## 2023-05-17 DIAGNOSIS — M25.561 BILATERAL CHRONIC KNEE PAIN: ICD-10-CM

## 2023-05-17 DIAGNOSIS — M06.00 SERONEGATIVE RHEUMATOID ARTHRITIS (HCC): Primary | ICD-10-CM

## 2023-05-17 DIAGNOSIS — R73.9 HYPERGLYCEMIA: ICD-10-CM

## 2023-05-17 PROCEDURE — 1123F ACP DISCUSS/DSCN MKR DOCD: CPT | Performed by: INTERNAL MEDICINE

## 2023-05-17 PROCEDURE — 3017F COLORECTAL CA SCREEN DOC REV: CPT | Performed by: INTERNAL MEDICINE

## 2023-05-17 PROCEDURE — 1036F TOBACCO NON-USER: CPT | Performed by: INTERNAL MEDICINE

## 2023-05-17 PROCEDURE — 99214 OFFICE O/P EST MOD 30 MIN: CPT | Performed by: INTERNAL MEDICINE

## 2023-05-17 PROCEDURE — G8417 CALC BMI ABV UP PARAM F/U: HCPCS | Performed by: INTERNAL MEDICINE

## 2023-05-17 PROCEDURE — 3078F DIAST BP <80 MM HG: CPT | Performed by: INTERNAL MEDICINE

## 2023-05-17 PROCEDURE — G8427 DOCREV CUR MEDS BY ELIG CLIN: HCPCS | Performed by: INTERNAL MEDICINE

## 2023-05-17 PROCEDURE — 3077F SYST BP >= 140 MM HG: CPT | Performed by: INTERNAL MEDICINE

## 2023-05-17 RX ORDER — SULFASALAZINE 500 MG/1
1500 TABLET, DELAYED RELEASE ORAL DAILY
Qty: 90 TABLET | Refills: 0 | Status: CANCELLED | OUTPATIENT
Start: 2023-05-17

## 2023-05-17 ASSESSMENT — ROUTINE ASSESSMENT OF PATIENT INDEX DATA (RAPID3)
ON A SCALE OF ONE TO TEN, HOW MUCH PAIN HAVE YOU HAD BECAUSE OF YOUR CONDITION OVER THE PAST WEEK?: 4
WHEN YOU AWAKENED IN THE MORNING OVER THE LAST WEEK, PLEASE INDICATE THE AMOUNT OF TIME IT TAKES UNTIL YOU ARE AS LIMBER AS YOU WILL BE FOR THE DAY: 15 MIN
ON A SCALE OF ONE TO TEN, CONSIDERING ALL THE WAYS IN WHICH ILLNESS AND HEALTH CONDITIONS MAY AFFECT YOU AT THIS TIME, PLEASE INDICATE BELOW HOW YOU ARE DOING:: 4
ON A SCALE OF ONE TO TEN, HOW MUCH OF A PROBLEM HAS UNUSUAL FATIGUE OR TIREDNESS BEEN FOR YOU OVER THE PAST WEEK?: 4
ON A SCALE OF ONE TO TEN, HOW DIFFICULT WAS IT FOR YOU TO COMPLETE THE LISTED DAILY PHYSICAL TASKS OVER THE LAST WEEK: 0.7

## 2023-05-17 NOTE — PROGRESS NOTES
5/17/23      SUBJECTIVE:  Antonietta Aceves VY Ingram is a 80 y.o. male that is here for follow-up of:  Seronegative rheumatoid arthritis Dx ~2016   MTX, HCQ (started Oct 2021)   Failed: humira, simponi aria - improvement in pain but not swelling - changed due to cost/convenience   PMH:  - R DVT - 2020 - ongoing coumadin use  - Graves s/p ablation  - HTN HL DM2      Dx: rheumatoid arthritis ~2015.  ---  Last OV April 2023. MTX maintained at mtx 25 mg -split weekly + FA 1 mg with  mg bid. SSZ increased to 1500 mg QD added. No change in symptoms-does not feel that increasing sulfasalazine has improved symptoms. Last eye exam was fall of 2022 @ [facility] -diabetic screening    Labs reviewed 4-: CBC with diff, CMP ESR CRP-progressive anemia. Steroid injection in L knee Feb 2023 - lasted 3-4 weeks. Steroid injection in R knee April 2023 still offering benefit. L knee pain is major concern today - pain recurred after last injection in February 2023. Not walking consistently due to pain. Hasn't started water aerobics yet but is considering going to [employer] [facility]. Mild R knee pain. Using knee braces bilaterally. XR May 2022:  IMPRESSION:  1. No acute osseous abnormality. 2.  Moderate to severe tricompartment osteoarthrosis, most notably involving the  medial compartments. 3.  Moderate left and small right knee joint effusions. Had ultrasound done in right lower extremity where blood clot was previously found.   This showed Baker's cyst.    Am stiffness 15-20 mins-improved        DMARD/Biologic 5/17/2023   AM Stiffness 15 min   Pain 4   Fatigue 4   MDHAQ 0.7   Patient Global Score 4   Medication Name Plaquenil   Dose 400mg   Medication Name Azulfidine   Dose -   Medication Name -   Dose -           REVIEW OF SYSTEMS:  A total of 10 systems (musculoskeletal system as stated in the HPI and the following 9 systems) were reviewed with patient today and were negative except as stated in the HPI and except

## 2023-05-19 ENCOUNTER — OFFICE VISIT (OUTPATIENT)
Dept: INTERNAL MEDICINE CLINIC | Facility: CLINIC | Age: 73
End: 2023-05-19
Payer: MEDICARE

## 2023-05-19 VITALS — WEIGHT: 200.2 LBS | BODY MASS INDEX: 27.12 KG/M2 | HEIGHT: 72 IN

## 2023-05-19 DIAGNOSIS — Z79.4 TYPE 2 DIABETES MELLITUS WITH HYPERGLYCEMIA, WITH LONG-TERM CURRENT USE OF INSULIN (HCC): ICD-10-CM

## 2023-05-19 DIAGNOSIS — G47.33 OSA ON CPAP: ICD-10-CM

## 2023-05-19 DIAGNOSIS — E89.0 HYPOTHYROIDISM FOLLOWING RADIOIODINE THERAPY: ICD-10-CM

## 2023-05-19 DIAGNOSIS — Z99.89 OSA ON CPAP: ICD-10-CM

## 2023-05-19 DIAGNOSIS — M06.9 RHEUMATOID ARTHRITIS INVOLVING MULTIPLE JOINTS (HCC): ICD-10-CM

## 2023-05-19 DIAGNOSIS — E11.9 TYPE 2 DIABETES MELLITUS WITHOUT COMPLICATION, WITHOUT LONG-TERM CURRENT USE OF INSULIN (HCC): ICD-10-CM

## 2023-05-19 DIAGNOSIS — E11.65 TYPE 2 DIABETES MELLITUS WITH HYPERGLYCEMIA, WITH LONG-TERM CURRENT USE OF INSULIN (HCC): ICD-10-CM

## 2023-05-19 DIAGNOSIS — Z12.5 PROSTATE CANCER SCREENING: ICD-10-CM

## 2023-05-19 DIAGNOSIS — F33.41 MAJOR DEPRESSIVE DISORDER, RECURRENT, IN PARTIAL REMISSION (HCC): ICD-10-CM

## 2023-05-19 DIAGNOSIS — I10 ESSENTIAL HYPERTENSION: Primary | ICD-10-CM

## 2023-05-19 DIAGNOSIS — D64.9 ANEMIA, UNSPECIFIED TYPE: ICD-10-CM

## 2023-05-19 DIAGNOSIS — M71.21 BAKER'S CYST OF KNEE, RIGHT: ICD-10-CM

## 2023-05-19 PROBLEM — I82.531 CHRONIC DEEP VEIN THROMBOSIS (DVT) OF POPLITEAL VEIN OF RIGHT LOWER EXTREMITY (HCC): Status: RESOLVED | Noted: 2021-04-12 | Resolved: 2023-05-19

## 2023-05-19 PROBLEM — I82.501 CHRONIC DEEP VEIN THROMBOSIS (DVT) OF RIGHT LOWER EXTREMITY (HCC): Status: RESOLVED | Noted: 2022-11-22 | Resolved: 2023-05-19

## 2023-05-19 PROBLEM — G93.40 ENCEPHALOPATHY: Status: RESOLVED | Noted: 2023-01-02 | Resolved: 2023-05-19

## 2023-05-19 LAB
EST. AVERAGE GLUCOSE BLD GHB EST-MCNC: 143 MG/DL
HBA1C MFR BLD: 6.6 % (ref 4.8–5.6)
PSA SERPL-MCNC: 2.2 NG/ML
TSH, 3RD GENERATION: 0.17 UIU/ML (ref 0.36–3.74)

## 2023-05-19 PROCEDURE — 1123F ACP DISCUSS/DSCN MKR DOCD: CPT | Performed by: INTERNAL MEDICINE

## 2023-05-19 PROCEDURE — 3051F HG A1C>EQUAL 7.0%<8.0%: CPT | Performed by: INTERNAL MEDICINE

## 2023-05-19 PROCEDURE — 3017F COLORECTAL CA SCREEN DOC REV: CPT | Performed by: INTERNAL MEDICINE

## 2023-05-19 PROCEDURE — 99214 OFFICE O/P EST MOD 30 MIN: CPT | Performed by: INTERNAL MEDICINE

## 2023-05-19 PROCEDURE — G8417 CALC BMI ABV UP PARAM F/U: HCPCS | Performed by: INTERNAL MEDICINE

## 2023-05-19 PROCEDURE — 1036F TOBACCO NON-USER: CPT | Performed by: INTERNAL MEDICINE

## 2023-05-19 PROCEDURE — G8427 DOCREV CUR MEDS BY ELIG CLIN: HCPCS | Performed by: INTERNAL MEDICINE

## 2023-05-19 PROCEDURE — 2022F DILAT RTA XM EVC RTNOPTHY: CPT | Performed by: INTERNAL MEDICINE

## 2023-05-19 SDOH — ECONOMIC STABILITY: INCOME INSECURITY: HOW HARD IS IT FOR YOU TO PAY FOR THE VERY BASICS LIKE FOOD, HOUSING, MEDICAL CARE, AND HEATING?: SOMEWHAT HARD

## 2023-05-19 SDOH — ECONOMIC STABILITY: FOOD INSECURITY: WITHIN THE PAST 12 MONTHS, YOU WORRIED THAT YOUR FOOD WOULD RUN OUT BEFORE YOU GOT MONEY TO BUY MORE.: SOMETIMES TRUE

## 2023-05-19 SDOH — ECONOMIC STABILITY: FOOD INSECURITY: WITHIN THE PAST 12 MONTHS, THE FOOD YOU BOUGHT JUST DIDN'T LAST AND YOU DIDN'T HAVE MONEY TO GET MORE.: SOMETIMES TRUE

## 2023-05-19 SDOH — ECONOMIC STABILITY: HOUSING INSECURITY
IN THE LAST 12 MONTHS, WAS THERE A TIME WHEN YOU DID NOT HAVE A STEADY PLACE TO SLEEP OR SLEPT IN A SHELTER (INCLUDING NOW)?: NO

## 2023-05-19 ASSESSMENT — ENCOUNTER SYMPTOMS: SHORTNESS OF BREATH: 0

## 2023-05-19 ASSESSMENT — PATIENT HEALTH QUESTIONNAIRE - PHQ9
9. THOUGHTS THAT YOU WOULD BE BETTER OFF DEAD, OR OF HURTING YOURSELF: 0
SUM OF ALL RESPONSES TO PHQ QUESTIONS 1-9: 0
1. LITTLE INTEREST OR PLEASURE IN DOING THINGS: 0
8. MOVING OR SPEAKING SO SLOWLY THAT OTHER PEOPLE COULD HAVE NOTICED. OR THE OPPOSITE, BEING SO FIGETY OR RESTLESS THAT YOU HAVE BEEN MOVING AROUND A LOT MORE THAN USUAL: 0
5. POOR APPETITE OR OVEREATING: 0
SUM OF ALL RESPONSES TO PHQ9 QUESTIONS 1 & 2: 0
SUM OF ALL RESPONSES TO PHQ QUESTIONS 1-9: 0
4. FEELING TIRED OR HAVING LITTLE ENERGY: 0
2. FEELING DOWN, DEPRESSED OR HOPELESS: 0
7. TROUBLE CONCENTRATING ON THINGS, SUCH AS READING THE NEWSPAPER OR WATCHING TELEVISION: 0
3. TROUBLE FALLING OR STAYING ASLEEP: 0
10. IF YOU CHECKED OFF ANY PROBLEMS, HOW DIFFICULT HAVE THESE PROBLEMS MADE IT FOR YOU TO DO YOUR WORK, TAKE CARE OF THINGS AT HOME, OR GET ALONG WITH OTHER PEOPLE: 0
6. FEELING BAD ABOUT YOURSELF - OR THAT YOU ARE A FAILURE OR HAVE LET YOURSELF OR YOUR FAMILY DOWN: 0
SUM OF ALL RESPONSES TO PHQ QUESTIONS 1-9: 0
SUM OF ALL RESPONSES TO PHQ QUESTIONS 1-9: 0

## 2023-05-19 NOTE — PROGRESS NOTES
Cervical back: Normal range of motion and neck supple. Right lower leg: No edema. Left lower leg: No edema. Comments: Arthritic changes of both knees   Skin:     General: Skin is warm and dry. Neurological:      General: No focal deficit present. Mental Status: He is alert and oriented to person, place, and time. Psychiatric:         Mood and Affect: Mood normal.         Behavior: Behavior normal.         Thought Content: Thought content normal.         Judgment: Judgment normal.        Assessment/Plan:   John Min was seen today for follow-up. Diagnoses and all orders for this visit:    Essential hypertension  controlled  Type 2 diabetes mellitus without complication, without long-term current use of insulin (HCC)  -     Hemoglobin A1C; Future  On diet and oral agents  Has been maintaining his wt  Hypothyroidism following radioiodine therapy  -     TSH; Future    Prostate cancer screening  -     PSA Screening; Future    STEFFEN on CPAP  Use nightly with good benefit  Rheumatoid arthritis involving multiple joints (HCC)  Follow with Rheum  Major depressive disorder, recurrent, in partial remission ([facility] [address]  Continue Zoloft  Anemia, unspecified type  Monitor   Baker's cyst of knee, right    Monitor   Is on Naproxen    Needs to consider knee surgery sooner vs later and maintain his mobility      Current medications are therapeutic at this time; continue as prescribed.         Tomasa Noble MD

## 2023-05-19 NOTE — PATIENT INSTRUCTIONS
FOOD RESOURCES    Meals on Wheels:  What they offer: Meals on Wheels is a program that delivers meals to individuals who have no reliable means for maintaining a healthy diet. [address] [place] [address] Phone: [phone]  www. [facility]. [address]:  What they offer:  Anyone is eligible to order, but Lela Villanueva is specifically designed for customers who could benefit from accessible, low-cost fresh food. Fresh Food Boxes are $15* with credit/debit card and are ALWAYS $5 with SNAP/EBT   Boxes are distributed every other week and you must preorder your box through their website  Drive-thru box pickup is every other Wednesday from 11 am-6 pm at: [address] [facility], [address] [place] [address], [address] [place] [address]  Website:  www.[employer]. Weele/fsg     Food Pantries:   [address] [employer] [address]   Phone: [phone]  Located in [place] [address], [facility].  Open Thursdays 8a-12p  Website: www.[employer]: [phone]  Located at [address] 8am-12pm Fridays  Website: https://[employer] [facility]. org/   [address] Pantry  Phone: [phone]  Located at [address].  Call for Pantry hours and availability  Website: [employer].  Water of [address] [place] [address] Pantry  Phone: [phone]  Located at [phone]  Call for Pantry hours and availability    Website: [employer].pl. php  [address]  Phone: [phone]  Located at [facility]. Emergency Food Pantry Hours: Mon, Wed, and Fri 9am-1pm  Website: http://www.[employer]/  [address] Pantry  Phone: [phone]  Located at [facility].   Call for hours and availability   Website: https://[employer].com/  [address] Pantry  Phone: [phone]  Located at [address] [place] [address]

## 2023-06-12 ENCOUNTER — TELEPHONE (OUTPATIENT)
Dept: RHEUMATOLOGY | Age: 73
End: 2023-06-12

## 2023-06-28 DIAGNOSIS — Z79.631 LONG TERM METHOTREXATE USER: ICD-10-CM

## 2023-06-28 DIAGNOSIS — M06.00 SERONEGATIVE RHEUMATOID ARTHRITIS (HCC): ICD-10-CM

## 2023-07-01 ENCOUNTER — TELEPHONE (OUTPATIENT)
Dept: INTERNAL MEDICINE CLINIC | Facility: CLINIC | Age: 73
End: 2023-07-01

## 2023-07-01 DIAGNOSIS — E78.2 MIXED HYPERLIPIDEMIA: Primary | ICD-10-CM

## 2023-07-01 RX ORDER — ATORVASTATIN CALCIUM 20 MG/1
20 TABLET, FILM COATED ORAL NIGHTLY
Qty: 90 TABLET | Refills: 3 | Status: SHIPPED | OUTPATIENT
Start: 2023-07-01

## 2023-07-01 RX ORDER — AMOXICILLIN AND CLAVULANATE POTASSIUM 875; 125 MG/1; MG/1
1 TABLET, FILM COATED ORAL 2 TIMES DAILY
Qty: 28 TABLET | Refills: 0 | Status: SHIPPED | OUTPATIENT
Start: 2023-07-01 | End: 2023-07-15

## 2023-07-03 ENCOUNTER — OFFICE VISIT (OUTPATIENT)
Dept: ORTHOPEDIC SURGERY | Age: 73
End: 2023-07-03
Payer: MEDICARE

## 2023-07-03 DIAGNOSIS — M79.671 RIGHT FOOT PAIN: Primary | ICD-10-CM

## 2023-07-03 DIAGNOSIS — M20.5X1 ACQUIRED CLAW TOE OF RIGHT FOOT: ICD-10-CM

## 2023-07-03 PROCEDURE — 99205 OFFICE O/P NEW HI 60 MIN: CPT | Performed by: ORTHOPAEDIC SURGERY

## 2023-07-03 PROCEDURE — 1036F TOBACCO NON-USER: CPT | Performed by: ORTHOPAEDIC SURGERY

## 2023-07-03 PROCEDURE — 3017F COLORECTAL CA SCREEN DOC REV: CPT | Performed by: ORTHOPAEDIC SURGERY

## 2023-07-03 PROCEDURE — G8428 CUR MEDS NOT DOCUMENT: HCPCS | Performed by: ORTHOPAEDIC SURGERY

## 2023-07-03 PROCEDURE — G8417 CALC BMI ABV UP PARAM F/U: HCPCS | Performed by: ORTHOPAEDIC SURGERY

## 2023-07-03 PROCEDURE — 1123F ACP DISCUSS/DSCN MKR DOCD: CPT | Performed by: ORTHOPAEDIC SURGERY

## 2023-07-03 NOTE — PROGRESS NOTES
Name: Lexy Montilla III  YOB: 1950  Gender: male  MRN: [id number]    Summary:   Right great claw toe with probable osteomyelitis, right second claw toe       CC: Foot Pain (Right foot pain will xray today )       HPI: Lexy Montilla III is a 71 y.o. male who presents with Foot Pain (Right foot pain will xray today )  . This patient presents the office today with a several week history of worsening pain and potential infection located in his great toe. Is been on some Augmentin. He denies any fevers chills nausea or vomiting. He is tried some pads and spacers for this as well. History was obtained by Patient and his wife    ROS/Meds/PSH/PMH/FH/SH: I personally reviewed the patients standard intake form. Below are the pertinents    Tobacco:  reports that he quit smoking about 11 years ago. His smoking use included cigarettes. He smoked an average of .5 packs per day. He has never used smokeless tobacco.  Diabetes: Diabetic - non insulin      Physical Examination:    Exam of the right foot shows a claw hallux. There is an ulceration located the tip of the toe. There is no ascending sign of erythema or active drainage identified. There is a fixed second claw toe deformity as well. I can palpate pulses. Imaging:   I independently interpreted XR taken today  Right foot XR: AP, Lateral, Oblique views     ICD-10-CM    1. Right foot pain  M79.671 XR FOOT RIGHT (MIN 3 VIEWS)      2.  Acquired claw toe of right foot  M20.5X1          Report: AP, lateral, oblique x-ray of the right foot demonstrates probable distal great toe phalangeal osteo    Impression: Audible distal great toe phalangeal osteo   Fozia Wilcox III, MD           Assessment:   Right great claw toe osteomyelitis and second claw toe    Treatment Plan:   5 This is an illness/condition that threatens bodily function  Treatment at this time: Urgent Major Surgery/procedure - this is a time sensitive condition that if

## 2023-07-05 ENCOUNTER — TELEPHONE (OUTPATIENT)
Dept: ORTHOPEDIC SURGERY | Age: 73
End: 2023-07-05

## 2023-07-05 DIAGNOSIS — M20.5X1 ACQUIRED CLAW TOE OF RIGHT FOOT: Primary | ICD-10-CM

## 2023-07-05 NOTE — TELEPHONE ENCOUNTER
Spoke with pt he is aware per Dr Suresh Hearing he can have the injections and still have surgery he will not have to postpone surgery at all.

## 2023-07-05 NOTE — TELEPHONE ENCOUNTER
He is having surgery with YASMIN on July 20. He is scheduled to have knee injections on July 11. 18 and 25. Should he postpone the injections or is it okay to have these? Please let them know.

## 2023-07-06 DIAGNOSIS — M06.00 SERONEGATIVE RHEUMATOID ARTHRITIS (HCC): ICD-10-CM

## 2023-07-06 DIAGNOSIS — Z79.631 LONG TERM METHOTREXATE USER: ICD-10-CM

## 2023-07-06 NOTE — TELEPHONE ENCOUNTER
Pt needs refill of mtx to get thru follow up visit scheduled on 8/21/23. Will do labs prior @PCP. LOV 5/17/23, labs as below.       Component      Latest Ref Rng & Units 4/28/2023 4/28/2023 4/28/2023 4/28/2023           8:09 AM  8:09 AM  8:09 AM  8:09 AM   WBC      4.3 - 11.1 K/uL    9.4   RBC      4.23 - 5.6 M/uL    3.72 (L)   Hemoglobin Quant      13.6 - 17.2 g/dL    12.1 (L)   Hematocrit      41.1 - 50.3 %    37.7 (L)   MCV      82 - 102 FL    101.3   MCH      26.1 - 32.9 PG    32.5   MCHC      31.4 - 35.0 g/dL    32.1   RDW      11.9 - 14.6 %    14.8 (H)   Platelet Count      946 - 450 K/uL    318   MPV      9.4 - 12.3 FL    10.0   Nucleated Red Blood Cells      0.0 - 0.2 K/uL    0.00   Differential Type          AUTOMATED   Seg Neutrophils      43 - 78 %    67   Lymphocytes      13 - 44 %    18   Monocytes      4.0 - 12.0 %    10   Eosinophils %      0.5 - 7.8 %    3   Basophils      0.0 - 2.0 %    1   Immature Granulocytes      0.0 - 5.0 %    1   Segs Absolute      1.7 - 8.2 K/UL    6.3   Absolute Lymph #      0.5 - 4.6 K/UL    1.7   Absolute Mono #      0.1 - 1.3 K/UL    1.0   Absolute Eos #      0.0 - 0.8 K/UL    0.3   Basophils Absolute      0.0 - 0.2 K/UL    0.1   Absolute Immature Granulocyte      0.0 - 0.5 K/UL    0.1   Sodium      133 - 143 mmol/L 134      Potassium      3.5 - 5.1 mmol/L 4.5      Chloride      101 - 110 mmol/L 109      CO2      21 - 32 mmol/L 25      Anion Gap      2 - 11 mmol/L 0 (L)      Glucose, Random      65 - 100 mg/dL 160 (H)      BUN,BUNPL      8 - 23 MG/DL 19      Creatinine      0.8 - 1.5 MG/DL 0.90      Est, Glom Filt Rate      >60 ml/min/1.73m2 >60      CALCIUM, SERUM, [id number]      8.3 - 10.4 MG/DL 9.3      BILIRUBIN TOTAL      0.2 - 1.1 MG/DL 0.3      ALT      12 - 65 U/L 30      AST      15 - 37 U/L 18      Alk Phos      50 - 136 U/L 62      Total Protein      6.3 - 8.2 g/dL 6.8      Albumin      3.2 - 4.6 g/dL 3.9      Globulin      2.8 - 4.5 g/dL 2.9

## 2023-07-10 ENCOUNTER — TELEPHONE (OUTPATIENT)
Dept: RHEUMATOLOGY | Age: 73
End: 2023-07-10

## 2023-07-10 NOTE — TELEPHONE ENCOUNTER
Wife called with patient being present. They want to know if it is still ok for patient to come as planned to get injections in knee (7/11, 7/18, 7/25) bc he is having amputation of great toe on R foot on 7/20 (Dr Kelsy [place]).

## 2023-07-11 ENCOUNTER — OFFICE VISIT (OUTPATIENT)
Dept: RHEUMATOLOGY | Age: 73
End: 2023-07-11
Payer: MEDICARE

## 2023-07-11 VITALS
HEART RATE: 84 BPM | WEIGHT: 203 LBS | DIASTOLIC BLOOD PRESSURE: 76 MMHG | SYSTOLIC BLOOD PRESSURE: 138 MMHG | BODY MASS INDEX: 27.5 KG/M2 | RESPIRATION RATE: 16 BRPM | HEIGHT: 72 IN

## 2023-07-11 DIAGNOSIS — Z79.631 LONG TERM METHOTREXATE USER: ICD-10-CM

## 2023-07-11 DIAGNOSIS — G89.29 BILATERAL CHRONIC KNEE PAIN: Primary | ICD-10-CM

## 2023-07-11 DIAGNOSIS — M06.00 SERONEGATIVE RHEUMATOID ARTHRITIS (HCC): ICD-10-CM

## 2023-07-11 DIAGNOSIS — M25.562 BILATERAL CHRONIC KNEE PAIN: Primary | ICD-10-CM

## 2023-07-11 DIAGNOSIS — Z79.899 LONG-TERM USE OF PLAQUENIL: ICD-10-CM

## 2023-07-11 DIAGNOSIS — M86.171 OTHER ACUTE OSTEOMYELITIS OF RIGHT FOOT (HCC): ICD-10-CM

## 2023-07-11 DIAGNOSIS — M17.0 BILATERAL PRIMARY OSTEOARTHRITIS OF KNEE: ICD-10-CM

## 2023-07-11 DIAGNOSIS — M25.561 BILATERAL CHRONIC KNEE PAIN: Primary | ICD-10-CM

## 2023-07-11 PROCEDURE — 3078F DIAST BP <80 MM HG: CPT | Performed by: INTERNAL MEDICINE

## 2023-07-11 PROCEDURE — 1036F TOBACCO NON-USER: CPT | Performed by: INTERNAL MEDICINE

## 2023-07-11 PROCEDURE — 20610 DRAIN/INJ JOINT/BURSA W/O US: CPT | Performed by: INTERNAL MEDICINE

## 2023-07-11 PROCEDURE — 3017F COLORECTAL CA SCREEN DOC REV: CPT | Performed by: INTERNAL MEDICINE

## 2023-07-11 PROCEDURE — 3075F SYST BP GE 130 - 139MM HG: CPT | Performed by: INTERNAL MEDICINE

## 2023-07-11 PROCEDURE — G8417 CALC BMI ABV UP PARAM F/U: HCPCS | Performed by: INTERNAL MEDICINE

## 2023-07-11 PROCEDURE — 99214 OFFICE O/P EST MOD 30 MIN: CPT | Performed by: INTERNAL MEDICINE

## 2023-07-11 PROCEDURE — 1123F ACP DISCUSS/DSCN MKR DOCD: CPT | Performed by: INTERNAL MEDICINE

## 2023-07-11 PROCEDURE — G8427 DOCREV CUR MEDS BY ELIG CLIN: HCPCS | Performed by: INTERNAL MEDICINE

## 2023-07-11 ASSESSMENT — ROUTINE ASSESSMENT OF PATIENT INDEX DATA (RAPID3)
ON A SCALE OF ONE TO TEN, CONSIDERING ALL THE WAYS IN WHICH ILLNESS AND HEALTH CONDITIONS MAY AFFECT YOU AT THIS TIME, PLEASE INDICATE BELOW HOW YOU ARE DOING:: 5
WHEN YOU AWAKENED IN THE MORNING OVER THE LAST WEEK, PLEASE INDICATE THE AMOUNT OF TIME IT TAKES UNTIL YOU ARE AS LIMBER AS YOU WILL BE FOR THE DAY: 30 MIN
ON A SCALE OF ONE TO TEN, HOW DIFFICULT WAS IT FOR YOU TO COMPLETE THE LISTED DAILY PHYSICAL TASKS OVER THE LAST WEEK: 0.7
ON A SCALE OF ONE TO TEN, HOW MUCH OF A PROBLEM HAS UNUSUAL FATIGUE OR TIREDNESS BEEN FOR YOU OVER THE PAST WEEK?: 4
ON A SCALE OF ONE TO TEN, HOW MUCH PAIN HAVE YOU HAD BECAUSE OF YOUR CONDITION OVER THE PAST WEEK?: 4

## 2023-07-11 NOTE — PROGRESS NOTES
7/11/23      SUBJECTIVE:  Antonette Lange is a 80 y.o. male that is here for follow-up of:  Seronegative rheumatoid arthritis Dx ~2016   MTX, HCQ (started Oct 2021)   Failed: humira, simponi aria - improvement in pain but not swelling - changed due to cost/convenience   PMH:  - R DVT - 2020 - ongoing coumadin use  - Graves s/p ablation  - HTN HL DM2      Dx: rheumatoid arthritis ~2015.  ---  Last OV May 2023. MTX maintained at mtx 25 mg -split weekly + FA 1 mg with  mg bid. SSZ stopped May 2023-is not sure if joint pain or swelling has increased since stopping this. Last eye exam was fall of 2022 @ [facility] -diabetic screening    Morning stiffness 30 minutes    Labs reviewed 4-: CBC with diff, CMP ESR CRP-progressive anemia. Increasing knee pain in bilateral knees L>R in the last few weeks. Patient would like to proceed with Gelsyn injections today. Planning to have toe amputation on 7/20/2023 for osteomyelitis. Has been following with Dr. Sabine Pinon and did not respond to antibiotics. Steroid injection in L knee Feb 2023 - lasted 3-4 weeks. Steroid injection in R knee April 2023 still offering benefit. XR May 2022:  IMPRESSION:  1. No acute osseous abnormality. 2.  Moderate to severe tricompartment osteoarthrosis, most notably involving the  medial compartments. 3.  Moderate left and small right knee joint effusions. Had ultrasound done in right lower extremity where blood clot was previously found.   This showed Baker's cyst.            DMARD/Biologic 7/11/2023   AM Stiffness 30 min   Pain 4   Fatigue 4   MDHAQ 0.7   Patient Global Score 5   Medication Name Methotrexate   Dose -   Medication Name Plaquenil   Dose -   Medication Name -   Dose -           REVIEW OF SYSTEMS:  A total of 10 systems (musculoskeletal system as stated in the HPI and the following 9 systems) were reviewed with patient today and were negative except as stated in the HPI and except for the following

## 2023-07-12 NOTE — PERIOP NOTE
Patient verified name and . Order for consent NOT found in EHR at time of PAT visit. Unable to verify case posting against order; surgery verified by patient. Type 1B surgery, phone assessment complete. Orders not received. Labs per surgeon: none ordered  Labs per anesthesia protocol: SQBS, Hgb s/h for DOS    Patient answered medical/surgical history questions at their best of ability. All prior to admission medications documented in EPIC. Patient instructed to take the following medications the day of surgery according to anesthesia guidelines with a small sip of water: zoloft,  hydroxychloroquine,  amlodipine, levothyroxine. On the day before surgery please take Acetaminophen 1000mg in the morning and then again before bed. You may substitute for Tylenol 650 mg. Hold all vitamins and supplements 7 days prior to surgery and NSAIDS 5 days prior to surgery. Prescription meds to hold: naproxen hold 5 days prior to surgery; hold the morning of surgery- metformin and lisinopril. Patient instructed on the following:    > Arrive at [facility], time of arrival to be called the day before by 1700  > NPO after midnight, unless otherwise indicated, including gum, mints, and ice chips  > Responsible adult must drive patient to the hospital, stay during surgery, and patient will need supervision 24 hours after anesthesia  > Use antibacterial soap in shower the night before surgery and on the morning of surgery  > All piercings must be removed prior to arrival.    > Leave all valuables (money and jewelry) at home but bring insurance card and ID on DOS.   > You may be required to pay a deductible or co-pay on the day of your procedure. You can pre-pay by calling [phone] if your surgery is at the [facility] or [phone] if your surgery is at the [facility]. > Do not wear make-up, nail polish, lotions, cologne, perfumes, powders, or oil on skin.      Patient verbalized understanding

## 2023-07-13 DIAGNOSIS — I10 ESSENTIAL HYPERTENSION: ICD-10-CM

## 2023-07-13 DIAGNOSIS — E11.9 TYPE 2 DIABETES MELLITUS WITHOUT COMPLICATION, WITHOUT LONG-TERM CURRENT USE OF INSULIN (HCC): ICD-10-CM

## 2023-07-13 RX ORDER — AMLODIPINE BESYLATE 5 MG/1
TABLET ORAL
Qty: 90 TABLET | Refills: 3 | Status: SHIPPED | OUTPATIENT
Start: 2023-07-13

## 2023-07-13 RX ORDER — LISINOPRIL 20 MG/1
TABLET ORAL
Qty: 90 TABLET | Refills: 3 | Status: SHIPPED | OUTPATIENT
Start: 2023-07-13

## 2023-07-13 NOTE — TELEPHONE ENCOUNTER
Requested Prescriptions     Signed Prescriptions Disp Refills    amLODIPine (NORVASC) 5 MG tablet 90 tablet 3     Sig: TAKE ONE TABLET BY MOUTH ONE TIME DAILY     Authorizing Provider: Dang MILLER    lisinopril (PRINIVIL;ZESTRIL) 20 MG tablet 90 tablet 3     Sig: TAKE ONE TABLET BY MOUTH ONE TIME DAILY     Authorizing Provider: Dang MILLER    metFORMIN (GLUCOPHAGE) 1000 MG tablet 180 tablet 3     Sig: TAKE ONE TABLET BY MOUTH TWICE A DAY WITH A MEAL     Authorizing Provider: Hernando Washington

## 2023-07-14 DIAGNOSIS — Z79.631 LONG TERM METHOTREXATE USER: ICD-10-CM

## 2023-07-14 LAB
ALBUMIN SERPL-MCNC: 3.8 G/DL (ref 3.2–4.6)
ALBUMIN/GLOB SERPL: 1.2 (ref 0.4–1.6)
ALP SERPL-CCNC: 74 U/L (ref 50–136)
ALT SERPL-CCNC: 23 U/L (ref 12–65)
ANION GAP SERPL CALC-SCNC: 6 MMOL/L (ref 2–11)
AST SERPL-CCNC: 14 U/L (ref 15–37)
BASOPHILS # BLD: 0.2 K/UL (ref 0–0.2)
BASOPHILS NFR BLD: 2 % (ref 0–2)
BILIRUB SERPL-MCNC: 0.2 MG/DL (ref 0.2–1.1)
BUN SERPL-MCNC: 24 MG/DL (ref 8–23)
CALCIUM SERPL-MCNC: 9.5 MG/DL (ref 8.3–10.4)
CHLORIDE SERPL-SCNC: 109 MMOL/L (ref 101–110)
CO2 SERPL-SCNC: 26 MMOL/L (ref 21–32)
CREAT SERPL-MCNC: 1.1 MG/DL (ref 0.8–1.5)
CRP SERPL-MCNC: 0.3 MG/DL (ref 0–0.9)
DIFFERENTIAL METHOD BLD: ABNORMAL
EOSINOPHIL # BLD: 0.4 K/UL (ref 0–0.8)
EOSINOPHIL NFR BLD: 3 % (ref 0.5–7.8)
ERYTHROCYTE [DISTWIDTH] IN BLOOD BY AUTOMATED COUNT: 13.9 % (ref 11.9–14.6)
ERYTHROCYTE [SEDIMENTATION RATE] IN BLOOD: 11 MM/HR
GLOBULIN SER CALC-MCNC: 3.3 G/DL (ref 2.8–4.5)
GLUCOSE SERPL-MCNC: 159 MG/DL (ref 65–100)
HCT VFR BLD AUTO: 37 % (ref 41.1–50.3)
HGB BLD-MCNC: 12.2 G/DL (ref 13.6–17.2)
IMM GRANULOCYTES # BLD AUTO: 0.1 K/UL (ref 0–0.5)
IMM GRANULOCYTES NFR BLD AUTO: 1 % (ref 0–5)
LYMPHOCYTES # BLD: 1.7 K/UL (ref 0.5–4.6)
LYMPHOCYTES NFR BLD: 17 % (ref 13–44)
MCH RBC QN AUTO: 34.6 PG (ref 26.1–32.9)
MCHC RBC AUTO-ENTMCNC: 33 G/DL (ref 31.4–35)
MCV RBC AUTO: 104.8 FL (ref 82–102)
MONOCYTES # BLD: 0.8 K/UL (ref 0.1–1.3)
MONOCYTES NFR BLD: 8 % (ref 4–12)
NEUTS SEG # BLD: 7.1 K/UL (ref 1.7–8.2)
NEUTS SEG NFR BLD: 69 % (ref 43–78)
NRBC # BLD: 0 K/UL (ref 0–0.2)
PLATELET # BLD AUTO: 403 K/UL (ref 150–450)
PMV BLD AUTO: 10.2 FL (ref 9.4–12.3)
POTASSIUM SERPL-SCNC: 4.7 MMOL/L (ref 3.5–5.1)
PROT SERPL-MCNC: 7.1 G/DL (ref 6.3–8.2)
RBC # BLD AUTO: 3.53 M/UL (ref 4.23–5.6)
SODIUM SERPL-SCNC: 141 MMOL/L (ref 133–143)
WBC # BLD AUTO: 10.3 K/UL (ref 4.3–11.1)

## 2023-07-18 ENCOUNTER — NURSE ONLY (OUTPATIENT)
Dept: RHEUMATOLOGY | Age: 73
End: 2023-07-18
Payer: MEDICARE

## 2023-07-18 VITALS
BODY MASS INDEX: 27.5 KG/M2 | HEART RATE: 80 BPM | SYSTOLIC BLOOD PRESSURE: 158 MMHG | RESPIRATION RATE: 16 BRPM | DIASTOLIC BLOOD PRESSURE: 96 MMHG | HEIGHT: 72 IN | WEIGHT: 203 LBS

## 2023-07-18 DIAGNOSIS — M17.0 BILATERAL PRIMARY OSTEOARTHRITIS OF KNEE: Primary | ICD-10-CM

## 2023-07-18 DIAGNOSIS — M06.00 SERONEGATIVE RHEUMATOID ARTHRITIS (HCC): ICD-10-CM

## 2023-07-18 PROCEDURE — 20610 DRAIN/INJ JOINT/BURSA W/O US: CPT | Performed by: INTERNAL MEDICINE

## 2023-07-18 NOTE — PROGRESS NOTES
After informed consent was obtained and after a time out was taken. under sterile technique (utilizing sterile gloves) the left knee was injected with 40 mg of depo-medrol and 0.5 cc of 1% lidocaine. A 21-gauge 1.5 inch needle was advanced using a lateral approach. 0 Cc of clear fluid was aspirated and medication was injected through the same needle. Ethyl chloride spray was used for local anesthetic. ER warnings given & post-care instructions given. The procedure was well tolerated. Patient will have toe amputation tomorrow.     We reviewed labs from 7- and have elected to hold on resuming sulfasalazine
[FreeTextEntry1] : 71-yo male with h/o CAD, s/p CABG, HTN, hyperlipidemia.\par \par Inferolateral ischemia in 2016. LHC showed patent grafts, 80% stenosis of proximal LCX (at the take-off of bypassed OM1), stented successfully with BRIDGET (Synergy).\par \par S/p gastric sleeve surgery. Patient has lost 20 pounds since starting Ozempic.\par \par Pt presents for a follow up visit.  He reports OCHOA is slightly better since last visit, he walks almost daily with his dog, doesn’t have to stop and rest in middle of his walk anymore, but feels tired at the end of his walk. Still gets occasional left-sided chest squeezing, not related to exertion.\par \par 2D ECHO:\par LVEF 57%\par Basal and mid inferior wall and basal and mid inferolateral wall abnormal\par Mildly reduced RV systolic function\par Mild LAE\par Mild TR\par Ascending aorta 4.5\par \par MPI:\par Small reversible defect basal inferior and basal inferolateral walls of LV suggestive of ischemia \par \par Carotid duplex:\par No significant stenosis \par \par LDL 90.\par \par \par

## 2023-07-19 ENCOUNTER — ANESTHESIA EVENT (OUTPATIENT)
Dept: SURGERY | Age: 73
End: 2023-07-19
Payer: MEDICARE

## 2023-07-19 NOTE — PERIOP NOTE
Preop department called to notify patient of arrival time for scheduled procedure. Instructions given to   - Arrive at [address] [facility]. - Remain NPO after midnight, unless otherwise indicated, including gum, mints, and ice chips. - Have a responsible adult to drive patient to the hospital, stay during surgery, and patient will need supervision 24 hours after anesthesia. - Use antibacterial soap in shower the night before surgery and on the morning of surgery.        Was patient contacted: pt  Voicemail left:   Numbers contacted: [phone]   Arrival time: 0372

## 2023-07-20 ENCOUNTER — APPOINTMENT (OUTPATIENT)
Dept: GENERAL RADIOLOGY | Age: 73
End: 2023-07-20
Attending: ORTHOPAEDIC SURGERY
Payer: MEDICARE

## 2023-07-20 ENCOUNTER — ANESTHESIA (OUTPATIENT)
Dept: SURGERY | Age: 73
End: 2023-07-20
Payer: MEDICARE

## 2023-07-20 ENCOUNTER — HOSPITAL ENCOUNTER (OUTPATIENT)
Age: 73
Setting detail: OUTPATIENT SURGERY
Discharge: HOME | End: 2023-07-20
Attending: ORTHOPAEDIC SURGERY | Admitting: ORTHOPAEDIC SURGERY
Payer: MEDICARE

## 2023-07-20 VITALS
BODY MASS INDEX: 27.5 KG/M2 | DIASTOLIC BLOOD PRESSURE: 87 MMHG | HEART RATE: 66 BPM | SYSTOLIC BLOOD PRESSURE: 143 MMHG | WEIGHT: 203 LBS | OXYGEN SATURATION: 95 % | RESPIRATION RATE: 15 BRPM | TEMPERATURE: 97.1 F | HEIGHT: 72 IN

## 2023-07-20 DIAGNOSIS — G89.18 ACUTE POST-OPERATIVE PAIN: Primary | ICD-10-CM

## 2023-07-20 DIAGNOSIS — M20.5X1 ACQUIRED CLAW TOE OF RIGHT FOOT: ICD-10-CM

## 2023-07-20 LAB
GLUCOSE BLD STRIP.AUTO-MCNC: 203 MG/DL (ref 65–100)
GLUCOSE BLD STRIP.AUTO-MCNC: 227 MG/DL (ref 65–100)
SERVICE CMNT-IMP: ABNORMAL
SERVICE CMNT-IMP: ABNORMAL

## 2023-07-20 PROCEDURE — 2709999900 HC NON-CHARGEABLE SUPPLY: Performed by: ORTHOPAEDIC SURGERY

## 2023-07-20 PROCEDURE — 2580000003 HC RX 258: Performed by: ANESTHESIOLOGY

## 2023-07-20 PROCEDURE — 7100000000 HC PACU RECOVERY - FIRST 15 MIN: Performed by: ORTHOPAEDIC SURGERY

## 2023-07-20 PROCEDURE — 28825 PARTIAL AMPUTATION OF TOE: CPT | Performed by: ORTHOPAEDIC SURGERY

## 2023-07-20 PROCEDURE — 3700000001 HC ADD 15 MINUTES (ANESTHESIA): Performed by: ORTHOPAEDIC SURGERY

## 2023-07-20 PROCEDURE — 6360000002 HC RX W HCPCS: Performed by: ANESTHESIOLOGY

## 2023-07-20 PROCEDURE — 3600000012 HC SURGERY LEVEL 2 ADDTL 15MIN: Performed by: ORTHOPAEDIC SURGERY

## 2023-07-20 PROCEDURE — 7100000010 HC PHASE II RECOVERY - FIRST 15 MIN: Performed by: ORTHOPAEDIC SURGERY

## 2023-07-20 PROCEDURE — 82962 GLUCOSE BLOOD TEST: CPT

## 2023-07-20 PROCEDURE — 7100000001 HC PACU RECOVERY - ADDTL 15 MIN: Performed by: ORTHOPAEDIC SURGERY

## 2023-07-20 PROCEDURE — 6360000002 HC RX W HCPCS: Performed by: NURSE ANESTHETIST, CERTIFIED REGISTERED

## 2023-07-20 PROCEDURE — 3600000002 HC SURGERY LEVEL 2 BASE: Performed by: ORTHOPAEDIC SURGERY

## 2023-07-20 PROCEDURE — C1713 ANCHOR/SCREW BN/BN,TIS/BN: HCPCS | Performed by: ORTHOPAEDIC SURGERY

## 2023-07-20 PROCEDURE — 28285 REPAIR OF HAMMERTOE: CPT | Performed by: ORTHOPAEDIC SURGERY

## 2023-07-20 PROCEDURE — 2500000003 HC RX 250 WO HCPCS: Performed by: NURSE ANESTHETIST, CERTIFIED REGISTERED

## 2023-07-20 PROCEDURE — 6370000000 HC RX 637 (ALT 250 FOR IP): Performed by: ANESTHESIOLOGY

## 2023-07-20 PROCEDURE — 28313 REPAIR DEFORMITY OF TOE: CPT | Performed by: ORTHOPAEDIC SURGERY

## 2023-07-20 PROCEDURE — 76942 ECHO GUIDE FOR BIOPSY: CPT | Performed by: ANESTHESIOLOGY

## 2023-07-20 PROCEDURE — 3700000000 HC ANESTHESIA ATTENDED CARE: Performed by: ORTHOPAEDIC SURGERY

## 2023-07-20 PROCEDURE — 6360000002 HC RX W HCPCS: Performed by: NURSE PRACTITIONER

## 2023-07-20 DEVICE — WIRE ORTH 1.1MM DIA 229MM SMOOTH DBL BAYNT TIP S STL K: Type: IMPLANTABLE DEVICE | Site: TOES | Status: FUNCTIONAL

## 2023-07-20 RX ORDER — SODIUM CHLORIDE 9 MG/ML
INJECTION, SOLUTION INTRAVENOUS PRN
Status: DISCONTINUED | OUTPATIENT
Start: 2023-07-20 | End: 2023-07-20 | Stop reason: HOSPADM

## 2023-07-20 RX ORDER — SODIUM CHLORIDE 0.9 % (FLUSH) 0.9 %
5-40 SYRINGE (ML) INJECTION EVERY 12 HOURS SCHEDULED
Status: DISCONTINUED | OUTPATIENT
Start: 2023-07-20 | End: 2023-07-20 | Stop reason: HOSPADM

## 2023-07-20 RX ORDER — HYDROCODONE BITARTRATE AND ACETAMINOPHEN 7.5; 325 MG/1; MG/1
1 TABLET ORAL EVERY 6 HOURS PRN
Qty: 20 TABLET | Refills: 0 | Status: SHIPPED | OUTPATIENT
Start: 2023-07-20 | End: 2023-07-25

## 2023-07-20 RX ORDER — PROPOFOL 10 MG/ML
INJECTION, EMULSION INTRAVENOUS PRN
Status: DISCONTINUED | OUTPATIENT
Start: 2023-07-20 | End: 2023-07-20 | Stop reason: SDUPTHER

## 2023-07-20 RX ORDER — OXYCODONE HYDROCHLORIDE 5 MG/1
5 TABLET ORAL
Status: DISCONTINUED | OUTPATIENT
Start: 2023-07-20 | End: 2023-07-20 | Stop reason: HOSPADM

## 2023-07-20 RX ORDER — SODIUM CHLORIDE 0.9 % (FLUSH) 0.9 %
5-40 SYRINGE (ML) INJECTION PRN
Status: DISCONTINUED | OUTPATIENT
Start: 2023-07-20 | End: 2023-07-20 | Stop reason: HOSPADM

## 2023-07-20 RX ORDER — PROCHLORPERAZINE EDISYLATE 5 MG/ML
5 INJECTION INTRAMUSCULAR; INTRAVENOUS
Status: DISCONTINUED | OUTPATIENT
Start: 2023-07-20 | End: 2023-07-20 | Stop reason: HOSPADM

## 2023-07-20 RX ORDER — INSULIN LISPRO 100 [IU]/ML
8 INJECTION, SOLUTION INTRAVENOUS; SUBCUTANEOUS ONCE
Status: COMPLETED | OUTPATIENT
Start: 2023-07-20 | End: 2023-07-20

## 2023-07-20 RX ORDER — HALOPERIDOL 5 MG/ML
1 INJECTION INTRAMUSCULAR
Status: DISCONTINUED | OUTPATIENT
Start: 2023-07-20 | End: 2023-07-20 | Stop reason: HOSPADM

## 2023-07-20 RX ORDER — MIDAZOLAM HYDROCHLORIDE 2 MG/2ML
2 INJECTION, SOLUTION INTRAMUSCULAR; INTRAVENOUS
Status: DISCONTINUED | OUTPATIENT
Start: 2023-07-20 | End: 2023-07-20 | Stop reason: HOSPADM

## 2023-07-20 RX ORDER — FENTANYL CITRATE 50 UG/ML
100 INJECTION, SOLUTION INTRAMUSCULAR; INTRAVENOUS
Status: DISCONTINUED | OUTPATIENT
Start: 2023-07-20 | End: 2023-07-20 | Stop reason: HOSPADM

## 2023-07-20 RX ORDER — CEPHALEXIN 500 MG/1
500 CAPSULE ORAL 4 TIMES DAILY
Qty: 12 CAPSULE | Refills: 0 | Status: SHIPPED | OUTPATIENT
Start: 2023-07-20

## 2023-07-20 RX ORDER — LIDOCAINE HYDROCHLORIDE 20 MG/ML
INJECTION, SOLUTION EPIDURAL; INFILTRATION; INTRACAUDAL; PERINEURAL PRN
Status: DISCONTINUED | OUTPATIENT
Start: 2023-07-20 | End: 2023-07-20 | Stop reason: SDUPTHER

## 2023-07-20 RX ORDER — SODIUM CHLORIDE, SODIUM LACTATE, POTASSIUM CHLORIDE, CALCIUM CHLORIDE 600; 310; 30; 20 MG/100ML; MG/100ML; MG/100ML; MG/100ML
INJECTION, SOLUTION INTRAVENOUS CONTINUOUS
Status: DISCONTINUED | OUTPATIENT
Start: 2023-07-20 | End: 2023-07-20 | Stop reason: HOSPADM

## 2023-07-20 RX ORDER — ROPIVACAINE HYDROCHLORIDE 7.5 MG/ML
INJECTION, SOLUTION EPIDURAL; PERINEURAL
Status: COMPLETED | OUTPATIENT
Start: 2023-07-20 | End: 2023-07-20

## 2023-07-20 RX ORDER — ACETAMINOPHEN 500 MG
1000 TABLET ORAL ONCE
Status: COMPLETED | OUTPATIENT
Start: 2023-07-20 | End: 2023-07-20

## 2023-07-20 RX ORDER — PROPOFOL 10 MG/ML
INJECTION, EMULSION INTRAVENOUS CONTINUOUS PRN
Status: DISCONTINUED | OUTPATIENT
Start: 2023-07-20 | End: 2023-07-20 | Stop reason: SDUPTHER

## 2023-07-20 RX ORDER — LIDOCAINE HYDROCHLORIDE 10 MG/ML
1 INJECTION, SOLUTION INFILTRATION; PERINEURAL
Status: DISCONTINUED | OUTPATIENT
Start: 2023-07-20 | End: 2023-07-20 | Stop reason: HOSPADM

## 2023-07-20 RX ORDER — IPRATROPIUM BROMIDE AND ALBUTEROL SULFATE 2.5; .5 MG/3ML; MG/3ML
1 SOLUTION RESPIRATORY (INHALATION)
Status: DISCONTINUED | OUTPATIENT
Start: 2023-07-20 | End: 2023-07-20 | Stop reason: HOSPADM

## 2023-07-20 RX ORDER — HYDROMORPHONE HYDROCHLORIDE 2 MG/ML
0.5 INJECTION, SOLUTION INTRAMUSCULAR; INTRAVENOUS; SUBCUTANEOUS EVERY 5 MIN PRN
Status: DISCONTINUED | OUTPATIENT
Start: 2023-07-20 | End: 2023-07-20 | Stop reason: HOSPADM

## 2023-07-20 RX ADMIN — LIDOCAINE HYDROCHLORIDE 20 MG: 20 INJECTION, SOLUTION EPIDURAL; INFILTRATION; INTRACAUDAL; PERINEURAL at 08:12

## 2023-07-20 RX ADMIN — Medication 2000 MG: at 08:07

## 2023-07-20 RX ADMIN — ACETAMINOPHEN 1000 MG: 500 TABLET, FILM COATED ORAL at 06:53

## 2023-07-20 RX ADMIN — ROPIVACAINE HYDROCHLORIDE 10 ML: 7.5 INJECTION, SOLUTION EPIDURAL; PERINEURAL at 07:23

## 2023-07-20 RX ADMIN — SODIUM CHLORIDE, POTASSIUM CHLORIDE, SODIUM LACTATE AND CALCIUM CHLORIDE: 600; 310; 30; 20 INJECTION, SOLUTION INTRAVENOUS at 06:53

## 2023-07-20 RX ADMIN — PROPOFOL 25 MG: 10 INJECTION, EMULSION INTRAVENOUS at 08:17

## 2023-07-20 RX ADMIN — INSULIN LISPRO 8 UNITS: 100 INJECTION, SOLUTION INTRAVENOUS; SUBCUTANEOUS at 06:54

## 2023-07-20 RX ADMIN — MEPIVACAINE HYDROCHLORIDE 20 ML: 15 INJECTION, SOLUTION EPIDURAL; INFILTRATION at 07:23

## 2023-07-20 RX ADMIN — PROPOFOL 25 MG: 10 INJECTION, EMULSION INTRAVENOUS at 08:12

## 2023-07-20 RX ADMIN — PROPOFOL 140 MCG/KG/MIN: 10 INJECTION, EMULSION INTRAVENOUS at 08:16

## 2023-07-20 ASSESSMENT — PAIN SCALES - GENERAL
PAINLEVEL_OUTOF10: 0

## 2023-07-20 ASSESSMENT — PAIN - FUNCTIONAL ASSESSMENT: PAIN_FUNCTIONAL_ASSESSMENT: 0-10

## 2023-07-20 NOTE — DISCHARGE INSTRUCTIONS
POSTOPERATIVE SURGICAL INSTRUCTIONS/ INFORMATION:    Your narcotic (pain medication) and an antibiotic will be sent to the pharmacy listed in your chart. Both of these medications should be taken as directed on the bottle. If the surgery you had requires you to be nonweightbearing, in addition to the narcotic and antibiotic you will also have an aspirin prescription that should also be taken as directed on the bottle. This will be taken until you are told to discontinue it. If you run out of the prescription of aspirin and over-the-counter 81 mg aspirin will work as well. Nonweightbearing to the affected extremity means you are not allowed to put pressure or any weight on the surgical extremity. Information regarding scooters, crutches and other assistive devices will have been discussed at your presurgical office visit these devices are to be used until told otherwise by the doctor or nurse practitioner. Pain can be hard to manage postoperatively especially if you had an anesthetic nerve block and do not know when it will wear off. It is recommended that you start taking pain medication even with an anesthetic block the night of surgery. The anesthetic nerve block can last up to 72 hours postoperatively, your leg will likely be numb as long as the anesthetic block is working. If you are taking the pain medication as directed on the bottle and your pain is not managed or controlled you may double the medication, taking 2 tablets every 4-6 hours as needed for 24 hours. Once pain level is controlled you will resume the recommended dosage on the bottle. Pain medication may cause constipation, to help minimize this ensure you are drinking plenty of water after surgery. You may start a stool softener and/or MiraLAX to help alleviate or mitigate this problem. Please take these over-the-counter products as directed on the bottle.     Please make every effort to leave your postoperative dressing by your doctor. DIET  Clear liquids until no nausea or vomiting; then light diet for the first day. Advance to regular diet on second day, unless your doctor orders otherwise. If nausea and vomiting continues, call your doctor. PAIN  Take pain medication as directed by your doctor. Call your doctor if pain is NOT relieved by medication. DO NOT take aspirin of blood thinners unless directed by your doctor. MEDICATION INTERACTION:During your procedure you potentially received a medication or medications which may reduce the effectiveness of oral contraceptives. Please consider other forms of contraception for 1 month following your procedure if you are currently using oral contraceptives as your primary form of birth control. In addition to this, we recommend continuing your oral contraceptive as prescribed, unless otherwise instructed by your physician, during this time      [address] IF   Excessive bleeding that does not stop after holding pressure over the area  Temperature of 101 degrees F or above  Excessive redness, swelling or bruising, and/ or green or yellow, smelly discharge from incision    After general anesthesia or intravenous sedation, for 24 hours or while taking prescription Narcotics:  Limit your activities  A responsible adult needs to be with you for the next 24 hours  Do not drive and operate hazardous machinery  Do not make important personal or business decisions  Do not drink alcoholic beverages  If you have not urinated within 8 hours after discharge, and you are experiencing discomfort from urinary retention, please go to the nearest ED. If you have sleep apnea and have a CPAP machine, please use it for all naps and sleeping. Please use caution when taking narcotics and any of your home medications that may cause drowsiness. *  Please give a list of your current medications to your Primary Care Provider.   *  Please update this list whenever your medications are

## 2023-07-20 NOTE — ANESTHESIA PRE PROCEDURE
Screen (If Applicable):  No results found for: LABABO, LABRH    Drug/Infectious Status (If Applicable):  Lab Results   Component Value Date/Time    HEPCAB <0.1 08/16/2021 09:26 AM       COVID-19 Screening (If Applicable):   Lab Results   Component Value Date/Time    COVID19 Detected 01/02/2023 04:48 PM           Anesthesia Evaluation  Patient summary reviewed and Nursing notes reviewed no history of anesthetic complications:   Airway: Mallampati: III     Neck ROM: full     Dental: normal exam         Pulmonary: breath sounds clear to auscultation  (+) sleep apnea: on CPAP,                             Cardiovascular:  Exercise tolerance: good (>4 METS),   (+) hypertension:, hyperlipidemia        Rhythm: regular                      Neuro/Psych:   (+) depression/anxiety             GI/Hepatic/Renal: Neg GI/Hepatic/Renal ROS            Endo/Other:    (+) DiabetesType II DM, , hypothyroidism: arthritis: rheumatoid. , .                 Abdominal:             Vascular:   + DVT, . Other Findings:           Anesthesia Plan      TIVA     ASA 3     (Ankle block per surgeon request)  Induction: intravenous. Anesthetic plan and risks discussed with patient and spouse.               Post-op pain plan if not by surgeon: single peripheral nerve block            Antonio Ayers MD   7/20/2023

## 2023-07-20 NOTE — ANESTHESIA PROCEDURE NOTES
Peripheral Block    Patient location during procedure: pre-op  Reason for block: post-op pain management and at surgeon's request  Start time: 7/20/2023 7:23 AM  End time: 7/20/2023 7:32 AM  Staffing  Performed: anesthesiologist   Anesthesiologist: Antonio Ayers MD  Preanesthetic Checklist  Completed: patient identified, IV checked, site marked, risks and benefits discussed, surgical/procedural consents, equipment checked, pre-op evaluation, timeout performed, anesthesia consent given, oxygen available and monitors applied/VS acknowledged  Peripheral Block   Patient position: supine  Prep: ChloraPrep  Provider prep: mask  Patient monitoring: cardiac monitor, continuous pulse ox, frequent blood pressure checks, IV access and oxygen  Block type: Ankle  Laterality: right  Injection technique: single-shot  Guidance: ultrasound guided    Needle   Needle type: long-bevel   Needle gauge: 25 g. Needle localization: ultrasound guidance  Needle length: 10 cm  Assessment   Injection assessment: negative aspiration for heme, no paresthesia on injection, local visualized surrounding nerve on ultrasound and no intravascular symptoms  Paresthesia pain: none  Slow fractionated injection: yes  Hemodynamics: stable  Real-time US image taken/store: yes  Outcomes: uncomplicated and patient tolerated procedure well    Additional Notes  -Block placed for post op pain at surgeon's request.     -Ultrasound used to identify anatomy of nerve bundle. -Needle placement and local injection at perineural area confirmed with real time ultrasound guidance.     -Local visualized with ultrasound surrounding nerve. -Permanent Image taken and placed on chart. Ankle Block Procedure Note    Risks, benefits, and alternatives of procedure discussed with patient and He  elects to proceed.   Right ankle circumferentially prepped with chloroprep.  25g needle used to block deep peroneal, superficial peroneal, saphenous, sural, and posterior tibial

## 2023-07-20 NOTE — ANESTHESIA POSTPROCEDURE EVALUATION
Department of Anesthesiology  Postprocedure Note    Patient: Jefferson Norris III  MRN: [id number]  YOB: 1950  Date of evaluation: 7/20/2023      Procedure Summary     Date: 07/20/23 Room / Location: [facility] OP OR 01 / [facility]    Anesthesia Start: 0807 Anesthesia Stop: Selin Anthony    Procedure: right great toe amputation through interphalangeal joint, right second proximal interphalangeal resection arthroplasty and metatarsophalangeal capsulotomy with angular soft tissue correction (Right: Toes) Diagnosis:       Acquired claw toe of right foot      (Acquired claw toe of right foot [M20.5X1])    Surgeons: Ambar Shore MD Responsible Provider: Perlita Moser MD    Anesthesia Type: TIVA ASA Status: 3          Anesthesia Type: TIVA    Ramona Phase I: Ramona Score: 7    Ramona Phase II:        Anesthesia Post Evaluation    Patient location during evaluation: PACU  Patient participation: complete - patient participated  Level of consciousness: awake  Airway patency: patent  Nausea & Vomiting: no nausea  Complications: no  Cardiovascular status: hemodynamically stable  Respiratory status: acceptable and nonlabored ventilation  Hydration status: stable  Multimodal analgesia pain management approach

## 2023-07-20 NOTE — PERIOP NOTE
PACU DISCHARGE NOTE    Pt and family educated on discharge instructions. No additional questions at this time. Vital signs stable, pain well controlled, alert and oriented times three or at baseline, follow up per surgeon, no anesthetic complications. Post op shoe applied and crutches given to patient at discharge. I have given crutches to the patient, adjusted them and provided complete instructions on safe use.

## 2023-07-20 NOTE — OP NOTE
Operative Note    Patient:Jose Deras III  MRN: [id number]    Date Of Surgery: 7/20/2023    Surgeon: Fozia Wilcox MD    Assistant Surgeon: None    Pre Op Diagnosis:  Pre-Op Diagnosis Codes:     * Acquired claw toe of right foot [M20.5X1]      Post Op Diagnosis:   same    Procedures Performed:  Right great toe amputation through interphalangeal joint, 02165  Right second proximal interphalangeal joint resection arthroplasty, 45285  Right second MTP capsulotomy with angular soft tissue correction, 96624    Implants:   Implant Name Type Inv. Item Serial No.  Lot No. LRB No. Used Action   WIRE ORTH 1.1MM JACE 229MM SMOOTH DBL BAYNT Lewayne Brunner - [id number]  WIRE ORTH 1.1MM JACE 229MM SMOOTH DBL BAYNT AMINAH Bynum TRAUMA-WD [id number] Right 1 Implanted       Anesthesia:  Monitor Anesthesia Care    Blood Loss:  minimal    Tourniquet:  Estimated calf 20 minutes    Pre Operative Abx:   Ancef 2g            Pre Operative Course:  Lexy Montilla III is a 71 y.o. male who has a history of right great claw toe and osteomyelitis and second claw toe deformity. Operation In Detail:  Patient was evaluated in the preoperative area. We had a long discussion about the procedure and postoperative protocols. The patient was then brought back to the operating room suite and placed in the operating room table. A timeout was taken to identify the patient, procedure being performed, and laterality. After this the patient was prepped and draped in the normal sterile fashion using a Betadine solution and/or a ChloraPrep solution. A timeout was then taken to identify the patient his name, date of birth, laterality, and procedure being performed. We also identified allergies and any concerns about the operation. Attention was then placed to the operative extremity. An ankle block was placed by the department of anesthesia.   In a preop surgical timeout the right lower extremity was

## 2023-07-21 ENCOUNTER — TELEPHONE (OUTPATIENT)
Dept: ORTHOPEDIC SURGERY | Age: 73
End: 2023-07-21

## 2023-07-21 NOTE — TELEPHONE ENCOUNTER
Spoke to pt's wife and told her he is able to shower as long as he wears the cast bag and not to get the wound wet at all.

## 2023-07-25 ENCOUNTER — NURSE ONLY (OUTPATIENT)
Dept: RHEUMATOLOGY | Age: 73
End: 2023-07-25
Payer: MEDICARE

## 2023-07-25 VITALS
HEART RATE: 80 BPM | WEIGHT: 203 LBS | SYSTOLIC BLOOD PRESSURE: 144 MMHG | RESPIRATION RATE: 16 BRPM | HEIGHT: 72 IN | BODY MASS INDEX: 27.5 KG/M2 | DIASTOLIC BLOOD PRESSURE: 82 MMHG

## 2023-07-25 DIAGNOSIS — M17.0 BILATERAL PRIMARY OSTEOARTHRITIS OF KNEE: Primary | ICD-10-CM

## 2023-07-25 PROCEDURE — 20610 DRAIN/INJ JOINT/BURSA W/O US: CPT | Performed by: INTERNAL MEDICINE

## 2023-07-25 RX ORDER — FOLIC ACID 1 MG/1
1 TABLET ORAL DAILY
Qty: 90 TABLET | Refills: 3 | Status: SHIPPED | OUTPATIENT
Start: 2023-07-25

## 2023-07-25 NOTE — PROGRESS NOTES
L knee pain - episodic now at anytime lasting 5-10 mins at a time. Wants to complete R knee injections if covered and approved    After informed consent was obtained and after a time out was taken. under sterile technique (utilizing sterile gloves) the left knee was injected with gelsyn #3. A 21-gauge 1.5 inch needle was advanced using a lateral approach. 0 Cc of clear fluid was aspirated and medication was injected through the same needle. Ethyl chloride spray was used for local anesthetic. ER warnings given & post-care instructions given. The procedure was well tolerated.

## 2023-07-29 ENCOUNTER — TELEPHONE (OUTPATIENT)
Dept: INTERNAL MEDICINE CLINIC | Facility: CLINIC | Age: 73
End: 2023-07-29

## 2023-07-29 RX ORDER — BROMPHENIRAMINE MALEATE, PSEUDOEPHEDRINE HYDROCHLORIDE, AND DEXTROMETHORPHAN HYDROBROMIDE 2; 30; 10 MG/5ML; MG/5ML; MG/5ML
5 SYRUP ORAL 4 TIMES DAILY
Qty: 240 ML | Refills: 1 | Status: SHIPPED | OUTPATIENT
Start: 2023-07-29

## 2023-07-31 NOTE — TELEPHONE ENCOUNTER
Requested Prescriptions     Signed Prescriptions Disp Refills    brompheniramine-pseudoephedrine-DM 2-30-10 MG/5ML syrup 240 mL 1     Sig: Take 5 mLs by mouth in the morning, at noon, in the evening, and at bedtime     Authorizing Provider: Joby Sanabria

## 2023-08-04 ENCOUNTER — OFFICE VISIT (OUTPATIENT)
Dept: ORTHOPEDIC SURGERY | Age: 73
End: 2023-08-04

## 2023-08-04 DIAGNOSIS — M20.5X1 ACQUIRED CLAW TOE OF RIGHT FOOT: Primary | ICD-10-CM

## 2023-08-04 PROCEDURE — 99024 POSTOP FOLLOW-UP VISIT: CPT | Performed by: NURSE PRACTITIONER

## 2023-08-04 NOTE — PROGRESS NOTES
Name: Heather Horton III  YOB: 1950  Gender: male  MRN: [id number]    Procedure Performed:  Right great toe amputation through interphalangeal joint  Right second proximal interphalangeal joint resection arthroplasty  Right second MTP capsulotomy with angular soft tissue correction        Date of Procedure: 07/20/2023      Subjective: Patient reports he is done well overall he notes that he only took for oxycodones for pain and skin continued with ibuprofen and Tylenol. Physical Examination: Incisional areas look good and do appear to be healing well without signs of infection. K wire and Jurgan pinball are intact in the second phalanx. He has palpable pulses and intact sensation to the foot. He is able to wiggle the remaining lesser toes without difficulty or pain. No pain with palpation to the first MTP joint plantarly. He has noted swelling to the dorsal aspect of his foot as well as first and second toes. Imaging:   No imaging reviewed          Assessment:   Status post right great toe amputation through the IP joint with second PIP RA and MTP capsulotomy. Plan:   3 This is stable chronic illness/condition  Treatment at this time:  Sutures removed, Steri-Strips placed. Patient continue to monitor and protect the K wire in the second phalanx. He will also continue to wear the postop shoe as a matter medical necessity bearing weight on the affected extremity as he can tolerate and swelling allows. He may get the affected extremity wet including showering only. He was encouraged continue elevating the affected extremity. He will limit his activity levels at this time excluding high-impact. He will follow-up in 2 weeks or sooner if needed with pins out x-ray. Studies ordered:  Foot XR needed @ Next Visit    Weight-bearing status: WBAT in Boot/hardsole shoe        Return to work/work restrictions: none  No medications given

## 2023-08-08 ENCOUNTER — NURSE ONLY (OUTPATIENT)
Dept: RHEUMATOLOGY | Age: 73
End: 2023-08-08
Payer: MEDICARE

## 2023-08-08 VITALS
BODY MASS INDEX: 27.22 KG/M2 | DIASTOLIC BLOOD PRESSURE: 72 MMHG | HEART RATE: 80 BPM | RESPIRATION RATE: 16 BRPM | SYSTOLIC BLOOD PRESSURE: 126 MMHG | WEIGHT: 201 LBS | HEIGHT: 72 IN

## 2023-08-08 DIAGNOSIS — M17.0 BILATERAL PRIMARY OSTEOARTHRITIS OF KNEE: Primary | ICD-10-CM

## 2023-08-08 PROCEDURE — 20610 DRAIN/INJ JOINT/BURSA W/O US: CPT | Performed by: INTERNAL MEDICINE

## 2023-08-08 RX ORDER — ASCORBIC ACID 500 MG
500 TABLET ORAL DAILY
COMMUNITY

## 2023-08-08 NOTE — PROGRESS NOTES
After informed consent was obtained and after a time out was taken. under sterile technique (utilizing sterile gloves) the R knee was injected with gelsyn #1. A 21-gauge 1.5 inch needle was advanced using a lateral approach. 0 Cc of clear fluid was aspirated and medication was injected through the same needle. Ethyl chloride spray was used for local anesthetic. ER warnings given & post-care instructions given. The procedure was well tolerated. L knee - unchanged symptoms from prior to 3 gelsyn injections. Asks when this should start helping. Reports More pain from R foot after amputation - in particular from location of rods that will be removed in 1.5 weeks.

## 2023-08-15 ENCOUNTER — NURSE ONLY (OUTPATIENT)
Dept: RHEUMATOLOGY | Age: 73
End: 2023-08-15
Payer: MEDICARE

## 2023-08-15 VITALS
SYSTOLIC BLOOD PRESSURE: 132 MMHG | RESPIRATION RATE: 14 BRPM | BODY MASS INDEX: 27.22 KG/M2 | HEIGHT: 72 IN | WEIGHT: 201 LBS | HEART RATE: 84 BPM | DIASTOLIC BLOOD PRESSURE: 78 MMHG

## 2023-08-15 DIAGNOSIS — M17.0 BILATERAL PRIMARY OSTEOARTHRITIS OF KNEE: Primary | ICD-10-CM

## 2023-08-15 PROCEDURE — 20610 DRAIN/INJ JOINT/BURSA W/O US: CPT | Performed by: INTERNAL MEDICINE

## 2023-08-15 NOTE — PROGRESS NOTES
After informed consent was obtained and after a time out was taken. under sterile technique (utilizing sterile gloves) the R knee was injected with gelsyn #2. A 21-gauge 1.5 inch needle was advanced using a lateral approach. 0 Cc of clear fluid was aspirated and medication was injected through the same needle. Ethyl chloride spray was used for local anesthetic. ER warnings given & post-care instructions given. The procedure was well tolerated.

## 2023-08-16 DIAGNOSIS — Z79.631 LONG TERM METHOTREXATE USER: ICD-10-CM

## 2023-08-16 LAB
ALBUMIN SERPL-MCNC: 3.6 G/DL (ref 3.2–4.6)
ALBUMIN/GLOB SERPL: 1.1 (ref 0.4–1.6)
ALP SERPL-CCNC: 83 U/L (ref 50–136)
ALT SERPL-CCNC: 32 U/L (ref 12–65)
ANION GAP SERPL CALC-SCNC: 10 MMOL/L (ref 2–11)
AST SERPL-CCNC: 24 U/L (ref 15–37)
BILIRUB SERPL-MCNC: 0.3 MG/DL (ref 0.2–1.1)
BUN SERPL-MCNC: 20 MG/DL (ref 8–23)
CALCIUM SERPL-MCNC: 9.6 MG/DL (ref 8.3–10.4)
CHLORIDE SERPL-SCNC: 110 MMOL/L (ref 101–110)
CO2 SERPL-SCNC: 22 MMOL/L (ref 21–32)
CREAT SERPL-MCNC: 1 MG/DL (ref 0.8–1.5)
CRP SERPL-MCNC: <0.3 MG/DL (ref 0–0.9)
GLOBULIN SER CALC-MCNC: 3.3 G/DL (ref 2.8–4.5)
GLUCOSE SERPL-MCNC: 214 MG/DL (ref 65–100)
POTASSIUM SERPL-SCNC: 5 MMOL/L (ref 3.5–5.1)
PROT SERPL-MCNC: 6.9 G/DL (ref 6.3–8.2)
SODIUM SERPL-SCNC: 142 MMOL/L (ref 133–143)

## 2023-08-18 ENCOUNTER — OFFICE VISIT (OUTPATIENT)
Dept: ORTHOPEDIC SURGERY | Age: 73
End: 2023-08-18

## 2023-08-18 DIAGNOSIS — Z79.631 LONG TERM METHOTREXATE USER: ICD-10-CM

## 2023-08-18 DIAGNOSIS — M20.5X1 ACQUIRED CLAW TOE OF RIGHT FOOT: Primary | ICD-10-CM

## 2023-08-18 DIAGNOSIS — M79.671 RIGHT FOOT PAIN: ICD-10-CM

## 2023-08-18 LAB
BASOPHILS # BLD: 0.1 K/UL (ref 0–0.2)
BASOPHILS NFR BLD: 2 % (ref 0–2)
DIFFERENTIAL METHOD BLD: ABNORMAL
EOSINOPHIL # BLD: 0.3 K/UL (ref 0–0.8)
EOSINOPHIL NFR BLD: 3 % (ref 0.5–7.8)
ERYTHROCYTE [DISTWIDTH] IN BLOOD BY AUTOMATED COUNT: 13.4 % (ref 11.9–14.6)
ERYTHROCYTE [SEDIMENTATION RATE] IN BLOOD: 25 MM/HR
HCT VFR BLD AUTO: 35.6 % (ref 41.1–50.3)
HGB BLD-MCNC: 11.6 G/DL (ref 13.6–17.2)
IMM GRANULOCYTES # BLD AUTO: 0.1 K/UL (ref 0–0.5)
IMM GRANULOCYTES NFR BLD AUTO: 1 % (ref 0–5)
LYMPHOCYTES # BLD: 1.3 K/UL (ref 0.5–4.6)
LYMPHOCYTES NFR BLD: 15 % (ref 13–44)
MCH RBC QN AUTO: 32.8 PG (ref 26.1–32.9)
MCHC RBC AUTO-ENTMCNC: 32.6 G/DL (ref 31.4–35)
MCV RBC AUTO: 100.6 FL (ref 82–102)
MONOCYTES # BLD: 0.5 K/UL (ref 0.1–1.3)
MONOCYTES NFR BLD: 6 % (ref 4–12)
NEUTS SEG # BLD: 6.3 K/UL (ref 1.7–8.2)
NEUTS SEG NFR BLD: 73 % (ref 43–78)
NRBC # BLD: 0 K/UL (ref 0–0.2)
PLATELET # BLD AUTO: 370 K/UL (ref 150–450)
PMV BLD AUTO: 10.1 FL (ref 9.4–12.3)
RBC # BLD AUTO: 3.54 M/UL (ref 4.23–5.6)
WBC # BLD AUTO: 8.5 K/UL (ref 4.3–11.1)

## 2023-08-18 PROCEDURE — 99024 POSTOP FOLLOW-UP VISIT: CPT | Performed by: NURSE PRACTITIONER

## 2023-08-18 NOTE — PROGRESS NOTES
Name: Dilma Conley III  YOB: 1950  Gender: male  MRN: [id number]    Procedure Performed:  Right great toe amputation through interphalangeal joint  Right second proximal interphalangeal joint resection arthroplasty  Right second MTP capsulotomy with angular soft tissue correction           Date of Procedure: 07/20/2023      Subjective: Patient seems to be overall doing well with his recovery. He is concerned today that the positioning of the K wire has affected the healing of the toe. Physical Examination: Incisional areas are continue to heal well and show no signs of infection. K wire and Brian Barranquitas were intact upon initial arrival today. He has palpable pulses and intact sensation to the foot. There is mild discoloration to the foot today. He is able to wiggle the remaining lesser toes without difficulty or pain. There is slight curvature to the second phalanx at the PIP and DIP joints. He has noted swelling to the dorsal aspect of his foot as well as second phalanx. Imaging:   I independently interpreted XR taken today  Right foot XR: AP, Lateral, Oblique views     ICD-10-CM    1. Acquired claw toe of right foot  M20.5X1       2. Right foot pain  M79.671          Report: AP, lateral, oblique x-ray of the right foot demonstrates small malunion of the second PIP joint    Impression: small malunion of the second PIP joint   [address] [facility], APRN - [facility]           Assessment: This post right great toe amputation through the IP joint with second PIP RA and MTP capsulotomy. The patient did report that since his last visit that he had called the second toe on an area rug a few times. Upon his arrival today the patient was visibly deformed but intact. Plan:   3 This is stable chronic illness/condition  Treatment at this time: K wire and Brian Barranquitas were extracted from the second phalanx today without difficulty.   He may now wean from the postop shoe and

## 2023-08-21 ENCOUNTER — OFFICE VISIT (OUTPATIENT)
Dept: RHEUMATOLOGY | Age: 73
End: 2023-08-21
Payer: MEDICARE

## 2023-08-21 VITALS
RESPIRATION RATE: 16 BRPM | DIASTOLIC BLOOD PRESSURE: 84 MMHG | BODY MASS INDEX: 27.36 KG/M2 | SYSTOLIC BLOOD PRESSURE: 148 MMHG | HEART RATE: 68 BPM | HEIGHT: 72 IN | WEIGHT: 202 LBS

## 2023-08-21 DIAGNOSIS — M06.00 SERONEGATIVE RHEUMATOID ARTHRITIS (HCC): ICD-10-CM

## 2023-08-21 DIAGNOSIS — R70.0 ELEVATED SED RATE: ICD-10-CM

## 2023-08-21 DIAGNOSIS — Z79.631 LONG TERM METHOTREXATE USER: ICD-10-CM

## 2023-08-21 DIAGNOSIS — Z23 ENCOUNTER FOR VACCINATION: ICD-10-CM

## 2023-08-21 DIAGNOSIS — Z79.899 LONG-TERM USE OF PLAQUENIL: ICD-10-CM

## 2023-08-21 DIAGNOSIS — Z01.89 ENCOUNTER FOR OTHER SPECIFIED SPECIAL EXAMINATIONS: ICD-10-CM

## 2023-08-21 DIAGNOSIS — M06.00 SERONEGATIVE RHEUMATOID ARTHRITIS (HCC): Primary | ICD-10-CM

## 2023-08-21 DIAGNOSIS — G89.29 CHRONIC PAIN OF LEFT KNEE: ICD-10-CM

## 2023-08-21 DIAGNOSIS — D64.9 NORMOCYTIC ANEMIA: ICD-10-CM

## 2023-08-21 DIAGNOSIS — M25.562 CHRONIC PAIN OF LEFT KNEE: ICD-10-CM

## 2023-08-21 LAB
HAV IGM SER QL: NONREACTIVE
HBV CORE IGM SER QL: NONREACTIVE
HBV SURFACE AG SER QL: NONREACTIVE
HCV AB SER QL: NONREACTIVE

## 2023-08-21 PROCEDURE — 3017F COLORECTAL CA SCREEN DOC REV: CPT | Performed by: INTERNAL MEDICINE

## 2023-08-21 PROCEDURE — 1036F TOBACCO NON-USER: CPT | Performed by: INTERNAL MEDICINE

## 2023-08-21 PROCEDURE — 99214 OFFICE O/P EST MOD 30 MIN: CPT | Performed by: INTERNAL MEDICINE

## 2023-08-21 PROCEDURE — 1123F ACP DISCUSS/DSCN MKR DOCD: CPT | Performed by: INTERNAL MEDICINE

## 2023-08-21 PROCEDURE — 3077F SYST BP >= 140 MM HG: CPT | Performed by: INTERNAL MEDICINE

## 2023-08-21 PROCEDURE — G8427 DOCREV CUR MEDS BY ELIG CLIN: HCPCS | Performed by: INTERNAL MEDICINE

## 2023-08-21 PROCEDURE — 3079F DIAST BP 80-89 MM HG: CPT | Performed by: INTERNAL MEDICINE

## 2023-08-21 PROCEDURE — G8417 CALC BMI ABV UP PARAM F/U: HCPCS | Performed by: INTERNAL MEDICINE

## 2023-08-21 RX ORDER — HYDROXYCHLOROQUINE SULFATE 200 MG/1
200 TABLET, FILM COATED ORAL 2 TIMES DAILY
Qty: 180 TABLET | Refills: 1 | Status: SHIPPED | OUTPATIENT
Start: 2023-08-21

## 2023-08-21 ASSESSMENT — ROUTINE ASSESSMENT OF PATIENT INDEX DATA (RAPID3)
ON A SCALE OF ONE TO TEN, HOW MUCH PAIN HAVE YOU HAD BECAUSE OF YOUR CONDITION OVER THE PAST WEEK?: 5
ON A SCALE OF ONE TO TEN, HOW DIFFICULT WAS IT FOR YOU TO COMPLETE THE LISTED DAILY PHYSICAL TASKS OVER THE LAST WEEK: 0.8
WHEN YOU AWAKENED IN THE MORNING OVER THE LAST WEEK, PLEASE INDICATE THE AMOUNT OF TIME IT TAKES UNTIL YOU ARE AS LIMBER AS YOU WILL BE FOR THE DAY: 30 MIN
ON A SCALE OF ONE TO TEN, CONSIDERING ALL THE WAYS IN WHICH ILLNESS AND HEALTH CONDITIONS MAY AFFECT YOU AT THIS TIME, PLEASE INDICATE BELOW HOW YOU ARE DOING:: 4
ON A SCALE OF ONE TO TEN, HOW MUCH OF A PROBLEM HAS UNUSUAL FATIGUE OR TIREDNESS BEEN FOR YOU OVER THE PAST WEEK?: 5

## 2023-08-21 NOTE — PROGRESS NOTES
reduction of 6.5 represents moderate improvement      ASSESSMENT AND PLAN:  Moi Stein is a 80 y.o. male that is here for evaluation of rheumatoid arthritis. his problems include:    1. Seronegative arthritis  2. Long-term methotrexate, Plaquenil use  3. Normocytic anemia  4. Elevated ESR  5. Chronic left knee pain    Increased disease activity score. Currently taking methotrexate, Plaquenil. Previously used 6 months of sulfasalazine without change in symptoms. He previously used Humira, Simponi Aria with improvement in joint pain. Today, he describes increasing left knee pain which is worse at the beginning of the day. Associated morning stiffness 45 minutes which is increased. Exam confirms multiple tender and small joints. He is open to resuming IV therapy that is covered per insurance but would prefer [facility] if possible. -Check hepatitis, tuberculosis, DrBharat Today  -Discussed the medication simponi aria/humira dosage, usage, goals of therapy, and potential side effects. Discussed potential side effects including but not limited to increased prevalence/incidence of neoplasms, lymphoma, neurologic disease, infection risk including the possibility of life threatening infections, and exacerbation of CHF with anti-TNF use. The patient understood and wishes to proceed with the treatment. - Cont mtx to 10 tabs split dosing plus daily folic acid,  mg bid. ophthalmology evaluation will be completed in 2 weeks. Patient will have this note sent over for review  - Encouraged flu, initiating COVID-vaccine series.   - Patient will complete last gel injection in right knee    Follow-up November 2023 with standing labs    John Odell MD  [address] [facility] [address] [id number] [phone]

## 2023-08-22 ENCOUNTER — TELEPHONE (OUTPATIENT)
Dept: RHEUMATOLOGY | Age: 73
End: 2023-08-22

## 2023-08-24 LAB
BIOMARKER COMMENT: NORMAL
CRP RESULT: 1.2 MG/L
EGF RESULT: 43 PG/ML
FOOTNOTE/HISTORY: NORMAL
IL-6 RESULT: 5 PG/ML
LEPTIN RESULT: 13 NG/ML
Lab: NORMAL
Lab: NORMAL
M TB IFN-G BLD-IMP: NEGATIVE
M TB IFN-G CD4+ T-CELLS BLD-ACNC: 0.01 IU/ML
M TBIFN-G CD4+ CD8+T-CELLS BLD-ACNC: 0.02 IU/ML
MMP-1 RESULT: 5.6 NG/ML
MMP3 RESULT: 59 NG/ML
QUANTIFERON CRITERIA: NORMAL
QUANTIFERON MITOGEN VALUE: >10 IU/ML
QUANTIFERON NIL VALUE: 0.02 IU/ML
RESISTIN RESULT: 3.1 NG/ML
RISK OF RADIOGRAPHIC PROGRESS: 4 %
SAA RESULT: 4.2 UG/ML
TNF-RI RESULT: 1.2 NG/ML
VCAM-1 RESULT: 0.53 UG/ML
VECTRA SCORE: 35
VECTRA(R) LEVEL: NORMAL
VEGF-A RESULT: 230 PG/ML
YKL-40 RESULT: 100 NG/ML

## 2023-08-28 NOTE — TELEPHONE ENCOUNTER
Benefits investigation completed for Simponi Aria:  This is a Traditional Medicare plan. Per Medicare, Product & J-code are billable. The standard Medicare deductible for 2023 is $226. The patient has met $226 of the deductible. Medicare pays 80% and the patient is responsible for 20%. Secondary: BCBS plan F- This is a Medicare Supplement Plan F. This plan covers the Medicare Part B Deductible, Coinsurance, and 100% of the excess  charges. If Medicare pays, the plan will pay. If Medicare denies, the plan will deny. PHONE CALL to patient to schedule the infusion. He is coming tomorrow for his last Gelsyn injection. Scheduled the first Simponi Aria infusion 8/30/23, 10:00.

## 2023-08-29 ENCOUNTER — NURSE ONLY (OUTPATIENT)
Dept: RHEUMATOLOGY | Age: 73
End: 2023-08-29
Payer: MEDICARE

## 2023-08-29 VITALS
HEART RATE: 80 BPM | SYSTOLIC BLOOD PRESSURE: 122 MMHG | HEIGHT: 72 IN | RESPIRATION RATE: 16 BRPM | DIASTOLIC BLOOD PRESSURE: 66 MMHG | WEIGHT: 200 LBS | BODY MASS INDEX: 27.09 KG/M2

## 2023-08-29 DIAGNOSIS — M17.0 BILATERAL PRIMARY OSTEOARTHRITIS OF KNEE: Primary | ICD-10-CM

## 2023-08-29 PROCEDURE — 20610 DRAIN/INJ JOINT/BURSA W/O US: CPT | Performed by: INTERNAL MEDICINE

## 2023-08-29 NOTE — PROGRESS NOTES
After informed consent was obtained and after a time out was taken. under sterile technique (utilizing sterile gloves) the R knee was injected with gelsyn #3. A 21-gauge 1.5 inch needle was advanced using a lateral approach. 0 Cc of clear fluid was aspirated and medication was injected through the same needle. Ethyl chloride spray was used for local anesthetic. ER warnings given & post-care instructions given. The procedure was well tolerated. Noticing painful clicking in L knee. Might have improved symptoms in R knee. First infusion simponi aria planned for tomorrow. No questions about infusion tomorrow.   Scheduled to see me mid November 2023

## 2023-08-30 ENCOUNTER — NURSE ONLY (OUTPATIENT)
Dept: RHEUMATOLOGY | Age: 73
End: 2023-08-30
Payer: MEDICARE

## 2023-08-30 VITALS
TEMPERATURE: 97.9 F | DIASTOLIC BLOOD PRESSURE: 80 MMHG | WEIGHT: 201.6 LBS | HEART RATE: 76 BPM | BODY MASS INDEX: 27.34 KG/M2 | SYSTOLIC BLOOD PRESSURE: 140 MMHG

## 2023-08-30 DIAGNOSIS — M06.00 SERONEGATIVE RHEUMATOID ARTHRITIS (HCC): Primary | ICD-10-CM

## 2023-08-30 PROCEDURE — 96365 THER/PROPH/DIAG IV INF INIT: CPT | Performed by: INTERNAL MEDICINE

## 2023-08-30 NOTE — PROGRESS NOTES
Radha, [facility], [address] [place] [address]  Office : [phone], Fax: [phone] infusion today. 2 mg/kg= 185 mg infused in 100 ml NaCl at a rate of 200 ml/hr over 30 minutes. 15 mg of Shy Dine was wasted. Infusion placed in left forearm. Next infusion in 4 weeks 9/27/23. IV insertion time: 0950  Medication start time: 1000  Medication completion time: 1030    Patient discharged feeling fine and instructed to call the office with any post-infusion issues. Pre-Infusion Questionnaire  Has your insurance changed or have you received a new card since your last visit?  no  Have you had an infection since your last infusion? no  Since your last visit, have you been hospitalized, been to the ER, or Urgent [address] [place] [address] for any reason?  no  Have you recently had GI problems, open wounds, urinary problems, or chest pain? no  Are you currently taking antibiotics?   no  Have you recently had or are you scheduled for any surgical procedures?   no  Have you had a TB test in the last year?   yes, 8/21/23 (negative)  Did you take an antihistamine today?  no  Have you received any vaccinations recently?  no  When was your last appointment with Dr. Ana Mead, Dr. Reid Butts, or Dr. Oscar Hodgson or Dr. Fritz Damon. 8/21/23  When was your last infusion? 8/30/23- 1st today  12. Are you in a skilled nursing facility?       no    Reviewed and Confirmed by Jakub Mathis

## 2023-09-13 RX ORDER — SERTRALINE HYDROCHLORIDE 100 MG/1
100 TABLET, FILM COATED ORAL DAILY
Qty: 90 TABLET | Refills: 1 | Status: SHIPPED | OUTPATIENT
Start: 2023-09-13

## 2023-09-18 ENCOUNTER — OFFICE VISIT (OUTPATIENT)
Dept: INTERNAL MEDICINE CLINIC | Facility: CLINIC | Age: 73
End: 2023-09-18
Payer: MEDICARE

## 2023-09-18 VITALS
DIASTOLIC BLOOD PRESSURE: 80 MMHG | SYSTOLIC BLOOD PRESSURE: 120 MMHG | WEIGHT: 203 LBS | BODY MASS INDEX: 27.5 KG/M2 | HEIGHT: 72 IN

## 2023-09-18 DIAGNOSIS — Z71.89 ACP (ADVANCE CARE PLANNING): ICD-10-CM

## 2023-09-18 DIAGNOSIS — E11.65 TYPE 2 DIABETES MELLITUS WITH HYPERGLYCEMIA, WITH LONG-TERM CURRENT USE OF INSULIN (HCC): Chronic | ICD-10-CM

## 2023-09-18 DIAGNOSIS — D64.9 NORMOCYTIC ANEMIA: Chronic | ICD-10-CM

## 2023-09-18 DIAGNOSIS — R19.7 DIARRHEA, UNSPECIFIED TYPE: Primary | ICD-10-CM

## 2023-09-18 DIAGNOSIS — Z79.4 TYPE 2 DIABETES MELLITUS WITH HYPERGLYCEMIA, WITH LONG-TERM CURRENT USE OF INSULIN (HCC): Chronic | ICD-10-CM

## 2023-09-18 DIAGNOSIS — Z00.00 MEDICARE ANNUAL WELLNESS VISIT, SUBSEQUENT: ICD-10-CM

## 2023-09-18 PROBLEM — M13.0 POLYARTHROPATHY: Status: ACTIVE | Noted: 2023-09-18

## 2023-09-18 PROBLEM — M19.049 LOCALIZED, PRIMARY OSTEOARTHRITIS OF HAND: Status: ACTIVE | Noted: 2023-09-18

## 2023-09-18 PROBLEM — M93.90 OSTEOCHONDROPATHY: Status: ACTIVE | Noted: 2023-09-18

## 2023-09-18 PROCEDURE — 2022F DILAT RTA XM EVC RTNOPTHY: CPT | Performed by: NURSE PRACTITIONER

## 2023-09-18 PROCEDURE — G8417 CALC BMI ABV UP PARAM F/U: HCPCS | Performed by: NURSE PRACTITIONER

## 2023-09-18 PROCEDURE — 3017F COLORECTAL CA SCREEN DOC REV: CPT | Performed by: NURSE PRACTITIONER

## 2023-09-18 PROCEDURE — 3079F DIAST BP 80-89 MM HG: CPT | Performed by: NURSE PRACTITIONER

## 2023-09-18 PROCEDURE — 99204 OFFICE O/P NEW MOD 45 MIN: CPT | Performed by: NURSE PRACTITIONER

## 2023-09-18 PROCEDURE — G0439 PPPS, SUBSEQ VISIT: HCPCS | Performed by: NURSE PRACTITIONER

## 2023-09-18 PROCEDURE — 3044F HG A1C LEVEL LT 7.0%: CPT | Performed by: NURSE PRACTITIONER

## 2023-09-18 PROCEDURE — G8427 DOCREV CUR MEDS BY ELIG CLIN: HCPCS | Performed by: NURSE PRACTITIONER

## 2023-09-18 PROCEDURE — 1123F ACP DISCUSS/DSCN MKR DOCD: CPT | Performed by: NURSE PRACTITIONER

## 2023-09-18 PROCEDURE — 3074F SYST BP LT 130 MM HG: CPT | Performed by: NURSE PRACTITIONER

## 2023-09-18 PROCEDURE — 1036F TOBACCO NON-USER: CPT | Performed by: NURSE PRACTITIONER

## 2023-09-18 ASSESSMENT — LIFESTYLE VARIABLES
HOW MANY STANDARD DRINKS CONTAINING ALCOHOL DO YOU HAVE ON A TYPICAL DAY: PATIENT DOES NOT DRINK
HOW OFTEN DO YOU HAVE A DRINK CONTAINING ALCOHOL: NEVER

## 2023-09-18 ASSESSMENT — PATIENT HEALTH QUESTIONNAIRE - PHQ9
SUM OF ALL RESPONSES TO PHQ QUESTIONS 1-9: 0
SUM OF ALL RESPONSES TO PHQ QUESTIONS 1-9: 0
2. FEELING DOWN, DEPRESSED OR HOPELESS: 0
SUM OF ALL RESPONSES TO PHQ QUESTIONS 1-9: 0
SUM OF ALL RESPONSES TO PHQ QUESTIONS 1-9: 0
SUM OF ALL RESPONSES TO PHQ9 QUESTIONS 1 & 2: 0
1. LITTLE INTEREST OR PLEASURE IN DOING THINGS: 0

## 2023-09-18 NOTE — PROGRESS NOTES
Izabella Diaz III (: 1950) presents today c/o diarrhea for several months. He cannot discern if it is worse after eating. He denies BRBPR or melena. He states the texture of the stool is soft but not watery. No recent travel. He has never had colonoscopy. He had a negative FIT in . He has chronic mild normocytic anemia. No weight loss or night sweats. Blood sugars have been well controlled; averaging 140's fasting. Chief Complaint   Patient presents with    Diarrhea    Medicare AW     Patient Active Problem List   Diagnosis    STEFFEN on CPAP    Essential hypertension    Hypercholesterolemia    Class 1 obesity    BPH associated with nocturia    Chronic pain of both knees    Rheumatoid arthritis of multiple sites without rheumatoid factor (HCC)    Hypothyroidism following radioiodine therapy    Rheumatoid arthritis ([address])    Type 2 diabetes mellitus (HCC)    Elevated LFTs    Anemia    Major depressive disorder, recurrent, in partial remission ([address])    Baker's cyst of knee, right    Acquired claw toe of right foot    Localized, primary osteoarthritis of hand    Polyarthropathy    Osteochondropathy      Reviewed and updated this visit by provider:  Tobacco  Allergies  Meds  Problems  Med Hx  Surg Hx  Fam Hx       Immunizations:  Immunization status: due today, declines update.     Review of Systems - Negative except as stated iN HPI  History obtained from spouse Urvashi Cain), chart review, and the patient  General ROS: negative for - fatigue or fever  Respiratory ROS: no cough, shortness of breath, or wheezing  Cardiovascular ROS: no chest pain or dyspnea on exertion  Gastrointestinal ROS: positive for - change in stools and diarrhea  negative for - abdominal pain, appetite loss, blood in stools, constipation, gas/bloating, heartburn, melena, or nausea/vomiting  Genito-Urinary ROS: no dysuria, trouble voiding, or hematuria    Visit Vitals  /80   Ht 6' (1.829 m)   Wt 203 lb (92.1 kg)

## 2023-09-19 ENCOUNTER — CLINICAL DOCUMENTATION (OUTPATIENT)
Dept: SPIRITUAL SERVICES | Age: 73
End: 2023-09-19

## 2023-09-19 DIAGNOSIS — R19.7 DIARRHEA, UNSPECIFIED TYPE: ICD-10-CM

## 2023-09-19 NOTE — PROGRESS NOTES
Advance Care Planning   Ambulatory ACP Specialist Patient Outreach    Date:  9/19/2023    ACP Specialist:  Triny Gómez    Outreach call to patient in follow-up to ACP Specialist referral from:SWATHI Pantoja CNP    [x] PCP  [] Provider   [] Ambulatory Care Management [] Other     For:                  [x] Advance Directive Assistance              [] Complete Portable DNR order              [] Complete POST/POLST/MOST              [] Code Status Discussion             [] Discuss Goals of Care             [] Early ACP Decision-Making              [x] Other (Specify)  Provider also said: For both pt and spouse (who is a [employer] pt)      Date Referral Received:9/18/23    Next Step:   [] ACP scheduled conversation  [x] Outreach again in one week               [] Email / Mail [address]  [] Email / Mail Advance Directive   [] Closing referral.  Routing closure to referring provider/staff and to ACP Specialist . [] Closure letter mailed to patient with invitation to contact ACP Specialist if / when ready. [] Other (Specify here):         [x] At this time, Healthcare Decision Maker Is: Advance Care Planning   Healthcare Decision Maker:    Primary Decision Maker: [address] [place] [address] [phone]    Secondary Decision Maker: Luz Quinonez Child - [phone]    Secondary Decision Maker: Renata Elida Child - [phone]        [] Primary agent named in scanned advance directive. [x] Legal Next of Kin. [] Unable to determine legal decision maker at this time. Outreaches:         [x] 1st -  Date:  9/19/23               Intervention:  [] Spoke with Patient   [x] Left Voice mail [] Email / Mail    [] VocalIQmadai  [] Other [phone] : Outcomes: Outreach phone call to the patient. Unable to reach, left voicemail message with call back information. Will attempt to follow up in one week.            [] 2nd -  Date:                 Intervention:  [] Spoke with

## 2023-09-20 LAB
C DIFF GDH STL QL: NORMAL
C DIFF TOX A+B STL QL IA: NORMAL
C DIFF TOXIN INTERPRETATION: NORMAL
CLINICAL CONSIDERATION: NORMAL
REFLEX: NORMAL

## 2023-09-21 LAB
BACTERIA SPEC CULT: NORMAL
SERVICE CMNT-IMP: NORMAL

## 2023-09-22 LAB
O+P SPEC MICRO: NORMAL
O+P STL CONC: NORMAL
SPECIMEN SOURCE: NORMAL

## 2023-09-25 ENCOUNTER — HOSPITAL ENCOUNTER (OUTPATIENT)
Dept: ULTRASOUND IMAGING | Age: 73
Discharge: HOME | End: 2023-09-27
Payer: MEDICARE

## 2023-09-25 DIAGNOSIS — R19.7 DIARRHEA, UNSPECIFIED TYPE: ICD-10-CM

## 2023-09-25 PROCEDURE — 76700 US EXAM ABDOM COMPLETE: CPT

## 2023-09-26 DIAGNOSIS — N20.0 NEPHROLITHIASIS: ICD-10-CM

## 2023-09-26 DIAGNOSIS — N28.1 RENAL CYST, LEFT: Primary | ICD-10-CM

## 2023-09-27 ENCOUNTER — CLINICAL DOCUMENTATION (OUTPATIENT)
Dept: SPIRITUAL SERVICES | Age: 73
End: 2023-09-27

## 2023-09-27 ENCOUNTER — HOSPITAL ENCOUNTER (OUTPATIENT)
Dept: CT IMAGING | Age: 73
Discharge: HOME | End: 2023-09-30
Payer: MEDICARE

## 2023-09-27 ENCOUNTER — NURSE ONLY (OUTPATIENT)
Dept: RHEUMATOLOGY | Age: 73
End: 2023-09-27
Payer: MEDICARE

## 2023-09-27 VITALS
SYSTOLIC BLOOD PRESSURE: 124 MMHG | BODY MASS INDEX: 26.99 KG/M2 | DIASTOLIC BLOOD PRESSURE: 76 MMHG | TEMPERATURE: 97.9 F | WEIGHT: 199 LBS | HEART RATE: 81 BPM

## 2023-09-27 DIAGNOSIS — Z79.4 TYPE 2 DIABETES MELLITUS WITH HYPERGLYCEMIA, WITH LONG-TERM CURRENT USE OF INSULIN (HCC): ICD-10-CM

## 2023-09-27 DIAGNOSIS — M06.00 SERONEGATIVE RHEUMATOID ARTHRITIS (HCC): Primary | ICD-10-CM

## 2023-09-27 DIAGNOSIS — N20.0 NEPHROLITHIASIS: ICD-10-CM

## 2023-09-27 DIAGNOSIS — E89.0 HYPOTHYROIDISM FOLLOWING RADIOIODINE THERAPY: ICD-10-CM

## 2023-09-27 DIAGNOSIS — N28.1 RENAL CYST, LEFT: ICD-10-CM

## 2023-09-27 DIAGNOSIS — I25.10 ATHEROSCLEROSIS OF NATIVE CORONARY ARTERY OF NATIVE HEART WITHOUT ANGINA PECTORIS: ICD-10-CM

## 2023-09-27 DIAGNOSIS — E78.2 MIXED HYPERLIPIDEMIA: ICD-10-CM

## 2023-09-27 DIAGNOSIS — E11.65 TYPE 2 DIABETES MELLITUS WITH HYPERGLYCEMIA, WITH LONG-TERM CURRENT USE OF INSULIN (HCC): ICD-10-CM

## 2023-09-27 DIAGNOSIS — E27.8 ADRENAL MASS 1 CM TO 4 CM IN DIAMETER (HCC): Primary | ICD-10-CM

## 2023-09-27 DIAGNOSIS — E11.9 TYPE 2 DIABETES MELLITUS WITHOUT COMPLICATION, WITHOUT LONG-TERM CURRENT USE OF INSULIN (HCC): ICD-10-CM

## 2023-09-27 PROBLEM — K57.90 DIVERTICULOSIS: Status: ACTIVE | Noted: 2023-09-27

## 2023-09-27 PROCEDURE — 74176 CT ABD & PELVIS W/O CONTRAST: CPT

## 2023-09-27 PROCEDURE — 96365 THER/PROPH/DIAG IV INF INIT: CPT | Performed by: INTERNAL MEDICINE

## 2023-09-27 NOTE — PROGRESS NOTES
Radha, [address], [address] [place] [address]  Office : [phone], Fax: [phone] infusion today. 2 mg/kg= 180  mg infused in 100 ml NaCl at a rate of 200 ml/hr over 30 minutes. 20  mg of Albertine Devon was wasted. Infusion placed in left cephalic vein by Chandler Wolf RN . Next infusion in 8  weeks 11/22/2023     IV insertion time: 0900   Medication start time: 7454   Medication completion time: 0935     Patient discharged feeling good  and instructed to call the office with any post-infusion issues. Pre-Infusion Questionnaire  Has your insurance changed or have you received a new card since your last visit?  no  Have you had an infection since your last infusion? no  Since your last visit, have you been hospitalized, been to the ER, or Urgent [address] [place] [address] for any reason?  no  Have you recently had GI problems, open wounds, urinary problems, or chest pain? no  Are you currently taking antibiotics?   no  Have you recently had or are you scheduled for any surgical procedures?   no  Have you had a TB test in the last year?   yes, 8/21/2023   Did you take an antihistamine today?  no  Have you received any vaccinations recently?  no  When was your last appointment with Dr. Salina Prasad, Dr. Munira Burgess, or Dr. Yordan Nugent or Dr. Eddy Crump. 8/21/2023   When was your last infusion? 8/30/2023   12. Are you in a skilled nursing facility?       no    Reviewed and Confirmed by Damián Rosenberg

## 2023-09-29 ENCOUNTER — OFFICE VISIT (OUTPATIENT)
Dept: ORTHOPEDIC SURGERY | Age: 73
End: 2023-09-29

## 2023-09-29 DIAGNOSIS — M20.5X1 ACQUIRED CLAW TOE OF RIGHT FOOT: Primary | ICD-10-CM

## 2023-09-29 PROCEDURE — 99024 POSTOP FOLLOW-UP VISIT: CPT | Performed by: NURSE PRACTITIONER

## 2023-09-29 NOTE — PROGRESS NOTES
Name: Rosa Urbina III  YOB: 1950  Gender: male  MRN: [id number]    Procedure Performed:  Right great toe amputation through interphalangeal joint  Right second proximal interphalangeal joint resection arthroplasty  Right second MTP capsulotomy with angular soft tissue correction           Date of Procedure: 07/20/2023      Subjective: Patient has done okay since his last visit. He is still concerned about the second toe including its appearance as well as how he bears weight on the toe. He does not report the foot causing any pain when he bears weight. Physical Examination: All incisional areas look great and are well-healed. There is still some mild swelling to the great toe as well as the second phalanx. No pain with palpation to the plantar aspect of the second MTP joint. Range of motion to the great toe at the first MTP joint was performed without pain or difficulty. There is some mild erythema present throughout the great and second toes. The second phalanx continues to be curved and point in a downward position. It does have the appearance of a mallet toe. Imaging:   No imaging reviewed          Assessment: This post right great toe potation through the IP joint as well as second PIP RA and MTP capsulotomy. We discussed conservative options today to help with the second phalanx as far as how he bears weight and to prevent skin breakdown. He will attempt to use a toe sleeve over the second phalanx at this time. If and when he becomes ready to discuss revision surgery he will make an appointment with Dr. Jeffrey Suero to discuss this. Plan:   3 This is stable chronic illness/condition  Treatment at this time: Time with no intervention, patient will plateau sleeves to the second phalanx to prevent shearing, friction, and skin breakdown.   Studies ordered: NO XR needed @ Next Visit    Weight-bearing status: WBAT        Return to work/work restrictions: none  No

## 2023-10-02 DIAGNOSIS — Z79.631 LONG TERM METHOTREXATE USER: ICD-10-CM

## 2023-10-02 DIAGNOSIS — M06.00 SERONEGATIVE RHEUMATOID ARTHRITIS (HCC): ICD-10-CM

## 2023-10-02 NOTE — TELEPHONE ENCOUNTER
Pt wife lvm requesting refill of mtx, pended below. Last labs 8/18/23  LOV 8/21/23. Next ov and simponi aria infusion 11/22/23.

## 2023-10-04 ENCOUNTER — CLINICAL DOCUMENTATION (OUTPATIENT)
Dept: CASE MANAGEMENT | Age: 73
End: 2023-10-04

## 2023-10-04 NOTE — ACP (ADVANCE CARE PLANNING)
Advance Care Planning   Ambulatory ACP Specialist Patient Outreach    Date:  10/4/2023    ACP Specialist:  Alexx Gatica [place]    Outreach call to patient in follow-up to ACP Specialist referral from:Paduano, Glo Sicks, APRN - CNP    [x] PCP  [] Provider   [] Ambulatory Care Management [] Other     For:                  [x] Advance Directive Assistance              [] Complete Portable DNR order              [] Complete POST/POLST/MOST              [] Code Status Discussion             [] Discuss Goals of Care             [] Early ACP Decision-Making              [] Other (Specify)    Date Referral Received:9/18/23    Next Step:   [] ACP scheduled conversation  [] Outreach again in one week               [] Email / Mail [address]  [] Email / Mail Advance Directive   [x] Closing referral.  Routing closure to referring provider/staff and to ACP Specialist . [x] Closure letter mailed to patient with invitation to contact ACP Specialist if / when ready. [] Other (Specify here):         [x] At this time, Healthcare Decision Maker Is:    Advance Care Planning   Healthcare Decision Maker:    Primary Decision Maker: [address] [place] [address] [phone]    Secondary Decision Maker: Lizbeth Kang Child - [phone]    Secondary Decision Maker: Amish George Child - [phone]        [] Primary agent named in scanned advance directive. [x] Legal Next of Kin. [] Unable to determine legal decision maker at this time. Outreaches:                [] 3rd -  Date:  10/04/2023              Intervention:  [] Spoke with Patient   [] Left Voice mail [x] Email / Mail    [] Aye  [] Other [id number]) : Outcomes:ACP team has been unable to reach this patient due to recent ACP referral.  ACP team has sent closure letter with team's contact should pt want to move forward in the future. We will move to close this referral at this time.             []  Additional Outreach -  Date:

## 2023-10-10 ENCOUNTER — TELEPHONE (OUTPATIENT)
Dept: UROLOGY | Age: 73
End: 2023-10-10

## 2023-10-10 ENCOUNTER — HOSPITAL ENCOUNTER (OUTPATIENT)
Dept: GENERAL RADIOLOGY | Age: 73
Discharge: HOME | End: 2023-10-13
Payer: MEDICARE

## 2023-10-10 ENCOUNTER — OFFICE VISIT (OUTPATIENT)
Dept: UROLOGY | Age: 73
End: 2023-10-10
Payer: MEDICARE

## 2023-10-10 DIAGNOSIS — N20.0 KIDNEY STONE: ICD-10-CM

## 2023-10-10 DIAGNOSIS — R10.9 FLANK PAIN: ICD-10-CM

## 2023-10-10 DIAGNOSIS — N20.0 KIDNEY STONE: Primary | ICD-10-CM

## 2023-10-10 LAB
BILIRUBIN, URINE, POC: NEGATIVE
BLOOD URINE, POC: NEGATIVE
GLUCOSE URINE, POC: NEGATIVE
KETONES, URINE, POC: NEGATIVE
LEUKOCYTE ESTERASE, URINE, POC: NEGATIVE
NITRITE, URINE, POC: NEGATIVE
PH, URINE, POC: 5.5 (ref 4.6–8)
PROTEIN,URINE, POC: NEGATIVE
SPECIFIC GRAVITY, URINE, POC: >=1.03 (ref 1–1.03)
URINALYSIS CLARITY, POC: NORMAL
URINALYSIS COLOR, POC: NORMAL
UROBILINOGEN, POC: NORMAL

## 2023-10-10 PROCEDURE — G8484 FLU IMMUNIZE NO ADMIN: HCPCS | Performed by: UROLOGY

## 2023-10-10 PROCEDURE — 74018 RADEX ABDOMEN 1 VIEW: CPT

## 2023-10-10 PROCEDURE — 1123F ACP DISCUSS/DSCN MKR DOCD: CPT | Performed by: UROLOGY

## 2023-10-10 PROCEDURE — G8427 DOCREV CUR MEDS BY ELIG CLIN: HCPCS | Performed by: UROLOGY

## 2023-10-10 PROCEDURE — 81003 URINALYSIS AUTO W/O SCOPE: CPT | Performed by: UROLOGY

## 2023-10-10 PROCEDURE — 3017F COLORECTAL CA SCREEN DOC REV: CPT | Performed by: UROLOGY

## 2023-10-10 PROCEDURE — 1036F TOBACCO NON-USER: CPT | Performed by: UROLOGY

## 2023-10-10 PROCEDURE — 99204 OFFICE O/P NEW MOD 45 MIN: CPT | Performed by: UROLOGY

## 2023-10-10 PROCEDURE — G8417 CALC BMI ABV UP PARAM F/U: HCPCS | Performed by: UROLOGY

## 2023-10-10 ASSESSMENT — ENCOUNTER SYMPTOMS
DIARRHEA: 1
RESPIRATORY NEGATIVE: 1
INDIGESTION: 1

## 2023-10-10 NOTE — TELEPHONE ENCOUNTER
----- Message from Ritika Maria DO sent at 10/10/2023 12:18 PM EDT -----  Regarding: surg  LEFT ESWL  1h  Gen  Outpt  Cbc, bmp, ua, ucx  Cipro 400mg iV preop  No rush

## 2023-10-10 NOTE — PROGRESS NOTES
to  proceed with a LEFT ESWL. All risks, benefits and alternatives to the above mentioned procedure were discussed and the patient is willing to proceed at this time. AMBER BRUNS DO    Total time for today's encounter including chart review, result review, documentation and face to face encounter was 46 minutes.

## 2023-10-11 ENCOUNTER — PREP FOR PROCEDURE (OUTPATIENT)
Dept: UROLOGY | Age: 73
End: 2023-10-11

## 2023-10-11 ENCOUNTER — TELEPHONE (OUTPATIENT)
Dept: UROLOGY | Age: 73
End: 2023-10-11

## 2023-10-11 PROBLEM — N20.0 KIDNEY STONE: Status: ACTIVE | Noted: 2023-10-10

## 2023-10-11 NOTE — TELEPHONE ENCOUNTER
Procedures: Procedure(s):   ESWL EXTRACORPOREAL SHOCK WAVE LITHOTRIPSY   Date: 11/9/2023   Time: 1130   Location: [facility] MAIN OR 11

## 2023-11-01 ENCOUNTER — OFFICE VISIT (OUTPATIENT)
Dept: ENDOCRINOLOGY | Age: 73
End: 2023-11-01
Payer: MEDICARE

## 2023-11-01 VITALS
BODY MASS INDEX: 27.53 KG/M2 | SYSTOLIC BLOOD PRESSURE: 118 MMHG | DIASTOLIC BLOOD PRESSURE: 72 MMHG | WEIGHT: 203 LBS | HEART RATE: 77 BPM | OXYGEN SATURATION: 97 %

## 2023-11-01 DIAGNOSIS — E89.0 HYPOTHYROIDISM FOLLOWING RADIOIODINE THERAPY: ICD-10-CM

## 2023-11-01 DIAGNOSIS — R93.5 ABNORMAL FINDINGS ON DIAGNOSTIC IMAGING OF OTHER ABDOMINAL REGIONS, INCLUDING RETROPERITONEUM: ICD-10-CM

## 2023-11-01 DIAGNOSIS — E27.8 ADRENAL NODULE (HCC): Primary | ICD-10-CM

## 2023-11-01 DIAGNOSIS — E27.8 ADRENAL NODULE (HCC): ICD-10-CM

## 2023-11-01 PROBLEM — E27.9 ADRENAL NODULE (HCC): Status: ACTIVE | Noted: 2023-11-01

## 2023-11-01 LAB — TSH W FREE THYROID IF ABNORMAL: 0.39 UIU/ML (ref 0.36–3.74)

## 2023-11-01 PROCEDURE — 99214 OFFICE O/P EST MOD 30 MIN: CPT | Performed by: INTERNAL MEDICINE

## 2023-11-01 PROCEDURE — 1123F ACP DISCUSS/DSCN MKR DOCD: CPT | Performed by: INTERNAL MEDICINE

## 2023-11-01 PROCEDURE — G8427 DOCREV CUR MEDS BY ELIG CLIN: HCPCS | Performed by: INTERNAL MEDICINE

## 2023-11-01 PROCEDURE — 3017F COLORECTAL CA SCREEN DOC REV: CPT | Performed by: INTERNAL MEDICINE

## 2023-11-01 PROCEDURE — 1036F TOBACCO NON-USER: CPT | Performed by: INTERNAL MEDICINE

## 2023-11-01 PROCEDURE — G8484 FLU IMMUNIZE NO ADMIN: HCPCS | Performed by: INTERNAL MEDICINE

## 2023-11-01 PROCEDURE — 3074F SYST BP LT 130 MM HG: CPT | Performed by: INTERNAL MEDICINE

## 2023-11-01 PROCEDURE — G8417 CALC BMI ABV UP PARAM F/U: HCPCS | Performed by: INTERNAL MEDICINE

## 2023-11-01 PROCEDURE — 3078F DIAST BP <80 MM HG: CPT | Performed by: INTERNAL MEDICINE

## 2023-11-01 RX ORDER — DEXAMETHASONE 1 MG
TABLET ORAL
Qty: 1 TABLET | Refills: 0 | Status: SHIPPED | OUTPATIENT
Start: 2023-11-01 | End: 2023-11-03 | Stop reason: ALTCHOICE

## 2023-11-01 RX ORDER — LEVOTHYROXINE SODIUM 0.15 MG/1
150 TABLET ORAL
Qty: 90 TABLET | Refills: 3
Start: 2023-11-01

## 2023-11-01 ASSESSMENT — ENCOUNTER SYMPTOMS
CONSTIPATION: 0
DIARRHEA: 1

## 2023-11-01 NOTE — PROGRESS NOTES
Frnasisco Handy MD, [facility]  [address] [address], [address]  Phone [phone]  Facsimile [phone]          Todd Ray III is a 68 y.o. male seen 11/1/2023 at the request of ZHANG Farrar for the evaluation of adrenal mass        ASSESSMENT AND PLAN:    1. Adrenal nodule ([address])  CT abdomen without contrast 9/2023 revealed the presence of a 1 cm indeterminate left adrenal nodule. I will order a CT abdomen with and without contrast with adrenal washout protocol for further evaluation. I will order a 1 mg overnight dexamethasone suppression test, aldosterone to renin ratio and plasma metanephrines to exclude Cushing's syndrome, primary hyperaldosteronism and pheochromocytoma, respectively. 2. Abnormal findings on diagnostic imaging of other abdominal regions, including retroperitoneum    3. Hypothyroidism following radioiodine therapy  I will assess his thyroid function today. - levothyroxine (SYNTHROID) 150 MCG tablet; Take 1 tablet by mouth every morning (before breakfast)  Dispense: 90 tablet; Refill: 3      Follow-up and Dispositions    Return To be determined. HISTORY OF PRESENT ILLNESS:    ADRENAL NODULE    Presentation/Diagnosis: Incidentally noted on CT abdomen. Symptoms: He has a history of hypertension, well controlled on 2 agents. No history of hypokalemia. He reports urinary frequency. Denies polyuria, polydipsia, muscle cramps. Denies spells of pallor, flushing, headaches, palpitations, tremors, diaphoresis, paroxysmal hypertension. Denies recent weight gain. He has a history of type 2 diabetes mellitus. Denies facial plethora, central obesity, proximal muscle weakness (he reports diffuse muscle weakness and myalgias). He reports easy bruising. Denies impaired wound healing, thinning of the skin, new striae, depression.   He has rheumatoid arthritis but does not take

## 2023-11-02 DIAGNOSIS — E27.8 ADRENAL NODULE (HCC): ICD-10-CM

## 2023-11-02 LAB — ACTH PLAS-MCNC: 10.1 PG/ML (ref 7.2–63.3)

## 2023-11-03 ENCOUNTER — HOSPITAL ENCOUNTER (OUTPATIENT)
Dept: SURGERY | Age: 73
End: 2023-11-03
Payer: MEDICARE

## 2023-11-03 ENCOUNTER — INITIAL CONSULT (OUTPATIENT)
Age: 73
End: 2023-11-03

## 2023-11-03 VITALS
RESPIRATION RATE: 16 BRPM | WEIGHT: 203.5 LBS | OXYGEN SATURATION: 100 % | BODY MASS INDEX: 30.14 KG/M2 | DIASTOLIC BLOOD PRESSURE: 79 MMHG | HEIGHT: 69 IN | HEART RATE: 67 BPM | TEMPERATURE: 97.5 F | SYSTOLIC BLOOD PRESSURE: 138 MMHG

## 2023-11-03 VITALS
DIASTOLIC BLOOD PRESSURE: 68 MMHG | SYSTOLIC BLOOD PRESSURE: 138 MMHG | WEIGHT: 205 LBS | HEART RATE: 65 BPM | HEIGHT: 72 IN | BODY MASS INDEX: 27.77 KG/M2

## 2023-11-03 DIAGNOSIS — I10 ESSENTIAL HYPERTENSION: ICD-10-CM

## 2023-11-03 DIAGNOSIS — E78.2 MIXED HYPERLIPIDEMIA: ICD-10-CM

## 2023-11-03 DIAGNOSIS — G47.33 OSA ON CPAP: ICD-10-CM

## 2023-11-03 DIAGNOSIS — I25.10 CORONARY ARTERY DISEASE INVOLVING NATIVE CORONARY ARTERY OF NATIVE HEART WITHOUT ANGINA PECTORIS: Primary | ICD-10-CM

## 2023-11-03 DIAGNOSIS — I73.9 PAD (PERIPHERAL ARTERY DISEASE) (HCC): ICD-10-CM

## 2023-11-03 LAB
ANION GAP SERPL CALC-SCNC: 6 MMOL/L (ref 2–11)
APPEARANCE UR: CLEAR
BILIRUB UR QL: NEGATIVE
BUN SERPL-MCNC: 24 MG/DL (ref 8–23)
CALCIUM SERPL-MCNC: 9.2 MG/DL (ref 8.3–10.4)
CHLORIDE SERPL-SCNC: 107 MMOL/L (ref 101–110)
CO2 SERPL-SCNC: 28 MMOL/L (ref 21–32)
COLOR UR: NORMAL
CORTIS BS SERPL-MCNC: 2.5 UG/DL
CREAT SERPL-MCNC: 0.96 MG/DL (ref 0.8–1.5)
ERYTHROCYTE [DISTWIDTH] IN BLOOD BY AUTOMATED COUNT: 14.6 % (ref 11.9–14.6)
GLUCOSE SERPL-MCNC: 114 MG/DL (ref 65–100)
GLUCOSE UR STRIP.AUTO-MCNC: NEGATIVE MG/DL
HCT VFR BLD AUTO: 35.5 % (ref 41.1–50.3)
HGB BLD-MCNC: 11.5 G/DL (ref 13.6–17.2)
HGB UR QL STRIP: NEGATIVE
KETONES UR QL STRIP.AUTO: NEGATIVE MG/DL
LEUKOCYTE ESTERASE UR QL STRIP.AUTO: NEGATIVE
MCH RBC QN AUTO: 32 PG (ref 26.1–32.9)
MCHC RBC AUTO-ENTMCNC: 32.4 G/DL (ref 31.4–35)
MCV RBC AUTO: 98.9 FL (ref 82–102)
NITRITE UR QL STRIP.AUTO: NEGATIVE
NRBC # BLD: 0 K/UL (ref 0–0.2)
PH UR STRIP: 5 (ref 5–9)
PLATELET # BLD AUTO: 362 K/UL (ref 150–450)
PMV BLD AUTO: 10.1 FL (ref 9.4–12.3)
POTASSIUM SERPL-SCNC: 4.7 MMOL/L (ref 3.5–5.1)
PROT UR STRIP-MCNC: NEGATIVE MG/DL
RBC # BLD AUTO: 3.59 M/UL (ref 4.23–5.6)
SODIUM SERPL-SCNC: 141 MMOL/L (ref 133–143)
SP GR UR REFRACTOMETRY: 1.02 (ref 1–1.02)
UROBILINOGEN UR QL STRIP.AUTO: 0.2 EU/DL (ref 0.2–1)
WBC # BLD AUTO: 9.6 K/UL (ref 4.3–11.1)

## 2023-11-03 PROCEDURE — 85027 COMPLETE CBC AUTOMATED: CPT

## 2023-11-03 PROCEDURE — 80048 BASIC METABOLIC PNL TOTAL CA: CPT

## 2023-11-03 PROCEDURE — 81003 URINALYSIS AUTO W/O SCOPE: CPT

## 2023-11-03 ASSESSMENT — ENCOUNTER SYMPTOMS
WHEEZING: 0
STRIDOR: 0
HEMOPTYSIS: 0
EYE REDNESS: 0
ABDOMINAL PAIN: 0
HEMATOCHEZIA: 0
DOUBLE VISION: 0
HEMATEMESIS: 0
HOARSE VOICE: 0

## 2023-11-03 ASSESSMENT — PAIN DESCRIPTION - DESCRIPTORS: DESCRIPTORS: ACHING

## 2023-11-03 ASSESSMENT — PAIN DESCRIPTION - ORIENTATION: ORIENTATION: RIGHT;LEFT

## 2023-11-03 ASSESSMENT — PAIN SCALES - GENERAL: PAINLEVEL_OUTOF10: 3

## 2023-11-03 ASSESSMENT — PAIN DESCRIPTION - LOCATION: LOCATION: KNEE

## 2023-11-03 NOTE — PERIOP NOTE
PLEASE CONTINUE TAKING ALL PRESCRIPTION MEDICATIONS UP TO THE DAY OF SURGERY UNLESS OTHERWISE DIRECTED BELOW. You may take Tylenol, allergy,  and/or indigestion medications. TAKE ONLY THESE MEDICATIONS ON THE DAY OF SURGERY   Amlodipine   Hydroxychloroquine (Plaquenil)   Levothyroxine (Synthroid)  Sertraline (Zoloft)     DISCONTINUE all vitamins and supplements 7 days prior to surgery. DISCONTINUE Non-Steroidal Anti-Inflammatory (NSAIDS) such as Advil and Aleve 5 days prior to surgery. Home Medications to Hold- please continue all other medications except these. Folic Acid  Multivitamins   Vitamin C  Vitamin D  Zinc   Naproxen (Naprosyn)     Comments     Bring CPAP machine the day of surgery   On the day before surgery please take 2 Tylenol in the morning and then again before bed. You may use either regular or extra strength. Please do not bring home medications with you on the day of surgery unless otherwise directed by your nurse. If you are instructed to bring home medications, please give them to your nurse as they will be administered by the nursing staff. If you have any questions, please call [address] [place] [address] [phone]. A copy of this note was provided to the patient for reference.

## 2023-11-03 NOTE — PROGRESS NOTES
an infection with underlying diabetes has claudication pain in both lower extremities but the right is much worse than the left. History of DVT-had a DVT in the right popliteal vein transiently in 2022 but a follow-up study showed resolution of this and he is off anticoagulation at this point. Past Medical History, Past Surgical History, Family history, Social History, and Medications were all reviewed with the patient today and updated as necessary.      No Known Allergies  Patient Active Problem List   Diagnosis    STEFFEN on CPAP    Essential hypertension    Hypercholesterolemia    Class 1 obesity    BPH associated with nocturia    Chronic pain of both knees    Rheumatoid arthritis of multiple sites without rheumatoid factor (HCC)    Hypothyroidism following radioiodine therapy    Rheumatoid arthritis ([address])    Type 2 diabetes mellitus (HCC)    Elevated LFTs    Normocytic anemia    Major depressive disorder, recurrent, in partial remission ([address])    Baker's cyst of knee, right    Acquired claw toe of right foot    Localized, primary osteoarthritis of hand    Polyarthropathy    Osteochondropathy    Diverticulosis    Kidney stone    Adrenal nodule ([address])     Past Medical History:   Diagnosis Date    Acquired hypothyroidism 3/27/2016    after treatment for Graves, levothyroxine    Acute deep vein thrombosis (DVT) ([address]) 2022    right leg, off blood thinners    Anemia     Hgb 12.1 on 4/28/23    Bilateral knee pain 05/2020    Dr. Ami Jacobs     Cataracts, bilateral     Chronic deep vein thrombosis (DVT) of popliteal vein of right lower extremity ([address]) 4/12/2021    Chronic pain of both knees 4/12/2021    COVID-19 2023    COVID-19 vaccine series declined 2021    Depression 3/20/2015    zoloft daily    Diabetic eye exam [facility]) 08/26/2020    [facility]    Diverticulosis     Eye exam, routine 09/2020    [facility]    Graves disease     S/p Ablation therapy-nuclear med    Hypertension     managed with meds    Kidney stone

## 2023-11-03 NOTE — PERIOP NOTE
Latest Reference Range & Units 11/03/23 11:21   Sodium 133 - 143 mmol/L 141   Potassium 3.5 - 5.1 mmol/L 4.7   Chloride 101 - 110 mmol/L 107   CO2 21 - 32 mmol/L 28   BUN,BUNPL 8 - 23 MG/DL 24 (H)   Creatinine 0.8 - 1.5 MG/DL 0.96   Anion Gap 2 - 11 mmol/L 6   Est, Glom Filt Rate >60 ml/min/1.73m2 >60   Glucose, Random 65 - 100 mg/dL 114 (H)   CALCIUM, SERUM, [id number] 8.3 - 10.4 MG/DL 9.2   WBC 4.3 - 11.1 K/uL 9.6   RBC 4.23 - 5.6 M/uL 3.59 (L)   Hemoglobin Quant 13.6 - 17.2 g/dL 11.5 (L)   Hematocrit 41.1 - 50.3 % 35.5 (L)   MCV 82.0 - 102.0 FL 98.9   MCH 26.1 - 32.9 PG 32.0   MCHC 31.4 - 35.0 g/dL 32.4   MPV 9.4 - 12.3 FL 10.1   RDW 11.9 - 14.6 % 14.6   Platelet Count 178 - 450 K/uL 362   Nucleated Red Blood Cells 0.0 - 0.2 K/uL 0.00   Color, UA -   YELLOW/STRAW   Glucose, UA mg/dL Negative   Bilirubin, Urine NEG   Negative   Ketones, Urine NEG mg/dL Negative   Specific Gravity, UA 1.001 - 1.023   1.018   Blood, Urine NEG   Negative   Protein, UA NEG mg/dL Negative   Urobilinogen, Urine 0.2 - 1.0 EU/dL 0.2   Nitrite, Urine NEG   Negative   Leukocyte Esterase, Urine NEG   Negative   Appearance -   CLEAR   pH, Urine 5.0 - 9.0   5.0   (H): Data is abnormally high  (L): Data is abnormally low    labs within anesthesia guidelines, no follow-up required. Labs automatically routed to ordering provider via Epic documentation.

## 2023-11-03 NOTE — PERIOP NOTE
Patient verified name and     Order for consent  found in EHR and matches case posting; patient verified. Type 1B surgery, walk-in assessment complete. Labs per surgeon: CBC, BMP, U/A; results PENDING  Labs per anesthesia protocol: SQBS (DOS) ; glucose 114  EKG: LAST NOTED ON 11/3/23 at Cardiologist      *Chart placed for anesthesia review due to patient ECHO and Stress test being ordered but not being completed until after surgery). Patient denies any chest pain or SOB. States cardiologist is ordering procedures for prevention measures. Hospital approved surgical skin cleanser and instructions given per hospital policy. Patient provided with and instructed on educational handouts including Guide to Surgery, Pain Management, Hand Hygiene, Blood Transfusion Education, and [facility] Brochure. Patient answered medical/surgical history questions at their best of ability. All prior to admission medications documented in [facility]. Original medication prescription bottle NOT visualized during patient appointment. Patient instructed to hold all vitamins 7 days prior to surgery and NSAIDS 5 days prior to surgery, patient verbalized understanding. Patient teach back successful and patient demonstrates knowledge of instructions.

## 2023-11-06 ENCOUNTER — TELEPHONE (OUTPATIENT)
Dept: SURGERY | Age: 73
End: 2023-11-06

## 2023-11-06 ENCOUNTER — TELEPHONE (OUTPATIENT)
Dept: UROLOGY | Age: 73
End: 2023-11-06

## 2023-11-06 NOTE — PROGRESS NOTES
Dr. Resendiz Fairly reviewed chart. New order received \"he should at least have his echo prior to surgery if at all possible. \" Request for Echo appt sent to Non Invasive Supervisor. Surgeon's office also notified.

## 2023-11-06 NOTE — TELEPHONE ENCOUNTER
Dr. Chika Mckay reviewed chart. New order received \"he should at least have his echo prior to surgery if at all possible. \" Request for Echo appt sent to Non Invasive Supervisor.

## 2023-11-06 NOTE — TELEPHONE ENCOUNTER
Patient called to see if okay to continue taking imodium until surgery. Do you know if this is okay?

## 2023-11-07 ENCOUNTER — HOSPITAL ENCOUNTER (OUTPATIENT)
Dept: NON INVASIVE DIAGNOSTICS | Age: 73
Discharge: HOME | End: 2023-11-09
Attending: ANESTHESIOLOGY
Payer: MEDICARE

## 2023-11-07 DIAGNOSIS — I10 ESSENTIAL HYPERTENSION: ICD-10-CM

## 2023-11-07 LAB
ECHO AO ASC DIAM: 3.4 CM
ECHO AO ROOT DIAM: 4.2 CM
ECHO AV AREA PEAK VELOCITY: 2.5 CM2
ECHO AV AREA VTI: 2.7 CM2
ECHO AV MEAN GRADIENT: 5 MMHG
ECHO AV MEAN VELOCITY: 1 M/S
ECHO AV PEAK GRADIENT: 9 MMHG
ECHO AV PEAK VELOCITY: 1.5 M/S
ECHO AV VELOCITY RATIO: 0.67
ECHO AV VTI: 29 CM
ECHO EST RA PRESSURE: 3 MMHG
ECHO IVC PROX: 1.4 CM
ECHO LA AREA 2C: 24.6 CM2
ECHO LA AREA 4C: 21.7 CM2
ECHO LA DIAMETER: 3.1 CM
ECHO LA MAJOR AXIS: 5.7 CM
ECHO LA MINOR AXIS: 6 CM
ECHO LA TO AORTIC ROOT RATIO: 0.74
ECHO LA VOL A-L A2C: 82 ML (ref 18–58)
ECHO LA VOL A-L A4C: 68 ML (ref 18–58)
ECHO LA VOL BP: 77 ML (ref 18–58)
ECHO LV E' LATERAL VELOCITY: 10 CM/S
ECHO LV E' SEPTAL VELOCITY: 7 CM/S
ECHO LV EDV A2C: 139 ML
ECHO LV EDV A4C: 151 ML
ECHO LV EJECTION FRACTION A2C: 50 %
ECHO LV EJECTION FRACTION A4C: 57 %
ECHO LV EJECTION FRACTION BIPLANE: 53 % (ref 55–100)
ECHO LV ESV A2C: 69 ML
ECHO LV ESV A4C: 65 ML
ECHO LV FRACTIONAL SHORTENING: 26 % (ref 28–44)
ECHO LV INTERNAL DIMENSION DIASTOLIC: 5.3 CM (ref 4.2–5.9)
ECHO LV INTERNAL DIMENSION SYSTOLIC: 3.9 CM
ECHO LV IVSD: 1.5 CM (ref 0.6–1)
ECHO LV MASS 2D: 286.9 G (ref 88–224)
ECHO LV POSTERIOR WALL DIASTOLIC: 1.1 CM (ref 0.6–1)
ECHO LV RELATIVE WALL THICKNESS RATIO: 0.42
ECHO LVOT AREA: 3.8 CM2
ECHO LVOT AV VTI INDEX: 0.71
ECHO LVOT DIAM: 2.2 CM
ECHO LVOT MEAN GRADIENT: 2 MMHG
ECHO LVOT PEAK GRADIENT: 4 MMHG
ECHO LVOT PEAK VELOCITY: 1 M/S
ECHO LVOT SV: 78.3 ML
ECHO LVOT VTI: 20.6 CM
ECHO MV A VELOCITY: 0.87 M/S
ECHO MV E DECELERATION TIME (DT): 254 MS
ECHO MV E VELOCITY: 0.7 M/S
ECHO MV E/A RATIO: 0.8
ECHO MV E/E' LATERAL: 7
ECHO MV E/E' RATIO (AVERAGED): 8.5
ECHO MV E/E' SEPTAL: 10
ECHO PV ACCELERATION TIME (AT): 140 MS
ECHO PV MAX VELOCITY: 1 M/S
ECHO PV PEAK GRADIENT: 4 MMHG
ECHO RV BASAL DIMENSION: 3.9 CM
ECHO RV FREE WALL PEAK S': 17 CM/S
ECHO RV TAPSE: 1.9 CM (ref 1.7–?)

## 2023-11-07 PROCEDURE — 93306 TTE W/DOPPLER COMPLETE: CPT | Performed by: INTERNAL MEDICINE

## 2023-11-07 PROCEDURE — 93306 TTE W/DOPPLER COMPLETE: CPT

## 2023-11-08 ENCOUNTER — ANESTHESIA EVENT (OUTPATIENT)
Dept: SURGERY | Age: 73
End: 2023-11-08
Payer: MEDICARE

## 2023-11-08 LAB
ALDOST SERPL-MCNC: 1.1 NG/DL (ref 0–30)
RENIN PLAS-CCNC: 1.14 NG/ML/HR (ref 0.17–5.38)

## 2023-11-09 ENCOUNTER — ANESTHESIA (OUTPATIENT)
Dept: SURGERY | Age: 73
End: 2023-11-09
Payer: MEDICARE

## 2023-11-09 ENCOUNTER — HOSPITAL ENCOUNTER (OUTPATIENT)
Age: 73
Setting detail: OUTPATIENT SURGERY
Discharge: HOME | End: 2023-11-09
Attending: UROLOGY | Admitting: UROLOGY
Payer: MEDICARE

## 2023-11-09 VITALS
SYSTOLIC BLOOD PRESSURE: 133 MMHG | DIASTOLIC BLOOD PRESSURE: 71 MMHG | BODY MASS INDEX: 30.18 KG/M2 | TEMPERATURE: 98 F | WEIGHT: 203.8 LBS | OXYGEN SATURATION: 96 % | RESPIRATION RATE: 16 BRPM | HEIGHT: 69 IN | HEART RATE: 71 BPM

## 2023-11-09 DIAGNOSIS — I10 ESSENTIAL HYPERTENSION: Primary | ICD-10-CM

## 2023-11-09 DIAGNOSIS — N20.0 KIDNEY STONE: ICD-10-CM

## 2023-11-09 LAB
GLUCOSE BLD STRIP.AUTO-MCNC: 149 MG/DL (ref 65–100)
SERVICE CMNT-IMP: ABNORMAL

## 2023-11-09 PROCEDURE — 6370000000 HC RX 637 (ALT 250 FOR IP): Performed by: ANESTHESIOLOGY

## 2023-11-09 PROCEDURE — 7100000000 HC PACU RECOVERY - FIRST 15 MIN: Performed by: UROLOGY

## 2023-11-09 PROCEDURE — 3600000004 HC SURGERY LEVEL 4 BASE: Performed by: UROLOGY

## 2023-11-09 PROCEDURE — 3600000014 HC SURGERY LEVEL 4 ADDTL 15MIN: Performed by: UROLOGY

## 2023-11-09 PROCEDURE — 7100000010 HC PHASE II RECOVERY - FIRST 15 MIN: Performed by: UROLOGY

## 2023-11-09 PROCEDURE — 7100000001 HC PACU RECOVERY - ADDTL 15 MIN: Performed by: UROLOGY

## 2023-11-09 PROCEDURE — 6360000002 HC RX W HCPCS: Performed by: UROLOGY

## 2023-11-09 PROCEDURE — 2580000003 HC RX 258: Performed by: ANESTHESIOLOGY

## 2023-11-09 PROCEDURE — 3700000001 HC ADD 15 MINUTES (ANESTHESIA): Performed by: UROLOGY

## 2023-11-09 PROCEDURE — 3700000000 HC ANESTHESIA ATTENDED CARE: Performed by: UROLOGY

## 2023-11-09 PROCEDURE — 2580000003 HC RX 258

## 2023-11-09 PROCEDURE — 82962 GLUCOSE BLOOD TEST: CPT

## 2023-11-09 PROCEDURE — 2500000003 HC RX 250 WO HCPCS

## 2023-11-09 PROCEDURE — 6360000002 HC RX W HCPCS

## 2023-11-09 RX ORDER — HYDROMORPHONE HYDROCHLORIDE 2 MG/ML
0.5 INJECTION, SOLUTION INTRAMUSCULAR; INTRAVENOUS; SUBCUTANEOUS EVERY 5 MIN PRN
Status: DISCONTINUED | OUTPATIENT
Start: 2023-11-09 | End: 2023-11-09 | Stop reason: HOSPADM

## 2023-11-09 RX ORDER — HYDROMORPHONE HYDROCHLORIDE 2 MG/ML
0.25 INJECTION, SOLUTION INTRAMUSCULAR; INTRAVENOUS; SUBCUTANEOUS EVERY 5 MIN PRN
Status: DISCONTINUED | OUTPATIENT
Start: 2023-11-09 | End: 2023-11-09 | Stop reason: HOSPADM

## 2023-11-09 RX ORDER — FENTANYL CITRATE 50 UG/ML
100 INJECTION, SOLUTION INTRAMUSCULAR; INTRAVENOUS
Status: DISCONTINUED | OUTPATIENT
Start: 2023-11-09 | End: 2023-11-09 | Stop reason: HOSPADM

## 2023-11-09 RX ORDER — ONDANSETRON 2 MG/ML
INJECTION INTRAMUSCULAR; INTRAVENOUS PRN
Status: DISCONTINUED | OUTPATIENT
Start: 2023-11-09 | End: 2023-11-09 | Stop reason: SDUPTHER

## 2023-11-09 RX ORDER — SODIUM CHLORIDE 9 MG/ML
INJECTION, SOLUTION INTRAVENOUS PRN
Status: DISCONTINUED | OUTPATIENT
Start: 2023-11-09 | End: 2023-11-09 | Stop reason: HOSPADM

## 2023-11-09 RX ORDER — ACETAMINOPHEN 500 MG
1000 TABLET ORAL ONCE
Status: COMPLETED | OUTPATIENT
Start: 2023-11-09 | End: 2023-11-09

## 2023-11-09 RX ORDER — PROPOFOL 10 MG/ML
INJECTION, EMULSION INTRAVENOUS PRN
Status: DISCONTINUED | OUTPATIENT
Start: 2023-11-09 | End: 2023-11-09 | Stop reason: SDUPTHER

## 2023-11-09 RX ORDER — HYDRALAZINE HYDROCHLORIDE 20 MG/ML
10 INJECTION INTRAMUSCULAR; INTRAVENOUS
Status: DISCONTINUED | OUTPATIENT
Start: 2023-11-09 | End: 2023-11-09 | Stop reason: HOSPADM

## 2023-11-09 RX ORDER — FENTANYL CITRATE 50 UG/ML
INJECTION, SOLUTION INTRAMUSCULAR; INTRAVENOUS PRN
Status: DISCONTINUED | OUTPATIENT
Start: 2023-11-09 | End: 2023-11-09 | Stop reason: SDUPTHER

## 2023-11-09 RX ORDER — LIDOCAINE HYDROCHLORIDE 10 MG/ML
1 INJECTION, SOLUTION INFILTRATION; PERINEURAL
Status: DISCONTINUED | OUTPATIENT
Start: 2023-11-09 | End: 2023-11-09 | Stop reason: HOSPADM

## 2023-11-09 RX ORDER — ONDANSETRON 2 MG/ML
4 INJECTION INTRAMUSCULAR; INTRAVENOUS
Status: DISCONTINUED | OUTPATIENT
Start: 2023-11-09 | End: 2023-11-09 | Stop reason: HOSPADM

## 2023-11-09 RX ORDER — SODIUM CHLORIDE 0.9 % (FLUSH) 0.9 %
5-40 SYRINGE (ML) INJECTION EVERY 12 HOURS SCHEDULED
Status: DISCONTINUED | OUTPATIENT
Start: 2023-11-09 | End: 2023-11-09 | Stop reason: HOSPADM

## 2023-11-09 RX ORDER — SODIUM CHLORIDE 0.9 % (FLUSH) 0.9 %
5-40 SYRINGE (ML) INJECTION PRN
Status: DISCONTINUED | OUTPATIENT
Start: 2023-11-09 | End: 2023-11-09 | Stop reason: HOSPADM

## 2023-11-09 RX ORDER — SODIUM CHLORIDE, SODIUM LACTATE, POTASSIUM CHLORIDE, CALCIUM CHLORIDE 600; 310; 30; 20 MG/100ML; MG/100ML; MG/100ML; MG/100ML
INJECTION, SOLUTION INTRAVENOUS CONTINUOUS
Status: DISCONTINUED | OUTPATIENT
Start: 2023-11-09 | End: 2023-11-09 | Stop reason: HOSPADM

## 2023-11-09 RX ORDER — LIDOCAINE HYDROCHLORIDE 20 MG/ML
INJECTION, SOLUTION EPIDURAL; INFILTRATION; INTRACAUDAL; PERINEURAL PRN
Status: DISCONTINUED | OUTPATIENT
Start: 2023-11-09 | End: 2023-11-09 | Stop reason: SDUPTHER

## 2023-11-09 RX ORDER — DEXAMETHASONE SODIUM PHOSPHATE 4 MG/ML
INJECTION, SOLUTION INTRA-ARTICULAR; INTRALESIONAL; INTRAMUSCULAR; INTRAVENOUS; SOFT TISSUE PRN
Status: DISCONTINUED | OUTPATIENT
Start: 2023-11-09 | End: 2023-11-09 | Stop reason: SDUPTHER

## 2023-11-09 RX ORDER — HYDROCODONE BITARTRATE AND ACETAMINOPHEN 5; 325 MG/1; MG/1
1 TABLET ORAL EVERY 6 HOURS PRN
Qty: 10 TABLET | Refills: 0 | Status: SHIPPED | OUTPATIENT
Start: 2023-11-09 | End: 2023-11-12

## 2023-11-09 RX ORDER — OXYCODONE HYDROCHLORIDE 5 MG/1
5 TABLET ORAL
Status: DISCONTINUED | OUTPATIENT
Start: 2023-11-09 | End: 2023-11-09 | Stop reason: HOSPADM

## 2023-11-09 RX ORDER — LABETALOL HYDROCHLORIDE 5 MG/ML
10 INJECTION, SOLUTION INTRAVENOUS
Status: DISCONTINUED | OUTPATIENT
Start: 2023-11-09 | End: 2023-11-09 | Stop reason: HOSPADM

## 2023-11-09 RX ORDER — MIDAZOLAM HYDROCHLORIDE 2 MG/2ML
2 INJECTION, SOLUTION INTRAMUSCULAR; INTRAVENOUS
Status: DISCONTINUED | OUTPATIENT
Start: 2023-11-09 | End: 2023-11-09 | Stop reason: HOSPADM

## 2023-11-09 RX ORDER — PROCHLORPERAZINE EDISYLATE 5 MG/ML
5 INJECTION INTRAMUSCULAR; INTRAVENOUS
Status: DISCONTINUED | OUTPATIENT
Start: 2023-11-09 | End: 2023-11-09 | Stop reason: HOSPADM

## 2023-11-09 RX ORDER — EPHEDRINE SULFATE/0.9% NACL/PF 50 MG/5 ML
SYRINGE (ML) INTRAVENOUS PRN
Status: DISCONTINUED | OUTPATIENT
Start: 2023-11-09 | End: 2023-11-09 | Stop reason: SDUPTHER

## 2023-11-09 RX ADMIN — ACETAMINOPHEN 1000 MG: 500 TABLET, FILM COATED ORAL at 10:05

## 2023-11-09 RX ADMIN — LIDOCAINE HYDROCHLORIDE 80 MG: 20 INJECTION, SOLUTION EPIDURAL; INFILTRATION; INTRACAUDAL; PERINEURAL at 11:34

## 2023-11-09 RX ADMIN — PHENYLEPHRINE HYDROCHLORIDE 300 MCG: 10 INJECTION INTRAVENOUS at 11:54

## 2023-11-09 RX ADMIN — DEXAMETHASONE SODIUM PHOSPHATE 4 MG: 4 INJECTION INTRA-ARTICULAR; INTRALESIONAL; INTRAMUSCULAR; INTRAVENOUS; SOFT TISSUE at 11:40

## 2023-11-09 RX ADMIN — ONDANSETRON 4 MG: 2 INJECTION INTRAMUSCULAR; INTRAVENOUS at 11:40

## 2023-11-09 RX ADMIN — PHENYLEPHRINE HYDROCHLORIDE 200 MCG: 10 INJECTION INTRAVENOUS at 12:11

## 2023-11-09 RX ADMIN — FENTANYL CITRATE 25 MCG: 50 INJECTION, SOLUTION INTRAMUSCULAR; INTRAVENOUS at 11:43

## 2023-11-09 RX ADMIN — Medication 10 MG: at 12:11

## 2023-11-09 RX ADMIN — Medication 10 MG: at 12:00

## 2023-11-09 RX ADMIN — PROPOFOL 170 MG: 10 INJECTION, EMULSION INTRAVENOUS at 11:35

## 2023-11-09 RX ADMIN — PHENYLEPHRINE HYDROCHLORIDE 100 MCG: 10 INJECTION INTRAVENOUS at 12:00

## 2023-11-09 RX ADMIN — SODIUM CHLORIDE, POTASSIUM CHLORIDE, SODIUM LACTATE AND CALCIUM CHLORIDE: 600; 310; 30; 20 INJECTION, SOLUTION INTRAVENOUS at 10:01

## 2023-11-09 RX ADMIN — Medication 2000 MG: at 11:43

## 2023-11-09 RX ADMIN — PHENYLEPHRINE HYDROCHLORIDE 200 MCG: 10 INJECTION INTRAVENOUS at 11:50

## 2023-11-09 RX ADMIN — FENTANYL CITRATE 25 MCG: 50 INJECTION, SOLUTION INTRAMUSCULAR; INTRAVENOUS at 12:06

## 2023-11-09 RX ADMIN — PHENYLEPHRINE HYDROCHLORIDE 100 MCG: 10 INJECTION INTRAVENOUS at 11:44

## 2023-11-09 RX ADMIN — PHENYLEPHRINE HYDROCHLORIDE 200 MCG: 10 INJECTION INTRAVENOUS at 11:47

## 2023-11-09 ASSESSMENT — PAIN - FUNCTIONAL ASSESSMENT: PAIN_FUNCTIONAL_ASSESSMENT: 0-10

## 2023-11-09 NOTE — OR NURSING
Preoperative flank skin condition: WARM, DRY, INTACT  Lead shield: Yes  Patient ear protection: Yes   Gel applied to patient's flank and Lithotripsy head: Yes   Starting power level: 7  Shock start time (first  fluoroscopy): 1146  Shock stop time (last lithotripsy shock): 1217  Ending power level: 11  Total shocks: 4000  Total fluoroscopy time: 1 min, 52 secs  Postoperative flank skin condition: WARM, DRY, RED AND INTACT

## 2023-11-09 NOTE — OP NOTE
[facility]  OPERATIVE REPORT    Name:  Ele Merino  MR#:  [id number]  :  1950  ACCOUNT #:  [de-identified]  DATE OF SERVICE:  2023    PREOPERATIVE DIAGNOSIS:  Left renal stone. POSTOPERATIVE DIAGNOSIS:  Left renal stone. PROCEDURE PERFORMED:  Left extracorporeal shockwave lithotripsy. SURGEON:  Mirna Elmore DO    ASSISTANT:  None. ANESTHESIA:  General.    COMPLICATIONS:  None immediate. SPECIMENS REMOVED:  None. IMPLANTS:  None. ESTIMATED BLOOD LOSS:  None. CLINICAL HISTORY:  This is a 79-year-old gentleman who recently presented to the office with right flank pain. Imaging revealed a 6-mm left renal stone as well as a very small right upper pole renal stone. All risks, benefits and alternatives to the above-mentioned procedure have been discussed and he is willing to proceed at this time. PROCEDURE:  Patient consent was obtained. The patient was brought back to the operating room, at which time he was placed in the supine position. The left lower pole stone was visualized under fluoroscopy. After the uneventful induction of general anesthesia, the stone was then localized under the X, Y and Z axes. A total of 3000 shocks were administered to the stone. Initial 400 shocks were administered at a rate of 60 shocks per minute and the subsequent 2600 shocks were administered at a rate of 90 shocks per minute. The stone did appear to fragment nicely throughout the case. The patient tolerated the procedure well. He will follow up in the office in two weeks with the nurse practitioner for a postoperative KUB.       DO ADRIEN [id number]  D:  2023 13:44  T:  2023 14:12  JOB #:  [id number]

## 2023-11-09 NOTE — DISCHARGE INSTRUCTIONS
If you have had surgery in the past 7-10 days by one of our providers and are having fever, bleeding, or drainage from an incision, have an opening in an incision, or having issues urinating properly, please call [phone]. ACTIVITY  As tolerated and as directed by your doctor. Walking and mild exercise help to pass the stone fragments. DIET  Clear liquids until no nausea and vomiting; then resume light diet for the first day  Advance to regular diet on second day, unless your doctor orders otherwise. If nauseated and/ or vomiting, call your doctor. Drink at least 8 glasses of water a day (avoid caffeinated beverages). PAIN  Take pain medication as directed. Call your doctor if pain is NOT relieved by medication. DO NOT take aspirin or blood thinners until directed by your doctor. MEDICATION INTERACTION:  During your procedure you potentially received a medication or medications which may reduce the effectiveness of oral contraceptives. Please consider other forms of contraception for 1 month following your procedure if you are currently using oral contraceptives as your primary form of birth control. In addition to this, we recommend continuing your oral contraceptive as prescribed, unless otherwise instructed by your physician, during this time. CALL YOUR DOCTOR IF   Expect blood-tinged urine. Call your doctor if it lasts more than 72 hours or if you cannot see through the urine. Aches, chills, or fever of 101 degree F or above    Lithotripsy does not make your stone disappear. The treatment is meant to crush your stone so that the fragments can be passed. Strain your urine, save any fragments, and take them to your doctor. The fragments may not begin to pass for up to 1-2 months. You may experience slight bruising at the treated site.      After general anesthesia or intravenous sedation, for 24 hours or while taking prescription Narcotics:  Limit your activities  A

## 2023-11-09 NOTE — BRIEF OP NOTE
Brief Postoperative Note      Patient: David Salomon III  YOB: 1950  MRN: [id number]    Date of Procedure: 11/9/2023    Pre-Op Diagnosis Codes:     * Kidney stone [N20.0] LEFT    Post-Op Diagnosis: Same       Procedure(s):  ESWL EXTRACORPOREAL SHOCK WAVE LITHOTRIPSY LEFt    Surgeon(s):  Michelle Elmore DO    Assistant:  * No surgical staff found *    Anesthesia: General    Estimated Blood Loss (mL): Nil    Complications: none immediate    Specimens:   * No specimens in log *    Implants:  * No implants in log *      Drains: * No LDAs found *    Findings: see op note      Electronically signed by Curry Gilmore DO on 11/9/2023 at 12:06 PM

## 2023-11-09 NOTE — ANESTHESIA POSTPROCEDURE EVALUATION
Department of Anesthesiology  Postprocedure Note    Patient: Tana Sadler III  MRN: [id number]  YOB: 1950  Date of evaluation: 11/9/2023      Procedure Summary     Date: 11/09/23 Room / Location: [facility] MAIN OR [address] [place] [address] MAIN OR    Anesthesia Start: 1128 Anesthesia Stop: 7809    Procedure: ESWL EXTRACORPOREAL SHOCK WAVE LITHOTRIPSY (Left: Flank) Diagnosis:       Kidney stone      (Kidney stone [N20.0])    Providers: Liana Mcmahon DO Responsible Provider: Marcelo Connor DO    Anesthesia Type: General ASA Status: 3          Anesthesia Type: General    Ramona Phase I: Ramona Score: 10    Ramona Phase II: Ramona Score: 10      Anesthesia Post Evaluation    Patient location during evaluation: PACU  Level of consciousness: awake and alert  Airway patency: patent  Nausea & Vomiting: no nausea  Complications: no  Cardiovascular status: hemodynamically stable  Respiratory status: acceptable  Hydration status: euvolemic  Pain management: satisfactory to patient

## 2023-11-11 LAB — DEXAMETHASONE SERPL-MCNC: 242 NG/DL

## 2023-11-13 LAB
METANEPH FREE SERPL-MCNC: <25 PG/ML (ref 0–88)
NORMETANEPHRINE SERPL-MCNC: 69.9 PG/ML (ref 0–285.2)

## 2023-11-22 ENCOUNTER — NURSE ONLY (OUTPATIENT)
Dept: RHEUMATOLOGY | Age: 73
End: 2023-11-22

## 2023-11-22 VITALS
SYSTOLIC BLOOD PRESSURE: 123 MMHG | HEART RATE: 77 BPM | TEMPERATURE: 97.4 F | DIASTOLIC BLOOD PRESSURE: 65 MMHG | WEIGHT: 194.6 LBS | BODY MASS INDEX: 28.74 KG/M2

## 2023-11-22 DIAGNOSIS — Z79.631 LONG TERM METHOTREXATE USER: ICD-10-CM

## 2023-11-22 DIAGNOSIS — M06.00 SERONEGATIVE RHEUMATOID ARTHRITIS (HCC): Primary | ICD-10-CM

## 2023-11-22 LAB
ALBUMIN SERPL-MCNC: 3.4 G/DL (ref 3.2–4.6)
ALBUMIN/GLOB SERPL: 1 (ref 0.4–1.6)
ALP SERPL-CCNC: 82 U/L (ref 50–136)
ALT SERPL-CCNC: 25 U/L (ref 12–65)
ANION GAP SERPL CALC-SCNC: 11 MMOL/L (ref 2–11)
AST SERPL-CCNC: 10 U/L (ref 15–37)
BASOPHILS # BLD: 0.1 K/UL (ref 0–0.2)
BASOPHILS NFR BLD: 1 % (ref 0–2)
BILIRUB SERPL-MCNC: 0.2 MG/DL (ref 0.2–1.1)
BUN SERPL-MCNC: 22 MG/DL (ref 8–23)
CALCIUM SERPL-MCNC: 8.8 MG/DL (ref 8.3–10.4)
CHLORIDE SERPL-SCNC: 108 MMOL/L (ref 101–110)
CO2 SERPL-SCNC: 21 MMOL/L (ref 21–32)
CREAT SERPL-MCNC: 1.3 MG/DL (ref 0.8–1.5)
CRP SERPL-MCNC: 2.4 MG/DL (ref 0–0.9)
DIFFERENTIAL METHOD BLD: ABNORMAL
EOSINOPHIL # BLD: 0.2 K/UL (ref 0–0.8)
EOSINOPHIL NFR BLD: 2 % (ref 0.5–7.8)
ERYTHROCYTE [DISTWIDTH] IN BLOOD BY AUTOMATED COUNT: 14.4 % (ref 11.9–14.6)
ERYTHROCYTE [SEDIMENTATION RATE] IN BLOOD: 33 MM/HR
GLOBULIN SER CALC-MCNC: 3.3 G/DL (ref 2.8–4.5)
GLUCOSE SERPL-MCNC: 180 MG/DL (ref 65–100)
HCT VFR BLD AUTO: 35.1 % (ref 41.1–50.3)
HGB BLD-MCNC: 11 G/DL (ref 13.6–17.2)
IMM GRANULOCYTES # BLD AUTO: 0.1 K/UL (ref 0–0.5)
IMM GRANULOCYTES NFR BLD AUTO: 1 % (ref 0–5)
LYMPHOCYTES # BLD: 1.8 K/UL (ref 0.5–4.6)
LYMPHOCYTES NFR BLD: 18 % (ref 13–44)
MCH RBC QN AUTO: 31.9 PG (ref 26.1–32.9)
MCHC RBC AUTO-ENTMCNC: 31.3 G/DL (ref 31.4–35)
MCV RBC AUTO: 101.7 FL (ref 82–102)
MONOCYTES # BLD: 0.9 K/UL (ref 0.1–1.3)
MONOCYTES NFR BLD: 9 % (ref 4–12)
NEUTS SEG # BLD: 7.1 K/UL (ref 1.7–8.2)
NEUTS SEG NFR BLD: 69 % (ref 43–78)
NRBC # BLD: 0 K/UL (ref 0–0.2)
PLATELET # BLD AUTO: 332 K/UL (ref 150–450)
PMV BLD AUTO: 10.3 FL (ref 9.4–12.3)
POTASSIUM SERPL-SCNC: 3.8 MMOL/L (ref 3.5–5.1)
PROT SERPL-MCNC: 6.7 G/DL (ref 6.3–8.2)
RBC # BLD AUTO: 3.45 M/UL (ref 4.23–5.6)
SODIUM SERPL-SCNC: 140 MMOL/L (ref 133–143)
WBC # BLD AUTO: 10.2 K/UL (ref 4.3–11.1)

## 2023-11-22 NOTE — PROGRESS NOTES
Radha, [facility], [address] [place] [address]  Office : [phone], Fax: [phone] infusion today. 2 mg/kg= 180 mg infused in 100 ml NaCl at a rate of 200 ml/hr over 30 minutes. 20 mg of Ike Jet was wasted. Infusion placed in left forearm. Next infusion in 8 weeks 1/17/24. IV insertion time: 0950  Medication start time: 1000  Medication completion time: 1030    Patient discharged feeling fine and instructed to call the office with any post-infusion issues. Pre-Infusion Questionnaire  Has your insurance changed or have you received a new card since your last visit?  no  Have you had an infection since your last infusion? no  Since your last visit, have you been hospitalized, been to the ER, or Urgent [address] [place] [address] for any reason?  no  Have you recently had GI problems, open wounds, urinary problems, or chest pain? no  Are you currently taking antibiotics?   no  Have you recently had or are you scheduled for any surgical procedures?   no  Have you had a TB test in the last year?   yes, 8/21/23 (negative)  Did you take an antihistamine today?  no  Have you received any vaccinations recently?  no  When was your last appointment with Dr. Mateo Gifford, Dr. Kristine Millan, or Dr. Aguilar Craft or Dr. Von Workman. 8/29/23  When was your last infusion? 9/27/23  12. Are you in a skilled nursing facility?       no    Reviewed and Confirmed by Caro Boyce

## 2023-11-27 ENCOUNTER — OFFICE VISIT (OUTPATIENT)
Dept: RHEUMATOLOGY | Age: 73
End: 2023-11-27
Payer: MEDICARE

## 2023-11-27 VITALS
DIASTOLIC BLOOD PRESSURE: 82 MMHG | WEIGHT: 197 LBS | HEIGHT: 69 IN | HEART RATE: 84 BPM | BODY MASS INDEX: 29.18 KG/M2 | SYSTOLIC BLOOD PRESSURE: 136 MMHG | RESPIRATION RATE: 16 BRPM

## 2023-11-27 DIAGNOSIS — Z79.899 LONG-TERM CURRENT USE OF GOLIMUMAB: ICD-10-CM

## 2023-11-27 DIAGNOSIS — Z79.899 LONG-TERM USE OF PLAQUENIL: ICD-10-CM

## 2023-11-27 DIAGNOSIS — Z79.631 LONG TERM METHOTREXATE USER: ICD-10-CM

## 2023-11-27 DIAGNOSIS — Z23 ENCOUNTER FOR VACCINATION: ICD-10-CM

## 2023-11-27 DIAGNOSIS — M19.042 PRIMARY OSTEOARTHRITIS OF BOTH HANDS: ICD-10-CM

## 2023-11-27 DIAGNOSIS — M06.00 SERONEGATIVE RHEUMATOID ARTHRITIS (HCC): Primary | ICD-10-CM

## 2023-11-27 DIAGNOSIS — R70.0 ELEVATED SED RATE: ICD-10-CM

## 2023-11-27 DIAGNOSIS — D64.9 NORMOCYTIC ANEMIA: ICD-10-CM

## 2023-11-27 DIAGNOSIS — M17.0 BILATERAL PRIMARY OSTEOARTHRITIS OF KNEE: ICD-10-CM

## 2023-11-27 DIAGNOSIS — R79.82 ELEVATED C-REACTIVE PROTEIN (CRP): ICD-10-CM

## 2023-11-27 DIAGNOSIS — M19.041 PRIMARY OSTEOARTHRITIS OF BOTH HANDS: ICD-10-CM

## 2023-11-27 PROCEDURE — G8417 CALC BMI ABV UP PARAM F/U: HCPCS | Performed by: INTERNAL MEDICINE

## 2023-11-27 PROCEDURE — 99215 OFFICE O/P EST HI 40 MIN: CPT | Performed by: INTERNAL MEDICINE

## 2023-11-27 PROCEDURE — G8427 DOCREV CUR MEDS BY ELIG CLIN: HCPCS | Performed by: INTERNAL MEDICINE

## 2023-11-27 PROCEDURE — 3079F DIAST BP 80-89 MM HG: CPT | Performed by: INTERNAL MEDICINE

## 2023-11-27 PROCEDURE — G8484 FLU IMMUNIZE NO ADMIN: HCPCS | Performed by: INTERNAL MEDICINE

## 2023-11-27 PROCEDURE — 1036F TOBACCO NON-USER: CPT | Performed by: INTERNAL MEDICINE

## 2023-11-27 PROCEDURE — 3075F SYST BP GE 130 - 139MM HG: CPT | Performed by: INTERNAL MEDICINE

## 2023-11-27 PROCEDURE — 1123F ACP DISCUSS/DSCN MKR DOCD: CPT | Performed by: INTERNAL MEDICINE

## 2023-11-27 PROCEDURE — 3017F COLORECTAL CA SCREEN DOC REV: CPT | Performed by: INTERNAL MEDICINE

## 2023-11-27 RX ORDER — METHOTREXATE 2.5 MG/1
TABLET ORAL
Qty: 120 TABLET | Refills: 0 | Status: SHIPPED | OUTPATIENT
Start: 2023-11-27

## 2023-11-27 RX ORDER — HYDROXYCHLOROQUINE SULFATE 200 MG/1
200 TABLET, FILM COATED ORAL 2 TIMES DAILY
Qty: 180 TABLET | Refills: 1 | Status: SHIPPED | OUTPATIENT
Start: 2023-11-27

## 2023-11-27 RX ORDER — OMEPRAZOLE 40 MG/1
40 CAPSULE, DELAYED RELEASE ORAL DAILY
COMMUNITY

## 2023-11-27 ASSESSMENT — ROUTINE ASSESSMENT OF PATIENT INDEX DATA (RAPID3)
ON A SCALE OF ONE TO TEN, CONSIDERING ALL THE WAYS IN WHICH ILLNESS AND HEALTH CONDITIONS MAY AFFECT YOU AT THIS TIME, PLEASE INDICATE BELOW HOW YOU ARE DOING:: 6
ON A SCALE OF ONE TO TEN, HOW MUCH OF A PROBLEM HAS UNUSUAL FATIGUE OR TIREDNESS BEEN FOR YOU OVER THE PAST WEEK?: 5
WHEN YOU AWAKENED IN THE MORNING OVER THE LAST WEEK, PLEASE INDICATE THE AMOUNT OF TIME IT TAKES UNTIL YOU ARE AS LIMBER AS YOU WILL BE FOR THE DAY: 1 HOUR
ON A SCALE OF ONE TO TEN, HOW MUCH PAIN HAVE YOU HAD BECAUSE OF YOUR CONDITION OVER THE PAST WEEK?: 7
ON A SCALE OF ONE TO TEN, HOW DIFFICULT WAS IT FOR YOU TO COMPLETE THE LISTED DAILY PHYSICAL TASKS OVER THE LAST WEEK: 0.8

## 2023-11-27 NOTE — PROGRESS NOTES
methotrexate (RHEUMATREX) 2.5 MG chemo tablet Take 10 tablets by mouth once a week (Patient taking differently: Take 10 tablets by mouth once a week ON EVERY MONDAY, TAKES 5 AT LUNCH AND 5 AT DINNER) 120 tablet 0    sertraline (ZOLOFT) 100 MG tablet Take 1 tablet by mouth once daily 90 tablet 1    vitamin D 25 MCG (1000 UT) CAPS Take 1 capsule by mouth Daily with lunch      vitamin C (ASCORBIC ACID) 500 MG tablet Take 1 tablet by mouth Daily with lunch      folic acid (FOLVITE) 1 MG tablet Take 1 tablet by mouth daily Take 1 tablet by mouth once daily (Patient taking differently: Take 1 tablet by mouth Daily with supper Take 1 tablet by mouth once daily) 90 tablet 3    amLODIPine (NORVASC) 5 MG tablet TAKE ONE TABLET BY MOUTH ONE TIME DAILY 90 tablet 3    lisinopril (PRINIVIL;ZESTRIL) 20 MG tablet TAKE ONE TABLET BY MOUTH ONE TIME DAILY 90 tablet 3    metFORMIN (GLUCOPHAGE) 1000 MG tablet TAKE ONE TABLET BY MOUTH TWICE A DAY WITH A MEAL 180 tablet 3    Multiple Vitamins-Minerals (MULTIVITAMIN MEN 50+ PO) Take by mouth daily      atorvastatin (LIPITOR) 20 MG tablet Take 1 tablet by mouth at bedtime 90 tablet 3     No current facility-administered medications for this visit. PHYSICAL EXAM:  Vitals:    11/27/23 0836   BP: 136/82   Pulse: 84   Resp: 16   Weight: 89.4 kg (197 lb)   Height: 1.753 m (5' 9\")         CONSTITUTIONAL:  The patient was in NAD.  ENT:  The OPC, MMM, lips/teeth/gums without abnormalities. NECK:  No masses or thyromegaly  LYMPH NODES:  No cervical or axillary lymphadenopathy. CV:  RRR no r/m/g. Normal peripheral pulses  RESP:  CTA bilaterally. Normal respiratory effort. GI:  Abdomen soft, non tender, no hepatosplenomegaly  Skin:  No rashes, nodules, or other lesions.     MUSCULOSKELETAL :  All joints including neck, back bilateral shoulders, elbows, wrists, MCP's, PIP's, DIP's, hips, knees, ankles, toes are within normal limits including normal gross examination, no synovitis, no

## 2023-11-29 ENCOUNTER — OFFICE VISIT (OUTPATIENT)
Dept: INTERNAL MEDICINE CLINIC | Facility: CLINIC | Age: 73
End: 2023-11-29
Payer: MEDICARE

## 2023-11-29 VITALS
DIASTOLIC BLOOD PRESSURE: 68 MMHG | BODY MASS INDEX: 30.21 KG/M2 | WEIGHT: 204 LBS | SYSTOLIC BLOOD PRESSURE: 124 MMHG | HEIGHT: 69 IN

## 2023-11-29 DIAGNOSIS — Z79.4 TYPE 2 DIABETES MELLITUS WITH HYPERGLYCEMIA, WITH LONG-TERM CURRENT USE OF INSULIN (HCC): Primary | Chronic | ICD-10-CM

## 2023-11-29 DIAGNOSIS — E11.65 TYPE 2 DIABETES MELLITUS WITH HYPERGLYCEMIA, WITH LONG-TERM CURRENT USE OF INSULIN (HCC): Primary | Chronic | ICD-10-CM

## 2023-11-29 DIAGNOSIS — D64.9 NORMOCYTIC ANEMIA: Chronic | ICD-10-CM

## 2023-11-29 DIAGNOSIS — I10 ESSENTIAL HYPERTENSION: Chronic | ICD-10-CM

## 2023-11-29 DIAGNOSIS — E78.2 MIXED HYPERLIPIDEMIA: Chronic | ICD-10-CM

## 2023-11-29 DIAGNOSIS — E89.0 HYPOTHYROIDISM FOLLOWING RADIOIODINE THERAPY: ICD-10-CM

## 2023-11-29 DIAGNOSIS — R19.7 DIARRHEA, UNSPECIFIED TYPE: ICD-10-CM

## 2023-11-29 PROBLEM — K26.9 DUODENAL ULCER, UNSPECIFIED AS ACUTE OR CHRONIC, WITHOUT HEMORRHAGE OR PERFORATION: Status: ACTIVE | Noted: 2023-11-21

## 2023-11-29 PROBLEM — K57.30 DIVERTICULOSIS OF LARGE INTESTINE WITHOUT PERFORATION OR ABSCESS WITHOUT BLEEDING: Status: ACTIVE | Noted: 2023-09-27

## 2023-11-29 LAB
CREAT UR-MCNC: 102 MG/DL
MICROALBUMIN UR-MCNC: 3.36 MG/DL
MICROALBUMIN/CREAT UR-RTO: 33 MG/G (ref 0–30)

## 2023-11-29 PROCEDURE — 2022F DILAT RTA XM EVC RTNOPTHY: CPT | Performed by: NURSE PRACTITIONER

## 2023-11-29 PROCEDURE — 1123F ACP DISCUSS/DSCN MKR DOCD: CPT | Performed by: NURSE PRACTITIONER

## 2023-11-29 PROCEDURE — 3074F SYST BP LT 130 MM HG: CPT | Performed by: NURSE PRACTITIONER

## 2023-11-29 PROCEDURE — G8417 CALC BMI ABV UP PARAM F/U: HCPCS | Performed by: NURSE PRACTITIONER

## 2023-11-29 PROCEDURE — G8427 DOCREV CUR MEDS BY ELIG CLIN: HCPCS | Performed by: NURSE PRACTITIONER

## 2023-11-29 PROCEDURE — 3078F DIAST BP <80 MM HG: CPT | Performed by: NURSE PRACTITIONER

## 2023-11-29 PROCEDURE — 3017F COLORECTAL CA SCREEN DOC REV: CPT | Performed by: NURSE PRACTITIONER

## 2023-11-29 PROCEDURE — 99214 OFFICE O/P EST MOD 30 MIN: CPT | Performed by: NURSE PRACTITIONER

## 2023-11-29 PROCEDURE — 36415 COLL VENOUS BLD VENIPUNCTURE: CPT | Performed by: NURSE PRACTITIONER

## 2023-11-29 PROCEDURE — 1036F TOBACCO NON-USER: CPT | Performed by: NURSE PRACTITIONER

## 2023-11-29 PROCEDURE — 3044F HG A1C LEVEL LT 7.0%: CPT | Performed by: NURSE PRACTITIONER

## 2023-11-29 PROCEDURE — G8484 FLU IMMUNIZE NO ADMIN: HCPCS | Performed by: NURSE PRACTITIONER

## 2023-11-29 NOTE — PROGRESS NOTES
Tara Grain III (: 1950) presents today for 6 month recheck. He underwent Lexiscan nuclear stress test yesterday; results are pending. Colonoscopy last week (Dr. Aurora Guillaume; [facility]) for persistent diarrhea showed gastritis and duodenal bulb ulcer, diverticulosis; therefore, Naproxen was discontinued. He continues to have multi-joint pain from RA. He is receiving Simponi infusions at rheumatology; also on methotrexate and Plaquenil. He was recommended to see pain management, which he is considering. Right now, he is taking Tylenol 650mg BID for pain. He also continues to see foot/ankle surgery (Dr. Cedrick Galarza); underwent partial toe amputation for osteomyelitis in August. This has healed. In October, he underwent lithotripsy for 6mm renal stone (left) with Dr. Deo Gilmore. Chief Complaint   Patient presents with    Follow-up     Patient Active Problem List   Diagnosis    STEFFEN on CPAP    Essential hypertension    Hypercholesterolemia    Class 1 obesity    BPH associated with nocturia    Chronic pain of both knees    Rheumatoid arthritis of multiple sites without rheumatoid factor (HCC)    Hypothyroidism following radioiodine therapy    Rheumatoid arthritis ([address])    Type 2 diabetes mellitus (HCC)    Elevated LFTs    Normocytic anemia    Major depressive disorder, recurrent, in partial remission ([address])    Baker's cyst of knee, right    Acquired claw toe of right foot    Localized, primary osteoarthritis of hand    Polyarthropathy    Osteochondropathy    Diverticulosis of large intestine without perforation or abscess without bleeding    Kidney stone    Adrenal nodule (HCC)    Duodenal ulcer, unspecified as acute or chronic, without hemorrhage or perforation      Reviewed and updated this visit by provider:  Tobacco  Allergies  Meds  Problems  Med Hx  Surg Hx  Fam Hx       Immunizations:  Immunization status: due today.     Review of Systems - Negative except as stated in HPI  History

## 2023-11-30 ENCOUNTER — TELEPHONE (OUTPATIENT)
Age: 73
End: 2023-11-30

## 2023-11-30 LAB
EST. AVERAGE GLUCOSE BLD GHB EST-MCNC: 166 MG/DL
HBA1C MFR BLD: 7.4 % (ref 4.8–5.6)

## 2023-11-30 NOTE — TELEPHONE ENCOUNTER
----- Message from Gisell Steven MD sent at 11/30/2023  6:54 AM EST -----  Please call the patient and let him know that his stress test was abnormal with concern for some blockage in the arteries of his heart. The overall pumping ability of the heart was normal which is reassuring. We should try to get him in sooner to see us. Needs 15-minute follow-up visit.   Thanks,  AD

## 2023-12-01 ENCOUNTER — OFFICE VISIT (OUTPATIENT)
Age: 73
End: 2023-12-01
Payer: MEDICARE

## 2023-12-01 VITALS
HEART RATE: 70 BPM | BODY MASS INDEX: 28.88 KG/M2 | DIASTOLIC BLOOD PRESSURE: 64 MMHG | WEIGHT: 195 LBS | SYSTOLIC BLOOD PRESSURE: 136 MMHG | HEIGHT: 69 IN

## 2023-12-01 DIAGNOSIS — G47.33 OSA ON CPAP: ICD-10-CM

## 2023-12-01 DIAGNOSIS — R94.39 ABNORMAL NUCLEAR STRESS TEST: ICD-10-CM

## 2023-12-01 DIAGNOSIS — I73.9 PAD (PERIPHERAL ARTERY DISEASE) (HCC): ICD-10-CM

## 2023-12-01 DIAGNOSIS — I25.10 CORONARY ARTERY DISEASE INVOLVING NATIVE CORONARY ARTERY OF NATIVE HEART WITHOUT ANGINA PECTORIS: Primary | ICD-10-CM

## 2023-12-01 DIAGNOSIS — I10 ESSENTIAL HYPERTENSION: ICD-10-CM

## 2023-12-01 DIAGNOSIS — R06.09 DYSPNEA ON EXERTION: ICD-10-CM

## 2023-12-01 DIAGNOSIS — E78.2 MIXED HYPERLIPIDEMIA: ICD-10-CM

## 2023-12-01 PROCEDURE — 1123F ACP DISCUSS/DSCN MKR DOCD: CPT | Performed by: INTERNAL MEDICINE

## 2023-12-01 PROCEDURE — G8427 DOCREV CUR MEDS BY ELIG CLIN: HCPCS | Performed by: INTERNAL MEDICINE

## 2023-12-01 PROCEDURE — 3078F DIAST BP <80 MM HG: CPT | Performed by: INTERNAL MEDICINE

## 2023-12-01 PROCEDURE — 99214 OFFICE O/P EST MOD 30 MIN: CPT | Performed by: INTERNAL MEDICINE

## 2023-12-01 PROCEDURE — 1036F TOBACCO NON-USER: CPT | Performed by: INTERNAL MEDICINE

## 2023-12-01 PROCEDURE — 3075F SYST BP GE 130 - 139MM HG: CPT | Performed by: INTERNAL MEDICINE

## 2023-12-01 PROCEDURE — G8417 CALC BMI ABV UP PARAM F/U: HCPCS | Performed by: INTERNAL MEDICINE

## 2023-12-01 PROCEDURE — 3017F COLORECTAL CA SCREEN DOC REV: CPT | Performed by: INTERNAL MEDICINE

## 2023-12-01 PROCEDURE — G8484 FLU IMMUNIZE NO ADMIN: HCPCS | Performed by: INTERNAL MEDICINE

## 2023-12-01 RX ORDER — METOPROLOL SUCCINATE 25 MG/1
25 TABLET, EXTENDED RELEASE ORAL DAILY
Qty: 30 TABLET | Refills: 11 | Status: SHIPPED | OUTPATIENT
Start: 2023-12-01

## 2023-12-01 RX ORDER — ASPIRIN 81 MG/1
81 TABLET ORAL DAILY
COMMUNITY

## 2023-12-01 ASSESSMENT — ENCOUNTER SYMPTOMS
EYE REDNESS: 0
HEMOPTYSIS: 0
HEMATOCHEZIA: 0
DOUBLE VISION: 0
STRIDOR: 0
WHEEZING: 0
ABDOMINAL PAIN: 0
HEMATEMESIS: 0
HOARSE VOICE: 0

## 2023-12-01 NOTE — PROGRESS NOTES
[facility] CARDIOLOGY  [id number] [place] [address], [facility]  PHONE: [phone]          23    NAME:  Mary Kumar III  : 1950  MRN: [id number]         SUBJECTIVE:   Enrico Ng is a 68 y.o. male seen for a visit regarding the following:     Chief Complaint   Patient presents with    Coronary Artery Disease     Nuke results           HPI:    Cardiac problem list:  1. Coronary artery disease-noted on CT scan of the chest and abdomen from 2023-extensive coronary calcifications  Dyspnea on exertion  -NST-2023 with moderate reversible inferior ischemia, EF 53%  -Echo from 2023 showed an EF at 60 to 65% with moderate concentric LVH, diastolic dysfunction, mild MR, mildly dilated left atrium, mildly dilated aortic root at 4.2 cm  2. Hypertension  3. Hyperlipidemia  4. Obstructive sleep apnea  5. Type 2 diabetes mellitus  6. History of DVT-right popliteal vein in   7. Peripheral arterial disease-lost his right great toe-amputated    Dear Gary Diaz,  I saw Mr. Luisana Benson who is a pleasant 71-year-old gentleman in cardiovascular follow-up for coronary artery disease with known hypertension, hyperlipidemia, STEFFEN and type 2 diabetes mellitus.  -We last met with him a month ago at which time we had ordered a nuclear stress test to work-up his extensive coronary calcifications which came back abnormal with moderate reversible ischemia involving the inferior wall with an EF at 53%    CAD-he had a CT scan done of the abdomen in September and incidentally they found extensive coronary calcifications. He has no complaints of any significant angina but he is held back with regards to his activity levels because of his underlying arthritis. He does have dyspnea on exertion which could be a coronary equivalent as his dyspnea has increased over the past 6 months.   No orthopnea PND or lower extremity edema he has multiple risk factors including advanced age,

## 2023-12-06 NOTE — PROGRESS NOTES
Patient pre-assessment complete for [facility] scheduled for 23, arrival time 0730. Patient verified using . Patient instructed to bring a list of all home medications on the day of procedure. NPO status reinforced. Patient informed to take a full dose aspirin 325mg  or 81 mg x 4 on the day of procedure. Patient instructed to HOLD Metformin tonight and tomorrow. Instructed they can take all other medications excluding vitamins & supplements. Patient verbalizes understanding of all instructions & denies any questions at this time.

## 2023-12-07 ENCOUNTER — HOSPITAL ENCOUNTER (OUTPATIENT)
Age: 73
Setting detail: OUTPATIENT SURGERY
Discharge: HOME | End: 2023-12-07
Attending: INTERNAL MEDICINE | Admitting: INTERNAL MEDICINE
Payer: MEDICARE

## 2023-12-07 ENCOUNTER — APPOINTMENT (OUTPATIENT)
Dept: NON INVASIVE DIAGNOSTICS | Age: 73
End: 2023-12-07
Attending: INTERNAL MEDICINE
Payer: MEDICARE

## 2023-12-07 ENCOUNTER — PREP FOR PROCEDURE (OUTPATIENT)
Dept: CARDIOTHORACIC SURGERY | Age: 73
End: 2023-12-07

## 2023-12-07 VITALS
OXYGEN SATURATION: 96 % | BODY MASS INDEX: 28.88 KG/M2 | TEMPERATURE: 97.9 F | HEIGHT: 69 IN | WEIGHT: 195 LBS | HEART RATE: 75 BPM | SYSTOLIC BLOOD PRESSURE: 124 MMHG | RESPIRATION RATE: 16 BRPM | DIASTOLIC BLOOD PRESSURE: 61 MMHG

## 2023-12-07 DIAGNOSIS — R94.39 ABNORMAL CARDIOVASCULAR STRESS TEST: ICD-10-CM

## 2023-12-07 DIAGNOSIS — R94.39 ABNORMAL NUCLEAR STRESS TEST: ICD-10-CM

## 2023-12-07 DIAGNOSIS — R07.9 CHEST PAIN: Primary | ICD-10-CM

## 2023-12-07 PROBLEM — I25.110 ATHEROSCLEROTIC HEART DISEASE OF NATIVE CORONARY ARTERY WITH UNSTABLE ANGINA PECTORIS (HCC): Status: ACTIVE | Noted: 2023-12-07

## 2023-12-07 LAB
ANION GAP SERPL CALC-SCNC: 2 MMOL/L (ref 2–11)
ANION GAP SERPL CALC-SCNC: 4 MMOL/L (ref 2–11)
BASOPHILS # BLD: 0.1 K/UL (ref 0–0.2)
BASOPHILS NFR BLD: 1 % (ref 0–2)
BUN SERPL-MCNC: 19 MG/DL (ref 8–23)
BUN SERPL-MCNC: 20 MG/DL (ref 8–23)
CALCIUM SERPL-MCNC: 9.2 MG/DL (ref 8.3–10.4)
CALCIUM SERPL-MCNC: 9.9 MG/DL (ref 8.3–10.4)
CHLORIDE SERPL-SCNC: 109 MMOL/L (ref 103–113)
CHLORIDE SERPL-SCNC: 110 MMOL/L (ref 103–113)
CO2 SERPL-SCNC: 23 MMOL/L (ref 21–32)
CO2 SERPL-SCNC: 25 MMOL/L (ref 21–32)
CREAT SERPL-MCNC: 1 MG/DL (ref 0.8–1.5)
CREAT SERPL-MCNC: 1 MG/DL (ref 0.8–1.5)
DIFFERENTIAL METHOD BLD: ABNORMAL
ECHO BSA: 2.07 M2
ECHO BSA: 2.07 M2
ECHO LV EDV A2C: 145 ML
ECHO LV EDV A4C: 196 ML
ECHO LV EDV INDEX A4C: 96 ML/M2
ECHO LV EDV NDEX A2C: 71 ML/M2
ECHO LV EJECTION FRACTION A2C: 48 %
ECHO LV EJECTION FRACTION A4C: 49 %
ECHO LV EJECTION FRACTION BIPLANE: 50 % (ref 55–100)
ECHO LV ESV A2C: 75 ML
ECHO LV ESV A4C: 101 ML
ECHO LV ESV INDEX A2C: 37 ML/M2
ECHO LV ESV INDEX A4C: 50 ML/M2
ECHO LV FRACTIONAL SHORTENING: 24 % (ref 28–44)
ECHO LV INTERNAL DIMENSION DIASTOLE INDEX: 2.65 CM/M2
ECHO LV INTERNAL DIMENSION DIASTOLIC: 5.4 CM (ref 4.2–5.9)
ECHO LV INTERNAL DIMENSION SYSTOLIC INDEX: 2.01 CM/M2
ECHO LV INTERNAL DIMENSION SYSTOLIC: 4.1 CM
ECHO LV IVSD: 1.3 CM (ref 0.6–1)
ECHO LV MASS 2D: 295.6 G (ref 88–224)
ECHO LV MASS INDEX 2D: 144.9 G/M2 (ref 49–115)
ECHO LV POSTERIOR WALL DIASTOLIC: 1.3 CM (ref 0.6–1)
ECHO LV RELATIVE WALL THICKNESS RATIO: 0.48
EKG ATRIAL RATE: 75 BPM
EKG DIAGNOSIS: NORMAL
EKG P AXIS: 17 DEGREES
EKG P-R INTERVAL: 166 MS
EKG Q-T INTERVAL: 378 MS
EKG QRS DURATION: 94 MS
EKG QTC CALCULATION (BAZETT): 422 MS
EKG R AXIS: 34 DEGREES
EKG T AXIS: 21 DEGREES
EKG VENTRICULAR RATE: 75 BPM
EOSINOPHIL # BLD: 0.2 K/UL (ref 0–0.8)
EOSINOPHIL NFR BLD: 3 % (ref 0.5–7.8)
ERYTHROCYTE [DISTWIDTH] IN BLOOD BY AUTOMATED COUNT: 14.3 % (ref 11.9–14.6)
GLUCOSE BLD STRIP.AUTO-MCNC: 236 MG/DL (ref 65–100)
GLUCOSE SERPL-MCNC: 190 MG/DL (ref 65–100)
GLUCOSE SERPL-MCNC: 210 MG/DL (ref 65–100)
HCT VFR BLD AUTO: 33.5 % (ref 41.1–50.3)
HGB BLD-MCNC: 11.2 G/DL (ref 13.6–17.2)
IMM GRANULOCYTES # BLD AUTO: 0.1 K/UL (ref 0–0.5)
IMM GRANULOCYTES NFR BLD AUTO: 1 % (ref 0–5)
LYMPHOCYTES # BLD: 1.1 K/UL (ref 0.5–4.6)
LYMPHOCYTES NFR BLD: 14 % (ref 13–44)
MAGNESIUM SERPL-MCNC: 1.5 MG/DL (ref 1.8–2.4)
MAGNESIUM SERPL-MCNC: 1.7 MG/DL (ref 1.8–2.4)
MCH RBC QN AUTO: 32.5 PG (ref 26.1–32.9)
MCHC RBC AUTO-ENTMCNC: 33.4 G/DL (ref 31.4–35)
MCV RBC AUTO: 97.1 FL (ref 82–102)
MONOCYTES # BLD: 0.8 K/UL (ref 0.1–1.3)
MONOCYTES NFR BLD: 9 % (ref 4–12)
NEUTS SEG # BLD: 5.9 K/UL (ref 1.7–8.2)
NEUTS SEG NFR BLD: 72 % (ref 43–78)
NRBC # BLD: 0 K/UL (ref 0–0.2)
PLATELET # BLD AUTO: 391 K/UL (ref 150–450)
PMV BLD AUTO: 10.3 FL (ref 9.4–12.3)
POTASSIUM SERPL-SCNC: 5.1 MMOL/L (ref 3.5–5.1)
POTASSIUM SERPL-SCNC: 5.4 MMOL/L (ref 3.5–5.1)
RBC # BLD AUTO: 3.45 M/UL (ref 4.23–5.6)
SERVICE CMNT-IMP: ABNORMAL
SODIUM SERPL-SCNC: 136 MMOL/L (ref 136–146)
SODIUM SERPL-SCNC: 137 MMOL/L (ref 136–146)
WBC # BLD AUTO: 8.2 K/UL (ref 4.3–11.1)

## 2023-12-07 PROCEDURE — C1769 GUIDE WIRE: HCPCS | Performed by: INTERNAL MEDICINE

## 2023-12-07 PROCEDURE — 36415 COLL VENOUS BLD VENIPUNCTURE: CPT

## 2023-12-07 PROCEDURE — 80048 BASIC METABOLIC PNL TOTAL CA: CPT

## 2023-12-07 PROCEDURE — 6360000004 HC RX CONTRAST MEDICATION: Performed by: INTERNAL MEDICINE

## 2023-12-07 PROCEDURE — C8924 2D TTE W OR W/O FOL W/CON,FU: HCPCS

## 2023-12-07 PROCEDURE — 6360000002 HC RX W HCPCS: Performed by: INTERNAL MEDICINE

## 2023-12-07 PROCEDURE — 93010 ELECTROCARDIOGRAM REPORT: CPT | Performed by: INTERNAL MEDICINE

## 2023-12-07 PROCEDURE — 93005 ELECTROCARDIOGRAM TRACING: CPT | Performed by: INTERNAL MEDICINE

## 2023-12-07 PROCEDURE — 82962 GLUCOSE BLOOD TEST: CPT

## 2023-12-07 PROCEDURE — 85025 COMPLETE CBC W/AUTO DIFF WBC: CPT

## 2023-12-07 PROCEDURE — C1894 INTRO/SHEATH, NON-LASER: HCPCS | Performed by: INTERNAL MEDICINE

## 2023-12-07 PROCEDURE — 2580000003 HC RX 258: Performed by: INTERNAL MEDICINE

## 2023-12-07 PROCEDURE — 83735 ASSAY OF MAGNESIUM: CPT

## 2023-12-07 PROCEDURE — 99152 MOD SED SAME PHYS/QHP 5/>YRS: CPT | Performed by: INTERNAL MEDICINE

## 2023-12-07 PROCEDURE — 2500000003 HC RX 250 WO HCPCS: Performed by: INTERNAL MEDICINE

## 2023-12-07 PROCEDURE — 93458 L HRT ARTERY/VENTRICLE ANGIO: CPT | Performed by: INTERNAL MEDICINE

## 2023-12-07 PROCEDURE — 2709999900 HC NON-CHARGEABLE SUPPLY: Performed by: INTERNAL MEDICINE

## 2023-12-07 PROCEDURE — 93308 TTE F-UP OR LMTD: CPT | Performed by: INTERNAL MEDICINE

## 2023-12-07 RX ORDER — ACETAMINOPHEN 325 MG/1
650 TABLET ORAL EVERY 4 HOURS PRN
Status: DISCONTINUED | OUTPATIENT
Start: 2023-12-07 | End: 2023-12-07 | Stop reason: HOSPADM

## 2023-12-07 RX ORDER — MIDAZOLAM HYDROCHLORIDE 1 MG/ML
INJECTION INTRAMUSCULAR; INTRAVENOUS PRN
Status: DISCONTINUED | OUTPATIENT
Start: 2023-12-07 | End: 2023-12-07 | Stop reason: HOSPADM

## 2023-12-07 RX ORDER — POTASSIUM CHLORIDE 7.45 MG/ML
10 INJECTION INTRAVENOUS ONCE
Status: DISCONTINUED | OUTPATIENT
Start: 2023-12-07 | End: 2023-12-07

## 2023-12-07 RX ORDER — SODIUM CHLORIDE 9 MG/ML
INJECTION, SOLUTION INTRAVENOUS CONTINUOUS
Status: DISCONTINUED | OUTPATIENT
Start: 2023-12-07 | End: 2023-12-07 | Stop reason: HOSPADM

## 2023-12-07 RX ORDER — POTASSIUM CHLORIDE 20 MEQ/1
40 TABLET, EXTENDED RELEASE ORAL
Status: DISCONTINUED | OUTPATIENT
Start: 2023-12-07 | End: 2023-12-07

## 2023-12-07 RX ORDER — HEPARIN SODIUM 200 [USP'U]/100ML
INJECTION, SOLUTION INTRAVENOUS CONTINUOUS PRN
Status: DISCONTINUED | OUTPATIENT
Start: 2023-12-07 | End: 2023-12-07 | Stop reason: HOSPADM

## 2023-12-07 RX ORDER — SODIUM CHLORIDE 0.9 % (FLUSH) 0.9 %
5-40 SYRINGE (ML) INJECTION PRN
Status: DISCONTINUED | OUTPATIENT
Start: 2023-12-07 | End: 2023-12-07 | Stop reason: HOSPADM

## 2023-12-07 RX ORDER — MAGNESIUM SULFATE IN WATER 40 MG/ML
2000 INJECTION, SOLUTION INTRAVENOUS ONCE
Status: COMPLETED | OUTPATIENT
Start: 2023-12-07 | End: 2023-12-07

## 2023-12-07 RX ORDER — SODIUM CHLORIDE 9 MG/ML
INJECTION, SOLUTION INTRAVENOUS PRN
Status: DISCONTINUED | OUTPATIENT
Start: 2023-12-07 | End: 2023-12-07 | Stop reason: HOSPADM

## 2023-12-07 RX ORDER — LIDOCAINE HYDROCHLORIDE 10 MG/ML
INJECTION, SOLUTION INFILTRATION; PERINEURAL PRN
Status: DISCONTINUED | OUTPATIENT
Start: 2023-12-07 | End: 2023-12-07 | Stop reason: HOSPADM

## 2023-12-07 RX ORDER — ASPIRIN 81 MG/1
324 TABLET, CHEWABLE ORAL DAILY
Status: DISCONTINUED | OUTPATIENT
Start: 2023-12-07 | End: 2023-12-07 | Stop reason: HOSPADM

## 2023-12-07 RX ORDER — SODIUM CHLORIDE 0.9 % (FLUSH) 0.9 %
5-40 SYRINGE (ML) INJECTION EVERY 12 HOURS SCHEDULED
Status: DISCONTINUED | OUTPATIENT
Start: 2023-12-07 | End: 2023-12-07 | Stop reason: HOSPADM

## 2023-12-07 RX ADMIN — SODIUM CHLORIDE, PRESERVATIVE FREE 0.3 ML: 5 INJECTION INTRAVENOUS at 13:15

## 2023-12-07 RX ADMIN — MAGNESIUM SULFATE HEPTAHYDRATE 2000 MG: 40 INJECTION, SOLUTION INTRAVENOUS at 12:28

## 2023-12-07 ASSESSMENT — ENCOUNTER SYMPTOMS
COLOR CHANGE: 0
BLURRED VISION: 0
WHEEZING: 0
BACK PAIN: 0
COUGH: 0
ORTHOPNEA: 0
LEFT EYE: 0
DIARRHEA: 0
SORE THROAT: 0
VOMITING: 0
HEMOPTYSIS: 0
SHORTNESS OF BREATH: 0
HEMATEMESIS: 0
RIGHT EYE: 0
CONSTIPATION: 0
NAUSEA: 0
HEARTBURN: 0
ABDOMINAL PAIN: 0
SPUTUM PRODUCTION: 0

## 2023-12-07 ASSESSMENT — PAIN - FUNCTIONAL ASSESSMENT: PAIN_FUNCTIONAL_ASSESSMENT: NONE - DENIES PAIN

## 2023-12-07 NOTE — PROGRESS NOTES
Discharge instructions reviewed with patient including post cath care and CABG scheduled for 12/14. Family educated on pre-assessment for CABG. Family enquired about donated unvaccinated blood for surgery this was reviewed with patient and discussed with SANDRA Ramsay and is not feasible for cardiovascular surgery. INT removed with cath intact. TR band removed with cath intact. Gauze and tegaderm applied.

## 2023-12-07 NOTE — DISCHARGE INSTRUCTIONS
HEART CATHETERIZATION/ANGIOGRAPHY DISCHARGE INSTRUCTIONS    Check puncture site frequently for swelling or bleeding. If there is any bleeding, apply pressure over the area with a clean towel or washcloth and call 911. Notify your doctor for any redness, swelling, drainage, or oozing from the puncture site. Notify your doctor for any fever or chills. If the extremity becomes cold, numb, or painful call Dr. Tiffany Howard at [phone]. Activity should be limited for the next 48 hours. No heavy lifting, pushing, pulling  or strenuous activity for 48 hours. No heavy lifting (anything over 10 pounds) for 3 days. You may resume your usual diet. Drink more fluids than usual.  Have a responsible person drive you home and stay with you for at least 24 hours after your heart catheterization/angiography. You may remove bandage from your right wrist in 24 hours. You may shower in 24 hours. No tub baths, hot tubs, or swimming for 1 week. Do not place any lotions, creams, powders, or ointments over puncture site for 1 week. You may place a clean band-aid over the puncture site each day for 5 days. Change daily. Sedation for a Medical Procedure: Care Instructions     You were given a sedative medication during your visit. While many of the effects will have worn   off before you leave; you may continue to feel some effects for several hours. Common side effects from sedation include:  Feeling sleepy. (Your doctors and nurses will make sure you are not too sleepy to go home.)  Nausea and vomiting. This usually does not last long. Feeling tired. How can you care for yourself at home? Activity    Don't do anything for 24 hours that requires attention to detail. It takes time for the medicine effects to completely wear off. Do not make important legal decisions for 24 hours. Do not sign any legal documents for 24 hours.      Do not drink alcohol today     For your safety, you should not drive or operate heavy

## 2023-12-07 NOTE — PROGRESS NOTES
TRANSFER - OUT REPORT:    Verbal report given to RN on Tosin Miles III  being transferred to [facility] for routine progression of patient care       Report consisted of patient's Situation, Background, Assessment and   Recommendations(SBAR). Information from the following report(s) Nurse Handoff Report and MAR was reviewed with the receiving nurse.       [facility] w/ Dr. Vandana Hall  CV Consult  R radial access  TR band to R radial @ 12mL  No s/sxs of bleeding or hematoma to R radial access site    Heparin 5,000 units IC  Versed 2mg IV  Fentanyl 25mcg IV

## 2023-12-07 NOTE — PROGRESS NOTES
Patient received to [facility] room # 12  Ambulatory from [facility]. Patient scheduled for C today with Dr Gustavo Burgos. Procedure reviewed & questions answered, voiced good understanding consent obtained & placed on chart. All medications and medical history reviewed. Will prep patient per orders. Patient & family updated on plan of care. The patient has a fraility score of 3-MANAGING WELL, based on ambulation.      Patient took Aspirin 324 today at 0530 prior to arrival.

## 2023-12-08 RX ORDER — SERTRALINE HYDROCHLORIDE 100 MG/1
100 TABLET, FILM COATED ORAL DAILY
Qty: 90 TABLET | Refills: 1 | Status: SHIPPED | OUTPATIENT
Start: 2023-12-08

## 2023-12-08 NOTE — TELEPHONE ENCOUNTER
Requested Prescriptions     Signed Prescriptions Disp Refills    sertraline (ZOLOFT) 100 MG tablet 90 tablet 1     Sig: Take 1 tablet by mouth daily     Authorizing Provider: Emeka Tarango

## 2023-12-11 ENCOUNTER — PREP FOR PROCEDURE (OUTPATIENT)
Dept: CARDIOTHORACIC SURGERY | Age: 73
End: 2023-12-11

## 2023-12-11 DIAGNOSIS — Z01.818 PRE-OP TESTING: Primary | ICD-10-CM

## 2023-12-11 RX ORDER — SODIUM CHLORIDE 9 MG/ML
INJECTION, SOLUTION INTRAVENOUS PRN
Status: CANCELLED | OUTPATIENT
Start: 2023-12-11

## 2023-12-11 RX ORDER — AMIODARONE HYDROCHLORIDE 200 MG/1
600 TABLET ORAL ONCE
Status: CANCELLED | OUTPATIENT
Start: 2023-12-11 | End: 2023-12-11

## 2023-12-11 RX ORDER — AMIODARONE HYDROCHLORIDE 200 MG/1
600 TABLET ORAL ONCE
Qty: 3 TABLET | Refills: 0 | Status: ON HOLD | OUTPATIENT
Start: 2023-12-11 | End: 2023-12-14

## 2023-12-11 RX ORDER — SODIUM CHLORIDE 0.9 % (FLUSH) 0.9 %
5-40 SYRINGE (ML) INJECTION PRN
Status: CANCELLED | OUTPATIENT
Start: 2023-12-11

## 2023-12-11 RX ORDER — SODIUM CHLORIDE 0.9 % (FLUSH) 0.9 %
5-40 SYRINGE (ML) INJECTION EVERY 12 HOURS SCHEDULED
Status: CANCELLED | OUTPATIENT
Start: 2023-12-11

## 2023-12-12 ENCOUNTER — HOSPITAL ENCOUNTER (OUTPATIENT)
Dept: LAB | Age: 73
Discharge: HOME | End: 2023-12-15
Payer: MEDICARE

## 2023-12-12 ENCOUNTER — HOSPITAL ENCOUNTER (OUTPATIENT)
Dept: SURGERY | Age: 73
Discharge: HOME | End: 2023-12-15
Payer: MEDICARE

## 2023-12-12 ENCOUNTER — HOSPITAL ENCOUNTER (OUTPATIENT)
Dept: GENERAL RADIOLOGY | Age: 73
Discharge: HOME | End: 2023-12-15
Payer: MEDICARE

## 2023-12-12 VITALS
DIASTOLIC BLOOD PRESSURE: 64 MMHG | HEART RATE: 71 BPM | OXYGEN SATURATION: 98 % | RESPIRATION RATE: 20 BRPM | SYSTOLIC BLOOD PRESSURE: 126 MMHG | HEIGHT: 70 IN | WEIGHT: 200 LBS | TEMPERATURE: 97.7 F | BODY MASS INDEX: 28.63 KG/M2

## 2023-12-12 DIAGNOSIS — Z01.818 PRE-OP TESTING: ICD-10-CM

## 2023-12-12 LAB
ALBUMIN SERPL-MCNC: 3.4 G/DL (ref 3.2–4.6)
ALBUMIN/GLOB SERPL: 0.9 (ref 0.4–1.6)
ALP SERPL-CCNC: 96 U/L (ref 50–136)
ALT SERPL-CCNC: 27 U/L (ref 12–65)
ANION GAP SERPL CALC-SCNC: 7 MMOL/L (ref 2–11)
APPEARANCE UR: CLEAR
APTT PPP: 50.5 SEC (ref 23.3–37.4)
AST SERPL-CCNC: 13 U/L (ref 15–37)
BILIRUB SERPL-MCNC: 0.3 MG/DL (ref 0.2–1.1)
BILIRUB UR QL: NEGATIVE
BUN SERPL-MCNC: 19 MG/DL (ref 8–23)
CALCIUM SERPL-MCNC: 9.3 MG/DL (ref 8.3–10.4)
CHLORIDE SERPL-SCNC: 107 MMOL/L (ref 103–113)
CO2 SERPL-SCNC: 25 MMOL/L (ref 21–32)
COLOR UR: YELLOW
CREAT SERPL-MCNC: 1.07 MG/DL (ref 0.8–1.5)
EST. AVERAGE GLUCOSE BLD GHB EST-MCNC: 171 MG/DL
GLOBULIN SER CALC-MCNC: 4 G/DL (ref 2.8–4.5)
GLUCOSE SERPL-MCNC: 208 MG/DL (ref 65–100)
GLUCOSE UR STRIP.AUTO-MCNC: NEGATIVE MG/DL
HBA1C MFR BLD: 7.6 % (ref 4.8–5.6)
HGB UR QL STRIP: NEGATIVE
INR PPP: 1.1
KETONES UR QL STRIP.AUTO: NEGATIVE MG/DL
LEUKOCYTE ESTERASE UR QL STRIP.AUTO: NEGATIVE
MAGNESIUM SERPL-MCNC: 1.7 MG/DL (ref 1.8–2.4)
NITRITE UR QL STRIP.AUTO: NEGATIVE
PH UR STRIP: 5.5 (ref 5–9)
POTASSIUM SERPL-SCNC: 4.5 MMOL/L (ref 3.5–5.1)
PROT SERPL-MCNC: 7.4 G/DL (ref 6.3–8.2)
PROT UR STRIP-MCNC: NEGATIVE MG/DL
PROTHROMBIN TIME: 13.7 SEC (ref 11.3–14.9)
SODIUM SERPL-SCNC: 139 MMOL/L (ref 136–146)
SP GR UR REFRACTOMETRY: 1.03 (ref 1–1.02)
UROBILINOGEN UR QL STRIP.AUTO: 0.2 EU/DL (ref 0.2–1)

## 2023-12-12 PROCEDURE — 71046 X-RAY EXAM CHEST 2 VIEWS: CPT

## 2023-12-12 PROCEDURE — 83735 ASSAY OF MAGNESIUM: CPT

## 2023-12-12 PROCEDURE — 80053 COMPREHEN METABOLIC PANEL: CPT

## 2023-12-12 PROCEDURE — 86923 COMPATIBILITY TEST ELECTRIC: CPT

## 2023-12-12 PROCEDURE — 86900 BLOOD TYPING SEROLOGIC ABO: CPT

## 2023-12-12 PROCEDURE — 81003 URINALYSIS AUTO W/O SCOPE: CPT

## 2023-12-12 PROCEDURE — 94010 BREATHING CAPACITY TEST: CPT

## 2023-12-12 PROCEDURE — 83036 HEMOGLOBIN GLYCOSYLATED A1C: CPT

## 2023-12-12 PROCEDURE — 85730 THROMBOPLASTIN TIME PARTIAL: CPT

## 2023-12-12 PROCEDURE — 86850 RBC ANTIBODY SCREEN: CPT

## 2023-12-12 PROCEDURE — 86901 BLOOD TYPING SEROLOGIC RH(D): CPT

## 2023-12-12 PROCEDURE — 85610 PROTHROMBIN TIME: CPT

## 2023-12-12 PROCEDURE — 87641 MR-STAPH DNA AMP PROBE: CPT

## 2023-12-13 ENCOUNTER — CLINICAL DOCUMENTATION (OUTPATIENT)
Dept: CARDIOTHORACIC SURGERY | Age: 73
End: 2023-12-13

## 2023-12-13 LAB
MRSA DNA SPEC QL NAA+PROBE: NOT DETECTED
S AUREUS CPE NOSE QL NAA+PROBE: NOT DETECTED

## 2023-12-14 ENCOUNTER — ANESTHESIA (OUTPATIENT)
Dept: SURGERY | Age: 73
End: 2023-12-14
Payer: MEDICARE

## 2023-12-14 ENCOUNTER — ANESTHESIA EVENT (OUTPATIENT)
Dept: SURGERY | Age: 73
End: 2023-12-14
Payer: MEDICARE

## 2023-12-14 PROBLEM — Z01.818 PRE-OP TESTING: Status: ACTIVE | Noted: 2023-12-14

## 2023-12-14 PROBLEM — I25.10 CAD, MULTIPLE VESSEL: Status: ACTIVE | Noted: 2023-12-14

## 2023-12-14 PROCEDURE — 2580000003 HC RX 258: Performed by: NURSE ANESTHETIST, CERTIFIED REGISTERED

## 2023-12-14 PROCEDURE — 6360000002 HC RX W HCPCS: Performed by: NURSE ANESTHETIST, CERTIFIED REGISTERED

## 2023-12-14 PROCEDURE — 6360000002 HC RX W HCPCS: Performed by: PHYSICIAN ASSISTANT

## 2023-12-14 PROCEDURE — 2500000003 HC RX 250 WO HCPCS: Performed by: NURSE ANESTHETIST, CERTIFIED REGISTERED

## 2023-12-14 RX ORDER — EPHEDRINE SULFATE/0.9% NACL/PF 50 MG/5 ML
SYRINGE (ML) INTRAVENOUS PRN
Status: DISCONTINUED | OUTPATIENT
Start: 2023-12-14 | End: 2023-12-14 | Stop reason: SDUPTHER

## 2023-12-14 RX ORDER — VECURONIUM BROMIDE 1 MG/ML
INJECTION, POWDER, LYOPHILIZED, FOR SOLUTION INTRAVENOUS PRN
Status: DISCONTINUED | OUTPATIENT
Start: 2023-12-14 | End: 2023-12-14 | Stop reason: SDUPTHER

## 2023-12-14 RX ORDER — SODIUM CHLORIDE, SODIUM LACTATE, POTASSIUM CHLORIDE, CALCIUM CHLORIDE 600; 310; 30; 20 MG/100ML; MG/100ML; MG/100ML; MG/100ML
INJECTION, SOLUTION INTRAVENOUS CONTINUOUS PRN
Status: DISCONTINUED | OUTPATIENT
Start: 2023-12-14 | End: 2023-12-14 | Stop reason: SDUPTHER

## 2023-12-14 RX ORDER — ETOMIDATE 2 MG/ML
INJECTION INTRAVENOUS PRN
Status: DISCONTINUED | OUTPATIENT
Start: 2023-12-14 | End: 2023-12-14 | Stop reason: SDUPTHER

## 2023-12-14 RX ORDER — DEXMEDETOMIDINE HYDROCHLORIDE 4 UG/ML
INJECTION, SOLUTION INTRAVENOUS CONTINUOUS PRN
Status: DISCONTINUED | OUTPATIENT
Start: 2023-12-14 | End: 2023-12-14 | Stop reason: SDUPTHER

## 2023-12-14 RX ORDER — MIDAZOLAM HYDROCHLORIDE 1 MG/ML
INJECTION INTRAMUSCULAR; INTRAVENOUS PRN
Status: DISCONTINUED | OUTPATIENT
Start: 2023-12-14 | End: 2023-12-14 | Stop reason: SDUPTHER

## 2023-12-14 RX ORDER — SODIUM CHLORIDE 9 MG/ML
INJECTION, SOLUTION INTRAVENOUS CONTINUOUS PRN
Status: DISCONTINUED | OUTPATIENT
Start: 2023-12-14 | End: 2023-12-14 | Stop reason: SDUPTHER

## 2023-12-14 RX ORDER — NITROGLYCERIN 20 MG/100ML
INJECTION INTRAVENOUS CONTINUOUS PRN
Status: DISCONTINUED | OUTPATIENT
Start: 2023-12-14 | End: 2023-12-14 | Stop reason: SDUPTHER

## 2023-12-14 RX ORDER — FENTANYL CITRATE 0.05 MG/ML
INJECTION, SOLUTION INTRAMUSCULAR; INTRAVENOUS PRN
Status: DISCONTINUED | OUTPATIENT
Start: 2023-12-14 | End: 2023-12-14 | Stop reason: SDUPTHER

## 2023-12-14 RX ORDER — HEPARIN SODIUM 1000 [USP'U]/ML
INJECTION, SOLUTION INTRAVENOUS; SUBCUTANEOUS PRN
Status: DISCONTINUED | OUTPATIENT
Start: 2023-12-14 | End: 2023-12-14 | Stop reason: SDUPTHER

## 2023-12-14 RX ORDER — LIDOCAINE HCL/PF 100 MG/5ML
SYRINGE (ML) INJECTION PRN
Status: DISCONTINUED | OUTPATIENT
Start: 2023-12-14 | End: 2023-12-14 | Stop reason: SDUPTHER

## 2023-12-14 RX ORDER — ROCURONIUM BROMIDE 10 MG/ML
INJECTION, SOLUTION INTRAVENOUS PRN
Status: DISCONTINUED | OUTPATIENT
Start: 2023-12-14 | End: 2023-12-14 | Stop reason: SDUPTHER

## 2023-12-14 RX ORDER — PROTAMINE SULFATE 10 MG/ML
INJECTION, SOLUTION INTRAVENOUS PRN
Status: DISCONTINUED | OUTPATIENT
Start: 2023-12-14 | End: 2023-12-14 | Stop reason: SDUPTHER

## 2023-12-14 RX ADMIN — Medication 5 MG: at 15:49

## 2023-12-14 RX ADMIN — VECURONIUM BROMIDE 5 MG: 1 INJECTION, POWDER, LYOPHILIZED, FOR SOLUTION INTRAVENOUS at 16:15

## 2023-12-14 RX ADMIN — AMINOCAPROIC ACID 20 G/HR: 250 INJECTION, SOLUTION INTRAVENOUS at 14:30

## 2023-12-14 RX ADMIN — MIDAZOLAM 2 MG: 1 INJECTION INTRAMUSCULAR; INTRAVENOUS at 13:57

## 2023-12-14 RX ADMIN — FENTANYL CITRATE 100 MCG: 0.05 INJECTION, SOLUTION INTRAMUSCULAR; INTRAVENOUS at 14:57

## 2023-12-14 RX ADMIN — SODIUM CHLORIDE: 9 INJECTION, SOLUTION INTRAVENOUS at 17:59

## 2023-12-14 RX ADMIN — PHENYLEPHRINE HYDROCHLORIDE 50 MCG: 10 INJECTION INTRAVENOUS at 17:24

## 2023-12-14 RX ADMIN — PHENYLEPHRINE HYDROCHLORIDE 40 MCG: 10 INJECTION INTRAVENOUS at 17:43

## 2023-12-14 RX ADMIN — Medication 5 MG: at 15:16

## 2023-12-14 RX ADMIN — Medication 5 MG: at 15:57

## 2023-12-14 RX ADMIN — NITROGLYCERIN 10 MCG: 20 INJECTION INTRAVENOUS at 17:32

## 2023-12-14 RX ADMIN — SODIUM CHLORIDE, SODIUM LACTATE, POTASSIUM CHLORIDE, AND CALCIUM CHLORIDE: 600; 310; 30; 20 INJECTION, SOLUTION INTRAVENOUS at 13:54

## 2023-12-14 RX ADMIN — Medication 5 MG: at 15:13

## 2023-12-14 RX ADMIN — PHENYLEPHRINE HYDROCHLORIDE 50 MCG: 10 INJECTION INTRAVENOUS at 15:58

## 2023-12-14 RX ADMIN — ETOMIDATE 24 MG: 2 INJECTION, SOLUTION INTRAVENOUS at 14:07

## 2023-12-14 RX ADMIN — PHENYLEPHRINE HYDROCHLORIDE 100 MCG: 10 INJECTION INTRAVENOUS at 16:12

## 2023-12-14 RX ADMIN — MIDAZOLAM 4 MG: 1 INJECTION INTRAMUSCULAR; INTRAVENOUS at 16:44

## 2023-12-14 RX ADMIN — NITROGLYCERIN 10 MCG: 20 INJECTION INTRAVENOUS at 17:26

## 2023-12-14 RX ADMIN — PHENYLEPHRINE HYDROCHLORIDE 50 MCG: 10 INJECTION INTRAVENOUS at 16:07

## 2023-12-14 RX ADMIN — AMINOCAPROIC ACID 1 G/HR: 250 INJECTION, SOLUTION INTRAVENOUS at 15:00

## 2023-12-14 RX ADMIN — NITROGLYCERIN 10 MCG: 20 INJECTION INTRAVENOUS at 17:28

## 2023-12-14 RX ADMIN — Medication 5 MG: at 14:21

## 2023-12-14 RX ADMIN — LIDOCAINE HYDROCHLORIDE 40 MG: 20 INJECTION INTRAVENOUS at 14:07

## 2023-12-14 RX ADMIN — VECURONIUM BROMIDE 5 MG: 1 INJECTION, POWDER, LYOPHILIZED, FOR SOLUTION INTRAVENOUS at 15:06

## 2023-12-14 RX ADMIN — ROCURONIUM BROMIDE 50 MG: 10 INJECTION, SOLUTION INTRAVENOUS at 16:44

## 2023-12-14 RX ADMIN — Medication 5 MG: at 15:37

## 2023-12-14 RX ADMIN — SODIUM CHLORIDE: 9 INJECTION, SOLUTION INTRAVENOUS at 14:30

## 2023-12-14 RX ADMIN — Medication 0.02 MCG/KG/MIN: at 17:08

## 2023-12-14 RX ADMIN — FENTANYL CITRATE 250 MCG: 0.05 INJECTION, SOLUTION INTRAMUSCULAR; INTRAVENOUS at 14:07

## 2023-12-14 RX ADMIN — MIDAZOLAM 1 MG: 1 INJECTION INTRAMUSCULAR; INTRAVENOUS at 14:23

## 2023-12-14 RX ADMIN — Medication 5 MG: at 14:50

## 2023-12-14 RX ADMIN — PROTAMINE SULFATE 270 MG: 10 INJECTION, SOLUTION INTRAVENOUS at 17:20

## 2023-12-14 RX ADMIN — NITROGLYCERIN 10 MCG: 20 INJECTION INTRAVENOUS at 15:44

## 2023-12-14 RX ADMIN — Medication 5 MG: at 15:50

## 2023-12-14 RX ADMIN — SODIUM CHLORIDE, SODIUM LACTATE, POTASSIUM CHLORIDE, CALCIUM CHLORIDE: 600; 310; 30; 20 INJECTION, SOLUTION INTRAVENOUS at 14:30

## 2023-12-14 RX ADMIN — DEXMEDETOMIDINE HYDROCHLORIDE 0.5 MCG/KG/HR: 4 INJECTION, SOLUTION INTRAVENOUS at 16:44

## 2023-12-14 RX ADMIN — NITROGLYCERIN 10 MCG: 20 INJECTION INTRAVENOUS at 17:23

## 2023-12-14 RX ADMIN — ROCURONIUM BROMIDE 50 MG: 10 INJECTION, SOLUTION INTRAVENOUS at 14:07

## 2023-12-14 RX ADMIN — FENTANYL CITRATE 150 MCG: 0.05 INJECTION, SOLUTION INTRAMUSCULAR; INTRAVENOUS at 15:06

## 2023-12-14 RX ADMIN — FENTANYL CITRATE 250 MCG: 0.05 INJECTION, SOLUTION INTRAMUSCULAR; INTRAVENOUS at 15:08

## 2023-12-14 RX ADMIN — FENTANYL CITRATE 250 MCG: 0.05 INJECTION, SOLUTION INTRAMUSCULAR; INTRAVENOUS at 15:42

## 2023-12-14 RX ADMIN — Medication 2000 MG: at 14:45

## 2023-12-14 RX ADMIN — HEPARIN SODIUM 35000 UNITS: 1000 INJECTION INTRAVENOUS; SUBCUTANEOUS at 15:55

## 2023-12-14 RX ADMIN — PHENYLEPHRINE HYDROCHLORIDE 100 MCG: 10 INJECTION INTRAVENOUS at 14:22

## 2023-12-14 RX ADMIN — NITROGLYCERIN 10 MCG/MIN: 20 INJECTION INTRAVENOUS at 14:30

## 2023-12-14 RX ADMIN — Medication 5 MG: at 15:56

## 2023-12-14 NOTE — ANESTHESIA PRE PROCEDURE
Department of Anesthesiology  Preprocedure Note       Name:  Susan Johnson III   Age:  68 y.o.  :  1950                                          MRN:  [id number]         Date:  2023      Surgeon: Connie Kwong): Gabby Loera MD    Procedure: Procedure(s):  CABG CORONARY ARTERY BYPASS GRAFTING/OR #4 TO FOLLOW 1ST CASE/ DR. Boggs Master REQUESTS PAPA    Medications prior to admission:   Prior to Admission medications    Medication Sig Start Date End Date Taking? Authorizing Provider   amiodarone (CORDARONE) 200 MG tablet Take 3 tablets by mouth once for 1 dose Take 3 tablets after 4pm the evening before surgery.  23  Anya Gonzales PA   sertraline (ZOLOFT) 100 MG tablet Take 1 tablet by mouth daily 23   Paduano, Bettyann Brady, APRN - CNP   aspirin 81 MG EC tablet Take 1 tablet by mouth nightly    ProviderKatie MD   metoprolol succinate (TOPROL XL) 25 MG extended release tablet Take 1 tablet by mouth daily 23   de Verlean Osler, MD   methotrexate (RHEUMATREX) 2.5 MG chemo tablet Take 10 tablets once per week 23   Gwendolyn Dillon MD   omeprazole (PRILOSEC) 40 MG delayed release capsule Take 1 capsule by mouth daily    ProviderKatie MD   hydroxychloroquine (PLAQUENIL) 200 MG tablet Take 1 tablet by mouth 2 times daily 23   Gwendolyn Dillon MD   ZINC PO Take 1 tablet by mouth Daily with lunch    ProviderKatie MD   levothyroxine (SYNTHROID) 150 MCG tablet Take 1 tablet by mouth every morning (before breakfast) 23   Larry Norris MD   methotrexate (RHEUMATREX) 2.5 MG chemo tablet Take 10 tablets by mouth once a week  Patient taking differently: Take 10 tablets by mouth once a week ON EVERY MONDAY, TAKES 5 AT LUNCH AND 5 AT [facility] 10/2/23   Oren Hernandez MD   vitamin D 25 MCG (1000 UT) CAPS Take 1 capsule by mouth Daily with lunch    Katie Varela MD   vitamin C (ASCORBIC ACID) 500 MG tablet Take 1

## 2023-12-14 NOTE — ANESTHESIA PROCEDURE NOTES
Airway  Date/Time: 12/14/2023 2:09 PM  Urgency: elective    Airway not difficult    General Information and Staff    Patient location during procedure: OR  Resident/CRNA: SWATHI Carpenter CRNA  Performed: resident/CRNA   Performed by: SWATHI Carpenter CRNA  Authorized by: Jenny Hoang MD      Indications and Patient Condition  Indications for airway management: anesthesia  Spontaneous Ventilation: absent  Sedation level: deep  Preoxygenated: yes  Patient position: sniffing  MILS not maintained throughout  Mask difficulty assessment: vent by bag mask + OA or adjuvant +/- NMBA    Final Airway Details  Final airway type: endotracheal airway      Successful airway: ETT  Cuffed: yes   Successful intubation technique: video laryngoscopy  Facilitating devices/methods: intubating stylet  Endotracheal tube insertion site: oral  Blade: Brown  Blade size: #3  ETT size (mm): 7.5  Cormack-Lehane Classification: grade I - full view of glottis  Placement verified by: chest auscultation and capnometry   Measured from: lips  ETT to lips (cm): 22  Number of attempts at approach: 1  Ventilation between attempts: bag mask  Number of other approaches attempted: 0    Additional Comments  Glidescope for RA  no

## 2023-12-14 NOTE — ANESTHESIA PROCEDURE NOTES
Arterial Line:    An arterial line was placed using surface landmarks, in the OR for the following indication(s): continuous blood pressure monitoring and blood sampling needed. A 20 gauge (size), 1 and 3/4 inch (length), Arrow (type) catheter was placed, Seldinger technique used, into the left radial artery, secured by tape and Tegaderm. Anesthesia type: Local  Local infiltration: Injection    Events:  patient tolerated procedure well with no complications and EBL 0mL. 12/14/2023 2:00 PM12/14/2023 2:04 PM  Resident/CRNA: SWATHI Hill CRNA  Performed: Resident/CRNA   Preanesthetic Checklist  Completed: patient identified, IV checked, site marked, risks and benefits discussed, surgical/procedural consents, equipment checked, pre-op evaluation, timeout performed, anesthesia consent given, oxygen available, monitors applied/VS acknowledged, fire risk safety assessment completed and verbalized and blood product R/B/A discussed and consented

## 2023-12-15 PROBLEM — R09.02 HYPOXEMIA: Status: ACTIVE | Noted: 2023-12-15

## 2023-12-15 PROBLEM — J98.11 ATELECTASIS: Status: ACTIVE | Noted: 2023-12-15

## 2023-12-15 PROBLEM — Z95.1 S/P CABG X 3: Status: ACTIVE | Noted: 2023-12-15

## 2023-12-15 NOTE — ANESTHESIA PROCEDURE NOTES
Procedure Performed: JASON       Start Time:  12/14/2023 2:35 PM       End Time:   12/14/2023 6:05 PM    Preanesthesia Checklist:  Patient identified, IV assessed, risks and benefits discussed, monitors and equipment assessed, procedure being performed at surgeon's request and anesthesia consent obtained. General Procedure Information  Diagnostic Indications for Echo:  hemodynamic monitoring  Physician Requesting Echo: Kosta Liu MD  Location performed:  OR  Intubated  Bite block placed  Heart visualized  Probe Insertion:  Easy  Probe Type:  Mulitplane  Modalities:  2D only, pulse wave Doppler, color flow mapping, 3D, 2D, continuous wave Doppler and M-mode    Echocardiographic and Doppler Measurements    Ventricles    Right Ventricle:  Cavity size normal.  Hypertrophy not present. Thrombus not present. Global function normal.    Left Ventricle:  Cavity size normal.  Hypertrophy present. Thrombus not present. Global Function normal.    Other Ventricular Findings:       Normal EF, Concentric LVH, no RWMA    Ventricular Regional Function:    Wall Motion Comments:       No RWMA    Valves    Aortic Valve: Annulus normal.  Stenosis not present. Regurgitation none. Leaflets normal.  Leaflet motions normal.      Mitral Valve: Annulus normal.  Stenosis not present. Regurgitation mild. Leaflets normal.  Leaflet motions normal.      Tricuspid Valve: Annulus normal.  Regurgitation none. Leaflet motions normal.    Other Valve Findings:       Trace central MR, otherwise valve studies were normal    Aorta    Ascending Aorta:  Size normal.  Dissection not present. Aortic Arch:  Dissection not present. Descending Aorta:  Size normal.  Dissection not present. Atria    Right Atrium:  Size normal.  Spontaneous echo contrast not present. Thrombus not present. Tumor not present. Left Atrium:  Size normal.  Thrombus not present. Tumor not present.   Left atrial appendage

## 2023-12-15 NOTE — ANESTHESIA PROCEDURE NOTES
Central Venous Line:    A central venous line was placed using ultrasound guidance and surface landmarks, in the OR for the following indication(s): central venous access and CVP monitoring. 12/14/2023 2:15 PM12/14/2023 2:24 PM    Sterility preparation included the following: hand hygiene performed prior to procedure, maximum sterile barriers used and sterile technique used to drape from head to toe. The patient was placed in Trendelenburg position. The right internal jugular vein was prepped. The site was prepped with Chloraprep. A 9 Fr (size), introducer SLIC was placed. During the procedure, the following specific steps were taken: target vein identified, needle advanced into vein and blood aspirated and guidewire advanced into vein. Intravenous verification was obtained by ultrasound, venous blood return, x-ray and manometry. Post insertion care included: all ports aspirated, all ports flushed easily, guidewire removed intact, Biopatch applied, line sutured in place and dressing applied. During the procedure the patient experienced: patient tolerated procedure well with no complications. Outcomes: uncomplicated  Real-time US image taken/store: yes  Anesthesia type: local.. No    Additional notes:  Informed consent was obtained and questions/concerns addressed. The selected vein was evaluated by ultrasound for patency and adequacy for catheter insertion and was deemed adequate. Using realtime ultrasound imaging, the vein was punctured with visualization of the needle entry. A permanent image was obtained and placed in the patient's chart.    Staffing  Performed: Anesthesiologist   Anesthesiologist: Danise Boeck, MD  Performed by: Danise Boeck, MD  Authorized by: Danise Boeck, MD    Preanesthetic Checklist  Completed: patient identified, IV checked, risks and benefits discussed, equipment checked, pre-op evaluation, timeout performed, anesthesia consent given, oxygen

## 2023-12-16 LAB
ABO + RH BLD: NORMAL
BLD PROD TYP BPU: NORMAL
BLOOD BANK DISPENSE STATUS: NORMAL
BLOOD GROUP ANTIBODIES SERPL: NORMAL
BPU ID: NORMAL
CROSSMATCH RESULT: NORMAL
SPECIMEN EXP DATE BLD: NORMAL
UNIT DIVISION: 0

## 2023-12-17 PROBLEM — I48.3 TYPICAL ATRIAL FLUTTER (HCC): Status: ACTIVE | Noted: 2023-12-17

## 2023-12-20 ENCOUNTER — HOSPITAL ENCOUNTER (INPATIENT)
Age: 73
DRG: 949 | End: 2023-12-20
Attending: PHYSICAL MEDICINE & REHABILITATION | Admitting: PHYSICAL MEDICINE & REHABILITATION
Payer: MEDICARE

## 2023-12-20 DIAGNOSIS — G47.9 DIFFICULTY SLEEPING: ICD-10-CM

## 2023-12-20 DIAGNOSIS — E78.2 MIXED HYPERLIPIDEMIA: ICD-10-CM

## 2023-12-20 DIAGNOSIS — R09.89 RHONCHI: ICD-10-CM

## 2023-12-20 DIAGNOSIS — I48.3 TYPICAL ATRIAL FLUTTER (HCC): ICD-10-CM

## 2023-12-20 DIAGNOSIS — J98.8 CONGESTION OF RESPIRATORY TRACT: Primary | ICD-10-CM

## 2023-12-20 DIAGNOSIS — E08.65 DIABETES MELLITUS DUE TO UNDERLYING CONDITION WITH HYPERGLYCEMIA, WITHOUT LONG-TERM CURRENT USE OF INSULIN (HCC): ICD-10-CM

## 2023-12-20 DIAGNOSIS — N30.01 ACUTE CYSTITIS WITH HEMATURIA: ICD-10-CM

## 2023-12-20 PROBLEM — Z95.1 HISTORY OF CORONARY ARTERY BYPASS GRAFT X 3: Status: ACTIVE | Noted: 2023-12-20

## 2023-12-20 PROBLEM — I48.91 ATRIAL FIBRILLATION (HCC): Status: ACTIVE | Noted: 2023-12-20

## 2023-12-20 PROBLEM — E03.9 HYPOTHYROIDISM: Status: ACTIVE | Noted: 2023-12-20

## 2023-12-20 PROBLEM — R53.81 DEBILITY: Status: ACTIVE | Noted: 2023-12-20

## 2023-12-20 PROBLEM — K21.9 GASTROESOPHAGEAL REFLUX DISEASE: Status: ACTIVE | Noted: 2023-12-20

## 2023-12-20 LAB
GLUCOSE BLD STRIP.AUTO-MCNC: 120 MG/DL (ref 65–100)
SERVICE CMNT-IMP: ABNORMAL

## 2023-12-20 PROCEDURE — 99223 1ST HOSP IP/OBS HIGH 75: CPT | Performed by: PHYSICAL MEDICINE & REHABILITATION

## 2023-12-20 PROCEDURE — 6370000000 HC RX 637 (ALT 250 FOR IP): Performed by: PHYSICAL MEDICINE & REHABILITATION

## 2023-12-20 PROCEDURE — 6370000000 HC RX 637 (ALT 250 FOR IP): Performed by: PHYSICIAN ASSISTANT

## 2023-12-20 PROCEDURE — 94760 N-INVAS EAR/PLS OXIMETRY 1: CPT

## 2023-12-20 PROCEDURE — 1180000000 HC REHAB R&B

## 2023-12-20 PROCEDURE — 97166 OT EVAL MOD COMPLEX 45 MIN: CPT

## 2023-12-20 PROCEDURE — 94640 AIRWAY INHALATION TREATMENT: CPT

## 2023-12-20 PROCEDURE — 6360000002 HC RX W HCPCS: Performed by: PHYSICAL MEDICINE & REHABILITATION

## 2023-12-20 RX ORDER — TRAZODONE HYDROCHLORIDE 50 MG/1
50 TABLET ORAL NIGHTLY PRN
Status: DISCONTINUED | OUTPATIENT
Start: 2023-12-20 | End: 2024-01-11 | Stop reason: HOSPADM

## 2023-12-20 RX ORDER — ATORVASTATIN CALCIUM 80 MG/1
80 TABLET, FILM COATED ORAL NIGHTLY
Status: DISCONTINUED | OUTPATIENT
Start: 2023-12-20 | End: 2024-01-11 | Stop reason: HOSPADM

## 2023-12-20 RX ORDER — BENZONATATE 100 MG/1
100 CAPSULE ORAL 4 TIMES DAILY PRN
Status: DISCONTINUED | OUTPATIENT
Start: 2023-12-20 | End: 2024-01-11 | Stop reason: HOSPADM

## 2023-12-20 RX ORDER — METOPROLOL SUCCINATE 25 MG/1
25 TABLET, EXTENDED RELEASE ORAL DAILY
Status: DISCONTINUED | OUTPATIENT
Start: 2023-12-21 | End: 2024-01-11 | Stop reason: HOSPADM

## 2023-12-20 RX ORDER — GUAIFENESIN/DEXTROMETHORPHAN 100-10MG/5
5 SYRUP ORAL 2 TIMES DAILY
Status: DISCONTINUED | OUTPATIENT
Start: 2023-12-20 | End: 2023-12-29

## 2023-12-20 RX ORDER — INSULIN LISPRO 100 [IU]/ML
0-6 INJECTION, SOLUTION INTRAVENOUS; SUBCUTANEOUS NIGHTLY
Status: DISCONTINUED | OUTPATIENT
Start: 2023-12-20 | End: 2023-12-21

## 2023-12-20 RX ORDER — ACETAMINOPHEN 325 MG/1
650 TABLET ORAL EVERY 4 HOURS PRN
Status: DISCONTINUED | OUTPATIENT
Start: 2023-12-20 | End: 2024-01-11 | Stop reason: HOSPADM

## 2023-12-20 RX ORDER — FOLIC ACID 1 MG/1
1 TABLET ORAL DAILY
Status: DISCONTINUED | OUTPATIENT
Start: 2023-12-21 | End: 2024-01-11 | Stop reason: HOSPADM

## 2023-12-20 RX ORDER — POLYETHYLENE GLYCOL 3350 17 G/17G
17 POWDER, FOR SOLUTION ORAL DAILY PRN
Status: DISCONTINUED | OUTPATIENT
Start: 2023-12-20 | End: 2024-01-11 | Stop reason: HOSPADM

## 2023-12-20 RX ORDER — INSULIN LISPRO 100 [IU]/ML
0-6 INJECTION, SOLUTION INTRAVENOUS; SUBCUTANEOUS NIGHTLY
Status: DISCONTINUED | OUTPATIENT
Start: 2023-12-20 | End: 2023-12-20 | Stop reason: SDUPTHER

## 2023-12-20 RX ORDER — MULTIVIT WITH MINERALS/LUTEIN
500 TABLET ORAL DAILY
Status: DISCONTINUED | OUTPATIENT
Start: 2023-12-21 | End: 2024-01-11 | Stop reason: HOSPADM

## 2023-12-20 RX ORDER — ASPIRIN 81 MG/1
81 TABLET ORAL DAILY
Status: DISCONTINUED | OUTPATIENT
Start: 2023-12-21 | End: 2024-01-11 | Stop reason: HOSPADM

## 2023-12-20 RX ORDER — VITAMIN B COMPLEX
1000 TABLET ORAL DAILY
Status: DISCONTINUED | OUTPATIENT
Start: 2023-12-21 | End: 2024-01-11 | Stop reason: HOSPADM

## 2023-12-20 RX ORDER — INSULIN GLARGINE 100 [IU]/ML
7 INJECTION, SOLUTION SUBCUTANEOUS NIGHTLY
Status: DISCONTINUED | OUTPATIENT
Start: 2023-12-20 | End: 2023-12-20

## 2023-12-20 RX ORDER — LEVOTHYROXINE SODIUM 0.07 MG/1
150 TABLET ORAL DAILY
Status: DISCONTINUED | OUTPATIENT
Start: 2023-12-21 | End: 2024-01-11 | Stop reason: HOSPADM

## 2023-12-20 RX ORDER — DIPHENHYDRAMINE HCL 25 MG
25 CAPSULE ORAL NIGHTLY PRN
Status: DISCONTINUED | OUTPATIENT
Start: 2023-12-20 | End: 2024-01-11 | Stop reason: HOSPADM

## 2023-12-20 RX ORDER — ZINC SULFATE 50(220)MG
50 CAPSULE ORAL DAILY
Status: DISCONTINUED | OUTPATIENT
Start: 2023-12-21 | End: 2024-01-11 | Stop reason: HOSPADM

## 2023-12-20 RX ORDER — MULTIVITAMIN WITH IRON
1 TABLET ORAL DAILY
Status: DISCONTINUED | OUTPATIENT
Start: 2023-12-21 | End: 2024-01-11 | Stop reason: HOSPADM

## 2023-12-20 RX ORDER — FAMOTIDINE 20 MG/1
20 TABLET, FILM COATED ORAL 2 TIMES DAILY
Status: DISCONTINUED | OUTPATIENT
Start: 2023-12-20 | End: 2023-12-20 | Stop reason: SDUPTHER

## 2023-12-20 RX ORDER — ONDANSETRON 4 MG/1
4 TABLET, ORALLY DISINTEGRATING ORAL EVERY 8 HOURS PRN
Status: DISCONTINUED | OUTPATIENT
Start: 2023-12-20 | End: 2024-01-11 | Stop reason: HOSPADM

## 2023-12-20 RX ORDER — LANOLIN ALCOHOL/MO/W.PET/CERES
6 CREAM (GRAM) TOPICAL NIGHTLY PRN
Status: DISCONTINUED | OUTPATIENT
Start: 2023-12-20 | End: 2024-01-11 | Stop reason: HOSPADM

## 2023-12-20 RX ORDER — INSULIN LISPRO 100 [IU]/ML
4 INJECTION, SOLUTION INTRAVENOUS; SUBCUTANEOUS
Status: DISCONTINUED | OUTPATIENT
Start: 2023-12-20 | End: 2023-12-20 | Stop reason: SDUPTHER

## 2023-12-20 RX ORDER — LOSARTAN POTASSIUM 50 MG/1
50 TABLET ORAL DAILY
Status: DISCONTINUED | OUTPATIENT
Start: 2023-12-21 | End: 2023-12-20 | Stop reason: ALTCHOICE

## 2023-12-20 RX ORDER — SENNA AND DOCUSATE SODIUM 50; 8.6 MG/1; MG/1
2 TABLET, FILM COATED ORAL 2 TIMES DAILY
Status: DISCONTINUED | OUTPATIENT
Start: 2023-12-20 | End: 2024-01-11 | Stop reason: HOSPADM

## 2023-12-20 RX ORDER — FAMOTIDINE 20 MG/1
20 TABLET, FILM COATED ORAL 2 TIMES DAILY
Status: DISCONTINUED | OUTPATIENT
Start: 2023-12-20 | End: 2024-01-11 | Stop reason: HOSPADM

## 2023-12-20 RX ORDER — LISINOPRIL 20 MG/1
20 TABLET ORAL DAILY
Status: DISCONTINUED | OUTPATIENT
Start: 2023-12-21 | End: 2024-01-11 | Stop reason: HOSPADM

## 2023-12-20 RX ORDER — ALBUTEROL SULFATE 2.5 MG/3ML
2.5 SOLUTION RESPIRATORY (INHALATION) EVERY 6 HOURS PRN
Status: DISCONTINUED | OUTPATIENT
Start: 2023-12-20 | End: 2023-12-21

## 2023-12-20 RX ORDER — GLIPIZIDE 5 MG/1
2.5 TABLET ORAL
Status: DISCONTINUED | OUTPATIENT
Start: 2023-12-20 | End: 2023-12-29

## 2023-12-20 RX ORDER — TRAMADOL HYDROCHLORIDE 50 MG/1
50 TABLET ORAL EVERY 6 HOURS PRN
Status: DISCONTINUED | OUTPATIENT
Start: 2023-12-20 | End: 2024-01-11 | Stop reason: HOSPADM

## 2023-12-20 RX ORDER — SERTRALINE HYDROCHLORIDE 100 MG/1
100 TABLET, FILM COATED ORAL DAILY
Status: DISCONTINUED | OUTPATIENT
Start: 2023-12-21 | End: 2024-01-11 | Stop reason: HOSPADM

## 2023-12-20 RX ORDER — INSULIN LISPRO 100 [IU]/ML
0-12 INJECTION, SOLUTION INTRAVENOUS; SUBCUTANEOUS
Status: DISCONTINUED | OUTPATIENT
Start: 2023-12-20 | End: 2023-12-21

## 2023-12-20 RX ORDER — LACTULOSE 10 G/15ML
20 SOLUTION ORAL DAILY PRN
Status: DISCONTINUED | OUTPATIENT
Start: 2023-12-20 | End: 2024-01-11 | Stop reason: HOSPADM

## 2023-12-20 RX ORDER — AMIODARONE HYDROCHLORIDE 200 MG/1
400 TABLET ORAL 2 TIMES DAILY
Status: DISCONTINUED | OUTPATIENT
Start: 2023-12-20 | End: 2024-01-10

## 2023-12-20 RX ADMIN — FAMOTIDINE 20 MG: 20 TABLET ORAL at 21:04

## 2023-12-20 RX ADMIN — ALBUTEROL SULFATE 2.5 MG: 2.5 SOLUTION RESPIRATORY (INHALATION) at 15:40

## 2023-12-20 RX ADMIN — AMIODARONE HYDROCHLORIDE 400 MG: 200 TABLET ORAL at 21:03

## 2023-12-20 RX ADMIN — BENZONATATE 100 MG: 100 CAPSULE ORAL at 15:46

## 2023-12-20 RX ADMIN — ATORVASTATIN CALCIUM 80 MG: 80 TABLET, FILM COATED ORAL at 21:04

## 2023-12-20 RX ADMIN — Medication 6 MG: at 21:21

## 2023-12-20 RX ADMIN — DOCUSATE SODIUM 50 MG AND SENNOSIDES 8.6 MG 2 TABLET: 8.6; 5 TABLET, FILM COATED ORAL at 21:03

## 2023-12-20 RX ADMIN — APIXABAN 5 MG: 5 TABLET, FILM COATED ORAL at 21:04

## 2023-12-20 RX ADMIN — GLIPIZIDE 2.5 MG: 5 TABLET ORAL at 15:46

## 2023-12-20 RX ADMIN — METFORMIN HYDROCHLORIDE 500 MG: 500 TABLET ORAL at 15:46

## 2023-12-20 RX ADMIN — GUAIFENESIN SYRUP AND DEXTROMETHORPHAN 5 ML: 100; 10 SYRUP ORAL at 15:46

## 2023-12-20 RX ADMIN — Medication 1 AMPULE: at 21:21

## 2023-12-20 ASSESSMENT — PAIN SCALES - GENERAL: PAINLEVEL_OUTOF10: 0

## 2023-12-20 NOTE — DIABETES MGMT
Patient now admitted to [facility]. Diabetes educational folder left at bedside. Plan to follow up with patient tomorrow as patient condition allows.

## 2023-12-20 NOTE — H&P
[facility]      Admission History and Physical Exam  [address] Admission Date: 12/20/2023  Surgeon: Dr. Sol Allen MD (CT Surgery)  Primary Care Provider: SWATHI Keane - CNP  Specialty Group / Referring Service: Cardiothoracic Surgery  Transferred from: [facility] 2nd floor    Chief Complaint: Impaired ability to ambulate, transfer, and carryout selfcare tasks due to a 3v CABG in setting of OA and RA limiting his mobility. Admitting Diagnosis:   Debility [R53.81]  History of coronary artery bypass graft x 3 [Z95.1]    Principal Problem:    History of coronary artery bypass graft x 3  Resolved Problems:    * No resolved hospital problems.  *      Acute Rehab Diagnoses:  Encounter for rehabilitation [Z51.89]   Abnormality of gait and mobility [R26.9]  Decreased independence for activities of daily living (ADL) [Z78.9]  Physical debility / deconditioning [R53.81]  Impaired endurance  Impaired balance    Medical Dx:  Past Medical History:   Diagnosis Date    Acquired hypothyroidism 3/27/2016    after treatment for Graves, levothyroxine    Acute deep vein thrombosis (DVT) ([address]) 2022    right leg, off blood thinners    Anemia     denies hx of blood transfusions    Arthritis     Atherosclerotic heart disease of native coronary artery with unstable angina pectoris ([address]) 12/07/2023    Bilateral knee pain 05/2020    Dr. Jorge Strong     CAD (coronary artery disease)     scheduled CABG 12/14/23    Cataracts, bilateral     Chronic deep vein thrombosis (DVT) of popliteal vein of right lower extremity ([address]) 4/12/2021    Chronic pain of both knees 4/12/2021    COVID-19 2023    COVID-19 vaccine series declined 2021    Depression 3/20/2015    zoloft daily    Diabetes ([address])     oral meds- A1C 7.6  12/12/23-    Diabetic eye exam ([address]) 08/26/2020    [facility]    Diverticulosis     Duodenal ulcer 11/22/2023    via EGD (Dr. Calin Singletary)    Eye 3:46 AM   Result Value Ref Range    Magnesium 2.0 1.8 - 2.4 mg/dL   POCT Glucose    Collection Time: 12/20/23  6:05 AM   Result Value Ref Range    POC Glucose 175 (H) 65 - 100 mg/dL    Performed by: Jaime    POCT Glucose    Collection Time: 12/20/23 11:24 AM   Result Value Ref Range    POC Glucose 169 (H) 65 - 100 mg/dL    Performed by: Kimmy        Imaging:   Carotid Artery US (12/12/23):  Right Carotid    Right arm BP: 161 mmHg.     Internal Carotid Artery: Mild (<50%) stenosis in the proximal ICA. Heterogeneous and calcific plaque.   Vertebral Artery: Flow is antegrade.   Left Carotid    Left arm BP: 158 mmHg.     Internal Carotid Artery: Mild (<50%) stenosis in the proximal ICA. Heterogeneous and calcific plaque.    Vertebral Artery: Occluded.     CXR (12/19/23):  IMPRESSION:  1. Mild improved aeration in the left lung base with continued presence of left  pleural effusion with atelectasis or infiltrate.  2. Right upper extremity PICC line remains in place.    Condition on Admission: Fair    Assessment:     This is a 73 y.o. debilitated M due to a 3v CABG in setting of OA and RA limiting his mobility.    The Post Assessment Physician Evaluation (AAMIR) found the current functional status to be comparable with the pre-admission screening. Mr. Michael Paduano is a good candidate for acute inpatient rehabilitation. Nothing since the pre-admission screen has changed that determination.     Rehabilitation Plan  The patient has shown the ability to tolerate and benefit from 3 hours of therapy daily and is being admitted to a comprehensive acute inpatient rehabilitation program consisting of at least 3 hours of combined physical and occupational therapies.  - Begin intensive Physical Therapy for a minimum of 1.5 hours a day, at least 5 out of 7 days per week to address bed mobility, transfers, ambulation, strengthening, balance, and endurance.   - Begin intensive Occupational Therapy for a minimum of  1.5 hours a day, at least 5 out of 7 days per week to address ADLs (bathing, LE dressing, toileting) and adaptive equipment as needed. - Continue Speech Therapy prn for: dysarthria, impaired communication skills; therapy schedule may be adjusted by MD based on patient's needs. Each of these therapies will be continued as above for the duration of the inpatient rehab stay. The patient will also require 24-hour skilled rehabilitation nursing for bowel and bladder management, skin care for decubitus ulcer prevention, pain management and ongoing medication administration. Plan / Recommendations / Medical Decision Making:     Daily physician / PA medical management:    Debility [R53.81]  History of coronary artery bypass graft x 3 [Z95.1] - Sternal precautions. Start interdisciplinary approach to rehabilitation including PT, OT, nursing and physiatry and disease specific education. HNT/Afib/CAD/HLD - New onset post op Afib. Continue Metoprolol Succinate 25mg daily Amiodarone 400mg BID x5 days then 200mg BID x 14 days, Lisinopril 20mg daily, Lipitor 80mg qhs, ASA 81mg daily and Eliquis 5mg BID. Diabetes mellitus - HgbA1c 7.6 on 12/18/23%; impaired / poor glycemic control. Will require ACHS glucose monitoring and medication adjustment to optimize control.   -Continue Glipizide 2.5mg BID, Metformin 500mg BID, SSI, Regular ADA Cardiac diet. Pain management - No reported post op or other pain on IPR admission. Will require regular pain assessment and management. Continue Tylenol prn and Tramadol 50mg q6h prn. Electrolyte abnormality - monitor BMPs and replace as needed. Pneumonia prophylaxis - incentive spirometer 10x every hour while awake. Robitussin DM BID and Albuterol prn. DVT risk / DVT prophylaxis - mobilize as tolerated. SCDs when in bed; DANIEL hose when out of bed. Eliquis 5mg BID. Acute Post Op Anemia - 12/20 H/H 8.2/25. 8. Follow with surveillance labs.     GI prophylaxis - resume Pepcid 20mg BID. At times may need additional antacids, Maalox PRN.    RA - Plan to restart Methotrexate 25mg weekly and and Plaquenil 200mg BID    Tobacco use - Quit smoking 15 years ago.    Hypothyroidism - Continue Levothyroxine 150mcg daily.    Depression - continue Sertraline 100mg daily. At risk of depression exacerbation post CABG.    General skin care / wound prevention - monitor Sternotomy incision with Provena, and general skin wound status daily. At risk for failure due to impaired mobility, advanced age, and long term smoking history.    Bladder program / urinary retention / neurogenic bladder - schedule voids q6-8h prn. Check post-void residual as needed; in-and-out catheter if post-void residual is more than 400ml. Pt reports normal bladder function on IPR admission without frequency or incontinence.    Bowel program - at risk for constipation as a side effect of opioids, other medications, impaired mobility, etc. MiraLAX daily for regularity, Senokot-S for stool softener + laxative. PRN MOM, bisacodyl suppository or tablets for constipation. Pt reports normal bowel function on IPR admission without constipation.      Disposition: The patient's prognosis for significant practical improvement within a reasonable period of time appears fair/good and the estimated length of stay is 10 days; patient is expected to return home with spouse / family supervision and continued rehabilitation with home health therapy.     Given the patient's complex neurologic / medical condition, risks of further medical complications include but are not limited to: thromboembolism / pulmonary embolism, skin breakdown, pneumonia due to decreased mobility, CVA, MI, cardiac arrhythmias due to HTN, postural hypotension, respiratory compromise/failure, infection. For these ongoing medical issues, rehabilitation services could not be safely provided at a lower level of care such as a skilled nursing facility or nursing home.        Signed

## 2023-12-20 NOTE — PROGRESS NOTES
[facility] OCCUPATIONAL THERAPY INITIAL EVALUATION    Time In 8622   Time Out 3043     HPI (per MD report): \"Patient presented for elective CABG surgery. He was seen in the office by Dr Libertad Russo. Calcium score was elevated in September. He underwent a nuclear stress test that showed moderate inferior ischemia. He has multiple risk factors for CAD. LHC was planned. He underwent cardiac catheterization that showed severe multivessel disease with occluded Lcx. The RCA was small and non-dominant with moderate disease. He denied any notable chest discomfort or dyspnea but is not very active due to OA and RA. Echocardiogram showed a normal LV EF with mild MR. He has a history of DVT. He uses a walker and cane at times. CABG surgery was planned. He underwent CABG x 3 with LIMA to the LAD, reverse SVG to the OM1 and reverse SVG to the OM2 on 12/14/23. He did well post operatively and was transferred to  stepdown on POD 1. He developed atrial fibrillation/flutter with RVR and was started on IV Cardizem. He remained in a-fib/flutter and was transitioned to IV amiodarone. He was weaned off of O2. He was started on Eliquis for anticoagulation. He remained in a-fib/flutter and cardioversion was planned. He underwent successful cardioversion from a-fib/flutter to sinus rhythm on 12/18. Inpatient rehab was recommended for continued therapy. He remained in sinus rhythm. The morning of 12/20/23, patient was feeling well and ambulating without any symptoms. Patient was determined stable and ready for discharge. Patient was instructed on the importance of medication compliance and outpatient follow up. For maximized medical therapy for CAD, patient will continue ASA, BB, ARB, and statin as well. The patient will have close follow up with [facility] Dr Libertad Russo in 2 weeks after discharge from [facility]. The patient will follow up with Dr. Geovanny Alejandro in 3 weeks after discharge from [facility] and has been referred to cardiac rehab. solving, deficits noted.   Vision/Perception: No deficits noted   Problem List: Activity Tolerance, Decreased ROM BUE, Safety Awareness, Strength, Pain, Sitting Balance, and Standing Balance   Functional Limitations: ADL, IADL, Functional Transfers, and Functional Mobility   Session: Pt agreeable to OT evaluation. Pt completed the following with details recorded above: MMT/ROM, ADLs, IRF-KATHY, and informal OT interview.   Interdisciplinary Communication: Collaborated with PT regarding patient's current level of function, plan of care, and safety measures.   Patient/Family Education: Patient and Family educated On the role of OT, On POC, and On IRC expectations.    GOALS:  Short Term Goals:  Time Frame for Short Term Goals : 7 days   STG 1: Patient will dress UB with Partial/Moderate Assistance using AE/DME PRN.  STG 2: Patient will dress LB with Partial/Moderate Assistance using AE/DME PRN.  STG 3: Patient will don footwear with Partial/Moderate Assistance using AE/DME PRN.  STG 4: Patient will bathe with Partial/Moderate Assistance using AE/DME PRN.  STG 5: Patient will toilet with Partial/Moderate Assistance using AE/DME PRN.    Long Term Goals:  Time Frame for Long Term Goals : 14 days   LTG 1: Patient will dress UB with Setup Assistance using AE/DME PRN.   LTG 2: Patient will dress LB with Supervision or Touching Assistance using AE/DME PRN.   LTG 3: Patient will don footwear with Supervision or Touching Assistance using AE/DME PRN.   LTG 4: Patient will bathe with Supervision or Touching Assistance using AE/DME PRN.   LTG 5: Patient will toilet with Setup Assistance using AE/DME PRN.   LTG 6: Patient/caregiver will verbalize understanding of OT recommendations regarding ADLs, functional transfers, home safety, AE/DME, energy conservation, safety awareness, activity tolerance, and follow-up therapy to increase safety with functional tasks upon discharge.    OT order received, chart reviewed, and OT orders  acknowledged. Patient will benefit from skilled OT services to address deficits to maximize functional performance with self-care tasks and functional mobility. Treatment is likely to include ADL, balance, transfer, strength, activity tolerance, safety, AE/DME, cognition training. Patient will be seen for 1.5-2 hours of skilled OT services 5-6 days a week as appropriate. Initate POC.      Ariel Hoang, OT   12/20/2023

## 2023-12-21 PROBLEM — R79.89 ELEVATED LFTS: Status: RESOLVED | Noted: 2023-01-02 | Resolved: 2023-12-21

## 2023-12-21 PROBLEM — Z51.89 ENCOUNTER FOR REHABILITATION: Status: ACTIVE | Noted: 2023-12-20

## 2023-12-21 PROBLEM — Z78.9 DECREASED INDEPENDENCE WITH ACTIVITIES OF DAILY LIVING: Status: ACTIVE | Noted: 2023-12-14

## 2023-12-21 PROBLEM — Z74.09 IMPAIRED FUNCTIONAL MOBILITY, BALANCE, GAIT, AND ENDURANCE: Status: ACTIVE | Noted: 2023-12-14

## 2023-12-21 LAB
GLUCOSE BLD STRIP.AUTO-MCNC: 111 MG/DL (ref 65–100)
GLUCOSE BLD STRIP.AUTO-MCNC: 124 MG/DL (ref 65–100)
GLUCOSE BLD STRIP.AUTO-MCNC: 129 MG/DL (ref 65–100)
GLUCOSE BLD STRIP.AUTO-MCNC: 152 MG/DL (ref 65–100)
GLUCOSE BLD STRIP.AUTO-MCNC: 97 MG/DL (ref 65–100)
SERVICE CMNT-IMP: ABNORMAL
SERVICE CMNT-IMP: NORMAL

## 2023-12-21 PROCEDURE — 6370000000 HC RX 637 (ALT 250 FOR IP): Performed by: PHYSICIAN ASSISTANT

## 2023-12-21 PROCEDURE — 94640 AIRWAY INHALATION TREATMENT: CPT

## 2023-12-21 PROCEDURE — 97535 SELF CARE MNGMENT TRAINING: CPT

## 2023-12-21 PROCEDURE — 94760 N-INVAS EAR/PLS OXIMETRY 1: CPT

## 2023-12-21 PROCEDURE — 97530 THERAPEUTIC ACTIVITIES: CPT

## 2023-12-21 PROCEDURE — 6360000002 HC RX W HCPCS: Performed by: PHYSICAL MEDICINE & REHABILITATION

## 2023-12-21 PROCEDURE — 6370000000 HC RX 637 (ALT 250 FOR IP): Performed by: PHYSICAL MEDICINE & REHABILITATION

## 2023-12-21 PROCEDURE — 82962 GLUCOSE BLOOD TEST: CPT

## 2023-12-21 PROCEDURE — 1180000000 HC REHAB R&B

## 2023-12-21 PROCEDURE — 99231 SBSQ HOSP IP/OBS SF/LOW 25: CPT | Performed by: PHYSICAL MEDICINE & REHABILITATION

## 2023-12-21 PROCEDURE — 97161 PT EVAL LOW COMPLEX 20 MIN: CPT

## 2023-12-21 PROCEDURE — 97116 GAIT TRAINING THERAPY: CPT

## 2023-12-21 PROCEDURE — 97110 THERAPEUTIC EXERCISES: CPT

## 2023-12-21 RX ORDER — ALBUTEROL SULFATE 2.5 MG/3ML
2.5 SOLUTION RESPIRATORY (INHALATION)
Status: DISCONTINUED | OUTPATIENT
Start: 2023-12-22 | End: 2023-12-24

## 2023-12-21 RX ORDER — INSULIN LISPRO 100 [IU]/ML
0-4 INJECTION, SOLUTION INTRAVENOUS; SUBCUTANEOUS NIGHTLY
Status: DISCONTINUED | OUTPATIENT
Start: 2023-12-21 | End: 2024-01-11 | Stop reason: HOSPADM

## 2023-12-21 RX ORDER — ALBUTEROL SULFATE 2.5 MG/3ML
2.5 SOLUTION RESPIRATORY (INHALATION)
Status: DISCONTINUED | OUTPATIENT
Start: 2023-12-21 | End: 2023-12-21

## 2023-12-21 RX ORDER — GLIMEPIRIDE 4 MG/1
4 TABLET ORAL 2 TIMES DAILY
Status: ON HOLD | COMMUNITY
End: 2024-01-10 | Stop reason: HOSPADM

## 2023-12-21 RX ORDER — INSULIN LISPRO 100 [IU]/ML
0-8 INJECTION, SOLUTION INTRAVENOUS; SUBCUTANEOUS
Status: DISCONTINUED | OUTPATIENT
Start: 2023-12-21 | End: 2024-01-11 | Stop reason: HOSPADM

## 2023-12-21 RX ADMIN — FAMOTIDINE 20 MG: 20 TABLET ORAL at 08:17

## 2023-12-21 RX ADMIN — VITAMIN D, TAB 1000IU (100/BT) 1000 UNITS: 25 TAB at 08:17

## 2023-12-21 RX ADMIN — GLIPIZIDE 2.5 MG: 5 TABLET ORAL at 05:15

## 2023-12-21 RX ADMIN — GUAIFENESIN SYRUP AND DEXTROMETHORPHAN 5 ML: 100; 10 SYRUP ORAL at 20:55

## 2023-12-21 RX ADMIN — Medication 1 AMPULE: at 20:55

## 2023-12-21 RX ADMIN — Medication 1 AMPULE: at 08:23

## 2023-12-21 RX ADMIN — ASPIRIN 81 MG: 81 TABLET ORAL at 08:17

## 2023-12-21 RX ADMIN — B-COMPLEX W/ C & FOLIC ACID TAB 1 TABLET: TAB at 08:17

## 2023-12-21 RX ADMIN — GLIPIZIDE 2.5 MG: 5 TABLET ORAL at 17:12

## 2023-12-21 RX ADMIN — ALBUTEROL SULFATE 2.5 MG: 2.5 SOLUTION RESPIRATORY (INHALATION) at 13:08

## 2023-12-21 RX ADMIN — ATORVASTATIN CALCIUM 80 MG: 80 TABLET, FILM COATED ORAL at 20:55

## 2023-12-21 RX ADMIN — METOPROLOL SUCCINATE 25 MG: 25 TABLET, FILM COATED, EXTENDED RELEASE ORAL at 08:17

## 2023-12-21 RX ADMIN — Medication 50 MG: at 08:16

## 2023-12-21 RX ADMIN — FOLIC ACID 1 MG: 1 TABLET ORAL at 08:18

## 2023-12-21 RX ADMIN — INSULIN LISPRO 2 UNITS: 100 INJECTION, SOLUTION INTRAVENOUS; SUBCUTANEOUS at 08:18

## 2023-12-21 RX ADMIN — DOCUSATE SODIUM 50 MG AND SENNOSIDES 8.6 MG 2 TABLET: 8.6; 5 TABLET, FILM COATED ORAL at 08:17

## 2023-12-21 RX ADMIN — LISINOPRIL 20 MG: 20 TABLET ORAL at 08:17

## 2023-12-21 RX ADMIN — SERTRALINE 100 MG: 100 TABLET, FILM COATED ORAL at 08:17

## 2023-12-21 RX ADMIN — Medication 500 MG: at 08:16

## 2023-12-21 RX ADMIN — FAMOTIDINE 20 MG: 20 TABLET ORAL at 20:55

## 2023-12-21 RX ADMIN — METFORMIN HYDROCHLORIDE 500 MG: 500 TABLET ORAL at 17:12

## 2023-12-21 RX ADMIN — AMIODARONE HYDROCHLORIDE 400 MG: 200 TABLET ORAL at 08:17

## 2023-12-21 RX ADMIN — APIXABAN 5 MG: 5 TABLET, FILM COATED ORAL at 08:18

## 2023-12-21 RX ADMIN — METFORMIN HYDROCHLORIDE 500 MG: 500 TABLET ORAL at 08:17

## 2023-12-21 RX ADMIN — AMIODARONE HYDROCHLORIDE 400 MG: 200 TABLET ORAL at 20:55

## 2023-12-21 RX ADMIN — APIXABAN 5 MG: 5 TABLET, FILM COATED ORAL at 20:55

## 2023-12-21 RX ADMIN — LEVOTHYROXINE SODIUM 150 MCG: 0.07 TABLET ORAL at 05:15

## 2023-12-21 RX ADMIN — DOCUSATE SODIUM 50 MG AND SENNOSIDES 8.6 MG 2 TABLET: 8.6; 5 TABLET, FILM COATED ORAL at 20:55

## 2023-12-21 RX ADMIN — GUAIFENESIN SYRUP AND DEXTROMETHORPHAN 5 ML: 100; 10 SYRUP ORAL at 08:16

## 2023-12-21 NOTE — PROGRESS NOTES
Inpatient [facility], [address] [place] [address]  Tel: [phone]     Physical Medicine and Rehabilitation Progress Note      Tana Sadler III  Admit Date: 12/20/2023  Admit Diagnosis: Debility [R53.81]  History of coronary artery bypass graft x 3 [Z95.1]    Subjective     Patient seen face-to-face and evaluated. NAEO and he reports sleeping well. He is eating and drinking fine, and continent of bowel and bladder. No pain reported. He said his therapies were going wonderfully. He appeared a bit worn out after therapy today. He reported continued cough.     Objective:     Current Facility-Administered Medications   Medication Dose Route Frequency    insulin lispro (HUMALOG) injection vial 0-8 Units  0-8 Units SubCUTAneous TID WC    insulin lispro (HUMALOG) injection vial 0-4 Units  0-4 Units SubCUTAneous Nightly    acetaminophen (TYLENOL) tablet 650 mg  650 mg Oral Q4H PRN    alcohol 62% (NOZIN) nasal  1 ampule  1 ampule Topical Q12H    amiodarone (CORDARONE) tablet 400 mg  400 mg Oral BID    apixaban (ELIQUIS) tablet 5 mg  5 mg Oral BID    aspirin EC tablet 81 mg  81 mg Oral Daily    atorvastatin (LIPITOR) tablet 80 mg  80 mg Oral Nightly    diphenhydrAMINE (BENADRYL) capsule 25 mg  25 mg Oral Nightly PRN    famotidine (PEPCID) tablet 20 mg  20 mg Oral BID    Or    famotidine (PEPCID) 20 mg in sodium chloride (PF) 0.9 % 10 mL injection  20 mg IntraVENous BID    glucose chewable tablet 16 g  4 tablet Oral PRN    lactulose (CHRONULAC) 10 GM/15ML solution 20 g  20 g Oral Daily PRN    levothyroxine (SYNTHROID) tablet 150 mcg  150 mcg Oral Daily    magnesium hydroxide (MILK OF MAGNESIA) 400 MG/5ML suspension 30 mL  30 mL Oral Daily PRN    melatonin tablet 6 mg  6 mg Oral Nightly PRN    metoprolol succinate (TOPROL XL) extended release tablet 25 mg  25 mg Oral Daily    ondansetron (ZOFRAN-ODT) disintegrating tablet 4 mg  4 mg Oral Q8H PRN    polyethylene glycol 5/5 throughout. BUEs not tested proximally d/t sternal precautions, but he can easily aBduct arms to shoulder level. Sensation intact globally without paresthesias.      Skin:  Intact, dry, good skin turgor, age related changes present       Incision:  Sternotomy with an intact and well sealed [place].      Psych: Patient was oriented to person, place, and time.   Without hallucinations, abnormal affect or abnormal behaviors during the examination.        Functional Assessment:  Per PT:   Pending eval    OT:  Functional Mobility    Score Comments   Rolling 3: Partial/Moderate A Min A - CGA   Sit to Supine N/A     Supine to Sit 3: Partial/Moderate A Min A    Sit to Stand 3: Partial/Moderate A Min A   Transfer Assist 3: Partial/Moderate A Mod - Min A SPT with RW      Activities of Daily Living    Score Comments   Eating Setup or clean-up assistance S/U seated in recliner   Oral Hygiene Supervision or touching assistance CGA standing, SBA seated   Bathing Substantial/maximal assistance SBA/Max A UB/LB seated EOB   Upper Body  Dressing Partial/moderate assistance Min A doff/don shirt seated   Lower Body Dressing Substantial/maximal assistance Max A doff/don brief and pants seated and standing with RW   Donning/[place] Footwear Dependent socks   Toilet Transfer Partial/moderate assistance Mod-Min A SPT with RW   Toileting Hygiene Substantial/maximal assistance Max A RW          Labs/Studies:  Recent Results (from the past 72 hour(s))   POCT Glucose    Collection Time: 12/18/23  3:42 PM   Result Value Ref Range    POC Glucose 245 (H) 65 - 100 mg/dL    Performed by: Kimmy    POCT Glucose    Collection Time: 12/18/23  8:13 PM   Result Value Ref Range    POC Glucose 204 (H) 65 - 100 mg/dL    Performed by: Isael    Potassium    Collection Time: 12/19/23  3:36 AM   Result Value Ref Range    Potassium 3.8 3.5 - 5.1 mmol/L   POCT Glucose    Collection Time: 12/19/23  6:12 AM   Result Value Ref Range    POC  Glucose 183 (H) 65 - 100 mg/dL    Performed by: Yvette    POCT Glucose    Collection Time: 12/19/23 11:06 AM   Result Value Ref Range    POC Glucose 173 (H) 65 - 100 mg/dL    Performed by: Ishaan    POCT Glucose    Collection Time: 12/19/23  3:00 PM   Result Value Ref Range    POC Glucose 116 (H) 65 - 100 mg/dL    Performed by: Danielle    POCT Glucose    Collection Time: 12/19/23  7:47 PM   Result Value Ref Range    POC Glucose 268 (H) 65 - 100 mg/dL    Performed by: Jaime    CBC    Collection Time: 12/20/23  3:46 AM   Result Value Ref Range    WBC 8.1 4.3 - 11.1 K/uL    RBC 2.68 (L) 4.23 - 5.6 M/uL    Hemoglobin 8.2 (L) 13.6 - 17.2 g/dL    Hematocrit 25.8 (L) 41.1 - 50.3 %    MCV 96.3 82 - 102 FL    MCH 30.6 26.1 - 32.9 PG    MCHC 31.8 31.4 - 35.0 g/dL    RDW 14.9 (H) 11.9 - 14.6 %    Platelets 290 150 - 450 K/uL    MPV 9.4 9.4 - 12.3 FL    nRBC 0.06 0.0 - 0.2 K/uL   Potassium    Collection Time: 12/20/23  3:46 AM   Result Value Ref Range    Potassium 3.9 3.5 - 5.1 mmol/L   Magnesium    Collection Time: 12/20/23  3:46 AM   Result Value Ref Range    Magnesium 2.0 1.8 - 2.4 mg/dL   POCT Glucose    Collection Time: 12/20/23  6:05 AM   Result Value Ref Range    POC Glucose 175 (H) 65 - 100 mg/dL    Performed by: Jaime    POCT Glucose    Collection Time: 12/20/23 11:24 AM   Result Value Ref Range    POC Glucose 169 (H) 65 - 100 mg/dL    Performed by: Kimmy    POCT Glucose    Collection Time: 12/20/23  3:51 PM   Result Value Ref Range    POC Glucose 111 (H) 65 - 100 mg/dL    Performed by: Cory    POCT Glucose    Collection Time: 12/20/23  8:17 PM   Result Value Ref Range    POC Glucose 120 (H) 65 - 100 mg/dL    Performed by: Isaac    POCT Glucose    Collection Time: 12/21/23  6:55 AM   Result Value Ref Range    POC Glucose 152 (H) 65 - 100 mg/dL    Performed by: Isaac    POCT Glucose    Collection Time: 12/21/23 11:12 AM  significant practical improvement within a reasonable period of time appears fair/good and the estimated length of stay is 10 days; patient is expected to return home with spouse / family supervision and continued rehabilitation with home health therapy. Dr. Sarabjit Penn MD (CT Surgery) per his f/u scheduling  Dossie SWATHI Mcbride CNP (PCP) 1-2 weeks post IPR    Time spent was 25 minutes with over 1/2 in direct patient care / examination, consultation with therapists / nursing and coordination of care.     Signed By: Treva Valerio MD     December 21, 2023

## 2023-12-21 NOTE — DIABETES MGMT
Patient s/p CABG x3, seen in [facility]. Blood glucose ranged 111-175 yesterday with patient receiving Humalog 12 units, Glipizide 2.5mg, and Metformin 500mg. Blood glucose this morning was 152. Reviewed patient current regimen: Glipizide 2.5mg BID, Metformin 500mg BID, and Humalog correctional insulin. Spoke with provider new orders received to change correctional insulin scale to medium dose to reduce risk of hypoglycemia. Patient in recliner, noted patient disoriented and oriented at times overnight. Patient able to answer orientation questions correctly x4 this AM. \"Diabetes Self-Management: A Patient Teaching Guide\", reviewed with patient. Explained basic physiology of diabetes, as well as causes, signs and symptoms, and treatments for hypoglycemia and hyperglycemia. Described the effects of poor glycemic control and the development of long-term complications such as renal, eye, nerve, and cardiovascular disease. Patient denies smoking stating he quit 15 years ago. Patient voices numbness and tingling in feet, noted patient has house slippers and tennis shoes at bedside. Described proper diabetic foot care and the importance of checking feet daily. Per patient they typically drink unsweetened tea and black coffee at home. Reviewed effects of sweetened beverages on glycemic control and discussed alternative beverages to help improve glycemic control. Patient voices that they do not drink alcoholic beverages. Educated re: effects of carbohydrates on blood glucose, the \"plate method\" of healthy meal planning, basics of healthy meal plan, Consistent Carbohydrate Diet, discussed the basics of carb counting and how to read a nutrition label. Educated patient regarding the benefits of physical activity (as cleared by provider) on glycemic control. Encouraged compliance with PT and OT. Also explained the relationship between hyperglycemia and infection and delayed healing. Discussed target goals for blood glucose and A1C. Educated patient regarding diabetic medications including mechanism of action, timing, and possible side effects. Patient verbalizes understanding of teaching. Spoke with patient wife via telephone updated regarding current hospital regimen for diabetes medications. Patient wife states patient only taking Metformin for diabetes at home, has been off Glimepiride for awhile. Reviewed importance of balanced diet on wound healing and glycemic control. Patient wife states patient likes the hospital food. Patient wife states her daughter-in-law who is an NP sets up patient's pill box every weekend and that patient keeps glucose log for her. Patient wife updated regarding patient's 3 most recent blood glucose levels. Patient wife has no questions regarding diabetes management. Encouraged follow up with PCP once discharged from rehab for further diabetes management.

## 2023-12-21 NOTE — PROGRESS NOTES
OT Daily Note  Time In 1040   Time Out 1117     Subjective: \"I do and I don't [have trouble with self feeding secondary to BUE tremors]\"  Pain: 0/10  Education: N/A  Interdisciplinary Communication: RN, PT    Mobility   Score Comments   Rolling Partial/moderate assistance    Supine to Sit Partial/moderate assistance    Sit to Supine      Sit to Stand Partial/moderate assistance    Transfer Assist Partial/moderate assistance      Coordination   Pt completed therapeutic activities to challenge BUE AROM/STR, FM coordination and dexterity, bi-manual coordination, standing tolerance, static balance, medication management, complex problem solving and activity tolerance in preparation for safe return to max (I) with I/ADLs. Pt tolerated activities well with no c/o pain. Rest breaks utilized as needed throughout session. Pt completed small geometric shape matching activity #1 seated at table with RW. Pt retrieved bright yellow geometric shape from board on L with R hand, scanned and located matching indention on board and fit piece snugly into correct spot with R hand. Pt completed ~30% with significantly increased time and 3-5 verbal cues for problem solving and directions. Assessment: Pt tolerated session well. Pt limited by fatigue. Pt remains highly motivated. Plan: Continue OT POC.      Dg Benson OT   12/21/2023

## 2023-12-21 NOTE — PROGRESS NOTES
OT DAILY NOTE    Time In:    7997     Time Out: 5251       Functional Mobility   Score Comments   Rolling 3: Partial/Moderate A Min A - CGA   Sit to Supine N/A    Supine to Sit 3: Partial/Moderate A Min A    Sit to Stand 3: Partial/Moderate A Min A   Transfer Assist 3: Partial/Moderate A Mod - Min A SPT with RW     Activities of Daily Living   Score Comments   Eating Setup or clean-up assistance S/U seated in recliner   Oral Hygiene Supervision or touching assistance CGA standing, SBA seated   Bathing Substantial/maximal assistance SBA/Max A UB/LB seated EOB   Upper Body  Dressing Partial/moderate assistance Min A doff/don shirt seated   Lower Body Dressing Substantial/maximal assistance Max A doff/don brief and pants seated and standing with RW   Donning/[place] Footwear Dependent socks   Toilet Transfer Partial/moderate assistance Mod-Min A SPT with RW   Toileting Hygiene Substantial/maximal assistance Max A RW       Summary of Session: S: \"I'd like to shave. \" Agreeable to therapy. Focus of session was on morning ADL routine. Patient was able to complete bed mobility and SPT for ADLs. Pain 0/10. Collaborated with RN, PT and confirmed patient is on track to reach goals as documented in the care plan. Continue OT POC with focus on ADL/IADL skills, functional transfers, functional mobility, coordination, strength, static and dynamic balance, and activity tolerance to maximize safety and independence with ADLs and functional transfers. Patient ended session in recliner with chair alarm active, breakfast tray set up and with call remote and phone within reach.        Boby Reveles OT  12/21/2023

## 2023-12-21 NOTE — PROGRESS NOTES
TRANSFER - IN REPORT:    Verbal report received from [facility] on Yany Shown III  being received from [facility] for routine progression of patient care      Report consisted of patient's Situation, Background, Assessment and   Recommendations(SBAR). Information from the following report(s) Nurse Handoff Report and MAR was reviewed with the receiving nurse. Opportunity for questions and clarification was provided. Assessment completed upon patient's arrival to unit and care assumed. Skin assessment completed by Hardy Jay and Gary Mcghee RN. Pt has incisional dresssing to mid sternum purple prevena dressing with wound vac. Pt has 3 small incisions noted to left thigh, CDI old blood noted. Purple Bruising noted left thigh posterior. Pt has excoriation noted to scrotum, zinc paste applied. Pt has some scabs noted to left flank. Pt has previous toe amputations noted to right foot. Pt sacrum is red but blanchable, pressure offloading, allyven applied. Pt skin is very mottled and purple in apperance trunk down, pt skin is very cold to touch, 1+ pedal pulse. Pt vitals taken, difficult to obtain o2 sat due to cold fingers. Pt is Aox4 this time. Bed alarm in recliner and on. Pt oriented to room and call light. All needs within reach. Will continue to monitor.

## 2023-12-21 NOTE — CARE COORDINATION
Interdisciplinary Wednesday, Team Conference Meeting Notes    Interdisciplinary team conference meeting completed to discuss plan of care. Estimated D/C Date: ***    Recommended Follow-Up Therapy:       Communication with family/caregivers: ***  Patient needs are continue to be followed by Dr. Luciana Tidwell.  Patient has no discharge date / plan at this time scheduled for ****. CM will continue to follow / monitor for any needs, concerns or questions that may arise.        Name of Ins:  Policy #:  Secondary Ins:  Policy #:  Auth #  Admission Date:  Approved Dates:  LOS:  Contact Name:  Contact #  Fax #:  Updated Clinicals:

## 2023-12-22 LAB
GLUCOSE BLD STRIP.AUTO-MCNC: 106 MG/DL (ref 65–100)
GLUCOSE BLD STRIP.AUTO-MCNC: 142 MG/DL (ref 65–100)
GLUCOSE BLD STRIP.AUTO-MCNC: 163 MG/DL (ref 65–100)
GLUCOSE BLD STRIP.AUTO-MCNC: 91 MG/DL (ref 65–100)
GLUCOSE BLD STRIP.AUTO-MCNC: 95 MG/DL (ref 65–100)
SERVICE CMNT-IMP: ABNORMAL
SERVICE CMNT-IMP: NORMAL
SERVICE CMNT-IMP: NORMAL

## 2023-12-22 PROCEDURE — 6370000000 HC RX 637 (ALT 250 FOR IP): Performed by: PHYSICAL MEDICINE & REHABILITATION

## 2023-12-22 PROCEDURE — 1180000000 HC REHAB R&B

## 2023-12-22 PROCEDURE — 97535 SELF CARE MNGMENT TRAINING: CPT

## 2023-12-22 PROCEDURE — 6370000000 HC RX 637 (ALT 250 FOR IP): Performed by: PHYSICIAN ASSISTANT

## 2023-12-22 PROCEDURE — 94640 AIRWAY INHALATION TREATMENT: CPT

## 2023-12-22 PROCEDURE — 6360000002 HC RX W HCPCS: Performed by: PHYSICAL MEDICINE & REHABILITATION

## 2023-12-22 PROCEDURE — 82962 GLUCOSE BLOOD TEST: CPT

## 2023-12-22 PROCEDURE — 97530 THERAPEUTIC ACTIVITIES: CPT

## 2023-12-22 PROCEDURE — 94760 N-INVAS EAR/PLS OXIMETRY 1: CPT

## 2023-12-22 PROCEDURE — 97110 THERAPEUTIC EXERCISES: CPT

## 2023-12-22 PROCEDURE — 97116 GAIT TRAINING THERAPY: CPT

## 2023-12-22 RX ORDER — HYDROCORTISONE 25 MG/G
CREAM TOPICAL 2 TIMES DAILY
Status: DISCONTINUED | OUTPATIENT
Start: 2023-12-22 | End: 2024-01-11 | Stop reason: HOSPADM

## 2023-12-22 RX ADMIN — Medication 1 AMPULE: at 20:00

## 2023-12-22 RX ADMIN — AMIODARONE HYDROCHLORIDE 400 MG: 200 TABLET ORAL at 07:49

## 2023-12-22 RX ADMIN — LEVOTHYROXINE SODIUM 150 MCG: 0.07 TABLET ORAL at 06:15

## 2023-12-22 RX ADMIN — ALBUTEROL SULFATE 2.5 MG: 2.5 SOLUTION RESPIRATORY (INHALATION) at 17:36

## 2023-12-22 RX ADMIN — METFORMIN HYDROCHLORIDE 500 MG: 500 TABLET ORAL at 07:49

## 2023-12-22 RX ADMIN — LISINOPRIL 20 MG: 20 TABLET ORAL at 07:49

## 2023-12-22 RX ADMIN — ACETAMINOPHEN 650 MG: 325 TABLET ORAL at 20:00

## 2023-12-22 RX ADMIN — ALBUTEROL SULFATE 2.5 MG: 2.5 SOLUTION RESPIRATORY (INHALATION) at 05:33

## 2023-12-22 RX ADMIN — ASPIRIN 81 MG: 81 TABLET ORAL at 07:48

## 2023-12-22 RX ADMIN — FAMOTIDINE 20 MG: 20 TABLET ORAL at 07:49

## 2023-12-22 RX ADMIN — DOCUSATE SODIUM 50 MG AND SENNOSIDES 8.6 MG 2 TABLET: 8.6; 5 TABLET, FILM COATED ORAL at 07:48

## 2023-12-22 RX ADMIN — FOLIC ACID 1 MG: 1 TABLET ORAL at 07:49

## 2023-12-22 RX ADMIN — APIXABAN 5 MG: 5 TABLET, FILM COATED ORAL at 07:49

## 2023-12-22 RX ADMIN — GUAIFENESIN SYRUP AND DEXTROMETHORPHAN 5 ML: 100; 10 SYRUP ORAL at 20:01

## 2023-12-22 RX ADMIN — APIXABAN 5 MG: 5 TABLET, FILM COATED ORAL at 20:00

## 2023-12-22 RX ADMIN — FAMOTIDINE 20 MG: 20 TABLET ORAL at 20:00

## 2023-12-22 RX ADMIN — SERTRALINE 100 MG: 100 TABLET, FILM COATED ORAL at 07:49

## 2023-12-22 RX ADMIN — METOPROLOL SUCCINATE 25 MG: 25 TABLET, FILM COATED, EXTENDED RELEASE ORAL at 07:48

## 2023-12-22 RX ADMIN — Medication 50 MG: at 07:49

## 2023-12-22 RX ADMIN — GUAIFENESIN SYRUP AND DEXTROMETHORPHAN 5 ML: 100; 10 SYRUP ORAL at 07:49

## 2023-12-22 RX ADMIN — METFORMIN HYDROCHLORIDE 500 MG: 500 TABLET ORAL at 17:20

## 2023-12-22 RX ADMIN — Medication 1 AMPULE: at 07:53

## 2023-12-22 RX ADMIN — ATORVASTATIN CALCIUM 80 MG: 80 TABLET, FILM COATED ORAL at 20:00

## 2023-12-22 RX ADMIN — HYDROCORTISONE: 25 CREAM TOPICAL at 20:00

## 2023-12-22 RX ADMIN — GLIPIZIDE 2.5 MG: 5 TABLET ORAL at 17:20

## 2023-12-22 RX ADMIN — VITAMIN D, TAB 1000IU (100/BT) 1000 UNITS: 25 TAB at 07:49

## 2023-12-22 RX ADMIN — Medication 500 MG: at 07:49

## 2023-12-22 RX ADMIN — GLIPIZIDE 2.5 MG: 5 TABLET ORAL at 06:15

## 2023-12-22 RX ADMIN — AMIODARONE HYDROCHLORIDE 400 MG: 200 TABLET ORAL at 20:00

## 2023-12-22 RX ADMIN — B-COMPLEX W/ C & FOLIC ACID TAB 1 TABLET: TAB at 07:49

## 2023-12-22 ASSESSMENT — PAIN DESCRIPTION - ORIENTATION: ORIENTATION: ANTERIOR;MID

## 2023-12-22 ASSESSMENT — PAIN SCALES - GENERAL
PAINLEVEL_OUTOF10: 0
PAINLEVEL_OUTOF10: 3

## 2023-12-22 ASSESSMENT — PAIN DESCRIPTION - DESCRIPTORS: DESCRIPTORS: ACHING

## 2023-12-22 ASSESSMENT — PAIN DESCRIPTION - LOCATION: LOCATION: INCISION

## 2023-12-22 NOTE — PROGRESS NOTES
OT Daily Note  Time In 1030   Time Out 1115     Subjective: \"I'm sore today\"  Pain: Pt reports increased soreness below ribcage across abdomen. Pt denied overall pain, no numerical value provided. Increased rest breaks utilized as needed to minimize discomfort and prevent exacerbation. Education: N/A  Interdisciplinary Communication: RN, PT    Mobility   Score Comments   Rolling Partial/moderate assistance    Supine to Sit Partial/moderate assistance    Sit to Supine      Sit to Stand Partial/moderate assistance    Transfer Assist Partial/moderate assistance      Coordination   Pt completed therapeutic activities to challenge BUE AROM/STR, FM coordination and dexterity, bi-manual coordination, standing tolerance, static balance, complex problem solving and activity tolerance in preparation for safe return to max (I) with I/ADLs. Pt tolerated activities well with no c/o pain. Rest breaks utilized as needed throughout session. Pt completed thera-putty item retrieval activity standing with RW and seated in w/c at table. Pt given container of thera-putty with specified items. Pt then used B/L hands to stretch and part putty while searching for and retrieving items. Once retrieve items placed in container on R. Once all items retrieved, pt used B/L hands to spread and flatten putty. Pt replaced items and folded into ball. Pt completed green, medium-hard, thera-putty with 10 pennies and 15 small beads. 1-3 dropped noted during activity. Pt completed 90% with significantly increased time. Pt required 3-5 verbal cues for directions and problem solving. Assessment: Pt tolerated session well. Pt making progress since [facility]. Pt remains highly motivated. Pt continues to require increased rest break frequency and duration. Plan: Continue OT POC.      Chitra Puente OT   12/22/2023

## 2023-12-22 NOTE — PROGRESS NOTES
PHYSICAL THERAPY DAILY NOTE  Time In:  1115  Time Out:  1201  Total Treatment Time:  55 Minutes  Pt. Seen for: AM, Gait Training, Therapeutic Exercise, and Transfer Training     Subjective: \"I feel tired. \"         Objective:  Precautions: Falls, Sternal, and Confused    GROSS ASSESSMENT Daily Assessment    Pt. Up in w/c @ OT table upon arrival.       COGNITION Daily Assessment    Pt. Exhibits tangential speech & decreased insight during tx session. Decreased carry over of transfer techniques & decreased safety awareness noted. BED/MAT MOBILITY Daily Assessment    Rolling Right: NT  Rolling Left: NT  Supine to Sit: NT  Sit to Supine: NT       TRANSFERS Daily Assessment    Sit to Stand: Mod A  Stand to Sit: Min A  Transfer Type: Stand Pivot w/ RW  Transfer Assistance: CGA  Car Transfers: NT         GAIT Daily Assessment   Flexed posture, shuffling gait pattern, increased B stance phas of gait w/ foot flat gait pattern Amount of Assistance: CGA  Distance (ft): 200'x1, 150'x1  Assistive Device: RW  Surface: Level Surface       STEPS/STAIRS Daily Assessment   NT        BALANCE Daily Assessment    Static Sitting: Good:  Pt. able to maintain balance w/o UE support;  exhibits some postural sway  Dynamic Sitting: Fair - accepts minimal challenge;  can maintain balance while turning head/trunk  Static Standing: Fair:  Pt. requires UE support;  may need occasional min A  Dynamic Standing: Fair - accepts minimal challenge;  can maintain balance while turning head/trunk       WHEELCHAIR MOBILITY Daily Assessment   NT        LOWER EXTREMITY EXERCISES Daily Assessment   Pt. Performed seated LE exercises as a warm-up prior to gait training.  SEATED EXERCISES Sets Reps Comments   Ankle Pumps 1 15    Hip Flexion 1 15    Long Arc Quads 1 15    Hip Adduction/Ball Squeeze 1 15    Hip Abduction with green theraband Theraband 1 15    Hamstring Curls with green theraband Theraband 1 15    Hip Extension with green theraband Theraband 1 15          Pain level: not rated  Pain Location:  low back  Pain Interventions: offered medication, Biofreeze - pt. declined    Vital Signs:  HR 71-80, 99%-98% RA    Education:  reviewed hand placement during transfers to follow sternal precautions    Interdisciplinary Communication: handoff communication w/ OT    Pt. Left up in recliner in NAD, call bell in reach, family @ bedside.         Assessment: Pt. Able to increase gait distance this AM.  However, cont. To require significant assistance for transfers secondary to poor mechanics & core weakness.  Benefits from cont. PT to address.         Plan of Care: Continue with POC and progress as tolerated.      Vanessa Nielson, PT  12/22/2023

## 2023-12-22 NOTE — PROGRESS NOTES
OT Daily Note  Time In:    1300     Time Out: 1347        Subjective: \"It really burns when I wipe. \"  Pain: painful/burning with wiping after toileting  Education: building standing tolerance  Interdisciplinary Communication: with RN regarding diarrhea    Mobility   Score Comments   Sit to Stand  modA    Transfer Assist  modA SPT with RW     Self-Care    Score Comments   Toilet Transfer 3: Partial/Moderate A Transfer Type: SPT   Equipment: Grab Bars   Comments: Toileting Hygiene 2: Substantial/Maximal A Output: diarrhea  Comments: pt required assistance for thorough cleanup, reporting burning pain in rectum with wiping. Soiled clothing both before getting to toilet and while on toilet- assist for clothing change. Relayed to RN          Activity Tolerance   Pt stood for 4 minutes at raised table with Maria Esther while engaged in pegboard activity. Pt required max to mod cuing for understanding pegboard activity. Able to place them according to simple visual design with mod cuing to complete first 8 pegs. Pt requesting to toilet and ended task early. Assessment: Pt appeared more confused this afternoon requiring significant cuing for sit to stand techniques, toileting, and simple cognitive activity. Plan: Continue OT POC.      Mikey Lawler, OT   12/22/2023

## 2023-12-22 NOTE — DIABETES MGMT
Patient s/p CABG. Blood glucose ranged  yesterday with patient receiving Glipizide 5mg, Humalog 2 units, and Metformin 1000mg. Blood glucose this morning was 95. Reviewed patient current regimen: Humalog correctional insulin, Glipizide 2.5mg BID, and Metformin 500mg BID. Patient would likely benefit from reduction in frequency of glipizide dosing to reduce risk of hypoglycemia.

## 2023-12-23 ENCOUNTER — APPOINTMENT (OUTPATIENT)
Dept: GENERAL RADIOLOGY | Age: 73
DRG: 949 | End: 2023-12-23
Attending: PHYSICAL MEDICINE & REHABILITATION
Payer: MEDICARE

## 2023-12-23 LAB
ANION GAP SERPL CALC-SCNC: 5 MMOL/L (ref 2–11)
BUN SERPL-MCNC: 21 MG/DL (ref 8–23)
CALCIUM SERPL-MCNC: 8.6 MG/DL (ref 8.3–10.4)
CHLORIDE SERPL-SCNC: 110 MMOL/L (ref 103–113)
CO2 SERPL-SCNC: 23 MMOL/L (ref 21–32)
CREAT SERPL-MCNC: 1.4 MG/DL (ref 0.8–1.5)
ERYTHROCYTE [DISTWIDTH] IN BLOOD BY AUTOMATED COUNT: 15.2 % (ref 11.9–14.6)
GLUCOSE BLD STRIP.AUTO-MCNC: 104 MG/DL (ref 65–100)
GLUCOSE BLD STRIP.AUTO-MCNC: 104 MG/DL (ref 65–100)
GLUCOSE BLD STRIP.AUTO-MCNC: 109 MG/DL (ref 65–100)
GLUCOSE BLD STRIP.AUTO-MCNC: 137 MG/DL (ref 65–100)
GLUCOSE SERPL-MCNC: 80 MG/DL (ref 65–100)
HCT VFR BLD AUTO: 26.7 % (ref 41.1–50.3)
HGB BLD-MCNC: 8.6 G/DL (ref 13.6–17.2)
MCH RBC QN AUTO: 31 PG (ref 26.1–32.9)
MCHC RBC AUTO-ENTMCNC: 32.2 G/DL (ref 31.4–35)
MCV RBC AUTO: 96.4 FL (ref 82–102)
NRBC # BLD: 0 K/UL (ref 0–0.2)
PLATELET # BLD AUTO: 358 K/UL (ref 150–450)
PMV BLD AUTO: 9.2 FL (ref 9.4–12.3)
POTASSIUM SERPL-SCNC: 3.8 MMOL/L (ref 3.5–5.1)
RBC # BLD AUTO: 2.77 M/UL (ref 4.23–5.6)
SERVICE CMNT-IMP: ABNORMAL
SODIUM SERPL-SCNC: 138 MMOL/L (ref 136–146)
WBC # BLD AUTO: 13.8 K/UL (ref 4.3–11.1)

## 2023-12-23 PROCEDURE — 80048 BASIC METABOLIC PNL TOTAL CA: CPT

## 2023-12-23 PROCEDURE — 6370000000 HC RX 637 (ALT 250 FOR IP): Performed by: PHYSICAL MEDICINE & REHABILITATION

## 2023-12-23 PROCEDURE — 6360000002 HC RX W HCPCS: Performed by: PHYSICAL MEDICINE & REHABILITATION

## 2023-12-23 PROCEDURE — 82962 GLUCOSE BLOOD TEST: CPT

## 2023-12-23 PROCEDURE — 36591 DRAW BLOOD OFF VENOUS DEVICE: CPT

## 2023-12-23 PROCEDURE — 85027 COMPLETE CBC AUTOMATED: CPT

## 2023-12-23 PROCEDURE — 1180000000 HC REHAB R&B

## 2023-12-23 PROCEDURE — 97530 THERAPEUTIC ACTIVITIES: CPT

## 2023-12-23 PROCEDURE — 6370000000 HC RX 637 (ALT 250 FOR IP): Performed by: PHYSICIAN ASSISTANT

## 2023-12-23 PROCEDURE — 71045 X-RAY EXAM CHEST 1 VIEW: CPT

## 2023-12-23 PROCEDURE — 97116 GAIT TRAINING THERAPY: CPT

## 2023-12-23 PROCEDURE — 94640 AIRWAY INHALATION TREATMENT: CPT

## 2023-12-23 PROCEDURE — 94761 N-INVAS EAR/PLS OXIMETRY MLT: CPT

## 2023-12-23 PROCEDURE — 97535 SELF CARE MNGMENT TRAINING: CPT

## 2023-12-23 RX ORDER — FUROSEMIDE 10 MG/ML
40 INJECTION INTRAMUSCULAR; INTRAVENOUS ONCE
Status: COMPLETED | OUTPATIENT
Start: 2023-12-23 | End: 2023-12-23

## 2023-12-23 RX ORDER — ALBUTEROL SULFATE 2.5 MG/3ML
2.5 SOLUTION RESPIRATORY (INHALATION) EVERY 4 HOURS PRN
Status: DISCONTINUED | OUTPATIENT
Start: 2023-12-23 | End: 2024-01-11 | Stop reason: HOSPADM

## 2023-12-23 RX ADMIN — LISINOPRIL 20 MG: 20 TABLET ORAL at 08:31

## 2023-12-23 RX ADMIN — ALBUTEROL SULFATE 2.5 MG: 2.5 SOLUTION RESPIRATORY (INHALATION) at 17:29

## 2023-12-23 RX ADMIN — METFORMIN HYDROCHLORIDE 500 MG: 500 TABLET ORAL at 17:03

## 2023-12-23 RX ADMIN — ALBUTEROL SULFATE 2.5 MG: 2.5 SOLUTION RESPIRATORY (INHALATION) at 05:09

## 2023-12-23 RX ADMIN — B-COMPLEX W/ C & FOLIC ACID TAB 1 TABLET: TAB at 08:30

## 2023-12-23 RX ADMIN — VITAMIN D, TAB 1000IU (100/BT) 1000 UNITS: 25 TAB at 08:30

## 2023-12-23 RX ADMIN — FAMOTIDINE 20 MG: 20 TABLET ORAL at 08:33

## 2023-12-23 RX ADMIN — ACETAMINOPHEN 650 MG: 325 TABLET ORAL at 15:16

## 2023-12-23 RX ADMIN — FOLIC ACID 1 MG: 1 TABLET ORAL at 08:32

## 2023-12-23 RX ADMIN — DOCUSATE SODIUM 50 MG AND SENNOSIDES 8.6 MG 2 TABLET: 8.6; 5 TABLET, FILM COATED ORAL at 08:30

## 2023-12-23 RX ADMIN — Medication 500 MG: at 08:31

## 2023-12-23 RX ADMIN — Medication 50 MG: at 08:31

## 2023-12-23 RX ADMIN — METFORMIN HYDROCHLORIDE 500 MG: 500 TABLET ORAL at 08:30

## 2023-12-23 RX ADMIN — LEVOTHYROXINE SODIUM 150 MCG: 0.07 TABLET ORAL at 05:17

## 2023-12-23 RX ADMIN — AMIODARONE HYDROCHLORIDE 400 MG: 200 TABLET ORAL at 08:32

## 2023-12-23 RX ADMIN — GUAIFENESIN SYRUP AND DEXTROMETHORPHAN 5 ML: 100; 10 SYRUP ORAL at 19:49

## 2023-12-23 RX ADMIN — DOCUSATE SODIUM 50 MG AND SENNOSIDES 8.6 MG 2 TABLET: 8.6; 5 TABLET, FILM COATED ORAL at 19:49

## 2023-12-23 RX ADMIN — GLIPIZIDE 2.5 MG: 5 TABLET ORAL at 15:16

## 2023-12-23 RX ADMIN — APIXABAN 5 MG: 5 TABLET, FILM COATED ORAL at 19:49

## 2023-12-23 RX ADMIN — Medication 1 AMPULE: at 19:50

## 2023-12-23 RX ADMIN — METOPROLOL SUCCINATE 25 MG: 25 TABLET, FILM COATED, EXTENDED RELEASE ORAL at 08:30

## 2023-12-23 RX ADMIN — AMIODARONE HYDROCHLORIDE 400 MG: 200 TABLET ORAL at 19:50

## 2023-12-23 RX ADMIN — SERTRALINE 100 MG: 100 TABLET, FILM COATED ORAL at 08:31

## 2023-12-23 RX ADMIN — APIXABAN 5 MG: 5 TABLET, FILM COATED ORAL at 08:31

## 2023-12-23 RX ADMIN — Medication 1 AMPULE: at 08:33

## 2023-12-23 RX ADMIN — ASPIRIN 81 MG: 81 TABLET ORAL at 08:30

## 2023-12-23 RX ADMIN — ATORVASTATIN CALCIUM 80 MG: 80 TABLET, FILM COATED ORAL at 19:49

## 2023-12-23 RX ADMIN — FAMOTIDINE 20 MG: 20 TABLET ORAL at 19:50

## 2023-12-23 RX ADMIN — GUAIFENESIN SYRUP AND DEXTROMETHORPHAN 5 ML: 100; 10 SYRUP ORAL at 08:30

## 2023-12-23 RX ADMIN — FUROSEMIDE 40 MG: 10 INJECTION, SOLUTION INTRAMUSCULAR; INTRAVENOUS at 22:22

## 2023-12-23 RX ADMIN — DIPHENHYDRAMINE HYDROCHLORIDE 25 MG: 25 CAPSULE ORAL at 15:48

## 2023-12-23 RX ADMIN — GLIPIZIDE 2.5 MG: 5 TABLET ORAL at 08:31

## 2023-12-23 ASSESSMENT — PAIN SCALES - GENERAL
PAINLEVEL_OUTOF10: 0

## 2023-12-23 NOTE — PROGRESS NOTES
Patient indicates that he does not require assistance in donning and doffing CPAP Mask. He has brought his own PAP unit to hospital and indicates he does not require assistance with it at this time. Patient left in no respiratory distress.

## 2023-12-23 NOTE — PLAN OF CARE
Problem: Safety - Adult  Goal: Free from fall injury  12/23/2023 0901 by José Miguel Nice RN  Outcome: Progressing  12/22/2023 2139 by Suleman Patrick RN  Outcome: Progressing

## 2023-12-23 NOTE — PROGRESS NOTES
This RN received a phone call from RAHEEL Wray concerned about patient progress with therapy due to increased confusion based on patient charts. This RN assessed the patient and he was A&O x4, but required significant cuing when walking to the bathroom and to not chew his meds, but swallow them. Patient required less cuing walking back to the bed from the bathroom and did not need any prompting to wash his hands. This RN notified Mally Nguyen of patient assessment. Will continue to monitor.

## 2023-12-23 NOTE — PROGRESS NOTES
Inpatient [address]  [address]  [facility]  Tel: [phone]     Physical Medicine and Rehabilitation Progress Note      Manda Tucker III  Admit Date: 12/20/2023  Admit Diagnosis: Debility [R53.81]  History of coronary artery bypass graft x 3 [Z95.1]    Subjective     Patient seen face-to-face and evaluated. NAEO and he reports sleeping well. He is eating and drinking fine, and continent of bowel and bladder. No pain reported, just some soreness around inferior thoracotomy area. He said his therapies were going well. He appeared a bit worn out again today, but said he felt everything was great. He reported continued cough, but improved.     Objective:     Current Facility-Administered Medications   Medication Dose Route Frequency    hydrocortisone (ANUSOL-HC) 2.5 % rectal cream   Rectal BID    insulin lispro (HUMALOG) injection vial 0-8 Units  0-8 Units SubCUTAneous TID WC    insulin lispro (HUMALOG) injection vial 0-4 Units  0-4 Units SubCUTAneous Nightly    albuterol (PROVENTIL) (2.5 MG/3ML) 0.083% nebulizer solution 2.5 mg  2.5 mg Nebulization BID RT    acetaminophen (TYLENOL) tablet 650 mg  650 mg Oral Q4H PRN    alcohol 62% (NOZIN) nasal  1 ampule  1 ampule Topical Q12H    amiodarone (CORDARONE) tablet 400 mg  400 mg Oral BID    apixaban (ELIQUIS) tablet 5 mg  5 mg Oral BID    aspirin EC tablet 81 mg  81 mg Oral Daily    atorvastatin (LIPITOR) tablet 80 mg  80 mg Oral Nightly    diphenhydrAMINE (BENADRYL) capsule 25 mg  25 mg Oral Nightly PRN    famotidine (PEPCID) tablet 20 mg  20 mg Oral BID    Or    famotidine (PEPCID) 20 mg in sodium chloride (PF) 0.9 % 10 mL injection  20 mg IntraVENous BID    glucose chewable tablet 16 g  4 tablet Oral PRN    lactulose (CHRONULAC) 10 GM/15ML solution 20 g  20 g Oral Daily PRN    levothyroxine (SYNTHROID) tablet 150 mcg  150 mcg Oral Daily    magnesium hydroxide (MILK OF MAGNESIA) 400 MG/5ML suspension 30 mL  30 Performed by: Malik    POCT Glucose    Collection Time: 12/22/23  5:39 AM   Result Value Ref Range    POC Glucose 95 65 - 100 mg/dL    Performed by: Roque    POCT Glucose    Collection Time: 12/22/23 11:27 AM   Result Value Ref Range    POC Glucose 163 (H) 65 - 100 mg/dL    Performed by: [facility]ars[facility]Companion    POCT Glucose    Collection Time: 12/22/23  4:59 PM   Result Value Ref Range    POC Glucose 91 65 - 100 mg/dL    Performed by: [facility]Companion    POCT Glucose    Collection Time: 12/22/23  7:58 PM   Result Value Ref Range    POC Glucose 106 (H) 65 - 100 mg/dL    Performed by: HiteshSASHLEY    POCT Glucose    Collection Time: 12/23/23  6:05 AM   Result Value Ref Range    POC Glucose 104 (H) 65 - 100 mg/dL    Performed by: OpaltBSASHLEY    POCT Glucose    Collection Time: 12/23/23 11:36 AM   Result Value Ref Range    POC Glucose 104 (H) 65 - 100 mg/dL    Performed by: Ca      Assessment: This is a 68 y.o. debilitated M due to a 3v CABG in setting of OA and RA limiting his mobility. Rehabilitation Plan  The patient has shown the ability to tolerate and benefit from 3 hours of therapy daily and is being admitted to a comprehensive acute inpatient rehabilitation program consisting of at least 3 hours of combined physical and occupational therapies. - Begin intensive Physical Therapy for a minimum of 1.5 hours a day, at least 5 out of 7 days per week to address bed mobility, transfers, ambulation, strengthening, balance, and endurance. - Begin intensive Occupational Therapy for a minimum of 1.5 hours a day, at least 5 out of 7 days per week to address ADLs (bathing, LE dressing, toileting) and adaptive equipment as needed. - Continue Speech Therapy prn for: dysarthria, impaired communication skills; therapy schedule may be adjusted by MD based on patient's needs.   Each of these therapies will be continued as above for the duration of the inpatient rehab stay.     The patient will also require 24-hour skilled rehabilitation nursing for bowel and bladder management, skin care for decubitus ulcer prevention, pain management and ongoing medication administration.     Plan / Recommendations / Medical Decision Making:      Daily physician / PA medical management:     Debility [R53.81]  History of coronary artery bypass graft x 3 [Z95.1] - Sternal precautions. Start interdisciplinary approach to rehabilitation including PT, OT, nursing and physiatry and disease specific education.      HNT/Afib/CAD/HLD - New onset post op Afib. Continue Metoprolol Succinate 25mg daily Amiodarone 400mg BID x5 days then 200mg BID x 14 days, Lisinopril 20mg daily, Lipitor 80mg qhs, ASA 81mg daily and Eliquis 5mg BID.     Diabetes mellitus - HgbA1c 7.6 on 12/18/23%; impaired / poor glycemic control. Will require ACHS glucose monitoring and medication adjustment to optimize control.   -Continue Glipizide 2.5mg BID, Metformin 500mg BID, SSI, Regular ADA Cardiac diet.     Pain management - No reported post op or other pain on IPR admission. Will require regular pain assessment and management. Continue Tylenol prn and Tramadol 50mg q6h prn.     Electrolyte abnormality - monitor BMPs and replace as needed.     Pneumonia prophylaxis - incentive spirometer 10x every hour while awake. Robitussin DM BID and Albuterol prn.     DVT risk / DVT prophylaxis - mobilize as tolerated. SCDs when in bed; DANIEL hose when out of bed. Eliquis 5mg BID.     Acute Post Op Anemia - 12/20 H/H 8.2/25.8. Follow with surveillance labs.     GI prophylaxis - resume Pepcid 20mg BID. At times may need additional antacids, Maalox PRN.     RA - Plan to restart Methotrexate 25mg weekly and and Plaquenil 200mg BID     Tobacco use - Quit smoking 15 years ago.     Hypothyroidism - Continue Levothyroxine 150mcg daily.     Depression - continue Sertraline 100mg daily. At risk of depression exacerbation post CABG.     General skin

## 2023-12-23 NOTE — PROGRESS NOTES
Physical Therapy  Facility/Department: [facility] 9 INPATIENT REHAB UNIT  Daily Treatment Note  NAME: Alina Henning III  : 1950  MRN: [id number]    Date of Service: 2023    Discharge Recommendations:           Patient Diagnosis(es): There were no encounter diagnoses. Assessment         Plan          Restrictions        Subjective          Objective   Vitals                         Goals       Education       AM-PAC - Mobility              Therapy Time   Individual Concurrent Group Co-treatment   Time In 3656         Time Out 1320         Minutes 39         Timed Code Treatment Minutes: 44 Minutes       Jesse Rizzo PTA         PHYSICAL THERAPY DAILY NOTE  Time In:  12:41 am  Time Out:  13:20 PM  Total Treatment Time:  44 Minutes  Pt.  Seen for: AM, Gait Training, Patient Education, and Transfer Training     Subjective: \"I'm getting tired but we are here and working\"         Objective:  Precautions: Falls and Sternal    GROSS ASSESSMENT Daily Assessment           COGNITION Daily Assessment           BED/MAT MOBILITY Daily Assessment    Rolling Right: NT  Rolling Left: NT  Supine to Sit: NT  Sit to Supine: NT       TRANSFERS Daily Assessment    Sit to Stand: CGA  Stand to Sit: CGA  Transfer Type: Stand Pivot  Transfer Assistance: CGA  Car Transfers: NT         GAIT Daily Assessment   Addiitional 80ft x2, 60ft Amount of Assistance: CGA  Distance (ft): 60  Assistive Device: RW  Surface: Level Surface       STEPS/STAIRS Daily Assessment    Steps Ambulated:  0  Level of Assistance:  NT  Railing:No Rails  Assistive Device: No A/D       BALANCE Daily Assessment    Static Sitting: Normal:  Pt. able to maintain balance w/o UE support  Dynamic Sitting: Good - accepts moderate challenge;  can maintain balance while picking object off the floor  Static Standing: Fair:  Pt. requires UE support;  may need occasional min A  Dynamic Standing: Poor - unable to accept challenge or move without LOB        WHEELCHAIR

## 2023-12-23 NOTE — PROGRESS NOTES
OT DAILY NOTE    Time In: 612  Time Out: 841     Score Comments   Rolling Supervision or touching assistance min A   Supine to Sit Supervision or touching assistance min A   Sit to Supine       Sit to Stand Supervision or touching assistance min A for safety SPT   Transfer Assist    Min A       Eating Current Functional Level   Score Setup or clean-up assistance   Comments able to open all containers     Oral Hygiene  Current Functional Level   Score Setup or clean-up assistance   Comments       Bathing Current Functional Level   Score Supervision or touching assistance   Comments min A - cues for sequencing and thoroughness     Upper Body   Dressing Current Functional Level   Score Setup or clean-up assistance   Comments assistance only to help with wires and cords     Lower Body   Dressing Current Functional Level     Functional Level Partial/moderate assistance   Comments able to cross legs to don/doff pants     Donning/[place]  Footwear Current Functional Level     Functional Level Supervision or touching assistance   Comments only socks. crossed legs to don both socks     Summary of Session: Agreeable to therapy. Pt in bed sleeping when arrived. \"I didn't sleep well last night. Pt oriented x person, place and date. Transferred to  to wash up at sink. See above for ADL scores. Needed cues for energy conservation. Appropriate conversation throughout session. When set up breakfast, pt able to open all containers I .     Plan: Continue OT POC with focus on ADL/IADL skills, functional transfers, functional mobility, coordination, strength, static and dynamic balance, and activity tolerance to maximize safety and independence with ADLs and functional transfers. Pt left in recliner with chair alarm activity, tv on and call button explained and put on lap.         SHAJI Sanford  12/23/2023

## 2023-12-23 NOTE — PROGRESS NOTES
Physical Therapy  Facility/Department: [facility] 9 INPATIENT REHAB UNIT  Daily Treatment Note  NAME: Michael Archie Paduano III  : 1950  MRN: [id number]    Date of Service: 2023    Discharge Recommendations:           Patient Diagnosis(es): There were no encounter diagnoses.    Assessment         Plan          Restrictions        Subjective          Objective   Vitals                         Goals       Education       AM-PAC - Mobility              Therapy Time   Individual Concurrent Group Co-treatment   Time In 918         Time Out 1011         Minutes 53         Timed Code Treatment Minutes: 53 Minutes       Delmy De Leon PTA         PHYSICAL THERAPY DAILY NOTE  Time In:  9:18 am  Time Out:  10:11 am  Total Treatment Time:  53 Minutes  Pt. Seen for: AM, Gait Training, Patient Education, and Transfer Training     Subjective: \"I'm doing alright I guess need to go to bathroom\"         Objective:  Precautions: Falls    GROSS ASSESSMENT Daily Assessment           COGNITION Daily Assessment           BED/MAT MOBILITY Daily Assessment    Rolling Right: NT  Rolling Left: NT  Supine to Sit: NT  Sit to Supine: NT       TRANSFERS Daily Assessment   Pt requires min - mod a with toilet duties cleaning and dressing LE Sit to Stand: Min A  Stand to Sit: CGA  Transfer Type: Stand Pivot  Transfer Assistance: CGA  Car Transfers: NT         GAIT Daily Assessment   Additional 60ft and 4 trials using FWW for support Amount of Assistance: CGA  Distance (ft): 100  Assistive Device: RW  Surface: Level Surface       STEPS/STAIRS Daily Assessment    Steps Ambulated:  0  Level of Assistance:  NT  Railing:No Rails  Assistive Device: No A/D       BALANCE Daily Assessment    Static Sitting: Normal:  Pt. able to maintain balance w/o UE support  Dynamic Sitting: Good - accepts moderate challenge;  can maintain balance while picking object off the floor  Static Standing: Fair:  Pt. requires UE support;  may need occasional min  A  Dynamic Standing: Poor - unable to accept challenge or move without LOB        WHEELCHAIR MOBILITY Daily Assessment    Able to Propel (ft): 0  Assistance: NT  Surface: NT  Wheelchair Set-up: NT       LOWER EXTREMITY EXERCISES Daily Assessment         Pain level: 0/10  Pain Location:  NA  Pain Interventions: NA, Soreness in chest    Vital Signs: WNL    Pt. Left in recliner chair in room, call light in reach, alarm set         Assessment: pt requires seated rest breaks between gait due to decrease and endurance and SOB.  Educated on pacing techniques         Plan of Care: Continue with current 0650 João Fabian, PTA  12/23/2023

## 2023-12-24 ENCOUNTER — APPOINTMENT (OUTPATIENT)
Dept: CT IMAGING | Age: 73
DRG: 949 | End: 2023-12-24
Attending: PHYSICAL MEDICINE & REHABILITATION
Payer: MEDICARE

## 2023-12-24 PROBLEM — M13.0 POLYARTHROPATHY: Chronic | Status: ACTIVE | Noted: 2023-09-18

## 2023-12-24 PROBLEM — M25.561 CHRONIC PAIN OF BOTH KNEES: Chronic | Status: ACTIVE | Noted: 2021-04-12

## 2023-12-24 PROBLEM — G47.33 OSA ON CPAP: Chronic | Status: ACTIVE | Noted: 2018-07-25

## 2023-12-24 PROBLEM — M25.562 CHRONIC PAIN OF BOTH KNEES: Chronic | Status: ACTIVE | Noted: 2021-04-12

## 2023-12-24 PROBLEM — J98.8 CONGESTION OF RESPIRATORY TRACT: Status: ACTIVE | Noted: 2023-12-24

## 2023-12-24 PROBLEM — R09.89 RHONCHI: Status: ACTIVE | Noted: 2023-12-24

## 2023-12-24 PROBLEM — Z87.891 HISTORY OF TOBACCO USE: Chronic | Status: ACTIVE | Noted: 2023-12-24

## 2023-12-24 PROBLEM — E11.9 TYPE 2 DIABETES MELLITUS (HCC): Chronic | Status: ACTIVE | Noted: 2022-05-09

## 2023-12-24 PROBLEM — K21.9 GASTROESOPHAGEAL REFLUX DISEASE: Chronic | Status: ACTIVE | Noted: 2023-12-20

## 2023-12-24 PROBLEM — R35.1 BPH ASSOCIATED WITH NOCTURIA: Chronic | Status: ACTIVE | Noted: 2017-05-10

## 2023-12-24 PROBLEM — E03.9 HYPOTHYROIDISM: Chronic | Status: ACTIVE | Noted: 2023-12-20

## 2023-12-24 PROBLEM — E78.00 HYPERCHOLESTEROLEMIA: Chronic | Status: ACTIVE | Noted: 2020-08-07

## 2023-12-24 PROBLEM — N40.1 BPH ASSOCIATED WITH NOCTURIA: Chronic | Status: ACTIVE | Noted: 2017-05-10

## 2023-12-24 PROBLEM — G89.29 CHRONIC PAIN OF BOTH KNEES: Chronic | Status: ACTIVE | Noted: 2021-04-12

## 2023-12-24 LAB
GLUCOSE BLD STRIP.AUTO-MCNC: 131 MG/DL (ref 65–100)
GLUCOSE BLD STRIP.AUTO-MCNC: 137 MG/DL (ref 65–100)
GLUCOSE BLD STRIP.AUTO-MCNC: 301 MG/DL (ref 65–100)
GLUCOSE BLD STRIP.AUTO-MCNC: 92 MG/DL (ref 65–100)
PROCALCITONIN SERPL-MCNC: 0.06 NG/ML (ref 0–0.49)
SERVICE CMNT-IMP: ABNORMAL
SERVICE CMNT-IMP: NORMAL

## 2023-12-24 PROCEDURE — 97530 THERAPEUTIC ACTIVITIES: CPT

## 2023-12-24 PROCEDURE — 6360000002 HC RX W HCPCS: Performed by: PHYSICAL MEDICINE & REHABILITATION

## 2023-12-24 PROCEDURE — 6360000002 HC RX W HCPCS: Performed by: NURSE PRACTITIONER

## 2023-12-24 PROCEDURE — 82962 GLUCOSE BLOOD TEST: CPT

## 2023-12-24 PROCEDURE — 51702 INSERT TEMP BLADDER CATH: CPT

## 2023-12-24 PROCEDURE — 84145 PROCALCITONIN (PCT): CPT

## 2023-12-24 PROCEDURE — 99232 SBSQ HOSP IP/OBS MODERATE 35: CPT | Performed by: PHYSICAL MEDICINE & REHABILITATION

## 2023-12-24 PROCEDURE — 6370000000 HC RX 637 (ALT 250 FOR IP): Performed by: PHYSICIAN ASSISTANT

## 2023-12-24 PROCEDURE — 6370000000 HC RX 637 (ALT 250 FOR IP): Performed by: PHYSICAL MEDICINE & REHABILITATION

## 2023-12-24 PROCEDURE — 94640 AIRWAY INHALATION TREATMENT: CPT

## 2023-12-24 PROCEDURE — 94760 N-INVAS EAR/PLS OXIMETRY 1: CPT

## 2023-12-24 PROCEDURE — 99223 1ST HOSP IP/OBS HIGH 75: CPT | Performed by: INTERNAL MEDICINE

## 2023-12-24 PROCEDURE — 1180000000 HC REHAB R&B

## 2023-12-24 PROCEDURE — 97116 GAIT TRAINING THERAPY: CPT

## 2023-12-24 PROCEDURE — 2580000003 HC RX 258: Performed by: NURSE PRACTITIONER

## 2023-12-24 PROCEDURE — 97535 SELF CARE MNGMENT TRAINING: CPT

## 2023-12-24 PROCEDURE — 71250 CT THORAX DX C-: CPT

## 2023-12-24 RX ORDER — ALBUTEROL SULFATE 2.5 MG/3ML
2.5 SOLUTION RESPIRATORY (INHALATION)
Status: DISCONTINUED | OUTPATIENT
Start: 2023-12-24 | End: 2023-12-26

## 2023-12-24 RX ORDER — FUROSEMIDE 10 MG/ML
40 INJECTION INTRAMUSCULAR; INTRAVENOUS ONCE
Status: COMPLETED | OUTPATIENT
Start: 2023-12-24 | End: 2023-12-24

## 2023-12-24 RX ADMIN — SERTRALINE 100 MG: 100 TABLET, FILM COATED ORAL at 08:20

## 2023-12-24 RX ADMIN — INSULIN LISPRO 4 UNITS: 100 INJECTION, SOLUTION INTRAVENOUS; SUBCUTANEOUS at 20:31

## 2023-12-24 RX ADMIN — METFORMIN HYDROCHLORIDE 500 MG: 500 TABLET ORAL at 15:43

## 2023-12-24 RX ADMIN — AMIODARONE HYDROCHLORIDE 400 MG: 200 TABLET ORAL at 08:20

## 2023-12-24 RX ADMIN — GUAIFENESIN SYRUP AND DEXTROMETHORPHAN 5 ML: 100; 10 SYRUP ORAL at 20:18

## 2023-12-24 RX ADMIN — FUROSEMIDE 40 MG: 10 INJECTION, SOLUTION INTRAMUSCULAR; INTRAVENOUS at 06:00

## 2023-12-24 RX ADMIN — Medication 500 MG: at 08:21

## 2023-12-24 RX ADMIN — WATER 40 MG: 1 INJECTION INTRAMUSCULAR; INTRAVENOUS; SUBCUTANEOUS at 23:22

## 2023-12-24 RX ADMIN — GLIPIZIDE 2.5 MG: 5 TABLET ORAL at 06:08

## 2023-12-24 RX ADMIN — APIXABAN 5 MG: 5 TABLET, FILM COATED ORAL at 08:20

## 2023-12-24 RX ADMIN — FAMOTIDINE 20 MG: 20 TABLET ORAL at 08:21

## 2023-12-24 RX ADMIN — ALBUTEROL SULFATE 2.5 MG: 2.5 SOLUTION RESPIRATORY (INHALATION) at 22:06

## 2023-12-24 RX ADMIN — Medication 1 AMPULE: at 20:19

## 2023-12-24 RX ADMIN — LISINOPRIL 20 MG: 20 TABLET ORAL at 08:20

## 2023-12-24 RX ADMIN — ATORVASTATIN CALCIUM 80 MG: 80 TABLET, FILM COATED ORAL at 20:18

## 2023-12-24 RX ADMIN — WATER 40 MG: 1 INJECTION INTRAMUSCULAR; INTRAVENOUS; SUBCUTANEOUS at 15:37

## 2023-12-24 RX ADMIN — METFORMIN HYDROCHLORIDE 500 MG: 500 TABLET ORAL at 08:20

## 2023-12-24 RX ADMIN — METOPROLOL SUCCINATE 25 MG: 25 TABLET, FILM COATED, EXTENDED RELEASE ORAL at 08:21

## 2023-12-24 RX ADMIN — ALBUTEROL SULFATE 2.5 MG: 2.5 SOLUTION RESPIRATORY (INHALATION) at 05:35

## 2023-12-24 RX ADMIN — FAMOTIDINE 20 MG: 20 TABLET ORAL at 20:18

## 2023-12-24 RX ADMIN — B-COMPLEX W/ C & FOLIC ACID TAB 1 TABLET: TAB at 08:20

## 2023-12-24 RX ADMIN — FOLIC ACID 1 MG: 1 TABLET ORAL at 08:21

## 2023-12-24 RX ADMIN — APIXABAN 5 MG: 5 TABLET, FILM COATED ORAL at 20:18

## 2023-12-24 RX ADMIN — ACETAMINOPHEN 650 MG: 325 TABLET ORAL at 20:18

## 2023-12-24 RX ADMIN — ASPIRIN 81 MG: 81 TABLET ORAL at 08:20

## 2023-12-24 RX ADMIN — GLIPIZIDE 2.5 MG: 5 TABLET ORAL at 15:43

## 2023-12-24 RX ADMIN — DOCUSATE SODIUM 50 MG AND SENNOSIDES 8.6 MG 2 TABLET: 8.6; 5 TABLET, FILM COATED ORAL at 20:18

## 2023-12-24 RX ADMIN — VITAMIN D, TAB 1000IU (100/BT) 1000 UNITS: 25 TAB at 08:20

## 2023-12-24 RX ADMIN — LEVOTHYROXINE SODIUM 150 MCG: 0.07 TABLET ORAL at 06:02

## 2023-12-24 RX ADMIN — Medication 50 MG: at 08:21

## 2023-12-24 RX ADMIN — DOCUSATE SODIUM 50 MG AND SENNOSIDES 8.6 MG 2 TABLET: 8.6; 5 TABLET, FILM COATED ORAL at 08:20

## 2023-12-24 RX ADMIN — GUAIFENESIN SYRUP AND DEXTROMETHORPHAN 5 ML: 100; 10 SYRUP ORAL at 08:20

## 2023-12-24 RX ADMIN — AMIODARONE HYDROCHLORIDE 400 MG: 200 TABLET ORAL at 20:18

## 2023-12-24 ASSESSMENT — PAIN DESCRIPTION - LOCATION: LOCATION: BACK

## 2023-12-24 ASSESSMENT — PAIN SCALES - GENERAL
PAINLEVEL_OUTOF10: 2
PAINLEVEL_OUTOF10: 0

## 2023-12-24 NOTE — PROGRESS NOTES
Upon assessment, patient noted with mid sternal wound vac intact but with no suction. Call placed to [facility] and was told that when wound vac is placed in surgery and the batteries die, it can be removed. Will notify MD in the am for order to removed. No drainage noted.

## 2023-12-24 NOTE — PROGRESS NOTES
OT DAILY NOTE    Time In:    0831     Time Out: 1009       Functional Mobility   Score Comments   Rolling 4: Supervision or touching A CGA   Sit to Supine N/A    Supine to Sit 3: Partial/Moderate A Min A with cues for precautions adherence   Sit to Stand 3: Partial/Moderate A Min A with cues for precautions adherence   Transfer Assist 3: Partial/Moderate A Min A with cues for precautions adherence and RW use     Activities of Daily Living   Score Comments   Eating Setup or clean-up assistance S/U   Oral Hygiene Setup or clean-up assistance S/U seated at sink with increased time, oral hygiene, face washing, hair care   Bathing Supervision or touching assistance CGA/Min A UB/Lb seated EOB with increased time, limited by increased lines/medical, woundvac seal failure, RN aware and reporting to avoid shower this am   Upper Body  Dressing Setup or clean-up assistance S/U doff/don shirt seated   Lower Body Dressing Partial/moderate assistance Mod A doff/don brief and pants seated and standing, Max A threadding stevens bag   Donning/[place] Footwear Supervision or touching assistance CGA doff/don socks seated   Toilet Transfer Partial/moderate assistance Min A with RW   Toileting Hygiene Substantial/maximal assistance Max A seated and standing RW       Summary of Session: S: \"This a a d#### workout [dressing]. \" Agreeable to therapy. Focus of session was on morning ADL routine. Patient was able to complete household mobility for ADLs in room and bathroom with RW. Coordination   Pt completed therapeutic activity to challenge BUE AROM, B/L hand FM coordination/dexterity, bi-manual coordination, problem solving and activity tolerance in preparation for safe return to max (I) with I/ADLs. Pt tolerated activity well with no c/o pain. Rest breaks utilized as needed secondary to hand fatigue. Pt completed bead retrieval activity seated at table. Pt used B/L hand to spread, stretch and flatten putty.  Pt located and retrieved

## 2023-12-24 NOTE — CONSULTS
Yudi  Subjective:     Mr. Paduano is a 73 y.o. male PMHx of CAD s/p CABGx3 on 12/14/2023, atrial fibrillation and flutter, HTN, and normocytic anemia who presents via consult from inpatient rehab by Dr. Sandoval for dyspnea. Patient endorses dyspnea for past 3-4 days that became markedly worse last night, present at rest. States he feels very fatigued. Denies increase in chest pain outside of post-CABG baseline. Nursing noted this with patient being tachypneic with mild hypoxia on RA. Patient had basic labs, CXR, and was diuresed with lasix and received breathing treatment. Placed on NC for support. Ordered more IV Lasix based on crackles in lungs per nursing and has a stevens in place for I&Os. Mr. Paduano does endorse a cough productive of colored sputum that he started having last night. Denies pedal edema.     Patient Active Problem List    Diagnosis Date Noted    Major depressive disorder, recurrent, in partial remission (HCC) 01/06/2023    Normocytic anemia 01/02/2023    History of coronary artery bypass graft x 3 12/20/2023    Debility 12/20/2023    Atrial fibrillation (HCC) 12/20/2023    Gastroesophageal reflux disease 12/20/2023    Hypothyroidism 12/20/2023    Encounter for rehabilitation 12/20/2023    Typical atrial flutter (HCC) 12/17/2023    S/P CABG x 3 12/15/2023    Hypoxemia 12/15/2023    Atelectasis 12/15/2023    CAD, multiple vessel 12/14/2023    Pre-op testing 12/14/2023    Impaired functional mobility, balance, gait, and endurance 12/14/2023    Decreased independence with activities of daily living 12/14/2023    Abnormal cardiovascular stress test 12/07/2023    Atherosclerotic heart disease of native coronary artery with unstable angina pectoris (HCC) 12/07/2023    Abnormal nuclear stress test 12/01/2023    Duodenal ulcer, unspecified as acute or chronic, without hemorrhage or perforation 11/21/2023    Adrenal nodule (HCC) 11/01/2023    Kidney stone 10/10/2023    Diverticulosis of large intestine  92 65 - 100 mg/dL    Performed by: Fitz Villaseñor        CXR 12/23:  FINDINGS:  Persistent, mild decreased left mid, lower lung pulmonary density with decreased  mild left pleural effusion. Mild bilateral pulmonary scarring. Unchanged cardiomediastinal silhouette configuration. Low lung volumes. IMPRESSION:  Persistent, but decreased left pulmonary disease, small left pleural effusion    Assessment:     Dyspnea  - CXR showing persistent but decreased mild left pleural effusion with decreased left mid, lower lung pulmonary density  --- small pleural effusion post CABG would be a normal finding however since this is improved from prior CXR I would doubt this is causing his sudden increase in dyspnea   --- can continue to address effusion with IV Lasix and breathing treatments prn   - appears euvolemic on physical exam with no pedal edema or bibasilar crackles per my exam. Still appears uncomfortable with increase in respiratory drive  - based on history above of dyspnea and cough productive of colored sputum, new leukocytosis of 13.8K, hypoxia and tachypnea, and continued hospitalization there should be a concern for pneumonia causing this dyspnea. Suggest a procal level and pulmonary consult as they would initiate abx for this. HTN  - well controlled    Anemia  - 8.6 Hgb; improved from 8.2  - lower transfusion threshold given post CABG status if he develops anginal symptoms; no symptoms currently, continue to monitor    Code Status: Full code  Dispo: per primary    VIVEK Greene    I have personally seen and evaluated the patient and reviewed the students note and agree with the following assessment and plan and findings. I was present for and observed the key components of this note. Any appropriate additions or editing of the information have been done by me.     Tab Hubbard MD, [facility]  Cardiology

## 2023-12-24 NOTE — PROGRESS NOTES
Patient voided 1000 ml after IV Lasix, patient states that he is breathing a little better. Assisted patient back to bed, crackles still heard in base of lungs. HOB elevated 60 degree, 2 liters oxygen via NC in place. Patient was instructed to call nurse if breathing got worse. Patient voices understanding.

## 2023-12-24 NOTE — PROGRESS NOTES
Patient continues with shortness of breath, lung sounds continue with wetness. RT Sherif Holguin called and came to bedside for neb tx. CPAP placed on after neb tx to help with pressure. Called Dr. Niranjan De La Cruz for orders Lasix 40 mg IV x1 and stevens placement for strict I&0.

## 2023-12-24 NOTE — PROGRESS NOTES
Patient unable to tolerate CPAP due to shortness of breath, oxygen at 2 liters placed on via NC. Patient continues with dyspnea at rest and he states he feels exhausted.  Respirations 26

## 2023-12-24 NOTE — PROGRESS NOTES
[place] [address]  Tel: [phone]     Physical Medicine and Rehabilitation Progress Note      Michael Archie Paduano III  Admit Date: 12/20/2023  Admit Diagnosis: Debility [R53.81]  History of coronary artery bypass graft x 3 [Z95.1]    Subjective     Patient seen face-to-face and evaluated. NAEO and he reports sleeping well. He is eating and drinking fine, and continent of bowel and bladder. No pain reported, just some soreness around inferior thoracotomy area. He said his therapies were going well. He appeared a bit worn out again today. He reported continued cough, but improved. He said he was breathing better than last night and was given IV Lasix x2. Cardiology and Pulmonology consulted and workup continues including CXR (same/improved), BMP, CBC, PCT. CBC showed leukocytosis (WBCs 13.8, but had been elevated other than last CBC). CT Chest to r/o PE pending.    Objective:     Current Facility-Administered Medications   Medication Dose Route Frequency    albuterol (PROVENTIL) (2.5 MG/3ML) 0.083% nebulizer solution 2.5 mg  2.5 mg Nebulization Q4H PRN    hydrocortisone (ANUSOL-HC) 2.5 % rectal cream   Rectal BID    insulin lispro (HUMALOG) injection vial 0-8 Units  0-8 Units SubCUTAneous TID WC    insulin lispro (HUMALOG) injection vial 0-4 Units  0-4 Units SubCUTAneous Nightly    albuterol (PROVENTIL) (2.5 MG/3ML) 0.083% nebulizer solution 2.5 mg  2.5 mg Nebulization BID RT    acetaminophen (TYLENOL) tablet 650 mg  650 mg Oral Q4H PRN    alcohol 62% (NOZIN) nasal  1 ampule  1 ampule Topical Q12H    amiodarone (CORDARONE) tablet 400 mg  400 mg Oral BID    apixaban (ELIQUIS) tablet 5 mg  5 mg Oral BID    aspirin EC tablet 81 mg  81 mg Oral Daily    atorvastatin (LIPITOR) tablet 80 mg  80 mg Oral Nightly    diphenhydrAMINE (BENADRYL) capsule 25 mg  25 mg Oral Nightly PRN    famotidine (PEPCID) tablet 20 mg  20 mg Oral BID    Or     famotidine (PEPCID) 20 mg in sodium chloride (PF) 0.9 % 10 mL injection  20 mg IntraVENous BID    glucose chewable tablet 16 g  4 tablet Oral PRN    lactulose (CHRONULAC) 10 GM/15ML solution 20 g  20 g Oral Daily PRN    levothyroxine (SYNTHROID) tablet 150 mcg  150 mcg Oral Daily    magnesium hydroxide (MILK OF MAGNESIA) 400 MG/5ML suspension 30 mL  30 mL Oral Daily PRN    melatonin tablet 6 mg  6 mg Oral Nightly PRN    metoprolol succinate (TOPROL XL) extended release tablet 25 mg  25 mg Oral Daily    ondansetron (ZOFRAN-ODT) disintegrating tablet 4 mg  4 mg Oral Q8H PRN    polyethylene glycol (GLYCOLAX) packet 17 g  17 g Oral Daily PRN    sennosides-docusate sodium (SENOKOT-S) 8.6-50 MG tablet 2 tablet  2 tablet Oral BID    sertraline (ZOLOFT) tablet 100 mg  100 mg Oral Daily    traMADol (ULTRAM) tablet 50 mg  50 mg Oral Q6H PRN    lisinopril (PRINIVIL;ZESTRIL) tablet 20 mg  20 mg Oral Daily    metFORMIN (GLUCOPHAGE) tablet 500 mg  500 mg Oral BID WC    glipiZIDE (GLUCOTROL) tablet 2.5 mg  2.5 mg Oral BID AC    traZODone (DESYREL) tablet 50 mg  50 mg Oral Nightly PRN    guaiFENesin-dextromethorphan (ROBITUSSIN DM) 100-10 MG/5ML syrup 5 mL  5 mL Oral BID    benzonatate (TESSALON) capsule 100 mg  100 mg Oral 4x Daily PRN    folic acid (FOLVITE) tablet 1 mg  1 mg Oral Daily    multivitamin 1 tablet  1 tablet Oral Daily    vitamin C tablet 500 mg  500 mg Oral Daily    zinc sulfate (ZINCATE) 220 mg capsule - elemental zinc 50 mg  50 mg Oral Daily    Vitamin D (CHOLECALCIFEROL) tablet 1,000 Units  1,000 Units Oral Daily     No Known Allergies    Visit Vitals  /62   Pulse 84   Temp 98.8 °F (37.1 °C) (Oral)   Resp 18   Ht 1.753 m (5' 9\")   Wt 89.9 kg (198 lb 3.1 oz)   SpO2 94%   BMI 29.27 kg/m²          Physical Exam:   General:  AAO with extra time, appears stated age. Appears mildly distressed due to post op debility and discomfort. Appeared somewhat worn out again today.   HEENT:  Normocephalic, EOM and FOV  intensive Physical Therapy for a minimum of 1.5 hours a day, at least 5 out of 7 days per week to address bed mobility, transfers, ambulation, strengthening, balance, and endurance. - Begin intensive Occupational Therapy for a minimum of 1.5 hours a day, at least 5 out of 7 days per week to address ADLs (bathing, LE dressing, toileting) and adaptive equipment as needed. - Continue Speech Therapy prn for: dysarthria, impaired communication skills; therapy schedule may be adjusted by MD based on patient's needs. Each of these therapies will be continued as above for the duration of the inpatient rehab stay. The patient will also require 24-hour skilled rehabilitation nursing for bowel and bladder management, skin care for decubitus ulcer prevention, pain management and ongoing medication administration. Plan / Recommendations / Medical Decision Making:      Daily physician / PA medical management:     Debility [R53.81]  History of coronary artery bypass graft x 3 [Z95.1] - Sternal precautions. Start interdisciplinary approach to rehabilitation including PT, OT, nursing and physiatry and disease specific education. HNT/Afib/CAD/HLD - New onset post op Afib. Continue Metoprolol Succinate 25mg daily Amiodarone 400mg BID x5 days then 200mg BID x 14 days, Lisinopril 20mg daily, Lipitor 80mg qhs, ASA 81mg daily and Eliquis 5mg BID. Diabetes mellitus - HgbA1c 7.6 on 12/18/23%; impaired / poor glycemic control. Will require ACHS glucose monitoring and medication adjustment to optimize control.   -Continue Glipizide 2.5mg BID, Metformin 500mg BID, SSI, Regular ADA Cardiac diet. Pain management - No reported post op or other pain on IPR admission. Will require regular pain assessment and management. Continue Tylenol prn and Tramadol 50mg q6h prn. Electrolyte abnormality - monitor BMPs and replace as needed. Pneumonia prophylaxis - incentive spirometer 10x every hour while awake.  Robitussin DM BID and Albuterol prn.  -Lasix IV x2 overnight with increased SOB (12/23-24). Cardiology and Pulmonology consulted.   -Workup including CXR (same/improved), BMP, CBC, PCT. CBC showed leukocytosis (WBCs 13.8). CT Chest to r/o PE pending.     DVT risk / DVT prophylaxis - mobilize as tolerated. SCDs when in bed; DANIEL hose when out of bed. Eliquis 5mg BID.     Acute Post Op Anemia - 12/20 H/H 8.2/25.8. Follow with surveillance labs.     GI prophylaxis - resume Pepcid 20mg BID. At times may need additional antacids, Maalox PRN.     RA - Plan to restart Methotrexate 25mg weekly and and Plaquenil 200mg BID     Tobacco use - Quit smoking 15 years ago.     Hypothyroidism - Continue Levothyroxine 150mcg daily.     Depression - continue Sertraline 100mg daily. At risk of depression exacerbation post CABG.     General skin care / wound prevention - monitor Sternotomy incision with Provena, and general skin wound status daily. At risk for failure due to impaired mobility, advanced age, and long term smoking history.     Bladder program / urinary retention / neurogenic bladder - schedule voids q6-8h prn. Check post-void residual as needed; in-and-out catheter if post-void residual is more than 400ml. Pt reports normal bladder function on IPR admission without frequency or incontinence.     Bowel program - at risk for constipation as a side effect of opioids, other medications, impaired mobility, etc. MiraLAX daily for regularity, Senokot-S for stool softener + laxative. PRN MOM, bisacodyl suppository or tablets for constipation. Pt reports normal bowel function on IPR admission without constipation.     Disposition: The patient's prognosis for significant practical improvement within a reasonable period of time appears fair/good and the estimated length of stay is 10 days; patient is expected to return home with spouse / family supervision and continued rehabilitation with home health therapy.     Dr. Shlomo Winters MD (CT

## 2023-12-24 NOTE — PROGRESS NOTES
Physical Therapy  Facility/Department: [facility] INPATIENT REHAB UNIT  Daily Treatment Note  NAME: Michael Archie Paduano III  : 1950  MRN: [id number]    Date of Service: 2023    Discharge Recommendations:           Patient Diagnosis(es): There were no encounter diagnoses.    Assessment         Plan          Restrictions        Subjective          Objective   Vitals                         Goals       Education       AM-PAC - Mobility              Therapy Time   Individual Concurrent Group Co-treatment   Time In 1045 1030       Time Out 1115 1045       Minutes 30 15       Timed Code Treatment Minutes: 45 Minutes       Delmy De Leon PTA         PHYSICAL THERAPY DAILY NOTE  Time In:  10:30 am  Time Out:  11:15 am  Total Treatment Time:  45 Minutes  Pt. Seen for: AM, Gait Training, and Patient Education     Subjective: \"Oh I'm tired\"         Objective:  Precautions: Falls and Sternal    GROSS ASSESSMENT Daily Assessment           COGNITION Daily Assessment           BED/MAT MOBILITY Daily Assessment    Rolling Right: NT  Rolling Left: NT  Supine to Sit: NT  Sit to Supine: NT       TRANSFERS Daily Assessment    Sit to Stand: CGA  Stand to Sit: CGA  Transfer Type: Stand Pivot  Transfer Assistance: CGA  Car Transfers: NT         GAIT Daily Assessment   Additional 60ft x4 using FWW for support CGA Amount of Assistance: CGA  Distance (ft): 100ft  Assistive Device: RW  Surface: Level Surface       STEPS/STAIRS Daily Assessment    Steps Ambulated:  0  Level of Assistance:  NT  Railing:No Rails  Assistive Device: No A/D       BALANCE Daily Assessment    Static Sitting: Normal:  Pt. able to maintain balance w/o UE support  Dynamic Sitting: Good - accepts moderate challenge;  can maintain balance while picking object off the floor  Static Standing: Fair:  Pt. requires UE support;  may need occasional min A  Dynamic Standing: Poor - unable to accept challenge or move without LOB        WHEELCHAIR MOBILITY Daily  Assessment    Able to Propel (ft): 0  Assistance: NT  Surface: NT  Wheelchair Set-up: NT       LOWER EXTREMITY EXERCISES Daily Assessment         Pain level: 2/10  Pain Location:  soreness in sternal  Pain Interventions: rest    Vital Signs:  78 HR , 95% O2    Pt. Left in w/c in room call light in reach, family in room         Assessment: pt requires multiple seated rest breaks, cues on pursed lip breathing.          Plan of Care: Continue with [address] Fabian, PTA  12/24/2023

## 2023-12-24 NOTE — PROGRESS NOTES
Physical Therapy  Facility/Department:  INPATIENT REHAB UNIT  Daily Treatment Note  NAME: Susan Johnson III  : 1950  MRN: [id number]    Date of Service: 2023    Discharge Recommendations:           Patient Diagnosis(es): There were no encounter diagnoses. Assessment         Plan          Restrictions        Subjective          Objective   Vitals                         Goals       Education       AM-PAC - Mobility              Therapy Time   Individual Concurrent Group Co-treatment   Time In 8787 4979       Time Out 9454 1068       Minutes 45 15       Timed Code Treatment Minutes: 39 Minutes       Home Factor, PTA         PHYSICAL THERAPY DAILY NOTE  Time In:  14:13  Time Out:  14:58  Total Treatment Time:  39 Minutes  Pt. Seen for: Gait Training, Patient Education, and Transfer Training     Subjective:  \"Oh Im okay I guess, short of breath\"         Objective:  Precautions: Falls and Sternal    GROSS ASSESSMENT Daily Assessment           COGNITION Daily Assessment           BED/MAT MOBILITY Daily Assessment    Rolling Right: NT  Rolling Left: NT  Supine to Sit: NT  Sit to Supine: NT       TRANSFERS Daily Assessment   Pt requires CGA- Min a with toilet duties and LE dressing for bowel movement Sit to Stand: CGA  Stand to Sit: CGA  Transfer Type: Stand Pivot  Transfer Assistance: CGA  Car Transfers: NT         GAIT Daily Assessment   Additional 40ft x4 using FWW Amount of Assistance: CGA  Distance (ft): 60  Assistive Device: RW  Surface: Level Surface       STEPS/STAIRS Daily Assessment    Steps Ambulated:  0  Level of Assistance:  NT  Railing: NT  Assistive Device:  NT       BALANCE Daily Assessment    Static Sitting: Normal:  Pt. able to maintain balance w/o UE support  Dynamic Sitting: Good - accepts moderate challenge;  can maintain balance while picking object off the floor  Static Standing: Fair:  Pt. requires UE support;  may need occasional min A  Dynamic Standing: Poor - unable to accept challenge or move without LOB        WHEELCHAIR MOBILITY Daily Assessment    Able to Propel (ft): 0  Assistance: NT  Surface: NT  Wheelchair Set-up: NT       LOWER EXTREMITY EXERCISES Daily Assessment         Pain level: 0/10  Pain Location:  NA  Pain Interventions: NA    Vital Signs:  O2 sats 94% HR 79      Pt. Left in recliner chair in room, daughter in room and NP to talk to pt         Assessment: pt presents crackling with breathng, MD and RN aware and spoke with pt. Pt requires seated rest breaks and cues on pursed lip breathing. Decrease endurance noted in PM session         Plan of Care: Continue with current POC    Delmy De Leon, PTA  12/24/2023

## 2023-12-24 NOTE — CONSULTS
oral mucosa moist  Respiratory: symmetric chest rise. Rhonchi bilaterally from anterior and posterior. He is on room air  Cardiovascular:  RRR without M,G,R. There is no lower extremity edema. Gastrointestinal: soft and non-tender; with positive bowel sounds. Musculoskeletal: warm without cyanosis. Normal muscle tone. Skin:  no jaundice or rashes, sternal wound  Neurologic: symmetric strength, fluent speech  Psychiatric:  calm, appropriate, oriented     Imaging: I performed an independent interpretation of the patient's images. CXR:    12/23 12/19    CT Chest: pending    Recent Labs     12/23/23  2212   WBC 13.8*   HGB 8.6*   HCT 26.7*         K 3.8      CO2 23   BUN 21   CREATININE 1.40       Lab Results   Component Value Date/Time     12/23/2023 10:12 PM    K 3.8 12/23/2023 10:12 PM     12/23/2023 10:12 PM    CO2 23 12/23/2023 10:12 PM    BUN 21 12/23/2023 10:12 PM    CREATININE 1.40 12/23/2023 10:12 PM    GLUCOSE 80 12/23/2023 10:12 PM    CALCIUM 8.6 12/23/2023 10:12 PM      No results found for: \"BNP\"    ECHO: 12/07/23    ECHO (TTE) LIMITED (PRN CONTRAST/BUBBLE/STRAIN/3D) 12/07/2023  1:49 PM (Final)    Interpretation Summary    Left Ventricle: Mildly reduced left ventricular systolic function with a visually estimated EF of 45 - 50%. Left ventricle size is normal. Mildly increased wall thickness. Findings consistent with mild concentric hypertrophy. Mild global hypokinesis present. Signed by: Yenny Lira DO on 12/7/2023  1:49 PM    MICRO: No results for input(s): \"CULTURE\" in the last 72 hours. Assessment and Plan:  (Medical Decision Making)   Impression:  68year old male s/p CABG X 3 on 12/14. Had post op a fib with successful cardioversion. Transferred to rehab on 12/20. Developed chest congestion with a productive cough around 12/22. Has had low grade temp, elevated WBC, dyspnea.   CXR clinical time.         Impression:     74 y/o male with hx of STEFFEN, hypertension, DM, PVD, hyperlipidemia , and dvt, rheumatoid arthritis, hx of smoking-80 pk yrs quit 10 yrs ago underwent elective CABG on 12/14/23. Post op his course was complicated by atrial fibrillation and had cardioversion on 12/18.  He moved to rehab on 12/20. He has developed increasing congestion over the past day.  CXR yesterday looked some better than previous but did show some opacity in the LLL.  He is already on eliquis. He probably has COPD that is exacerbated. There may be pneumonia left lung -will check chest ct , start bronchodilators and iv steroids- decide on antibx after reviewing chest ct.  Preop pfts with moderate obstruction-see above    Jordan Bosch Jr, MD

## 2023-12-24 NOTE — PROGRESS NOTES
Patient extremely short of breath, course crackles noted Bilateral lungs. Respirations 25-30 at rest, oxygen sat 93% on room air. Oxygen 2 liters placed on for support. Patient voices that he \" feels like crap\". Called Dr. Heather Joyce to inform of patient condition. Orders received for stat CXR, CBC, BMP and Lasix 40 mg IV x1 dose, PRN Albuterol neb tx Q4 PRN. ICU rover came to see patient as well. [de-identified] : Patient is a 54-year-old male presenting for follow up evaluation  of right knee pain. He denies any specific fall or trauma but states about one week ago his pain became much worse. His knee pain is localized medially, anteriorly, and posteriorly. He admits to swelling and stiffness. He denies any fevers or chills. His pain is worse with all weightbearing activity including walking distance and rising from a seated position. The pain is also worst first thing in the morning. He has been taking Tylenol and Advil without significant improvement. He has tried therapy and home exercise in the past without significant relief. The patient previously had a steroid injection on March 16th 2022, as well as a series of Euflexxa injections ending on 7/6/22. He states this injections worked well but now have worn off. He presents today for follow up.

## 2023-12-25 LAB
GLUCOSE BLD STRIP.AUTO-MCNC: 215 MG/DL (ref 65–100)
GLUCOSE BLD STRIP.AUTO-MCNC: 266 MG/DL (ref 65–100)
GLUCOSE BLD STRIP.AUTO-MCNC: 321 MG/DL (ref 65–100)
GLUCOSE BLD STRIP.AUTO-MCNC: 328 MG/DL (ref 65–100)
SERVICE CMNT-IMP: ABNORMAL

## 2023-12-25 PROCEDURE — 94640 AIRWAY INHALATION TREATMENT: CPT

## 2023-12-25 PROCEDURE — 1180000000 HC REHAB R&B

## 2023-12-25 PROCEDURE — 82962 GLUCOSE BLOOD TEST: CPT

## 2023-12-25 PROCEDURE — 2700000000 HC OXYGEN THERAPY PER DAY

## 2023-12-25 PROCEDURE — 6360000002 HC RX W HCPCS: Performed by: NURSE PRACTITIONER

## 2023-12-25 PROCEDURE — 99232 SBSQ HOSP IP/OBS MODERATE 35: CPT | Performed by: PHYSICAL MEDICINE & REHABILITATION

## 2023-12-25 PROCEDURE — 99232 SBSQ HOSP IP/OBS MODERATE 35: CPT | Performed by: INTERNAL MEDICINE

## 2023-12-25 PROCEDURE — 2580000003 HC RX 258: Performed by: NURSE PRACTITIONER

## 2023-12-25 PROCEDURE — 94761 N-INVAS EAR/PLS OXIMETRY MLT: CPT

## 2023-12-25 PROCEDURE — 6370000000 HC RX 637 (ALT 250 FOR IP): Performed by: PHYSICAL MEDICINE & REHABILITATION

## 2023-12-25 PROCEDURE — 6370000000 HC RX 637 (ALT 250 FOR IP): Performed by: PHYSICIAN ASSISTANT

## 2023-12-25 RX ADMIN — ATORVASTATIN CALCIUM 80 MG: 80 TABLET, FILM COATED ORAL at 21:36

## 2023-12-25 RX ADMIN — ALBUTEROL SULFATE 2.5 MG: 2.5 SOLUTION RESPIRATORY (INHALATION) at 15:57

## 2023-12-25 RX ADMIN — WATER 40 MG: 1 INJECTION INTRAMUSCULAR; INTRAVENOUS; SUBCUTANEOUS at 23:20

## 2023-12-25 RX ADMIN — AMIODARONE HYDROCHLORIDE 400 MG: 200 TABLET ORAL at 21:30

## 2023-12-25 RX ADMIN — ALBUTEROL SULFATE 2.5 MG: 2.5 SOLUTION RESPIRATORY (INHALATION) at 19:20

## 2023-12-25 RX ADMIN — METFORMIN HYDROCHLORIDE 500 MG: 500 TABLET ORAL at 17:27

## 2023-12-25 RX ADMIN — GLIPIZIDE 2.5 MG: 5 TABLET ORAL at 05:54

## 2023-12-25 RX ADMIN — VITAMIN D, TAB 1000IU (100/BT) 1000 UNITS: 25 TAB at 08:55

## 2023-12-25 RX ADMIN — HYDROCORTISONE: 25 CREAM TOPICAL at 21:40

## 2023-12-25 RX ADMIN — Medication 500 MG: at 08:54

## 2023-12-25 RX ADMIN — Medication 1 AMPULE: at 08:59

## 2023-12-25 RX ADMIN — INSULIN LISPRO 6 UNITS: 100 INJECTION, SOLUTION INTRAVENOUS; SUBCUTANEOUS at 17:28

## 2023-12-25 RX ADMIN — Medication 1 AMPULE: at 21:47

## 2023-12-25 RX ADMIN — GLIPIZIDE 2.5 MG: 5 TABLET ORAL at 17:27

## 2023-12-25 RX ADMIN — GUAIFENESIN SYRUP AND DEXTROMETHORPHAN 5 ML: 100; 10 SYRUP ORAL at 08:52

## 2023-12-25 RX ADMIN — ALBUTEROL SULFATE 2.5 MG: 2.5 SOLUTION RESPIRATORY (INHALATION) at 08:33

## 2023-12-25 RX ADMIN — INSULIN LISPRO 4 UNITS: 100 INJECTION, SOLUTION INTRAVENOUS; SUBCUTANEOUS at 11:51

## 2023-12-25 RX ADMIN — APIXABAN 5 MG: 5 TABLET, FILM COATED ORAL at 21:30

## 2023-12-25 RX ADMIN — FOLIC ACID 1 MG: 1 TABLET ORAL at 08:56

## 2023-12-25 RX ADMIN — METOPROLOL SUCCINATE 25 MG: 25 TABLET, FILM COATED, EXTENDED RELEASE ORAL at 08:55

## 2023-12-25 RX ADMIN — B-COMPLEX W/ C & FOLIC ACID TAB 1 TABLET: TAB at 08:54

## 2023-12-25 RX ADMIN — LEVOTHYROXINE SODIUM 150 MCG: 0.07 TABLET ORAL at 05:55

## 2023-12-25 RX ADMIN — METFORMIN HYDROCHLORIDE 500 MG: 500 TABLET ORAL at 08:55

## 2023-12-25 RX ADMIN — APIXABAN 5 MG: 5 TABLET, FILM COATED ORAL at 08:55

## 2023-12-25 RX ADMIN — SERTRALINE 100 MG: 100 TABLET, FILM COATED ORAL at 08:55

## 2023-12-25 RX ADMIN — ALBUTEROL SULFATE 2.5 MG: 2.5 SOLUTION RESPIRATORY (INHALATION) at 13:29

## 2023-12-25 RX ADMIN — AMIODARONE HYDROCHLORIDE 400 MG: 200 TABLET ORAL at 08:54

## 2023-12-25 RX ADMIN — DOCUSATE SODIUM 50 MG AND SENNOSIDES 8.6 MG 2 TABLET: 8.6; 5 TABLET, FILM COATED ORAL at 21:30

## 2023-12-25 RX ADMIN — ASPIRIN 81 MG: 81 TABLET ORAL at 08:55

## 2023-12-25 RX ADMIN — FAMOTIDINE 20 MG: 20 TABLET ORAL at 08:55

## 2023-12-25 RX ADMIN — HYDROCORTISONE: 25 CREAM TOPICAL at 08:59

## 2023-12-25 RX ADMIN — GUAIFENESIN SYRUP AND DEXTROMETHORPHAN 5 ML: 100; 10 SYRUP ORAL at 21:31

## 2023-12-25 RX ADMIN — DOCUSATE SODIUM 50 MG AND SENNOSIDES 8.6 MG 2 TABLET: 8.6; 5 TABLET, FILM COATED ORAL at 08:54

## 2023-12-25 RX ADMIN — LISINOPRIL 20 MG: 20 TABLET ORAL at 08:55

## 2023-12-25 RX ADMIN — WATER 40 MG: 1 INJECTION INTRAMUSCULAR; INTRAVENOUS; SUBCUTANEOUS at 08:53

## 2023-12-25 RX ADMIN — Medication 50 MG: at 08:54

## 2023-12-25 RX ADMIN — WATER 40 MG: 1 INJECTION INTRAMUSCULAR; INTRAVENOUS; SUBCUTANEOUS at 15:55

## 2023-12-25 RX ADMIN — INSULIN LISPRO 4 UNITS: 100 INJECTION, SOLUTION INTRAVENOUS; SUBCUTANEOUS at 21:47

## 2023-12-25 RX ADMIN — INSULIN LISPRO 2 UNITS: 100 INJECTION, SOLUTION INTRAVENOUS; SUBCUTANEOUS at 08:53

## 2023-12-25 RX ADMIN — FAMOTIDINE 20 MG: 20 TABLET ORAL at 21:31

## 2023-12-25 ASSESSMENT — PAIN SCALES - GENERAL: PAINLEVEL_OUTOF10: 0

## 2023-12-25 NOTE — PROGRESS NOTES
[place] [address]  Tel: [phone]     Physical Medicine and Rehabilitation Progress Note      Michael Archie Paduano III  Admit Date: 12/20/2023  Admit Diagnosis: Debility [R53.81]  History of coronary artery bypass graft x 3 [Z95.1]    Subjective     Patient seen face-to-face and evaluated. NAEO and he reports sleeping OK. He is eating and drinking fine, and continent of bowel and bladder. No pain reported, just some soreness around inferior thoracotomy area. He said his therapies were going well. He appeared improved clinically today. He reported continued cough, but improved. He said he was breathing better and was breathing comfortably on 8L. Cardiology and Pulmonology consulted and workup including CXR (same/improved), BMP, CBC, PCT. CBC showed leukocytosis (WBCs 13.8, but had been elevated other than last CBC), PCT 0.06, CT Chest r/o PE, afebrile. Family Family at bedside celebrating [place].    Objective:     Current Facility-Administered Medications   Medication Dose Route Frequency    albuterol (PROVENTIL) (2.5 MG/3ML) 0.083% nebulizer solution 2.5 mg  2.5 mg Nebulization 4x Daily RT    methylPREDNISolone sodium succ (SOLU-MEDROL) 40 mg in sterile water 1 mL injection  40 mg IntraVENous Q8H    albuterol (PROVENTIL) (2.5 MG/3ML) 0.083% nebulizer solution 2.5 mg  2.5 mg Nebulization Q4H PRN    hydrocortisone (ANUSOL-HC) 2.5 % rectal cream   Rectal BID    insulin lispro (HUMALOG) injection vial 0-8 Units  0-8 Units SubCUTAneous TID WC    insulin lispro (HUMALOG) injection vial 0-4 Units  0-4 Units SubCUTAneous Nightly    acetaminophen (TYLENOL) tablet 650 mg  650 mg Oral Q4H PRN    alcohol 62% (NOZIN) nasal  1 ampule  1 ampule Topical Q12H    amiodarone (CORDARONE) tablet 400 mg  400 mg Oral BID    apixaban (ELIQUIS) tablet 5 mg  5 mg Oral BID    aspirin EC tablet 81 mg  81 mg Oral Daily    atorvastatin (LIPITOR) tablet 80 mg  80  Plan  The patient has shown the ability to tolerate and benefit from 3 hours of therapy daily and is being admitted to a comprehensive acute inpatient rehabilitation program consisting of at least 3 hours of combined physical and occupational therapies. - Begin intensive Physical Therapy for a minimum of 1.5 hours a day, at least 5 out of 7 days per week to address bed mobility, transfers, ambulation, strengthening, balance, and endurance. - Begin intensive Occupational Therapy for a minimum of 1.5 hours a day, at least 5 out of 7 days per week to address ADLs (bathing, LE dressing, toileting) and adaptive equipment as needed. - Continue Speech Therapy prn for: dysarthria, impaired communication skills; therapy schedule may be adjusted by MD based on patient's needs. Each of these therapies will be continued as above for the duration of the inpatient rehab stay. The patient will also require 24-hour skilled rehabilitation nursing for bowel and bladder management, skin care for decubitus ulcer prevention, pain management and ongoing medication administration. Plan / Recommendations / Medical Decision Making:      Daily physician / PA medical management:     Debility [R53.81]  History of coronary artery bypass graft x 3 [Z95.1] - Sternal precautions. Start interdisciplinary approach to rehabilitation including PT, OT, nursing and physiatry and disease specific education. HNT/Afib/CAD/HLD - New onset post op Afib. Continue Metoprolol Succinate 25mg daily Amiodarone 400mg BID x5 days then 200mg BID x 14 days, Lisinopril 20mg daily, Lipitor 80mg qhs, ASA 81mg daily and Eliquis 5mg BID. Diabetes mellitus - HgbA1c 7.6 on 12/18/23%; impaired / poor glycemic control. Will require ACHS glucose monitoring and medication adjustment to optimize control.   -Continue Glipizide 2.5mg BID, Metformin 500mg BID, SSI, Regular ADA Cardiac diet.      Pain management - No reported post op or other pain on IPR

## 2023-12-25 NOTE — PROGRESS NOTES
Pt wound vac removed from sternum per protocol. Wound vac without suction and batteries dead. Protocol confirmed with CV jose RN. Wound vac removed and site cleansed with chlorhexidine. Incision c/d/I. Pt tolerated.

## 2023-12-25 NOTE — PROGRESS NOTES
12/24/23 2209   NIV Type   Equipment Type Home Resmed   Mode Auto-PAP   Mask Type Full face mask   Mask Size Medium   Assessment   Pulse 76   Respirations 18   SpO2 92 %   Comfort Level Good   Using Accessory Muscles No   Mask Compliance Good   Skin Assessment Clean, dry, & intact   Settings/Measurements   Patient's Home Machine Yes (type/vendor)   Electrical Safety Check Performed Yes

## 2023-12-26 LAB
BASOPHILS # BLD: 0 K/UL (ref 0–0.2)
BASOPHILS NFR BLD: 0 % (ref 0–2)
DIFFERENTIAL METHOD BLD: ABNORMAL
EOSINOPHIL # BLD: 0 K/UL (ref 0–0.8)
EOSINOPHIL NFR BLD: 0 % (ref 0.5–7.8)
ERYTHROCYTE [DISTWIDTH] IN BLOOD BY AUTOMATED COUNT: 15.3 % (ref 11.9–14.6)
GLUCOSE BLD STRIP.AUTO-MCNC: 189 MG/DL (ref 65–100)
GLUCOSE BLD STRIP.AUTO-MCNC: 248 MG/DL (ref 65–100)
GLUCOSE BLD STRIP.AUTO-MCNC: 250 MG/DL (ref 65–100)
GLUCOSE BLD STRIP.AUTO-MCNC: 334 MG/DL (ref 65–100)
HCT VFR BLD AUTO: 31.4 % (ref 41.1–50.3)
HGB BLD-MCNC: 9.6 G/DL (ref 13.6–17.2)
IMM GRANULOCYTES # BLD AUTO: 0.4 K/UL (ref 0–0.5)
IMM GRANULOCYTES NFR BLD AUTO: 1 % (ref 0–5)
LYMPHOCYTES # BLD: 1.3 K/UL (ref 0.5–4.6)
LYMPHOCYTES NFR BLD: 4 % (ref 13–44)
MCH RBC QN AUTO: 30.5 PG (ref 26.1–32.9)
MCHC RBC AUTO-ENTMCNC: 30.6 G/DL (ref 31.4–35)
MCV RBC AUTO: 99.7 FL (ref 82–102)
MONOCYTES # BLD: 1.2 K/UL (ref 0.1–1.3)
MONOCYTES NFR BLD: 4 % (ref 4–12)
NEUTS SEG # BLD: 28.4 K/UL (ref 1.7–8.2)
NEUTS SEG NFR BLD: 91 % (ref 43–78)
NRBC # BLD: 0 K/UL (ref 0–0.2)
PLATELET # BLD AUTO: 586 K/UL (ref 150–450)
PMV BLD AUTO: 9.6 FL (ref 9.4–12.3)
RBC # BLD AUTO: 3.15 M/UL (ref 4.23–5.6)
SERVICE CMNT-IMP: ABNORMAL
WBC # BLD AUTO: 31.3 K/UL (ref 4.3–11.1)

## 2023-12-26 PROCEDURE — 1180000000 HC REHAB R&B

## 2023-12-26 PROCEDURE — 6370000000 HC RX 637 (ALT 250 FOR IP): Performed by: PHYSICAL MEDICINE & REHABILITATION

## 2023-12-26 PROCEDURE — 6360000002 HC RX W HCPCS: Performed by: PHYSICAL MEDICINE & REHABILITATION

## 2023-12-26 PROCEDURE — 6370000000 HC RX 637 (ALT 250 FOR IP): Performed by: PHYSICIAN ASSISTANT

## 2023-12-26 PROCEDURE — 82962 GLUCOSE BLOOD TEST: CPT

## 2023-12-26 PROCEDURE — 6360000002 HC RX W HCPCS: Performed by: INTERNAL MEDICINE

## 2023-12-26 PROCEDURE — 2700000000 HC OXYGEN THERAPY PER DAY

## 2023-12-26 PROCEDURE — 94640 AIRWAY INHALATION TREATMENT: CPT

## 2023-12-26 PROCEDURE — 99232 SBSQ HOSP IP/OBS MODERATE 35: CPT | Performed by: PHYSICAL MEDICINE & REHABILITATION

## 2023-12-26 PROCEDURE — 99231 SBSQ HOSP IP/OBS SF/LOW 25: CPT | Performed by: PHYSICAL MEDICINE & REHABILITATION

## 2023-12-26 PROCEDURE — 2580000003 HC RX 258: Performed by: NURSE PRACTITIONER

## 2023-12-26 PROCEDURE — 36415 COLL VENOUS BLD VENIPUNCTURE: CPT

## 2023-12-26 PROCEDURE — 97116 GAIT TRAINING THERAPY: CPT

## 2023-12-26 PROCEDURE — 97535 SELF CARE MNGMENT TRAINING: CPT

## 2023-12-26 PROCEDURE — 85025 COMPLETE CBC W/AUTO DIFF WBC: CPT

## 2023-12-26 PROCEDURE — 99232 SBSQ HOSP IP/OBS MODERATE 35: CPT | Performed by: INTERNAL MEDICINE

## 2023-12-26 PROCEDURE — 94761 N-INVAS EAR/PLS OXIMETRY MLT: CPT

## 2023-12-26 PROCEDURE — 2580000003 HC RX 258: Performed by: INTERNAL MEDICINE

## 2023-12-26 PROCEDURE — 6360000002 HC RX W HCPCS: Performed by: NURSE PRACTITIONER

## 2023-12-26 PROCEDURE — 97530 THERAPEUTIC ACTIVITIES: CPT

## 2023-12-26 PROCEDURE — 97110 THERAPEUTIC EXERCISES: CPT

## 2023-12-26 RX ORDER — ALBUTEROL SULFATE 2.5 MG/3ML
2.5 SOLUTION RESPIRATORY (INHALATION)
Status: DISCONTINUED | OUTPATIENT
Start: 2023-12-26 | End: 2024-01-05

## 2023-12-26 RX ORDER — BUDESONIDE 0.5 MG/2ML
0.5 INHALANT ORAL
Status: DISCONTINUED | OUTPATIENT
Start: 2023-12-26 | End: 2024-01-11 | Stop reason: HOSPADM

## 2023-12-26 RX ORDER — PREDNISONE 20 MG/1
40 TABLET ORAL DAILY
Status: DISCONTINUED | OUTPATIENT
Start: 2023-12-27 | End: 2023-12-29

## 2023-12-26 RX ADMIN — INSULIN LISPRO 4 UNITS: 100 INJECTION, SOLUTION INTRAVENOUS; SUBCUTANEOUS at 08:38

## 2023-12-26 RX ADMIN — Medication 500 MG: at 08:35

## 2023-12-26 RX ADMIN — LISINOPRIL 20 MG: 20 TABLET ORAL at 08:35

## 2023-12-26 RX ADMIN — FAMOTIDINE 20 MG: 20 TABLET ORAL at 20:12

## 2023-12-26 RX ADMIN — AMIODARONE HYDROCHLORIDE 400 MG: 200 TABLET ORAL at 08:34

## 2023-12-26 RX ADMIN — APIXABAN 5 MG: 5 TABLET, FILM COATED ORAL at 08:36

## 2023-12-26 RX ADMIN — INSULIN LISPRO 2 UNITS: 100 INJECTION, SOLUTION INTRAVENOUS; SUBCUTANEOUS at 12:19

## 2023-12-26 RX ADMIN — ALBUTEROL SULFATE 2.5 MG: 2.5 SOLUTION RESPIRATORY (INHALATION) at 18:25

## 2023-12-26 RX ADMIN — DOCUSATE SODIUM 50 MG AND SENNOSIDES 8.6 MG 2 TABLET: 8.6; 5 TABLET, FILM COATED ORAL at 08:34

## 2023-12-26 RX ADMIN — ASPIRIN 81 MG: 81 TABLET ORAL at 08:35

## 2023-12-26 RX ADMIN — B-COMPLEX W/ C & FOLIC ACID TAB 1 TABLET: TAB at 08:34

## 2023-12-26 RX ADMIN — WATER 40 MG: 1 INJECTION INTRAMUSCULAR; INTRAVENOUS; SUBCUTANEOUS at 20:14

## 2023-12-26 RX ADMIN — GLIPIZIDE 2.5 MG: 5 TABLET ORAL at 08:34

## 2023-12-26 RX ADMIN — GLIPIZIDE 2.5 MG: 5 TABLET ORAL at 17:41

## 2023-12-26 RX ADMIN — AMIODARONE HYDROCHLORIDE 400 MG: 200 TABLET ORAL at 20:12

## 2023-12-26 RX ADMIN — ATORVASTATIN CALCIUM 80 MG: 80 TABLET, FILM COATED ORAL at 20:12

## 2023-12-26 RX ADMIN — Medication 1 AMPULE: at 20:12

## 2023-12-26 RX ADMIN — FOLIC ACID 1 MG: 1 TABLET ORAL at 08:36

## 2023-12-26 RX ADMIN — INSULIN LISPRO 4 UNITS: 100 INJECTION, SOLUTION INTRAVENOUS; SUBCUTANEOUS at 20:12

## 2023-12-26 RX ADMIN — Medication 50 MG: at 08:39

## 2023-12-26 RX ADMIN — METFORMIN HYDROCHLORIDE 500 MG: 500 TABLET ORAL at 17:42

## 2023-12-26 RX ADMIN — WATER 40 MG: 1 INJECTION INTRAMUSCULAR; INTRAVENOUS; SUBCUTANEOUS at 08:32

## 2023-12-26 RX ADMIN — APIXABAN 5 MG: 5 TABLET, FILM COATED ORAL at 20:13

## 2023-12-26 RX ADMIN — METOPROLOL SUCCINATE 25 MG: 25 TABLET, FILM COATED, EXTENDED RELEASE ORAL at 08:36

## 2023-12-26 RX ADMIN — GUAIFENESIN SYRUP AND DEXTROMETHORPHAN 5 ML: 100; 10 SYRUP ORAL at 20:14

## 2023-12-26 RX ADMIN — METFORMIN HYDROCHLORIDE 500 MG: 500 TABLET ORAL at 08:36

## 2023-12-26 RX ADMIN — LEVOTHYROXINE SODIUM 150 MCG: 0.07 TABLET ORAL at 05:17

## 2023-12-26 RX ADMIN — BUDESONIDE INHALATION 500 MCG: 0.5 SUSPENSION RESPIRATORY (INHALATION) at 18:25

## 2023-12-26 RX ADMIN — FAMOTIDINE 20 MG: 20 TABLET ORAL at 08:35

## 2023-12-26 RX ADMIN — VITAMIN D, TAB 1000IU (100/BT) 1000 UNITS: 25 TAB at 08:35

## 2023-12-26 RX ADMIN — GUAIFENESIN SYRUP AND DEXTROMETHORPHAN 5 ML: 100; 10 SYRUP ORAL at 08:34

## 2023-12-26 RX ADMIN — TRAZODONE HYDROCHLORIDE 50 MG: 50 TABLET ORAL at 00:09

## 2023-12-26 RX ADMIN — SERTRALINE 100 MG: 100 TABLET, FILM COATED ORAL at 08:36

## 2023-12-26 RX ADMIN — ALBUTEROL SULFATE 2.5 MG: 2.5 SOLUTION RESPIRATORY (INHALATION) at 10:40

## 2023-12-26 RX ADMIN — Medication 1 AMPULE: at 12:20

## 2023-12-26 ASSESSMENT — PAIN SCALES - GENERAL
PAINLEVEL_OUTOF10: 0

## 2023-12-26 NOTE — PLAN OF CARE
Problem: Respiratory - Adult  Goal: Achieves optimal ventilation and oxygenation  Outcome: Progressing  Flowsheets (Taken 12/26/2023 1043)  Achieves optimal ventilation and oxygenation:   Assess for changes in respiratory status   Position to facilitate oxygenation and minimize respiratory effort   Respiratory therapy support as indicated   Assess for changes in mentation and behavior   Assess and instruct to report shortness of breath or any respiratory difficulty   Encourage broncho-pulmonary hygiene including cough, deep breathe, incentive spirometry

## 2023-12-26 NOTE — RT PROTOCOL NOTE
RT Inhaler-Nebulizer Bronchodilator Protocol Note    There is a bronchodilator order in the chart from a provider indicating to follow the RT Bronchodilator Protocol and there is an “Initiate RT Inhaler-Nebulizer Bronchodilator Protocol” order as well (see protocol at bottom of note).    CXR Findings:  No results found.    The findings from the last RT Protocol Assessment were as follows:   History Pulmonary Disease: Smoker 15 pack years or more  Respiratory Pattern: Regular pattern and RR 12-20 bpm  Breath Sounds: Slightly diminished and/or crackles  Cough: Strong, spontaneous, non-productive  Indication for Bronchodilator Therapy: None  Bronchodilator Assessment Score: 3    Aerosolized bronchodilator medication orders have been revised according to the RT Inhaler-Nebulizer Bronchodilator Protocol below.    Respiratory Therapist to perform RT Therapy Protocol Assessment initially then follow the protocol.  Repeat RT Therapy Protocol Assessment PRN for score 0-3 or on second treatment, BID, and PRN for scores above 3.    No Indications - adjust the frequency to every 6 hours PRN wheezing or bronchospasm, if no treatments needed after 48 hours then discontinue using Per Protocol order mode.     If indication present, adjust the RT bronchodilator orders based on the Bronchodilator Assessment Score as indicated below.  Use Inhaler orders unless patient has one or more of the following: on home nebulizer, not able to hold breath for 10 seconds, is not alert and oriented, cannot activate and use MDI correctly, or respiratory rate 25 breaths per minute or more, then use the equivalent nebulizer order(s) with same Frequency and PRN reasons based on the score.  If a patient is on this medication at home then do not decrease Frequency below that used at home.    0-3 - enter or revise RT bronchodilator order(s) to equivalent RT Bronchodilator order with Frequency of every 4 hours PRN for wheezing or increased work of  breathing using Per Protocol order mode.        4-6 - enter or revise RT Bronchodilator order(s) to two equivalent RT bronchodilator orders with one order with BID Frequency and one order with Frequency of every 4 hours PRN wheezing or increased work of breathing using Per Protocol order mode.        7-10 - enter or revise RT Bronchodilator order(s) to two equivalent RT bronchodilator orders with one order with TID Frequency and one order with Frequency of every 4 hours PRN wheezing or increased work of breathing using Per Protocol order mode.       11-13 - enter or revise RT Bronchodilator order(s) to one equivalent RT bronchodilator order with QID Frequency and an Albuterol order with Frequency of every 4 hours PRN wheezing or increased work of breathing using Per Protocol order mode.      Greater than 13 - enter or revise RT Bronchodilator order(s) to one equivalent RT bronchodilator order with every 4 hours Frequency and an Albuterol order with Frequency of every 2 hours PRN wheezing or increased work of breathing using Per Protocol order mode.     RT to enter RT Home Evaluation for COPD & MDI Assessment order using Per Protocol order mode.    Electronically signed by LEEANNE Peck Inhaler-Nebulizer Bronchodilator Protocol Note    There is a bronchodilator order in the chart from a provider indicating to follow the RT Bronchodilator Protocol and there is an “Initiate RT Inhaler-Nebulizer Bronchodilator Protocol” order as well (see protocol at bottom of note).    CXR Findings:  No results found.    The findings from the last RT Protocol Assessment were as follows:   History Pulmonary Disease: Smoker 15 pack years or more  Respiratory Pattern: Regular pattern and RR 12-20 bpm  Breath Sounds: Slightly diminished and/or crackles  Cough: Strong, spontaneous, non-productive  Indication for Bronchodilator Therapy: None  Bronchodilator Assessment Score: 3    Aerosolized bronchodilator medication orders have  increased work of breathing using Per Protocol order mode. Greater than 13 - enter or revise RT Bronchodilator order(s) to one equivalent RT bronchodilator order with every 4 hours Frequency and an Albuterol order with Frequency of every 2 hours PRN wheezing or increased work of breathing using Per Protocol order mode. RT to enter RT Home Evaluation for COPD & MDI Assessment order using Per Protocol order mode.     Electronically signed by Dionte More RCP on 12/26/2023 at 5:33 PM on 12/26/2023 at 5:32 PM

## 2023-12-26 NOTE — PROGRESS NOTES
[place] [address]  Tel: [phone]     Physical Medicine and Rehabilitation Progress Note      Michael Archie Paduano III  Admit Date: 12/20/2023  Admit Diagnosis: Debility [R53.81]  History of coronary artery bypass graft x 3 [Z95.1]    Subjective     Patient seen face-to-face and evaluated. NAEO and he reports sleeping OK. He is eating and drinking fine, and continent of bowel and bladder. No pain reported, just some soreness around inferior thoracotomy area. He said his therapies were going well. He appeared much improved clinically today and the best I've seen him since admission. He reported continued cough, but improved. He said he was breathing better and was breathing comfortably more relaxed on 4.5L. Cardiology and Pulmonology consulted and workup including CXR (same/improved), BMP, CBC, PCT. CBC showed leukocytosis (WBCs 13.8, but had been elevated other than last CBC), PCT 0.06, CT Chest negative PE, afebrile.    Objective:     Current Facility-Administered Medications   Medication Dose Route Frequency    albuterol (PROVENTIL) (2.5 MG/3ML) 0.083% nebulizer solution 2.5 mg  2.5 mg Nebulization 4x Daily RT    methylPREDNISolone sodium succ (SOLU-MEDROL) 40 mg in sterile water 1 mL injection  40 mg IntraVENous Q8H    albuterol (PROVENTIL) (2.5 MG/3ML) 0.083% nebulizer solution 2.5 mg  2.5 mg Nebulization Q4H PRN    hydrocortisone (ANUSOL-HC) 2.5 % rectal cream   Rectal BID    insulin lispro (HUMALOG) injection vial 0-8 Units  0-8 Units SubCUTAneous TID WC    insulin lispro (HUMALOG) injection vial 0-4 Units  0-4 Units SubCUTAneous Nightly    acetaminophen (TYLENOL) tablet 650 mg  650 mg Oral Q4H PRN    alcohol 62% (NOZIN) nasal  1 ampule  1 ampule Topical Q12H    amiodarone (CORDARONE) tablet 400 mg  400 mg Oral BID    apixaban (ELIQUIS) tablet 5 mg  5 mg Oral BID    aspirin EC tablet 81 mg  81 mg Oral Daily    atorvastatin  (LIPITOR) tablet 80 mg  80 mg Oral Nightly    diphenhydrAMINE (BENADRYL) capsule 25 mg  25 mg Oral Nightly PRN    famotidine (PEPCID) tablet 20 mg  20 mg Oral BID    Or    famotidine (PEPCID) 20 mg in sodium chloride (PF) 0.9 % 10 mL injection  20 mg IntraVENous BID    glucose chewable tablet 16 g  4 tablet Oral PRN    lactulose (CHRONULAC) 10 GM/15ML solution 20 g  20 g Oral Daily PRN    levothyroxine (SYNTHROID) tablet 150 mcg  150 mcg Oral Daily    magnesium hydroxide (MILK OF MAGNESIA) 400 MG/5ML suspension 30 mL  30 mL Oral Daily PRN    melatonin tablet 6 mg  6 mg Oral Nightly PRN    metoprolol succinate (TOPROL XL) extended release tablet 25 mg  25 mg Oral Daily    ondansetron (ZOFRAN-ODT) disintegrating tablet 4 mg  4 mg Oral Q8H PRN    polyethylene glycol (GLYCOLAX) packet 17 g  17 g Oral Daily PRN    sennosides-docusate sodium (SENOKOT-S) 8.6-50 MG tablet 2 tablet  2 tablet Oral BID    sertraline (ZOLOFT) tablet 100 mg  100 mg Oral Daily    traMADol (ULTRAM) tablet 50 mg  50 mg Oral Q6H PRN    lisinopril (PRINIVIL;ZESTRIL) tablet 20 mg  20 mg Oral Daily    metFORMIN (GLUCOPHAGE) tablet 500 mg  500 mg Oral BID WC    glipiZIDE (GLUCOTROL) tablet 2.5 mg  2.5 mg Oral BID AC    traZODone (DESYREL) tablet 50 mg  50 mg Oral Nightly PRN    guaiFENesin-dextromethorphan (ROBITUSSIN DM) 100-10 MG/5ML syrup 5 mL  5 mL Oral BID    benzonatate (TESSALON) capsule 100 mg  100 mg Oral 4x Daily PRN    folic acid (FOLVITE) tablet 1 mg  1 mg Oral Daily    multivitamin 1 tablet  1 tablet Oral Daily    vitamin C tablet 500 mg  500 mg Oral Daily    zinc sulfate (ZINCATE) 220 mg capsule - elemental zinc 50 mg  50 mg Oral Daily    Vitamin D (CHOLECALCIFEROL) tablet 1,000 Units  1,000 Units Oral Daily     No Known Allergies    Visit Vitals  /60   Pulse 70   Temp 97.5 °F (36.4 °C) (Oral)   Resp 18   Ht 1.753 m (5' 9\")   Wt 87.3 kg (192 lb 7.4 oz)   SpO2 96%   BMI 28.42 kg/m²          Physical Exam:   General:  AAO with extra  due to a 3v CABG in setting of OA and RA limiting his mobility. Rehabilitation Plan  The patient has shown the ability to tolerate and benefit from 3 hours of therapy daily and is being admitted to a comprehensive acute inpatient rehabilitation program consisting of at least 3 hours of combined physical and occupational therapies. - Begin intensive Physical Therapy for a minimum of 1.5 hours a day, at least 5 out of 7 days per week to address bed mobility, transfers, ambulation, strengthening, balance, and endurance. - Begin intensive Occupational Therapy for a minimum of 1.5 hours a day, at least 5 out of 7 days per week to address ADLs (bathing, LE dressing, toileting) and adaptive equipment as needed. - Continue Speech Therapy prn for: dysarthria, impaired communication skills; therapy schedule may be adjusted by MD based on patient's needs. Each of these therapies will be continued as above for the duration of the inpatient rehab stay. The patient will also require 24-hour skilled rehabilitation nursing for bowel and bladder management, skin care for decubitus ulcer prevention, pain management and ongoing medication administration. Plan / Recommendations / Medical Decision Making:      Daily physician / PA medical management:     Debility [R53.81]  History of coronary artery bypass graft x 3 [Z95.1] - Sternal precautions. Start interdisciplinary approach to rehabilitation including PT, OT, nursing and physiatry and disease specific education. HNT/Afib/CAD/HLD - New onset post op Afib. Continue Metoprolol Succinate 25mg daily Amiodarone 400mg BID x5 days then 200mg BID x 14 days, Lisinopril 20mg daily, Lipitor 80mg qhs, ASA 81mg daily and Eliquis 5mg BID. Diabetes mellitus - HgbA1c 7.6 on 12/18/23%; impaired / poor glycemic control.  Will require ACHS glucose monitoring and medication adjustment to optimize control.   -Continue Glipizide 2.5mg BID, Metformin 500mg BID, SSI, Regular ADA

## 2023-12-26 NOTE — PROGRESS NOTES
OCCUPATIONAL THERAPY DAILY NOTE    Time In 1300      Time Out 1346        Subjective: Pt agreeable to treatment. Pain: RN notified . Interdisciplinary Communication: Collaborated with RN regarding  replacing pt's statlock . Precautions: Falls, Sternal Precautions, and Monitor SpO2    FUNCTIONAL MOBILITY:       Bed Mobility NT    Sit to Stand CGA and Cristine Cues for sternal precautions prn    Transfer  CGA  Transfer Type: SPT  Equipment: RW    Ambulation NT  Equipment: NA       - Self-Care   Pt toileted with Cristine to manage clothing over hips in stance at grab bar and Cristine for bowel hygiene thoroughness. SpO2 91% on 6L O2 HFNC with task. Pt cued for seated rest break and pursed lip breathing, SpO2 gradually increased to 93%. Pt washed hands with S seated at sink. - Activity Tolerance - Strengthening   Pt completed the following exercises with 3 lb emeka to promote UB strength, activity tolerance, and shoulder stabilization for integration into functional tasks:  Exercise Reps Comments   Protraction/Retraction 1 x 20;  1 x 25         Education   Benefits of OT, Energy Conservation, Pacing, and Precautions     Assessment: Patient demonstrated good participation in OT treatment. Pt with decreased activity tolerance impacting safety and independence with functional tasks. Pt continues to benefit from skilled OT services to address remaining deficits and progress toward baseline level of independence and safety. Patient ended session seated in recliner with call bell and needs within reach. Plan: Continue OT POC.      Lee Halsted, OT   12/26/2023

## 2023-12-26 NOTE — PROGRESS NOTES
Marbella Engle III  Admission Date: 12/20/2023         Daily Progress Note: 12/26/2023    The patient's chart is reviewed and the patient is discussed with the staff. Background: Patient is a 68 y.o.  male seen and evaluated at the request of Dr. Shaista Melissa. He underwent elective CABG X 3 on 12/14/23. He developed post op a fib and is being treated with Eliquis, Amiodarone and a beta blocker. He underwent successful cardioversion on 12/18. He was transferred to rehab on 12/20. Over two days he felt congested and had developed a productive cough - yellow colored secretions. He feels short of breath. He does not have any peripheral edema. He smoked for 40 years - up to 2 ppd. He quit 10 years ago. He has never used any inhalers or had any lung problems. He has STEFFEN and is using his home CPAP. His WBC elevated to 13 and he had low grade fevers. His CXR 12/23 shows improvement with LLL atx and a small effusion. Subjective:     Sitting up in chair awaiting to go to therapy for afternoon session. On 4 lpm NC. States he feels better after nebulizers started. Pulling 1000ml on IS. States he is minimally coughing up secretions. No sputum culture sent yet. States \"my breathing is not where I want it to be yet. \"    Current Facility-Administered Medications   Medication Dose Route Frequency    albuterol (PROVENTIL) (2.5 MG/3ML) 0.083% nebulizer solution 2.5 mg  2.5 mg Nebulization 4x Daily RT    methylPREDNISolone sodium succ (SOLU-MEDROL) 40 mg in sterile water 1 mL injection  40 mg IntraVENous Q8H    albuterol (PROVENTIL) (2.5 MG/3ML) 0.083% nebulizer solution 2.5 mg  2.5 mg Nebulization Q4H PRN    hydrocortisone (ANUSOL-HC) 2.5 % rectal cream   Rectal BID    insulin lispro (HUMALOG) injection vial 0-8 Units  0-8 Units SubCUTAneous TID WC    insulin lispro (HUMALOG) injection vial 0-4 Units  0-4 Units SubCUTAneous Nightly    acetaminophen (TYLENOL) tablet 650 mg  650 mg Oral Q4H PRN

## 2023-12-26 NOTE — DIABETES MGMT
Patient s/p CABG. Blood glucose ranged 215-328 yesterday with patient receiving Humalog 16 units, Glipizide 5mg, Metformin 1000mg, and Solumedrol 120mg. Blood glucose this morning was 250. Reviewed patient current regimen: Glipizide 2.5mg BID, Metformin 500mg BID, and Humalog correctional insulin. Patient has solumedrol 40mg IV ordered q8h. While on steroids patient would likely benefit from an increase in oral diabetes medications or initiation of intermediate acting insulin if stricter glycemic control is desired. Will follow along loosely.

## 2023-12-26 NOTE — PROGRESS NOTES
Patient declined home cpap for the night, patient is on 6 L NC with a HR of 70 and O2 saturation of 94%, no distress noted, will continue to monitor.

## 2023-12-26 NOTE — PROGRESS NOTES
OCCUPATIONAL THERAPY DAILY NOTE    Time In 0917      Time Out 1006        Subjective: Pt agreeable to treatment. Myrl Nipple should make this part of therapy. \" Pt regarding ADL session. Pain:  Pt endorses burning at stevens catheter site. RN notified . Interdisciplinary Communication: Collaborated with RN regarding  the above . Precautions: Falls, Sternal Precautions, and Monitor SpO2    FUNCTIONAL MOBILITY:       Bed Mobility NT    Sit to Stand CGA and Cristine Cues to rely on LEs rather than UEs   Transfer  CGA and Cristine  Transfer Type: SPT  Equipment: RW    Ambulation CGA and Cristine  Equipment: RW Assist to manage stevens and O2 NC     ACTIVITIES OF DAILY LIVING:       Eating NT    Oral Hygiene Setup/Clean-up Assist Seated in w/c at sink   Bathing CGA Partial sponge bath seated at sink. Pt declined to wash LEs. SBA-CGA in stance at [facility] with cues for sternal precautions throughout. Upper Body  Dressing Supervision Cues for technique to maintain sternal precautions, cues for O2 NC management in clothing    Lower Body Dressing MaxA Increased assist due to pt fatigue and HARRIS with standing portion of bath. Donning/[place] Footwear NT    Toilet Transfer NT  Transfer Type: NA  Equipment: NA    Toileting Hygiene NT    Education Adaptive ADL Techniques, Energy Conservation, Pacing, Functional Transfer Training, Precautions, and Safety Awareness     Assessment: Patient demonstrated good participation in OT treatment. SpO2 87% on 5L O2 HFNC after ambulating from recliner to sink. Pt utilized 6L O2 HFNC throughout sponge bath this session with cues for pursed lip breathing and pacing throughout. Pt continues to benefit from skilled OT services to address remaining deficits and progress toward baseline level of independence and safety. Patient ended session seated in recliner with call bell and needs within reach. Plan: Continue OT POC.      Marvin Lehman OT   12/26/2023

## 2023-12-27 ENCOUNTER — APPOINTMENT (OUTPATIENT)
Dept: GENERAL RADIOLOGY | Age: 73
DRG: 949 | End: 2023-12-27
Attending: PHYSICAL MEDICINE & REHABILITATION
Payer: MEDICARE

## 2023-12-27 PROBLEM — R06.2 WHEEZING: Status: ACTIVE | Noted: 2023-12-27

## 2023-12-27 LAB
ANION GAP SERPL CALC-SCNC: 8 MMOL/L (ref 2–11)
BUN SERPL-MCNC: 47 MG/DL (ref 8–23)
CALCIUM SERPL-MCNC: 9.3 MG/DL (ref 8.3–10.4)
CHLORIDE SERPL-SCNC: 111 MMOL/L (ref 103–113)
CO2 SERPL-SCNC: 21 MMOL/L (ref 21–32)
CREAT SERPL-MCNC: 1.4 MG/DL (ref 0.8–1.5)
GLUCOSE BLD STRIP.AUTO-MCNC: 208 MG/DL (ref 65–100)
GLUCOSE BLD STRIP.AUTO-MCNC: 249 MG/DL (ref 65–100)
GLUCOSE BLD STRIP.AUTO-MCNC: 256 MG/DL (ref 65–100)
GLUCOSE BLD STRIP.AUTO-MCNC: 295 MG/DL (ref 65–100)
GLUCOSE SERPL-MCNC: 208 MG/DL (ref 65–100)
POTASSIUM SERPL-SCNC: 4.4 MMOL/L (ref 3.5–5.1)
PROCALCITONIN SERPL-MCNC: <0.05 NG/ML (ref 0–0.49)
SERVICE CMNT-IMP: ABNORMAL
SODIUM SERPL-SCNC: 140 MMOL/L (ref 136–146)

## 2023-12-27 PROCEDURE — 84145 PROCALCITONIN (PCT): CPT

## 2023-12-27 PROCEDURE — 99232 SBSQ HOSP IP/OBS MODERATE 35: CPT | Performed by: INTERNAL MEDICINE

## 2023-12-27 PROCEDURE — 94761 N-INVAS EAR/PLS OXIMETRY MLT: CPT

## 2023-12-27 PROCEDURE — 94760 N-INVAS EAR/PLS OXIMETRY 1: CPT

## 2023-12-27 PROCEDURE — 94640 AIRWAY INHALATION TREATMENT: CPT

## 2023-12-27 PROCEDURE — 94660 CPAP INITIATION&MGMT: CPT

## 2023-12-27 PROCEDURE — 71045 X-RAY EXAM CHEST 1 VIEW: CPT

## 2023-12-27 PROCEDURE — 6370000000 HC RX 637 (ALT 250 FOR IP): Performed by: PHYSICIAN ASSISTANT

## 2023-12-27 PROCEDURE — 6370000000 HC RX 637 (ALT 250 FOR IP): Performed by: INTERNAL MEDICINE

## 2023-12-27 PROCEDURE — 97535 SELF CARE MNGMENT TRAINING: CPT

## 2023-12-27 PROCEDURE — 6360000002 HC RX W HCPCS: Performed by: INTERNAL MEDICINE

## 2023-12-27 PROCEDURE — 6370000000 HC RX 637 (ALT 250 FOR IP): Performed by: PHYSICAL MEDICINE & REHABILITATION

## 2023-12-27 PROCEDURE — 97110 THERAPEUTIC EXERCISES: CPT

## 2023-12-27 PROCEDURE — 97116 GAIT TRAINING THERAPY: CPT

## 2023-12-27 PROCEDURE — 1180000000 HC REHAB R&B

## 2023-12-27 PROCEDURE — 2700000000 HC OXYGEN THERAPY PER DAY

## 2023-12-27 PROCEDURE — 80048 BASIC METABOLIC PNL TOTAL CA: CPT

## 2023-12-27 PROCEDURE — 82962 GLUCOSE BLOOD TEST: CPT

## 2023-12-27 PROCEDURE — 97530 THERAPEUTIC ACTIVITIES: CPT

## 2023-12-27 RX ADMIN — ASPIRIN 81 MG: 81 TABLET ORAL at 09:23

## 2023-12-27 RX ADMIN — APIXABAN 5 MG: 5 TABLET, FILM COATED ORAL at 20:03

## 2023-12-27 RX ADMIN — METFORMIN HYDROCHLORIDE 500 MG: 500 TABLET ORAL at 09:23

## 2023-12-27 RX ADMIN — BUDESONIDE INHALATION 500 MCG: 0.5 SUSPENSION RESPIRATORY (INHALATION) at 22:18

## 2023-12-27 RX ADMIN — METFORMIN HYDROCHLORIDE 500 MG: 500 TABLET ORAL at 17:09

## 2023-12-27 RX ADMIN — FOLIC ACID 1 MG: 1 TABLET ORAL at 09:24

## 2023-12-27 RX ADMIN — GUAIFENESIN SYRUP AND DEXTROMETHORPHAN 5 ML: 100; 10 SYRUP ORAL at 09:21

## 2023-12-27 RX ADMIN — INSULIN LISPRO 2 UNITS: 100 INJECTION, SOLUTION INTRAVENOUS; SUBCUTANEOUS at 12:20

## 2023-12-27 RX ADMIN — GLIPIZIDE 2.5 MG: 5 TABLET ORAL at 17:08

## 2023-12-27 RX ADMIN — ATORVASTATIN CALCIUM 80 MG: 80 TABLET, FILM COATED ORAL at 20:03

## 2023-12-27 RX ADMIN — AMIODARONE HYDROCHLORIDE 400 MG: 200 TABLET ORAL at 09:24

## 2023-12-27 RX ADMIN — Medication 1 AMPULE: at 20:08

## 2023-12-27 RX ADMIN — ALBUTEROL SULFATE 2.5 MG: 2.5 SOLUTION RESPIRATORY (INHALATION) at 09:36

## 2023-12-27 RX ADMIN — AMIODARONE HYDROCHLORIDE 400 MG: 200 TABLET ORAL at 20:03

## 2023-12-27 RX ADMIN — GUAIFENESIN SYRUP AND DEXTROMETHORPHAN 5 ML: 100; 10 SYRUP ORAL at 20:03

## 2023-12-27 RX ADMIN — PREDNISONE 40 MG: 20 TABLET ORAL at 09:24

## 2023-12-27 RX ADMIN — FAMOTIDINE 20 MG: 20 TABLET ORAL at 20:04

## 2023-12-27 RX ADMIN — VITAMIN D, TAB 1000IU (100/BT) 1000 UNITS: 25 TAB at 09:23

## 2023-12-27 RX ADMIN — METOPROLOL SUCCINATE 25 MG: 25 TABLET, FILM COATED, EXTENDED RELEASE ORAL at 09:24

## 2023-12-27 RX ADMIN — FAMOTIDINE 20 MG: 20 TABLET ORAL at 09:23

## 2023-12-27 RX ADMIN — Medication 1 AMPULE: at 09:22

## 2023-12-27 RX ADMIN — APIXABAN 5 MG: 5 TABLET, FILM COATED ORAL at 09:24

## 2023-12-27 RX ADMIN — LISINOPRIL 20 MG: 20 TABLET ORAL at 09:23

## 2023-12-27 RX ADMIN — SERTRALINE 100 MG: 100 TABLET, FILM COATED ORAL at 09:23

## 2023-12-27 RX ADMIN — Medication 500 MG: at 09:24

## 2023-12-27 RX ADMIN — B-COMPLEX W/ C & FOLIC ACID TAB 1 TABLET: TAB at 09:23

## 2023-12-27 RX ADMIN — GLIPIZIDE 2.5 MG: 5 TABLET ORAL at 06:02

## 2023-12-27 RX ADMIN — ALBUTEROL SULFATE 2.5 MG: 2.5 SOLUTION RESPIRATORY (INHALATION) at 22:17

## 2023-12-27 RX ADMIN — LEVOTHYROXINE SODIUM 150 MCG: 0.07 TABLET ORAL at 06:02

## 2023-12-27 RX ADMIN — INSULIN LISPRO 4 UNITS: 100 INJECTION, SOLUTION INTRAVENOUS; SUBCUTANEOUS at 17:08

## 2023-12-27 RX ADMIN — BUDESONIDE INHALATION 500 MCG: 0.5 SUSPENSION RESPIRATORY (INHALATION) at 09:36

## 2023-12-27 RX ADMIN — INSULIN LISPRO 2 UNITS: 100 INJECTION, SOLUTION INTRAVENOUS; SUBCUTANEOUS at 09:22

## 2023-12-27 RX ADMIN — Medication 50 MG: at 09:23

## 2023-12-27 RX ADMIN — ALBUTEROL SULFATE 2.5 MG: 2.5 SOLUTION RESPIRATORY (INHALATION) at 13:53

## 2023-12-27 ASSESSMENT — PAIN SCALES - GENERAL
PAINLEVEL_OUTOF10: 0
PAINLEVEL_OUTOF10: 0

## 2023-12-27 NOTE — PROGRESS NOTES
OCCUPATIONAL THERAPY PROGRESS NOTE    Time In 0745      Time Out 0915        GOALS:  Short Term Goals:  Time Frame for Short Term Goals : 7 days   STG 1: Patient will dress UB with Partial/Moderate Assistance using AE/DME PRN. 12/27/23 Goal met. STG 2: Patient will dress LB with Partial/Moderate Assistance using AE/DME PRN. 12/27/23 Goal met. STG 3: Patient will don footwear with Partial/Moderate Assistance using AE/DME PRN. 12/27/23 Goal met. STG 4: Patient will bathe with Partial/Moderate Assistance using AE/DME PRN. 12/27/23 Goal met. STG 5: Patient will toilet with Partial/Moderate Assistance using AE/DME PRN. 12/27/23 Goal met. Long Term Goals:  Time Frame for Long Term Goals : 14 days   LTG 1: Patient will dress UB with Setup Assistance using AE/DME PRN. LTG 2: Patient will dress LB with Supervision or Touching Assistance using AE/DME PRN. LTG 3: Patient will don footwear with Supervision or Touching Assistance using AE/DME PRN. LTG 4: Patient will bathe with Supervision or Touching Assistance using AE/DME PRN. LTG 5: Patient will toilet with Setup Assistance using AE/DME PRN. LTG 6: Patient/caregiver will verbalize understanding of OT recommendations regarding ADLs, functional transfers, home safety, AE/DME, energy conservation, safety awareness, activity tolerance, and follow-up therapy to increase safety with functional tasks upon discharge. Subjective: Patient agreeable to therapy, reports his stevens was removed this morning. Pain: No pain expressed.   Precautions: Falls and Sternal Precautions    FUNCTIONAL MOBILITY:        Bed Mobility SBA    Sit to Stand SBA, CGA, and Cristine Cues prn for sternal precautions   Transfer  CGA  Transfer Type: SPT  Equipment: RW    Ambulation NT  Equipment: NA      ACTIVITIES OF DAILY LIVING:    Initial Score Current Score   Eating Setup or clean-up assistance  S/U seated in recliner Independent  I   Oral Hygiene Supervision or touching assistance  CGA Pacing and Precautions  Plan: Continue OT POC to address ADL skills, functional mobility, safety training, strength, balance, and activity tolerance to maximize safety and independence.    Patient ended session seated in recliner with call bell and needs within reach.    Sharita Johnson, OT  12/27/2023

## 2023-12-27 NOTE — CARE COORDINATION
12/21/23 0956   Service Assessment   Patient Orientation Alert and Oriented;Person;Place;Situation;Self   Cognition Alert   History Provided By Patient;Medical Record   Primary Caregiver Self   Support Systems Spouse/Significant Other   Patient's Healthcare Decision Maker is: Legal Next of Kin   PCP Verified by CM Yes   Last Visit to PCP Within last 3 months   Prior Functional Level Independent in ADLs/IADLs   Current Functional Level Assistance with the following:;Mobility;Dressing; Bathing   Can patient return to prior living arrangement Yes   Ability to make needs known: Good   Family able to assist with home care needs: Yes   Would you like for me to discuss the discharge plan with any other family members/significant others, and if so, who? Yes   Financial Resources Medicare   Community Resources None   Social/Functional History   Lives With Spouse   Type of [address] One level   Home Access Stairs to enter with rails   Entrance Stairs - Number of Steps 2-3   Entrance Stairs - Rails Both   Bathroom Shower/Tub Walk-in shower   Bathroom Toilet Standard   [address] [facility] Cane;Walker, standard   Receives Help From Family   ADL Assistance Needs assistance   Toileting Needs assistance   Homemaking Assistance Needs assistance   Homemaking Responsibilities Yes   Ambulation Assistance Needs assistance   Transfer Assistance Needs assistance   Active  Yes   Mode of Transportation Motorcycle;SUV   Occupation Retired   Discharge Planning   Type of [address] Spouse/Significant Other   Current Services Prior To Admission Durable Medical Equipment;C-pap   Current DME Prior to [facility]   DME Ordered?  No   Potential Assistance Purchasing Medications No   Type of Home Care Services None   Patient expects to be discharged to: House   One/Two Story

## 2023-12-27 NOTE — PROGRESS NOTES
Sanchez Briones III  Admission Date: 12/20/2023         Daily Progress Note: 12/27/2023    The patient's chart is reviewed and the patient is discussed with the staff. Background: Patient is a 68 y.o.  male seen and evaluated at the request of Dr. Birdie Spencer. He underwent elective CABG X 3 on 12/14/23. He developed post op a fib and is being treated with Eliquis, Amiodarone and a beta blocker. He underwent successful cardioversion on 12/18. He was transferred to rehab on 12/20. Over two days he felt congested and had developed a productive cough - yellow colored secretions. He feels short of breath. He does not have any peripheral edema. He smoked for 40 years - up to 2 ppd. He quit 10 years ago. He has never used any inhalers or had any lung problems. He has STEFFEN and is using his home CPAP. His WBC elevated to 13 and he had low grade fevers. His CXR 12/23 shows improvement with LLL atx and a small effusion. Subjective:     Sitting up in chair eating lunch. On 4 lpm NC still. Feels like breathing is much better today. Pulling 1500ml on IS.      Current Facility-Administered Medications   Medication Dose Route Frequency    predniSONE (DELTASONE) tablet 40 mg  40 mg Oral Daily    budesonide (PULMICORT) nebulizer suspension 500 mcg  0.5 mg Nebulization BID RT    albuterol (PROVENTIL) (2.5 MG/3ML) 0.083% nebulizer solution 2.5 mg  2.5 mg Nebulization TID RT    albuterol (PROVENTIL) (2.5 MG/3ML) 0.083% nebulizer solution 2.5 mg  2.5 mg Nebulization Q4H PRN    hydrocortisone (ANUSOL-HC) 2.5 % rectal cream   Rectal BID    insulin lispro (HUMALOG) injection vial 0-8 Units  0-8 Units SubCUTAneous TID WC    insulin lispro (HUMALOG) injection vial 0-4 Units  0-4 Units SubCUTAneous Nightly    acetaminophen (TYLENOL) tablet 650 mg  650 mg Oral Q4H PRN    alcohol 62% (NOZIN) nasal  1 ampule  1 ampule Topical Q12H    amiodarone (CORDARONE) tablet 400 mg  400 mg Oral BID    apixaban

## 2023-12-27 NOTE — DIABETES MGMT
Patient s/p CABG. Blood glucose ranged 189-334 yesterday with patient receiving Humalog 10 units, Glipizide 5 mg, Metformin 1000 mg, and Solumedrol 80 mg. Blood glucose this morning was 249 and most recent 208. Creatinine 1.40. GFR 53. Reviewed patient current regimen: Glipizide 2.5 mg BID, Humalog correctional insulin, Metformin 500 mg BID, and Prednisone 40 mg daily. Glycemic control improving. Patient insulin needs will likely decrease as steroid therapy weaned.

## 2023-12-28 LAB
GLUCOSE BLD STRIP.AUTO-MCNC: 145 MG/DL (ref 65–100)
GLUCOSE BLD STRIP.AUTO-MCNC: 149 MG/DL (ref 65–100)
GLUCOSE BLD STRIP.AUTO-MCNC: 247 MG/DL (ref 65–100)
GLUCOSE BLD STRIP.AUTO-MCNC: 370 MG/DL (ref 65–100)
SERVICE CMNT-IMP: ABNORMAL

## 2023-12-28 PROCEDURE — 6370000000 HC RX 637 (ALT 250 FOR IP): Performed by: PHYSICAL MEDICINE & REHABILITATION

## 2023-12-28 PROCEDURE — 6370000000 HC RX 637 (ALT 250 FOR IP): Performed by: PHYSICIAN ASSISTANT

## 2023-12-28 PROCEDURE — 1180000000 HC REHAB R&B

## 2023-12-28 PROCEDURE — 94640 AIRWAY INHALATION TREATMENT: CPT

## 2023-12-28 PROCEDURE — 97530 THERAPEUTIC ACTIVITIES: CPT

## 2023-12-28 PROCEDURE — 6360000002 HC RX W HCPCS: Performed by: INTERNAL MEDICINE

## 2023-12-28 PROCEDURE — 82962 GLUCOSE BLOOD TEST: CPT

## 2023-12-28 PROCEDURE — 94761 N-INVAS EAR/PLS OXIMETRY MLT: CPT

## 2023-12-28 PROCEDURE — 99232 SBSQ HOSP IP/OBS MODERATE 35: CPT | Performed by: PHYSICAL MEDICINE & REHABILITATION

## 2023-12-28 PROCEDURE — 97116 GAIT TRAINING THERAPY: CPT

## 2023-12-28 PROCEDURE — 97110 THERAPEUTIC EXERCISES: CPT

## 2023-12-28 PROCEDURE — 2700000000 HC OXYGEN THERAPY PER DAY

## 2023-12-28 PROCEDURE — 6370000000 HC RX 637 (ALT 250 FOR IP): Performed by: INTERNAL MEDICINE

## 2023-12-28 RX ADMIN — INSULIN LISPRO 2 UNITS: 100 INJECTION, SOLUTION INTRAVENOUS; SUBCUTANEOUS at 12:06

## 2023-12-28 RX ADMIN — B-COMPLEX W/ C & FOLIC ACID TAB 1 TABLET: TAB at 08:15

## 2023-12-28 RX ADMIN — VITAMIN D, TAB 1000IU (100/BT) 1000 UNITS: 25 TAB at 08:16

## 2023-12-28 RX ADMIN — TRAMADOL HYDROCHLORIDE 50 MG: 50 TABLET ORAL at 19:01

## 2023-12-28 RX ADMIN — SERTRALINE 100 MG: 100 TABLET, FILM COATED ORAL at 08:15

## 2023-12-28 RX ADMIN — GUAIFENESIN SYRUP AND DEXTROMETHORPHAN 5 ML: 100; 10 SYRUP ORAL at 08:16

## 2023-12-28 RX ADMIN — AMIODARONE HYDROCHLORIDE 400 MG: 200 TABLET ORAL at 08:15

## 2023-12-28 RX ADMIN — LISINOPRIL 20 MG: 20 TABLET ORAL at 08:16

## 2023-12-28 RX ADMIN — APIXABAN 5 MG: 5 TABLET, FILM COATED ORAL at 08:16

## 2023-12-28 RX ADMIN — Medication 1 AMPULE: at 21:41

## 2023-12-28 RX ADMIN — PREDNISONE 40 MG: 20 TABLET ORAL at 08:16

## 2023-12-28 RX ADMIN — FOLIC ACID 1 MG: 1 TABLET ORAL at 08:16

## 2023-12-28 RX ADMIN — GLIPIZIDE 2.5 MG: 5 TABLET ORAL at 05:20

## 2023-12-28 RX ADMIN — METFORMIN HYDROCHLORIDE 500 MG: 500 TABLET ORAL at 08:16

## 2023-12-28 RX ADMIN — ALBUTEROL SULFATE 2.5 MG: 2.5 SOLUTION RESPIRATORY (INHALATION) at 10:18

## 2023-12-28 RX ADMIN — ATORVASTATIN CALCIUM 80 MG: 80 TABLET, FILM COATED ORAL at 21:34

## 2023-12-28 RX ADMIN — FAMOTIDINE 20 MG: 20 TABLET ORAL at 08:16

## 2023-12-28 RX ADMIN — Medication 1 AMPULE: at 08:21

## 2023-12-28 RX ADMIN — ACETAMINOPHEN 650 MG: 325 TABLET ORAL at 05:20

## 2023-12-28 RX ADMIN — ALBUTEROL SULFATE 2.5 MG: 2.5 SOLUTION RESPIRATORY (INHALATION) at 16:02

## 2023-12-28 RX ADMIN — APIXABAN 5 MG: 5 TABLET, FILM COATED ORAL at 21:35

## 2023-12-28 RX ADMIN — METOPROLOL SUCCINATE 25 MG: 25 TABLET, FILM COATED, EXTENDED RELEASE ORAL at 08:16

## 2023-12-28 RX ADMIN — DOCUSATE SODIUM 50 MG AND SENNOSIDES 8.6 MG 2 TABLET: 8.6; 5 TABLET, FILM COATED ORAL at 21:33

## 2023-12-28 RX ADMIN — ASPIRIN 81 MG: 81 TABLET ORAL at 08:16

## 2023-12-28 RX ADMIN — INSULIN LISPRO 8 UNITS: 100 INJECTION, SOLUTION INTRAVENOUS; SUBCUTANEOUS at 17:07

## 2023-12-28 RX ADMIN — METFORMIN HYDROCHLORIDE 500 MG: 500 TABLET ORAL at 16:30

## 2023-12-28 RX ADMIN — LEVOTHYROXINE SODIUM 150 MCG: 0.07 TABLET ORAL at 05:20

## 2023-12-28 RX ADMIN — FAMOTIDINE 20 MG: 20 TABLET ORAL at 21:34

## 2023-12-28 RX ADMIN — BUDESONIDE INHALATION 500 MCG: 0.5 SUSPENSION RESPIRATORY (INHALATION) at 05:51

## 2023-12-28 RX ADMIN — Medication 50 MG: at 08:18

## 2023-12-28 RX ADMIN — GUAIFENESIN SYRUP AND DEXTROMETHORPHAN 5 ML: 100; 10 SYRUP ORAL at 21:35

## 2023-12-28 RX ADMIN — HYDROCORTISONE: 25 CREAM TOPICAL at 21:38

## 2023-12-28 RX ADMIN — AMIODARONE HYDROCHLORIDE 400 MG: 200 TABLET ORAL at 21:34

## 2023-12-28 RX ADMIN — GLIPIZIDE 2.5 MG: 5 TABLET ORAL at 16:30

## 2023-12-28 RX ADMIN — Medication 500 MG: at 08:15

## 2023-12-28 RX ADMIN — BUDESONIDE INHALATION 500 MCG: 0.5 SUSPENSION RESPIRATORY (INHALATION) at 16:02

## 2023-12-28 RX ADMIN — ALBUTEROL SULFATE 2.5 MG: 2.5 SOLUTION RESPIRATORY (INHALATION) at 05:50

## 2023-12-28 RX ADMIN — DOCUSATE SODIUM 50 MG AND SENNOSIDES 8.6 MG 2 TABLET: 8.6; 5 TABLET, FILM COATED ORAL at 08:17

## 2023-12-28 ASSESSMENT — PAIN SCALES - GENERAL
PAINLEVEL_OUTOF10: 0
PAINLEVEL_OUTOF10: 0
PAINLEVEL_OUTOF10: 6

## 2023-12-28 NOTE — PROGRESS NOTES
OCCUPATIONAL THERAPY DAILY NOTE    Time In 1030      Time Out 1115        Subjective: Pt agreeable to treatment. Pain: No pain expressed. Interdisciplinary Communication: Collaborated with SLP regarding  pt's cognitive deficits/problem solving . Precautions: Falls, Sternal Precautions, and Monitor SpO2    FUNCTIONAL MOBILITY:       Bed Mobility NT    Sit to Stand CGA Cues for sternal precautions prn    Transfer  NT  Transfer Type: SPT  Equipment: RW    Ambulation NT  Equipment: NA       - Activity Tolerance - Strengthening   Pt attempted 28 piece puzzle, required max cues and unable to complete. Task downgraded to simpler 20 piece puzzle with puzzle piece outline on puzzle board. Pt completed 50% of a simple nativity scene jigsaw puzzle to address problem solving, reasoning, visual perceptual skills, and overall activity tolerance. Pt stood for 2:26 then completed the remainder of session seated. Pt required mod-max cues after initial cueing for puzzle building strategies. Pt with poor problem solving noted, frequently attempting to place edge pieces into interior portion of puzzle despite education. Education   Benefits of OT, Energy Conservation, Pacing, and Precautions     Assessment: Patient demonstrated good participation in OT treatment. SpO2 92-94% on 3L O2 HFNC. Pt with poor problem solving noted, SLP notified. Pt continues to benefit from skilled OT services to address remaining deficits and progress toward baseline level of independence and safety. Patient ended session seated in w/c  with rehab tech . Plan: Continue OT POC.      Lon Griffiths OT   12/28/2023

## 2023-12-28 NOTE — DIABETES MGMT
Patient admitted with s/p CABG. Blood glucose ranged 208-295 yesterday with patient receiving Humalog 8 units, Glipizide 5 mg, Metformin 1000 mg, and Prednisone 40 mg. Blood glucose this morning was 149. Reviewed patient current regimen: Glipizide 2.5 mg BID, Humalog correctional insulin, Metformin 500 mg BID, and Prednisone 40 mg.  Glycemic control improving on current regimen. Will follow along.

## 2023-12-28 NOTE — PROGRESS NOTES
12/27/23 4528   NIV Type   $NIV $Daily Charge   NIV Started/Stopped On   Equipment Type home resmed   Mode Auto-PAP   Mask Type Full face mask   Mask Size Medium   Assessment   Pulse 74   Respirations 17   SpO2 94 %   Comfort Level Good   Using Accessory Muscles No   Mask Compliance Good   Settings/Measurements   CPAP/EPAP 5 cmH2O   EPAP Min 5 cmH2O   EPAP Max 20 cmH2O   O2 Flow Rate (L/min) 4 L/min  (bled in cpap)   Patient's Home Machine Yes (type/vendor)   Electrical Safety Check Performed Yes

## 2023-12-28 NOTE — PROGRESS NOTES
Patient noted through out the night with increased SOB while ambulating to bathroom. Patient was unable to tolerate CPAP due to increased SOB. Oxygen via NC at 4 liters sating at 93% in place. Patient also noted with urinary frequency every 2 hours during the night. Crackles heard to bilateral lower lobes and left upper lobe, patient is having non-productive, dry hacking cough. Patient states \" I was so scared tonight, I couldn't get my breath\". Incentive inspirometer encouraged this morning with nurse at bedside, pulling approx 1000.

## 2023-12-28 NOTE — PROGRESS NOTES
Inpatient [facility], [address]  [facility]  Tel: [phone]     Physical Medicine and Rehabilitation Progress Note      Chucky Leal III  Admit Date: 12/20/2023  Admit Diagnosis: Debility [R53.81]  History of coronary artery bypass graft x 3 [Z95.1]    Subjective     Patient seen face-to-face and evaluated. NAEO and he reports not sleeping well. He is eating and drinking fine, and continent of bowel and bladder. No pain reported, just some soreness around inferior thoracotomy area. He said his therapies were going well. He has appeared much improved clinically lately breathing better. He reported continued cough, but improved. Cardiology and Pulmonology consulted and workup including CXR (same/improved), BMP, CBC, PCT. CBC showed leukocytosis (WBCs 13.8, but had been elevated other than last CBC), PCT 0.06, CT Chest negative PE, afebrile. WBCs 31.3 on 12/26/23 likely d/t corticosteroids, Hg improved to 9.6.     Objective:     Current Facility-Administered Medications   Medication Dose Route Frequency    predniSONE (DELTASONE) tablet 40 mg  40 mg Oral Daily    budesonide (PULMICORT) nebulizer suspension 500 mcg  0.5 mg Nebulization BID RT    albuterol (PROVENTIL) (2.5 MG/3ML) 0.083% nebulizer solution 2.5 mg  2.5 mg Nebulization TID RT    albuterol (PROVENTIL) (2.5 MG/3ML) 0.083% nebulizer solution 2.5 mg  2.5 mg Nebulization Q4H PRN    hydrocortisone (ANUSOL-HC) 2.5 % rectal cream   Rectal BID    insulin lispro (HUMALOG) injection vial 0-8 Units  0-8 Units SubCUTAneous TID WC    insulin lispro (HUMALOG) injection vial 0-4 Units  0-4 Units SubCUTAneous Nightly    acetaminophen (TYLENOL) tablet 650 mg  650 mg Oral Q4H PRN    alcohol 62% (NOZIN) nasal  1 ampule  1 ampule Topical Q12H    amiodarone (CORDARONE) tablet 400 mg  400 mg Oral BID    apixaban (ELIQUIS) tablet 5 mg  5 mg Oral BID    aspirin EC tablet 81 mg  81 mg Oral Daily    atorvastatin (LIPITOR) Amiodarone 400mg BID x5 days then 200mg BID x 14 days, Lisinopril 20mg daily, Lipitor 80mg qhs, ASA 81mg daily and Eliquis 5mg BID.     Diabetes mellitus - HgbA1c 7.6 on 12/18/23%; impaired / poor glycemic control. Will require ACHS glucose monitoring and medication adjustment to optimize control.   -Continue Glipizide 2.5mg BID, Metformin 500mg BID, SSI, Regular ADA Cardiac diet.     Pain management - No reported post op or other pain on IPR admission. Will require regular pain assessment and management. Continue Tylenol prn and Tramadol 50mg q6h prn.     Electrolyte abnormality - monitor BMPs and replace as needed.     Pneumonia prophylaxis - incentive spirometer 10x every hour while awake. Robitussin DM BID and Albuterol prn.  -Lasix IV x2 overnight with increased SOB (12/23-24). Cardiology and Pulmonology consulted.   -Workup including CXR (same/improved), BMP, CBC, PC (0.06). CBC showed leukocytosis (WBCs 13.8). CT Chest neg PE.     DVT risk / DVT prophylaxis - mobilize as tolerated. SCDs when in bed; DANIEL hose when out of bed. Eliquis 5mg BID.     Acute Post Op Anemia - 12/20 H/H 8.2/25.8. Follow with surveillance labs.     GI prophylaxis - resume Pepcid 20mg BID. At times may need additional antacids, Maalox PRN.     RA - Plan to restart Methotrexate 25mg weekly and and Plaquenil 200mg BID     Tobacco use - Quit smoking 15 years ago.     Hypothyroidism - Continue Levothyroxine 150mcg daily.     Depression - continue Sertraline 100mg daily. At risk of depression exacerbation post CABG.     General skin care / wound prevention - monitor Sternotomy incision with Provena, and general skin wound status daily. At risk for failure due to impaired mobility, advanced age, and long term smoking history.     Bladder program / urinary retention / neurogenic bladder - schedule voids q6-8h prn. Check post-void residual as needed; in-and-out catheter if post-void residual is more than 400ml. Pt reports normal bladder  function on IPR admission without frequency or incontinence.     Bowel program - at risk for constipation as a side effect of opioids, other medications, impaired mobility, etc. MiraLAX daily for regularity, Senokot-S for stool softener + laxative. PRN MOM, bisacodyl suppository or tablets for constipation. Pt reports normal bowel function on IPR admission without constipation.     Disposition: The patient's prognosis for significant practical improvement within a reasonable period of time appears fair/good and the estimated length of stay is 10 days; patient is expected to return home with spouse / family supervision and continued rehabilitation with home health therapy.     Dr. Shlomo Winters MD (CT Surgery) per his f/u scheduling  Paduano, Julia Schmidt, APRN - CNP (PCP) 1-2 weeks post IPR    Time spent was 25 minutes with over 1/2 in direct patient care / examination, consultation with therapists / nursing and coordination of care.    Signed By: Attila Sandoval MD     December 28, 2023

## 2023-12-29 ENCOUNTER — CARE COORDINATION (OUTPATIENT)
Dept: CARE COORDINATION | Facility: CLINIC | Age: 73
End: 2023-12-29

## 2023-12-29 PROBLEM — D72.829 LEUKOCYTOSIS: Status: ACTIVE | Noted: 2023-12-29

## 2023-12-29 LAB
GLUCOSE BLD STRIP.AUTO-MCNC: 126 MG/DL (ref 65–100)
GLUCOSE BLD STRIP.AUTO-MCNC: 203 MG/DL (ref 65–100)
GLUCOSE BLD STRIP.AUTO-MCNC: 233 MG/DL (ref 65–100)
GLUCOSE BLD STRIP.AUTO-MCNC: 270 MG/DL (ref 65–100)
SERVICE CMNT-IMP: ABNORMAL

## 2023-12-29 PROCEDURE — 6370000000 HC RX 637 (ALT 250 FOR IP): Performed by: PHYSICAL MEDICINE & REHABILITATION

## 2023-12-29 PROCEDURE — 6370000000 HC RX 637 (ALT 250 FOR IP): Performed by: PHYSICIAN ASSISTANT

## 2023-12-29 PROCEDURE — 6370000000 HC RX 637 (ALT 250 FOR IP): Performed by: INTERNAL MEDICINE

## 2023-12-29 PROCEDURE — 99232 SBSQ HOSP IP/OBS MODERATE 35: CPT | Performed by: INTERNAL MEDICINE

## 2023-12-29 PROCEDURE — 1180000000 HC REHAB R&B

## 2023-12-29 PROCEDURE — 6360000002 HC RX W HCPCS: Performed by: INTERNAL MEDICINE

## 2023-12-29 PROCEDURE — 97530 THERAPEUTIC ACTIVITIES: CPT

## 2023-12-29 PROCEDURE — 94761 N-INVAS EAR/PLS OXIMETRY MLT: CPT

## 2023-12-29 PROCEDURE — 82962 GLUCOSE BLOOD TEST: CPT

## 2023-12-29 PROCEDURE — 94640 AIRWAY INHALATION TREATMENT: CPT

## 2023-12-29 PROCEDURE — 97535 SELF CARE MNGMENT TRAINING: CPT

## 2023-12-29 PROCEDURE — 97110 THERAPEUTIC EXERCISES: CPT

## 2023-12-29 PROCEDURE — 92523 SPEECH SOUND LANG COMPREHEN: CPT

## 2023-12-29 PROCEDURE — 97116 GAIT TRAINING THERAPY: CPT

## 2023-12-29 PROCEDURE — 6370000000 HC RX 637 (ALT 250 FOR IP): Performed by: STUDENT IN AN ORGANIZED HEALTH CARE EDUCATION/TRAINING PROGRAM

## 2023-12-29 PROCEDURE — 2700000000 HC OXYGEN THERAPY PER DAY

## 2023-12-29 RX ORDER — PREDNISONE 20 MG/1
20 TABLET ORAL DAILY
Status: COMPLETED | OUTPATIENT
Start: 2024-01-01 | End: 2024-01-02

## 2023-12-29 RX ORDER — PREDNISONE 5 MG/1
10 TABLET ORAL DAILY
Status: COMPLETED | OUTPATIENT
Start: 2024-01-03 | End: 2024-01-04

## 2023-12-29 RX ORDER — GUAIFENESIN 600 MG/1
1200 TABLET, EXTENDED RELEASE ORAL 2 TIMES DAILY
Status: DISCONTINUED | OUTPATIENT
Start: 2023-12-29 | End: 2024-01-11 | Stop reason: HOSPADM

## 2023-12-29 RX ORDER — DEXTROSE MONOHYDRATE 100 MG/ML
INJECTION, SOLUTION INTRAVENOUS CONTINUOUS PRN
Status: DISCONTINUED | OUTPATIENT
Start: 2023-12-29 | End: 2024-01-11 | Stop reason: HOSPADM

## 2023-12-29 RX ADMIN — AMIODARONE HYDROCHLORIDE 400 MG: 200 TABLET ORAL at 21:11

## 2023-12-29 RX ADMIN — SERTRALINE 100 MG: 100 TABLET, FILM COATED ORAL at 08:10

## 2023-12-29 RX ADMIN — AMIODARONE HYDROCHLORIDE 400 MG: 200 TABLET ORAL at 08:10

## 2023-12-29 RX ADMIN — ALBUTEROL SULFATE 2.5 MG: 2.5 SOLUTION RESPIRATORY (INHALATION) at 16:23

## 2023-12-29 RX ADMIN — GLIPIZIDE 2.5 MG: 5 TABLET ORAL at 16:58

## 2023-12-29 RX ADMIN — DOCUSATE SODIUM 50 MG AND SENNOSIDES 8.6 MG 2 TABLET: 8.6; 5 TABLET, FILM COATED ORAL at 21:11

## 2023-12-29 RX ADMIN — GLIPIZIDE 2.5 MG: 5 TABLET ORAL at 06:01

## 2023-12-29 RX ADMIN — Medication 50 MG: at 08:10

## 2023-12-29 RX ADMIN — PREDNISONE 40 MG: 20 TABLET ORAL at 08:09

## 2023-12-29 RX ADMIN — GUAIFENESIN 1200 MG: 600 TABLET ORAL at 12:23

## 2023-12-29 RX ADMIN — METFORMIN HYDROCHLORIDE 500 MG: 500 TABLET ORAL at 16:58

## 2023-12-29 RX ADMIN — GUAIFENESIN SYRUP AND DEXTROMETHORPHAN 5 ML: 100; 10 SYRUP ORAL at 08:10

## 2023-12-29 RX ADMIN — BUDESONIDE INHALATION 500 MCG: 0.5 SUSPENSION RESPIRATORY (INHALATION) at 06:25

## 2023-12-29 RX ADMIN — VITAMIN D, TAB 1000IU (100/BT) 1000 UNITS: 25 TAB at 08:09

## 2023-12-29 RX ADMIN — DOCUSATE SODIUM 50 MG AND SENNOSIDES 8.6 MG 2 TABLET: 8.6; 5 TABLET, FILM COATED ORAL at 08:09

## 2023-12-29 RX ADMIN — INSULIN LISPRO 4 UNITS: 100 INJECTION, SOLUTION INTRAVENOUS; SUBCUTANEOUS at 12:28

## 2023-12-29 RX ADMIN — FAMOTIDINE 20 MG: 20 TABLET ORAL at 08:09

## 2023-12-29 RX ADMIN — FAMOTIDINE 20 MG: 20 TABLET ORAL at 21:12

## 2023-12-29 RX ADMIN — APIXABAN 5 MG: 5 TABLET, FILM COATED ORAL at 21:11

## 2023-12-29 RX ADMIN — ALBUTEROL SULFATE 2.5 MG: 2.5 SOLUTION RESPIRATORY (INHALATION) at 06:25

## 2023-12-29 RX ADMIN — Medication 500 MG: at 08:10

## 2023-12-29 RX ADMIN — ASPIRIN 81 MG: 81 TABLET ORAL at 08:09

## 2023-12-29 RX ADMIN — B-COMPLEX W/ C & FOLIC ACID TAB 1 TABLET: TAB at 08:10

## 2023-12-29 RX ADMIN — LEVOTHYROXINE SODIUM 150 MCG: 0.07 TABLET ORAL at 06:01

## 2023-12-29 RX ADMIN — METFORMIN HYDROCHLORIDE 500 MG: 500 TABLET ORAL at 08:10

## 2023-12-29 RX ADMIN — FOLIC ACID 1 MG: 1 TABLET ORAL at 08:10

## 2023-12-29 RX ADMIN — LISINOPRIL 20 MG: 20 TABLET ORAL at 08:10

## 2023-12-29 RX ADMIN — ALBUTEROL SULFATE 2.5 MG: 2.5 SOLUTION RESPIRATORY (INHALATION) at 11:54

## 2023-12-29 RX ADMIN — BUDESONIDE INHALATION 500 MCG: 0.5 SUSPENSION RESPIRATORY (INHALATION) at 16:23

## 2023-12-29 RX ADMIN — Medication 1 AMPULE: at 21:10

## 2023-12-29 RX ADMIN — GUAIFENESIN 1200 MG: 600 TABLET ORAL at 21:11

## 2023-12-29 RX ADMIN — INSULIN LISPRO 2 UNITS: 100 INJECTION, SOLUTION INTRAVENOUS; SUBCUTANEOUS at 16:59

## 2023-12-29 RX ADMIN — METOPROLOL SUCCINATE 25 MG: 25 TABLET, FILM COATED, EXTENDED RELEASE ORAL at 08:10

## 2023-12-29 RX ADMIN — Medication 1 AMPULE: at 08:20

## 2023-12-29 RX ADMIN — APIXABAN 5 MG: 5 TABLET, FILM COATED ORAL at 08:10

## 2023-12-29 RX ADMIN — ATORVASTATIN CALCIUM 80 MG: 80 TABLET, FILM COATED ORAL at 21:11

## 2023-12-29 ASSESSMENT — PAIN SCALES - GENERAL
PAINLEVEL_OUTOF10: 0

## 2023-12-29 NOTE — DIABETES MGMT
Patient admitted with s/p CABG. Blood glucose ranged 145-370 yesterday with patient receiving Humalog 10 units, Glipizide 5 mg, Metformin 1000 mg, and Prednisone 40 mg. Blood glucose this morning was 126. Reviewed patient current regimen: Humalog correctional insulin, Glipizide 2.5 mg BID, Metformin 500 mg BID, and Prednisone 40 mg daily. Patient would likely benefit from initiation of low dose prandial insulin to help offset hyperglycemia during the day related to steroid therapy. As steroid therapy weaned patient insulin needs will likely decrease. Will follow along.

## 2023-12-29 NOTE — PROGRESS NOTES
OCCUPATIONAL THERAPY DAILY NOTE    Time In 0746      Time Out 0916        Subjective: Pt agreeable to treatment. Pt reports he already washed up and changed clothes, requesting gym session. Pain: No pain expressed. Interdisciplinary Communication: Collaborated with RN regarding pt status. Precautions: Falls, Sternal Precautions, and Monitor SpO2    FUNCTIONAL MOBILITY:       Bed Mobility NT    Sit to Stand CGA    Transfer  CGA  Transfer Type: SPT  Equipment: RW    Ambulation CGA  Equipment: RW Recliner > bathroom sink     ACTIVITIES OF DAILY LIVING:       Eating NT    Oral Hygiene Setup/Clean-up Assist Seated at sink   Bathing NT    Upper Body  Dressing NT    Lower Body Dressing NT    Donning/Luverne Footwear NT    Toilet Transfer NT  Transfer Type: NA  Equipment: NA    Toileting Hygiene NT    Education Rolling Walker Management     - Activity Tolerance - Range of Motion - Strengthening   Pt completed the following exercises with 5 lb emeka to promote UB strength, activity tolerance, and shoulder stabilization for integration into functional tasks:  Exercise Reps Comments   Protraction/Retraction 2 x 30    Abduction/Adduction     Circles  1/2 CW, 1/2 CCW   Vs     ABCs x1         - Cognition - Visual-Perceptual    Pt participated in pegboard task with 6 written prompts to address visual perceptual skills, coordination, and cognition. Pt completed task while seated in w/c. Pt demonstrates increased errors on R side of pegboard/as he progressed through prompts. Pt with poor problem solving and working memory. Pt completed row 1 without assist, required mod cues to complete rows 2 and 3. Pt required mod-max cues to follow direction #4, unable to complete 5 or 6 in allotted time. Pt unable to identify errors, required max cues to correct after assist to identify errors. Assessment: Patient on 3.5L O2 NC upon arrival, SpO2 94%, HR 78. Pt tolerated 3L O2 NC throughout session with SpO2 in low to mid 90s.  Pt c/o lightheadedness during session (seated activity), LUE /61, HR 74. Pt demonstrated good participation in OT treatment. Pt with significant cognitive deficits impacting safety, anticipate pt will need 24 hour supervision/assisst at d/c. Pt continues to benefit from skilled OT services to address remaining deficits and progress toward baseline level of independence and safety. Patient ended session seated in w/c with PT.   Plan: Continue OT POC.     Sharita Johnson, OT   12/29/2023

## 2023-12-29 NOTE — PROGRESS NOTES
Tosin Miles III  Admission Date: 12/20/2023         Daily Progress Note: 12/29/2023    The patient's chart is reviewed and the patient is discussed with the staff. Background:  Patient is a 68 y.o.  male seen and evaluated at the request of Dr. Favio Naranjo. He underwent elective CABG X 3 on 12/14/23. He developed post op a fib and is being treated with Eliquis, Amiodarone and a beta blocker. He underwent successful cardioversion on 12/18. He was transferred to rehab on 12/20. Over two days he felt congested and had developed a productive cough - yellow colored secretions. He feels short of breath. He does not have any peripheral edema. He smoked for 40 years - up to 2 ppd. He quit 10 years ago. He has never used any inhalers or had any lung problems. He has STEFFEN and is using his home CPAP. His WBC elevated to 13 and he had low grade fevers. His CXR 12/23 shows improvement with LLL atx and a small effusion. Subjective:     Having some labored breathing with exertion but able to recover easily. Not having much cough.  Currently on 3lpm.    Current Facility-Administered Medications   Medication Dose Route Frequency    predniSONE (DELTASONE) tablet 40 mg  40 mg Oral Daily    budesonide (PULMICORT) nebulizer suspension 500 mcg  0.5 mg Nebulization BID RT    albuterol (PROVENTIL) (2.5 MG/3ML) 0.083% nebulizer solution 2.5 mg  2.5 mg Nebulization TID RT    albuterol (PROVENTIL) (2.5 MG/3ML) 0.083% nebulizer solution 2.5 mg  2.5 mg Nebulization Q4H PRN    hydrocortisone (ANUSOL-HC) 2.5 % rectal cream   Rectal BID    insulin lispro (HUMALOG) injection vial 0-8 Units  0-8 Units SubCUTAneous TID WC    insulin lispro (HUMALOG) injection vial 0-4 Units  0-4 Units SubCUTAneous Nightly    acetaminophen (TYLENOL) tablet 650 mg  650 mg Oral Q4H PRN    alcohol 62% (NOZIN) nasal  1 ampule  1 ampule Topical Q12H    amiodarone (CORDARONE) tablet 400 mg  400 mg Oral BID    apixaban Transferred to rehab on 12/20. Developed chest congestion with a productive cough around 12/22.  Had low grade temp, elevated WBC, dyspnea.  CXR 12/21 shows improvement of LLL atx/effusion. CT chest also showing small L pleural effusion with atelectasis. Now requiring 4 lpm O2.     Principal Problem:    History of coronary artery bypass graft x 3  Plan: CABG 12/14, transfer to rehab 12/20.  Active Problems:    STEFFEN on CPAP  Plan: home CPAP    Debility  Plan: 9th floor rehab    Atrial fibrillation (HCC)  Plan: underwent cardioversion. On amio, eliquis    Congestion of respiratory tract  Plan: still congested, no wheezing. Will wean steroids by 10mg every other day until complete. Add mucinex. Continue weaning O2 as tolerated    History of tobacco use  Plan: 80pack year hx, quit 10years ago. Moderate obstruction on preop PFTs, will need OP follow up with CPFT. Started on albuterol nebs TIB and pulmicort.         More than 50% of the time documented was spent in face-to-face contact with the patient and in the care of the patient on the floor/unit where the patient is located.    In this split/shared evaluation I performed performed a medically appropriate history and exam, counseled and educated the patient and/or family member, ordered medications, tests or procedures, documented information in EMR, and coordinated care. which accounted for 14 minutes of clinical time.     SWATHI Hercules CNP    In this split/shared evaluation I performed reviewed the patients's H&P, available images, labs, cultures., discussed case in detail with NPP, performed a medically appropriate history and exam, counseled and educated the patient and/or family member, ordered and/or reviewed medications, tests or procedures, documented information in EMR, independently interpreted images, and coordinated care. which accounted for 16 minutes clinical time.     Impression: Mr. Paduano has persistent hypoxemia requiring 3-4lpm.   Recommend follow up CBC tomorrow and CXR on Monday.  We will be available as needed over the weekend and will resume rounding next week.    Jasmyn Segovia MD

## 2023-12-29 NOTE — PROGRESS NOTES
[facility]  SPEECH LANGUAGE PATHOLOGY: COMMUNICATION Initial Assessment    MRN: [id number]    ADMISSION DATE: 12/20/2023  PRIMARY DIAGNOSIS: History of coronary artery bypass graft x 3  Debility [R53.81]  History of coronary artery bypass graft x 3 [Z95.1]    Date of Eval: 12/29/2023  ICD-10: Treatment Diagnosis:  R41.841 Cognitive-Communication Deficit    RECOMMENDATIONS:   Recommendations:   -ST to address problem solving, working memory/attention     Patient continues to require skilled intervention: yes       ASSESSMENT   Patient presents with cognitive linguistic deficits in the areas of attention and problem solving.   At baseline, patient is independent with iADLs. Current deficits impact ability to manage finances, medications, etc.   Recommend speech therapy to address aforementioned deficits in order to improve safety and independence.         GENERAL   History of Present Injury/Illness: Per chart review, 74 yo RHD M pmh CAD, RA, OA, HTN, HLD, GERD, Hypothyroidism, and 40 year smoking history quit 15 years ago who presented for elective CABG surgery. He was seen in the office by Dr Allred. Calcium score was elevated in September. He underwent a nuclear stress test that showed moderate inferior ischemia. He has multiple risk factors for CAD. LHC was planned. He underwent cardiac catheterization that showed severe multivessel disease with occluded Lcx. The RCA was small and non-dominant with moderate disease. He denied any notable chest discomfort or dyspnea but is not very active due to OA and RA. Echocardiogram showed a normal LV EF with mild MR. He has a history of DVT. He uses a walker and cane at times. CABG surgery was planned.     He underwent CABG x 3 with LIMA to the LAD, reverse SVG to the OM1 and reverse SVG to the OM2 on 12/14/23 by Dr. Shlomo Winters MD. He did well post operatively and was transferred to  stepdown on POD 1. He developed atrial fibrillation/flutter with RVR and was started on  IV Cardizem. He remained in a-fib/flutter and was transitioned to IV amiodarone. He was weaned off of O2. He was started on Eliquis for anticoagulation. He remained in a-fib/flutter and cardioversion was planned. He underwent successful cardioversion from a-fib/flutter to sinus rhythm on . Inpatient rehab was recommended for continued therapy. He remained in sinus rhythm. The morning of 23, patient was feeling well and ambulating without any symptoms. Patient was determined stable and ready for discharge. Patient was instructed on the importance of medication compliance and outpatient follow up. For maximized medical therapy for CAD, patient will continue ASA, BB, ARB, and statin as well. The patient will have close follow up with [facility] Cardiology Dr Florencia Cosme in 2 weeks after discharge from [facility]. The patient will follow up with Dr. Stephanie Chinchilla in 3 weeks after discharge from [facility] and has been referred to cardiac rehab. Subjective:  \"I am foggy\" \"usually my best part is math\"      Prior level of functioning: independent,driving, manages meds, finances, etc. lives with wife. Pain:   Patient does not c/o pain                       OBJECTIVE   Patient seen for cognitive communication evaluation:  The [employer] Mental Status (SLUMS) Examination and other measures were completed to evaluate cognitive linguistic functioning:    Total score: 18/30 (score falls within \"Dementia\" range)  Orientation-3/3  Recall(5 words)- immediate: 5/5(not scored); delayed:1/5 (not scored: increased to 4/5 with category cue)  Problem solvin/3  Divergent naming(concrete category): 1/3 (named 9 in 1 minute)  Digit manipulation: 0/2 (unable to grasp concept, decreased attention/working memory)  Visuospatial: 2/2  Clock drawin/4 (unable to place hands in correct location with cueing; unable to provide correct time verbally with patient stating \"10:50\" rather then 11:10)  Immediate recall

## 2023-12-30 LAB
BASOPHILS # BLD: 0 K/UL (ref 0–0.2)
BASOPHILS NFR BLD: 0 % (ref 0–2)
DIFFERENTIAL METHOD BLD: ABNORMAL
EOSINOPHIL # BLD: 0 K/UL (ref 0–0.8)
EOSINOPHIL NFR BLD: 0 % (ref 0.5–7.8)
ERYTHROCYTE [DISTWIDTH] IN BLOOD BY AUTOMATED COUNT: 15.1 % (ref 11.9–14.6)
GLUCOSE BLD STRIP.AUTO-MCNC: 140 MG/DL (ref 65–100)
GLUCOSE BLD STRIP.AUTO-MCNC: 259 MG/DL (ref 65–100)
GLUCOSE BLD STRIP.AUTO-MCNC: 308 MG/DL (ref 65–100)
GLUCOSE BLD STRIP.AUTO-MCNC: 336 MG/DL (ref 65–100)
HCT VFR BLD AUTO: 28.1 % (ref 41.1–50.3)
HGB BLD-MCNC: 8.7 G/DL (ref 13.6–17.2)
IMM GRANULOCYTES # BLD AUTO: 0.2 K/UL (ref 0–0.5)
IMM GRANULOCYTES NFR BLD AUTO: 1 % (ref 0–5)
LYMPHOCYTES # BLD: 2 K/UL (ref 0.5–4.6)
LYMPHOCYTES NFR BLD: 9 % (ref 13–44)
MCH RBC QN AUTO: 30.4 PG (ref 26.1–32.9)
MCHC RBC AUTO-ENTMCNC: 31 G/DL (ref 31.4–35)
MCV RBC AUTO: 98.3 FL (ref 82–102)
MONOCYTES # BLD: 1.7 K/UL (ref 0.1–1.3)
MONOCYTES NFR BLD: 8 % (ref 4–12)
NEUTS SEG # BLD: 17.2 K/UL (ref 1.7–8.2)
NEUTS SEG NFR BLD: 82 % (ref 43–78)
NRBC # BLD: 0.02 K/UL (ref 0–0.2)
PLATELET # BLD AUTO: 655 K/UL (ref 150–450)
PMV BLD AUTO: 9.7 FL (ref 9.4–12.3)
RBC # BLD AUTO: 2.86 M/UL (ref 4.23–5.6)
SERVICE CMNT-IMP: ABNORMAL
WBC # BLD AUTO: 21.2 K/UL (ref 4.3–11.1)

## 2023-12-30 PROCEDURE — 36592 COLLECT BLOOD FROM PICC: CPT

## 2023-12-30 PROCEDURE — 6370000000 HC RX 637 (ALT 250 FOR IP): Performed by: STUDENT IN AN ORGANIZED HEALTH CARE EDUCATION/TRAINING PROGRAM

## 2023-12-30 PROCEDURE — 6360000002 HC RX W HCPCS: Performed by: INTERNAL MEDICINE

## 2023-12-30 PROCEDURE — 94761 N-INVAS EAR/PLS OXIMETRY MLT: CPT

## 2023-12-30 PROCEDURE — 6370000000 HC RX 637 (ALT 250 FOR IP): Performed by: PHYSICIAN ASSISTANT

## 2023-12-30 PROCEDURE — 2700000000 HC OXYGEN THERAPY PER DAY

## 2023-12-30 PROCEDURE — 1180000000 HC REHAB R&B

## 2023-12-30 PROCEDURE — 97530 THERAPEUTIC ACTIVITIES: CPT

## 2023-12-30 PROCEDURE — 85025 COMPLETE CBC W/AUTO DIFF WBC: CPT

## 2023-12-30 PROCEDURE — 94640 AIRWAY INHALATION TREATMENT: CPT

## 2023-12-30 PROCEDURE — 97116 GAIT TRAINING THERAPY: CPT

## 2023-12-30 PROCEDURE — 99232 SBSQ HOSP IP/OBS MODERATE 35: CPT | Performed by: PHYSICAL MEDICINE & REHABILITATION

## 2023-12-30 PROCEDURE — 6370000000 HC RX 637 (ALT 250 FOR IP): Performed by: PHYSICAL MEDICINE & REHABILITATION

## 2023-12-30 PROCEDURE — 82962 GLUCOSE BLOOD TEST: CPT

## 2023-12-30 RX ADMIN — INSULIN LISPRO 4 UNITS: 100 INJECTION, SOLUTION INTRAVENOUS; SUBCUTANEOUS at 12:15

## 2023-12-30 RX ADMIN — AMIODARONE HYDROCHLORIDE 400 MG: 200 TABLET ORAL at 20:11

## 2023-12-30 RX ADMIN — Medication 1 AMPULE: at 07:34

## 2023-12-30 RX ADMIN — Medication 1 AMPULE: at 20:11

## 2023-12-30 RX ADMIN — GUAIFENESIN 1200 MG: 600 TABLET ORAL at 07:35

## 2023-12-30 RX ADMIN — FOLIC ACID 1 MG: 1 TABLET ORAL at 07:35

## 2023-12-30 RX ADMIN — FAMOTIDINE 20 MG: 20 TABLET ORAL at 20:11

## 2023-12-30 RX ADMIN — APIXABAN 5 MG: 5 TABLET, FILM COATED ORAL at 20:11

## 2023-12-30 RX ADMIN — Medication 50 MG: at 07:35

## 2023-12-30 RX ADMIN — APIXABAN 5 MG: 5 TABLET, FILM COATED ORAL at 07:35

## 2023-12-30 RX ADMIN — LISINOPRIL 20 MG: 20 TABLET ORAL at 07:35

## 2023-12-30 RX ADMIN — GUAIFENESIN 1200 MG: 600 TABLET ORAL at 20:11

## 2023-12-30 RX ADMIN — ALBUTEROL SULFATE 2.5 MG: 2.5 SOLUTION RESPIRATORY (INHALATION) at 11:34

## 2023-12-30 RX ADMIN — BUDESONIDE INHALATION 500 MCG: 0.5 SUSPENSION RESPIRATORY (INHALATION) at 17:45

## 2023-12-30 RX ADMIN — DOCUSATE SODIUM 50 MG AND SENNOSIDES 8.6 MG 2 TABLET: 8.6; 5 TABLET, FILM COATED ORAL at 20:11

## 2023-12-30 RX ADMIN — B-COMPLEX W/ C & FOLIC ACID TAB 1 TABLET: TAB at 07:35

## 2023-12-30 RX ADMIN — INSULIN LISPRO 4 UNITS: 100 INJECTION, SOLUTION INTRAVENOUS; SUBCUTANEOUS at 21:07

## 2023-12-30 RX ADMIN — FAMOTIDINE 20 MG: 20 TABLET ORAL at 07:35

## 2023-12-30 RX ADMIN — SERTRALINE 100 MG: 100 TABLET, FILM COATED ORAL at 07:35

## 2023-12-30 RX ADMIN — METFORMIN HYDROCHLORIDE 1000 MG: 500 TABLET ORAL at 07:37

## 2023-12-30 RX ADMIN — METOPROLOL SUCCINATE 25 MG: 25 TABLET, FILM COATED, EXTENDED RELEASE ORAL at 07:35

## 2023-12-30 RX ADMIN — INSULIN LISPRO 6 UNITS: 100 INJECTION, SOLUTION INTRAVENOUS; SUBCUTANEOUS at 17:58

## 2023-12-30 RX ADMIN — ASPIRIN 81 MG: 81 TABLET ORAL at 07:35

## 2023-12-30 RX ADMIN — PREDNISONE 30 MG: 5 TABLET ORAL at 07:34

## 2023-12-30 RX ADMIN — ATORVASTATIN CALCIUM 80 MG: 80 TABLET, FILM COATED ORAL at 20:11

## 2023-12-30 RX ADMIN — Medication 500 MG: at 07:35

## 2023-12-30 RX ADMIN — AMIODARONE HYDROCHLORIDE 400 MG: 200 TABLET ORAL at 07:35

## 2023-12-30 RX ADMIN — METFORMIN HYDROCHLORIDE 1000 MG: 500 TABLET ORAL at 17:58

## 2023-12-30 RX ADMIN — BUDESONIDE INHALATION 500 MCG: 0.5 SUSPENSION RESPIRATORY (INHALATION) at 05:28

## 2023-12-30 RX ADMIN — ALBUTEROL SULFATE 2.5 MG: 2.5 SOLUTION RESPIRATORY (INHALATION) at 17:45

## 2023-12-30 RX ADMIN — ALBUTEROL SULFATE 2.5 MG: 2.5 SOLUTION RESPIRATORY (INHALATION) at 05:28

## 2023-12-30 RX ADMIN — LEVOTHYROXINE SODIUM 150 MCG: 0.07 TABLET ORAL at 05:18

## 2023-12-30 RX ADMIN — VITAMIN D, TAB 1000IU (100/BT) 1000 UNITS: 25 TAB at 07:35

## 2023-12-30 NOTE — PROGRESS NOTES
PHYSICAL THERAPY DAILY NOTE  Time In:  830  Time Out:  908  Total Treatment Time:  38 Minutes  Pt. Seen for: AM, Balance Training, Gait Training, Patient Education, Therapeutic Exercise, and Transfer Training     Subjective: Feel pretty good.  Need to get to the bathroom and brush my teeth.         Objective:  Precautions: falls, sternal    GROSS ASSESSMENT Daily Assessment    Pt in recliner at start of session. On 3L O2 at start of session.        COGNITION Daily Assessment    Pt following all commands.   Impaired carryover for sternal precautions       BED/MAT MOBILITY Daily Assessment   N/a Rolling Right: NT  Rolling Left: NT  Supine to Sit: NT  Sit to Supine: NT       TRANSFERS Daily Assessment   Pre gait stretch -seated ant lean past knees x 5.  Good sit to stand with good forward lean.    Sit to stand to walker - walked to bathroom for standing balance and face washing.  Seated rest.  Back up to brush teeth.    O2 sat mid 80s on 3L  bumped up to 6 for continued activity with sats in upper 80s to low 90s       Sit to Stand: cga  Stand to Sit: cga  Transfer Type: Stand Pivot with RW  Transfer Assistance: cga  Car Transfers: NT         GAIT Daily Assessment   Cues for upright posture and staying closer to walker. More shuffled steps today. Total 150'  standing rest  x 3 q. 50'.  At 50' pt tends to drop sats to 88 - 89%.          STEPS/STAIRS Daily Assessment    N/a       BALANCE Daily Assessment    Static Sitting: normal  Dynamic Sitting: Good  Static Standing: Good with RW support  Dynamic Standing: Fair       WHEELCHAIR MOBILITY Daily Assessment   NT        LOWER EXTREMITY EXERCISES Daily Assessment         Pain level: denies pain  Pain Location:    Pain Interventions:     Vital Signs:  /76   Pulse 80   Temp 97.7 °F (36.5 °C) (Oral)   Resp 18   Ht 1.753 m (5' 9\")   Wt 84 kg (185 lb 3.2 oz)   SpO2 94%   BMI 27.35 kg/m²      Education:  Discussed gait safety with RW, pacing and energy conservation,  gait x 50' at a time for multiple reps for now    Interdisciplinary Communication: Discussed with care team re: SOB. Pt with new orders for mucinex    Pt up in recliner after session with call bell in reach. Assessment: Sats not as good today. Needed 6 L O2 this session to maintain sats in low 90s. Recommend multiple 50' walks vs. One long walk for now. Will progress as tolerated. Plan of Care: Continue with POC and progress as tolerated.       Trever Marrufo [place]  12/30/2023

## 2023-12-30 NOTE — PROGRESS NOTES
[place]patient [place] [address]  Tel: [phone]     Physical Medicine and Rehabilitation Progress Note      Michael Archie Paduano III  Admit Date: 12/20/2023  Admit Diagnosis: Debility [R53.81]  History of coronary artery bypass graft x 3 [Z95.1]    Subjective     Patient seen face-to-face and evaluated. NAEO and he reports sleeping OK. He is eating and drinking fine, and continent of bowel and bladder. No pain reported, just some soreness around inferior thoracotomy area. He said his therapies are going well. He has appeared much improved clinically lately breathing better. Although, he's still on 6L O2. He reported continued cough, but improved. Cardiology and Pulmonology consulted and workup including CXR (same/improved), BMP, CBC, PCT. CBC showed leukocytosis (WBCs 13.8, but had been elevated other than last CBC), PCT 0.06, CT Chest negative PE, afebrile. WBCs 31.3 on 12/26/23 likely d/t corticosteroids, Hg improved to 9.6. His lungs are about th same today as they've been for several days.    Objective:     Current Facility-Administered Medications   Medication Dose Route Frequency    guaiFENesin (MUCINEX) extended release tablet 1,200 mg  1,200 mg Oral BID    predniSONE (DELTASONE) tablet 30 mg  30 mg Oral Daily    Followed by    [START ON 1/1/2024] predniSONE (DELTASONE) tablet 20 mg  20 mg Oral Daily    Followed by    [START ON 1/3/2024] predniSONE (DELTASONE) tablet 10 mg  10 mg Oral Daily    glucose chewable tablet 16 g  4 tablet Oral PRN    dextrose bolus 10% 125 mL  125 mL IntraVENous PRN    Or    dextrose bolus 10% 250 mL  250 mL IntraVENous PRN    glucagon (rDNA) injection 1 mg  1 mg SubCUTAneous PRN    dextrose 10 % infusion   IntraVENous Continuous PRN    metFORMIN (GLUCOPHAGE) tablet 1,000 mg  1,000 mg Oral BID WC    budesonide (PULMICORT) nebulizer suspension 500 mcg  0.5 mg Nebulization BID RT    albuterol (PROVENTIL) (2.5 MG/3ML) 0.083%  occupational therapies.  - Begin intensive Physical Therapy for a minimum of 1.5 hours a day, at least 5 out of 7 days per week to address bed mobility, transfers, ambulation, strengthening, balance, and endurance.   - Begin intensive Occupational Therapy for a minimum of 1.5 hours a day, at least 5 out of 7 days per week to address ADLs (bathing, LE dressing, toileting) and adaptive equipment as needed.  - Continue Speech Therapy prn for: dysarthria, impaired communication skills; therapy schedule may be adjusted by MD based on patient's needs.  Each of these therapies will be continued as above for the duration of the inpatient rehab stay.     The patient will also require 24-hour skilled rehabilitation nursing for bowel and bladder management, skin care for decubitus ulcer prevention, pain management and ongoing medication administration.     Plan / Recommendations / Medical Decision Making:      Daily physician / PA medical management:     Debility [R53.81]  History of coronary artery bypass graft x 3 [Z95.1] - Sternal precautions. Start interdisciplinary approach to rehabilitation including PT, OT, nursing and physiatry and disease specific education.      HNT/Afib/CAD/HLD - New onset post op Afib. Continue Metoprolol Succinate 25mg daily Amiodarone 400mg BID x5 days then 200mg BID x 14 days, Lisinopril 20mg daily, Lipitor 80mg qhs, ASA 81mg daily and Eliquis 5mg BID.     Diabetes mellitus - HgbA1c 7.6 on 12/18/23%; impaired / poor glycemic control. Will require ACHS glucose monitoring and medication adjustment to optimize control.   -Continue Glipizide 2.5mg BID, Metformin 500mg BID, SSI, Regular ADA Cardiac diet.     Pain management - No reported post op or other pain on IPR admission. Will require regular pain assessment and management. Continue Tylenol prn and Tramadol 50mg q6h prn.     Electrolyte abnormality - monitor BMPs and replace as needed.     Pneumonia prophylaxis - incentive spirometer 10x every  Jefferson Irwin MD (CT Surgery) per his f/u scheduling  SWATHI Alcantara CNP (PCP) 1-2 weeks post IPR    Time spent was 35 minutes with over 1/2 in direct patient care / examination, consultation with therapists / nursing and coordination of care.     Signed By: Bryan Greenfield MD     December 30, 2023

## 2023-12-30 NOTE — PROGRESS NOTES
OT Daily Note  Time In 1101   Time Out 1133     Subjective: \"They're a little tired\"  Pain: 0/10  Education: N/A  Interdisciplinary Communication: RN, PT, RT    Mobility   Score Comments   Rolling Partial/moderate assistance    Supine to Sit Partial/moderate assistance    Sit to Supine      Sit to Stand Partial/moderate assistance    Transfer Assist Partial/moderate assistance      Coordination   Pt completed therapeutic activity to challenge BUE AROM/STR, FM/GM coordination, bi-manual coordination/dexterity, problem solving and activity tolerance in preparation for safe return to max (I) with I/ADLs. Pt tolerated activity well with no c/o pain. Rest break utilized as needed secondary to hand fatigue. Pt completed home management board seated in w/c at table. Pt initiated on lock activity. Pt able to retreive keys and completed 1 lock with increased time. Pt limited by tremors and significantly decreased FM coordination. Pt transitioned to bolt grid. Pt completed 75% with increased time and rest breaks secondary to hand fatigue. 3-5 verbal cues for problem solving and directions throughout. Assessment: [Pt on 6L o2 via NC] Pt tolerated session well. Pt making progress since [facility]. Plan: Continue OT POC.      Kain Killian OT   12/30/2023

## 2023-12-31 VITALS
OXYGEN SATURATION: 96 % | SYSTOLIC BLOOD PRESSURE: 121 MMHG | HEIGHT: 69 IN | HEART RATE: 66 BPM | DIASTOLIC BLOOD PRESSURE: 68 MMHG | BODY MASS INDEX: 27.72 KG/M2 | RESPIRATION RATE: 21 BRPM | WEIGHT: 187.17 LBS | TEMPERATURE: 97.3 F

## 2023-12-31 LAB
GLUCOSE BLD STRIP.AUTO-MCNC: 192 MG/DL (ref 65–100)
GLUCOSE BLD STRIP.AUTO-MCNC: 279 MG/DL (ref 65–100)
GLUCOSE BLD STRIP.AUTO-MCNC: 298 MG/DL (ref 65–100)
GLUCOSE BLD STRIP.AUTO-MCNC: 332 MG/DL (ref 65–100)
GLUCOSE BLD STRIP.AUTO-MCNC: 355 MG/DL (ref 65–100)
SERVICE CMNT-IMP: ABNORMAL

## 2023-12-31 PROCEDURE — 6370000000 HC RX 637 (ALT 250 FOR IP): Performed by: PHYSICIAN ASSISTANT

## 2023-12-31 PROCEDURE — 82962 GLUCOSE BLOOD TEST: CPT

## 2023-12-31 PROCEDURE — 94640 AIRWAY INHALATION TREATMENT: CPT

## 2023-12-31 PROCEDURE — 2700000000 HC OXYGEN THERAPY PER DAY

## 2023-12-31 PROCEDURE — 6370000000 HC RX 637 (ALT 250 FOR IP): Performed by: PHYSICAL MEDICINE & REHABILITATION

## 2023-12-31 PROCEDURE — 1180000000 HC REHAB R&B

## 2023-12-31 PROCEDURE — 94760 N-INVAS EAR/PLS OXIMETRY 1: CPT

## 2023-12-31 PROCEDURE — 6360000002 HC RX W HCPCS: Performed by: INTERNAL MEDICINE

## 2023-12-31 PROCEDURE — 94761 N-INVAS EAR/PLS OXIMETRY MLT: CPT

## 2023-12-31 PROCEDURE — 6370000000 HC RX 637 (ALT 250 FOR IP): Performed by: STUDENT IN AN ORGANIZED HEALTH CARE EDUCATION/TRAINING PROGRAM

## 2023-12-31 RX ADMIN — FAMOTIDINE 20 MG: 20 TABLET ORAL at 20:28

## 2023-12-31 RX ADMIN — Medication 1 AMPULE: at 20:31

## 2023-12-31 RX ADMIN — SERTRALINE 100 MG: 100 TABLET, FILM COATED ORAL at 08:22

## 2023-12-31 RX ADMIN — METFORMIN HYDROCHLORIDE 1000 MG: 500 TABLET ORAL at 16:57

## 2023-12-31 RX ADMIN — METOPROLOL SUCCINATE 25 MG: 25 TABLET, FILM COATED, EXTENDED RELEASE ORAL at 08:22

## 2023-12-31 RX ADMIN — FOLIC ACID 1 MG: 1 TABLET ORAL at 08:22

## 2023-12-31 RX ADMIN — LEVOTHYROXINE SODIUM 150 MCG: 0.07 TABLET ORAL at 05:20

## 2023-12-31 RX ADMIN — METFORMIN HYDROCHLORIDE 1000 MG: 500 TABLET ORAL at 08:22

## 2023-12-31 RX ADMIN — ALBUTEROL SULFATE 2.5 MG: 2.5 SOLUTION RESPIRATORY (INHALATION) at 12:25

## 2023-12-31 RX ADMIN — BUDESONIDE INHALATION 500 MCG: 0.5 SUSPENSION RESPIRATORY (INHALATION) at 05:46

## 2023-12-31 RX ADMIN — ATORVASTATIN CALCIUM 80 MG: 80 TABLET, FILM COATED ORAL at 20:28

## 2023-12-31 RX ADMIN — PREDNISONE 30 MG: 5 TABLET ORAL at 08:22

## 2023-12-31 RX ADMIN — Medication 50 MG: at 08:21

## 2023-12-31 RX ADMIN — TRAZODONE HYDROCHLORIDE 50 MG: 50 TABLET ORAL at 20:28

## 2023-12-31 RX ADMIN — ASPIRIN 81 MG: 81 TABLET ORAL at 08:22

## 2023-12-31 RX ADMIN — DOCUSATE SODIUM 50 MG AND SENNOSIDES 8.6 MG 2 TABLET: 8.6; 5 TABLET, FILM COATED ORAL at 20:27

## 2023-12-31 RX ADMIN — GUAIFENESIN 1200 MG: 600 TABLET ORAL at 20:28

## 2023-12-31 RX ADMIN — Medication 500 MG: at 08:22

## 2023-12-31 RX ADMIN — Medication 1 AMPULE: at 08:28

## 2023-12-31 RX ADMIN — GUAIFENESIN 1200 MG: 600 TABLET ORAL at 08:23

## 2023-12-31 RX ADMIN — AMIODARONE HYDROCHLORIDE 400 MG: 200 TABLET ORAL at 08:21

## 2023-12-31 RX ADMIN — APIXABAN 5 MG: 5 TABLET, FILM COATED ORAL at 08:22

## 2023-12-31 RX ADMIN — AMIODARONE HYDROCHLORIDE 400 MG: 200 TABLET ORAL at 20:27

## 2023-12-31 RX ADMIN — ALBUTEROL SULFATE 2.5 MG: 2.5 SOLUTION RESPIRATORY (INHALATION) at 05:46

## 2023-12-31 RX ADMIN — LISINOPRIL 20 MG: 20 TABLET ORAL at 08:22

## 2023-12-31 RX ADMIN — BUDESONIDE INHALATION 500 MCG: 0.5 SUSPENSION RESPIRATORY (INHALATION) at 18:08

## 2023-12-31 RX ADMIN — FAMOTIDINE 20 MG: 20 TABLET ORAL at 08:21

## 2023-12-31 RX ADMIN — APIXABAN 5 MG: 5 TABLET, FILM COATED ORAL at 20:27

## 2023-12-31 RX ADMIN — INSULIN LISPRO 4 UNITS: 100 INJECTION, SOLUTION INTRAVENOUS; SUBCUTANEOUS at 12:15

## 2023-12-31 RX ADMIN — B-COMPLEX W/ C & FOLIC ACID TAB 1 TABLET: TAB at 08:22

## 2023-12-31 RX ADMIN — VITAMIN D, TAB 1000IU (100/BT) 1000 UNITS: 25 TAB at 08:21

## 2023-12-31 RX ADMIN — INSULIN LISPRO 8 UNITS: 100 INJECTION, SOLUTION INTRAVENOUS; SUBCUTANEOUS at 16:57

## 2023-12-31 RX ADMIN — ALBUTEROL SULFATE 2.5 MG: 2.5 SOLUTION RESPIRATORY (INHALATION) at 18:08

## 2024-01-01 ENCOUNTER — APPOINTMENT (OUTPATIENT)
Dept: GENERAL RADIOLOGY | Age: 74
DRG: 949 | End: 2024-01-01
Attending: PHYSICAL MEDICINE & REHABILITATION
Payer: MEDICARE

## 2024-01-01 LAB
GLUCOSE BLD STRIP.AUTO-MCNC: 181 MG/DL (ref 65–100)
GLUCOSE BLD STRIP.AUTO-MCNC: 236 MG/DL (ref 65–100)
GLUCOSE BLD STRIP.AUTO-MCNC: 237 MG/DL (ref 65–100)
GLUCOSE BLD STRIP.AUTO-MCNC: 354 MG/DL (ref 65–100)
SERVICE CMNT-IMP: ABNORMAL

## 2024-01-01 PROCEDURE — 82962 GLUCOSE BLOOD TEST: CPT

## 2024-01-01 PROCEDURE — 97530 THERAPEUTIC ACTIVITIES: CPT

## 2024-01-01 PROCEDURE — 97535 SELF CARE MNGMENT TRAINING: CPT

## 2024-01-01 PROCEDURE — 6360000002 HC RX W HCPCS: Performed by: INTERNAL MEDICINE

## 2024-01-01 PROCEDURE — 6370000000 HC RX 637 (ALT 250 FOR IP): Performed by: PHYSICIAN ASSISTANT

## 2024-01-01 PROCEDURE — 6370000000 HC RX 637 (ALT 250 FOR IP): Performed by: PHYSICAL MEDICINE & REHABILITATION

## 2024-01-01 PROCEDURE — 97110 THERAPEUTIC EXERCISES: CPT

## 2024-01-01 PROCEDURE — 2700000000 HC OXYGEN THERAPY PER DAY

## 2024-01-01 PROCEDURE — 92507 TX SP LANG VOICE COMM INDIV: CPT

## 2024-01-01 PROCEDURE — 94761 N-INVAS EAR/PLS OXIMETRY MLT: CPT

## 2024-01-01 PROCEDURE — 94760 N-INVAS EAR/PLS OXIMETRY 1: CPT

## 2024-01-01 PROCEDURE — 71046 X-RAY EXAM CHEST 2 VIEWS: CPT

## 2024-01-01 PROCEDURE — 97116 GAIT TRAINING THERAPY: CPT

## 2024-01-01 PROCEDURE — 94640 AIRWAY INHALATION TREATMENT: CPT

## 2024-01-01 PROCEDURE — 1180000000 HC REHAB R&B

## 2024-01-01 PROCEDURE — 6370000000 HC RX 637 (ALT 250 FOR IP): Performed by: STUDENT IN AN ORGANIZED HEALTH CARE EDUCATION/TRAINING PROGRAM

## 2024-01-01 PROCEDURE — 99231 SBSQ HOSP IP/OBS SF/LOW 25: CPT | Performed by: PHYSICAL MEDICINE & REHABILITATION

## 2024-01-01 RX ORDER — INSULIN GLARGINE 100 [IU]/ML
10 INJECTION, SOLUTION SUBCUTANEOUS DAILY
Status: DISCONTINUED | OUTPATIENT
Start: 2024-01-01 | End: 2024-01-02

## 2024-01-01 RX ADMIN — ATORVASTATIN CALCIUM 80 MG: 80 TABLET, FILM COATED ORAL at 21:01

## 2024-01-01 RX ADMIN — Medication 1 AMPULE: at 08:40

## 2024-01-01 RX ADMIN — B-COMPLEX W/ C & FOLIC ACID TAB 1 TABLET: TAB at 08:39

## 2024-01-01 RX ADMIN — Medication 500 MG: at 08:39

## 2024-01-01 RX ADMIN — DOCUSATE SODIUM 50 MG AND SENNOSIDES 8.6 MG 2 TABLET: 8.6; 5 TABLET, FILM COATED ORAL at 08:39

## 2024-01-01 RX ADMIN — ASPIRIN 81 MG: 81 TABLET ORAL at 08:39

## 2024-01-01 RX ADMIN — FAMOTIDINE 20 MG: 20 TABLET ORAL at 21:01

## 2024-01-01 RX ADMIN — APIXABAN 5 MG: 5 TABLET, FILM COATED ORAL at 21:01

## 2024-01-01 RX ADMIN — APIXABAN 5 MG: 5 TABLET, FILM COATED ORAL at 08:39

## 2024-01-01 RX ADMIN — AMIODARONE HYDROCHLORIDE 400 MG: 200 TABLET ORAL at 21:01

## 2024-01-01 RX ADMIN — VITAMIN D, TAB 1000IU (100/BT) 1000 UNITS: 25 TAB at 08:39

## 2024-01-01 RX ADMIN — PREDNISONE 20 MG: 20 TABLET ORAL at 08:39

## 2024-01-01 RX ADMIN — SERTRALINE 100 MG: 100 TABLET, FILM COATED ORAL at 08:40

## 2024-01-01 RX ADMIN — Medication 1 AMPULE: at 21:01

## 2024-01-01 RX ADMIN — BUDESONIDE INHALATION 500 MCG: 0.5 SUSPENSION RESPIRATORY (INHALATION) at 17:43

## 2024-01-01 RX ADMIN — ALBUTEROL SULFATE 2.5 MG: 2.5 SOLUTION RESPIRATORY (INHALATION) at 17:43

## 2024-01-01 RX ADMIN — AMIODARONE HYDROCHLORIDE 400 MG: 200 TABLET ORAL at 08:40

## 2024-01-01 RX ADMIN — METFORMIN HYDROCHLORIDE 1000 MG: 500 TABLET ORAL at 08:40

## 2024-01-01 RX ADMIN — GUAIFENESIN 1200 MG: 600 TABLET ORAL at 08:39

## 2024-01-01 RX ADMIN — METOPROLOL SUCCINATE 25 MG: 25 TABLET, FILM COATED, EXTENDED RELEASE ORAL at 08:39

## 2024-01-01 RX ADMIN — Medication 50 MG: at 08:39

## 2024-01-01 RX ADMIN — ACETAMINOPHEN 650 MG: 325 TABLET ORAL at 17:39

## 2024-01-01 RX ADMIN — INSULIN GLARGINE 10 UNITS: 100 INJECTION, SOLUTION SUBCUTANEOUS at 12:54

## 2024-01-01 RX ADMIN — FAMOTIDINE 20 MG: 20 TABLET ORAL at 08:39

## 2024-01-01 RX ADMIN — LISINOPRIL 20 MG: 20 TABLET ORAL at 08:39

## 2024-01-01 RX ADMIN — BUDESONIDE INHALATION 500 MCG: 0.5 SUSPENSION RESPIRATORY (INHALATION) at 06:00

## 2024-01-01 RX ADMIN — METFORMIN HYDROCHLORIDE 1000 MG: 500 TABLET ORAL at 17:39

## 2024-01-01 RX ADMIN — LEVOTHYROXINE SODIUM 150 MCG: 0.07 TABLET ORAL at 05:28

## 2024-01-01 RX ADMIN — INSULIN LISPRO 8 UNITS: 100 INJECTION, SOLUTION INTRAVENOUS; SUBCUTANEOUS at 12:55

## 2024-01-01 RX ADMIN — GUAIFENESIN 1200 MG: 600 TABLET ORAL at 21:01

## 2024-01-01 RX ADMIN — INSULIN LISPRO 2 UNITS: 100 INJECTION, SOLUTION INTRAVENOUS; SUBCUTANEOUS at 17:39

## 2024-01-01 RX ADMIN — FOLIC ACID 1 MG: 1 TABLET ORAL at 08:39

## 2024-01-01 RX ADMIN — ALBUTEROL SULFATE 2.5 MG: 2.5 SOLUTION RESPIRATORY (INHALATION) at 06:00

## 2024-01-01 ASSESSMENT — PAIN SCALES - GENERAL: PAINLEVEL_OUTOF10: 0

## 2024-01-01 NOTE — PROGRESS NOTES
[facility]  SPEECH LANGUAGE PATHOLOGY: COMMUNICATION Daily Note #1    MRN: [id number]    ADMISSION DATE: 12/20/2023  PRIMARY DIAGNOSIS: History of coronary artery bypass graft x 3  Debility [R53.81]  History of coronary artery bypass graft x 3 [Z95.1]    ICD-10: Treatment Diagnosis:  R41.841 Cognitive-Communication Deficit    RECOMMENDATIONS:   Recommendations:   -assist with iADLs     Patient continues to require skilled intervention: yes     ASSESSMENT   Patient making good progress towards goals.   Increased problem solving for simple calculations to improve ability to manage money.  Patient reports he and his wife typically manage iADLs together.       GENERAL   Subjective:  \"my chest is so sore today\"      Pain:   Patient c/o pain  -chest sore                                           OBJECTIVE   Patient seen for cognitive communication treatment:  Verbal problem solving/safety: 4/4  Patient uses pill box to organize medications. His wife assists in organizing. His daughter in law then checks over it.     Patient solved functional calculations related to money management(addition/subtraction): 6/6        PLAN    Duration/Frequency: Continue to follow patient 4-5 x/week for duration of inpatient rehab stay to address goals listed or until goals met.     Medical Necessity/Other Comments:   Patient is expected to demonstrate progress in cognitive ability to increase independence with activities of daily living.      PATIENT EDUCATION:  Education provided on the following topics: cognition, role of SLP, assist with iADLs  Education provided to: patient  Response to education: verbalized understanding.  Interdisciplinary Collaboration:   OT    Safety:   Patient left in bed.  Call light left within reach.      Therapy Time  SLP Individual Minutes  Time In: 0920  Time Out: 0945  Minutes: 25          CAROLINE Ford, CCC-SLP    1/1/2024 9:53 AM

## 2024-01-01 NOTE — PROGRESS NOTES
OT Daily Note  Time In:    1117     Time Out: 1203        Subjective: \"I think I am clearing up from last week.\"  Pain: denied during all activities   Education: building activity tolerance & reaching skills   Interdisciplinary Communication: with PTA during handoff     Mobility   Score Comments   Sit to Stand  CGA    Transfer Assist  CGA SPT with RW     Activity Tolerance   Pt completed 10 minutes on the ergometer frontwards and backwards with light resistance to increase UB strength and activity tolerance for integration into functional tasks.      Reaching & Cognition   Pt completed visual perceptual, reaching, activity tolerance, and bilateral hand coordination task utilizing Rubber Band Board while following two visual designs. Pt with fair coordination skills noted while placing rubber bands onto design at midline. Pt with fair reaching skills as well. Pt required extra time for processing visual designs but completed with only 1 verbal cue throughout. Pt completed activity in seated position at table top.      Assessment: Pt stating he felt an improvement in mental status today vs last week. Pt is progressing well in activity tolerance for carryover into ADLs. Pt's SPO2 level 97-98% on 4 L, discussing with PTA on titrating pt down in future sessions.   Plan: Continue OT POC.     Mikey Enrique, OT   1/1/2024

## 2024-01-01 NOTE — PROGRESS NOTES
Physical Therapy  PHYSICAL THERAPY DAILY NOTE  Time In:  1348 PM  Time Out:  1435 PM  Total Treatment Time:  (P) 47 Minutes  Pt. Seen for: PM, Gait Training, Therapeutic Exercise, and Transfer Training     Subjective: \"I am feeling good today.\"         Objective:  Precautions:  cardiac    GROSS ASSESSMENT Daily Assessment           COGNITION Daily Assessment           BED/MAT MOBILITY Daily Assessment    Rolling Right:   Rolling Left:   Supine to Sit: NT  Sit to Supine: NT       TRANSFERS Daily Assessment    Sit to Stand: CGA and Min A  Stand to Sit: CGA and Min A  Transfer Type: Stand Pivot and with rolling walker  Transfer Assistance: CGA and Min A  Car Transfers: NT         GAIT Daily Assessment    Amount of Assistance: Min A  Distance (ft): 60  Assistive Device: RW  Surface: Level Surface       STEPS/STAIRS Daily Assessment    Steps Ambulated:    Level of Assistance:    Railing:  Assistive Device:        BALANCE Daily Assessment    Static Sitting:   Dynamic Sitting:   Static Standing:   Dynamic Standing:        WHEELCHAIR MOBILITY Daily Assessment    Able to Propel (ft):   Assistance:   Surface:   Wheelchair Set-up:        LOWER EXTREMITY EXERCISES Daily Assessment    SEATED EXERCISES Sets Reps Comments   Ankle Pumps 2 10    Hip Flexion 2 10    Long Arc Quads 2 10    Hip Adduction/Ball Squeeze 2 10    Hip Abduction with no Theraband 2 10    Hamstring Curls with no Theraband 2 10    Hip Extension with no Theraband 2 10           Pain level: no pain noted  Pain Location:    Pain Interventions:     Vital Signs:  /65   Pulse 72   Temp 97.3 °F (36.3 °C) (Oral)   Resp 18   Ht 1.753 m (5' 9\")   Wt 84.8 kg (186 lb 15.2 oz)   SpO2 90%   BMI 27.61 kg/m²       Education:      Interdisciplinary Communication:     Pt. Left in recliner with call bell at reach.         Assessment: Patient making good progress.         Plan of Care: Continue with plan of care.    Ivon Storey, PTA  1/1/2024

## 2024-01-01 NOTE — PROGRESS NOTES
[place] [address]  Tel: [phone]     Physical Medicine and Rehabilitation Progress Note      Michael Archie Paduano III  Admit Date: 12/20/2023  Admit Diagnosis: Debility [R53.81]  History of coronary artery bypass graft x 3 [Z95.1]    Subjective     Patient seen face-to-face and evaluated. NAEO and he reports sleeping well. He is eating and drinking fine, and continent of bowel and bladder. No pain reported, just some soreness around inferior thoracotomy area. He said his therapies are going very well. He has appeared much improved clinically lately breathing better. Although, he's still on 4L O2. He reported continued cough, but improved. Cardiology and Pulmonology consulted and workup including CXR (same/improved), BMP, CBC, PCT. CBC showed leukocytosis (WBCs 13.8, but had been elevated other than last CBC), PCT 0.06, CT Chest negative PE, afebrile. WBCs 31.3 on 12/26/23 -> 21.2 (12/30) likely d/t corticosteroids, Hg improved to 9.6 -> 8.7. His lungs are about th same today as they've been for several days, and he's breathing Ok now on 4L. Glargine 10U daily added.    Objective:     Current Facility-Administered Medications   Medication Dose Route Frequency    insulin glargine (LANTUS) injection vial 10 Units  10 Units SubCUTAneous Daily    guaiFENesin (MUCINEX) extended release tablet 1,200 mg  1,200 mg Oral BID    predniSONE (DELTASONE) tablet 20 mg  20 mg Oral Daily    Followed by    [START ON 1/3/2024] predniSONE (DELTASONE) tablet 10 mg  10 mg Oral Daily    glucose chewable tablet 16 g  4 tablet Oral PRN    dextrose bolus 10% 125 mL  125 mL IntraVENous PRN    Or    dextrose bolus 10% 250 mL  250 mL IntraVENous PRN    glucagon (rDNA) injection 1 mg  1 mg SubCUTAneous PRN    dextrose 10 % infusion   IntraVENous Continuous PRN    metFORMIN (GLUCOPHAGE) tablet 1,000 mg  1,000 mg Oral BID     budesonide (PULMICORT) nebulizer suspension 500  Performed by: RenukaCompanion    POCT Glucose    Collection Time: 12/31/23  7:53 PM   Result Value Ref Range    POC Glucose 279 (H) 65 - 100 mg/dL    Performed by: Vivi    POCT Glucose    Collection Time: 01/01/24  5:30 AM   Result Value Ref Range    POC Glucose 181 (H) 65 - 100 mg/dL    Performed by: Malik    POCT Glucose    Collection Time: 01/01/24 11:31 AM   Result Value Ref Range    POC Glucose 354 (H) 65 - 100 mg/dL    Performed by: RenukaCompanion      CXR (12/23/23):  IMPRESSION:  Persistent, but decreased left pulmonary disease, small left pleural effusion.      Assessment:      This is a 73 y.o. debilitated M due to a 3v CABG in setting of OA and RA limiting his mobility.    Rehabilitation Plan  The patient has shown the ability to tolerate and benefit from 3 hours of therapy daily and is being admitted to a comprehensive acute inpatient rehabilitation program consisting of at least 3 hours of combined physical and occupational therapies.  - Begin intensive Physical Therapy for a minimum of 1.5 hours a day, at least 5 out of 7 days per week to address bed mobility, transfers, ambulation, strengthening, balance, and endurance.   - Begin intensive Occupational Therapy for a minimum of 1.5 hours a day, at least 5 out of 7 days per week to address ADLs (bathing, LE dressing, toileting) and adaptive equipment as needed.  - Continue Speech Therapy prn for: dysarthria, impaired communication skills; therapy schedule may be adjusted by MD based on patient's needs.  Each of these therapies will be continued as above for the duration of the inpatient rehab stay.     The patient will also require 24-hour skilled rehabilitation nursing for bowel and bladder management, skin care for decubitus ulcer prevention, pain management and ongoing medication administration.     Plan / Recommendations / Medical Decision Making:      Daily physician / PA medical management:

## 2024-01-01 NOTE — PROGRESS NOTES
OT DAILY NOTE    Time In:    0945     Time Out: 1030       Functional Mobility   Score Comments   Sit to Stand 4: Supervision or touching A CGA   Transfer Assist 4: Supervision or touching A CGA with SPT using RW     Activities of Daily Living   Score Comments   Eating Independent I   Oral Hygiene Independent I seated in w/c at sink   Bathing Supervision or touching assistance partial sink bath completed while seated in wc, CGA while pt stood to wash bottom and moni area   Upper Body  Dressing Setup or clean-up assistance S/U doff/don pullover, Cristine to thread UEs in buttondown shirt while maintaining sternal precautions   Lower Body Dressing Supervision or touching assistance CGA in stance, rest breaks between donning briefs and pants and between sit to stands. SpO2 level 93% on 4 L   Donning/[place] Footwear Setup or clean-up assistance  socks already donned, setup for pt to don slippers   Toilet Transfer Supervision or touching assistance CGA SPT   Toileting Hygiene Partial/moderate assistance Cristine bowel hygiene thoroughness. Cues for seated rest break.       Summary of Session: S: \"I already went down earlier for my chest x-ray.\" Agreeable to therapy.   Focus of session was on morning ADL routine.   Pain not expressed.  Collaborated with PT and confirmed patient is on track to reach goals as documented in the care plan.   Continue OT POC with focus on ADL/IADL skills, functional transfers, functional mobility, coordination, strength, static and dynamic balance, and activity tolerance to maximize safety and independence with ADLs and functional transfers.   Patient ended session in wc with PTA assuming care.        Mikey Enrique, OT  1/1/2024

## 2024-01-01 NOTE — PROGRESS NOTES
Physical Therapy  PHYSICAL THERAPY DAILY NOTE  Time In:  1030 AM  Time Out:  1117 AM  Total Treatment Time:  (P) 47 Minutes  Pt. Seen for: AM, Therapeutic Exercise, and Transfer Training     Subjective: \" I am feeling more normal today.\"         Objective:  Precautions: Falls, Poor Safety Awareness, and 4 liters of O2    GROSS ASSESSMENT Daily Assessment           COGNITION Daily Assessment    intact       BED/MAT MOBILITY Daily Assessment    Rolling Right:   Rolling Left:   Supine to Sit: CGA  Sit to Supine: CGA and Min A       TRANSFERS Daily Assessment    Sit to Stand: CGA  Stand to Sit: CGA  Transfer Type: Stand Pivot and with rolling walker  Transfer Assistance: CGA  Car Transfers: NT         GAIT Daily Assessment    Amount of Assistance: NT  Distance (ft):   Assistive Device:   Surface:        STEPS/STAIRS Daily Assessment    Steps Ambulated:    Level of Assistance:    Railing:  Assistive Device:        BALANCE Daily Assessment    Static Sitting:   Dynamic Sitting:   Static Standing:   Dynamic Standing:        WHEELCHAIR MOBILITY Daily Assessment    Able to Propel (ft):   Assistance:   Surface:   Wheelchair Set-up:        LOWER EXTREMITY EXERCISES Daily Assessment    SEATED EXERCISES Sets Reps Comments   Ankle Pumps 2 10    Hip Flexion 2 10    Long Arc Quads 2 10    Hip Adduction/Ball Squeeze 2 10    Hip Abduction with green Theraband 2 10    Hamstring Curls with green Theraband 2 10    Hip Extension with green Theraband 2 10           Pain level: no pain noted  Pain Location:    Pain Interventions:     Vital Signs:  /65   Pulse 72   Temp 97.3 °F (36.3 °C) (Oral)   Resp 18   Ht 1.753 m (5' 9\")   Wt 84.8 kg (186 lb 15.2 oz)   SpO2 90%   BMI 27.61 kg/m²       Education:      Interdisciplinary Communication:     Pt. Left in wheelchair in gym for next session with OT         Assessment: Patient seems very motivated with therapy.         Plan of Care: Continue with plan of care.    Ivon Storey

## 2024-01-02 LAB
GLUCOSE BLD STRIP.AUTO-MCNC: 166 MG/DL (ref 65–100)
GLUCOSE BLD STRIP.AUTO-MCNC: 230 MG/DL (ref 65–100)
GLUCOSE BLD STRIP.AUTO-MCNC: 253 MG/DL (ref 65–100)
GLUCOSE BLD STRIP.AUTO-MCNC: 255 MG/DL (ref 65–100)
SERVICE CMNT-IMP: ABNORMAL

## 2024-01-02 PROCEDURE — 97535 SELF CARE MNGMENT TRAINING: CPT

## 2024-01-02 PROCEDURE — 6360000002 HC RX W HCPCS: Performed by: INTERNAL MEDICINE

## 2024-01-02 PROCEDURE — 6370000000 HC RX 637 (ALT 250 FOR IP): Performed by: PHYSICIAN ASSISTANT

## 2024-01-02 PROCEDURE — 97530 THERAPEUTIC ACTIVITIES: CPT

## 2024-01-02 PROCEDURE — 97110 THERAPEUTIC EXERCISES: CPT

## 2024-01-02 PROCEDURE — 94760 N-INVAS EAR/PLS OXIMETRY 1: CPT

## 2024-01-02 PROCEDURE — 99232 SBSQ HOSP IP/OBS MODERATE 35: CPT | Performed by: INTERNAL MEDICINE

## 2024-01-02 PROCEDURE — 94640 AIRWAY INHALATION TREATMENT: CPT

## 2024-01-02 PROCEDURE — 94761 N-INVAS EAR/PLS OXIMETRY MLT: CPT

## 2024-01-02 PROCEDURE — 97116 GAIT TRAINING THERAPY: CPT

## 2024-01-02 PROCEDURE — 2700000000 HC OXYGEN THERAPY PER DAY

## 2024-01-02 PROCEDURE — 92507 TX SP LANG VOICE COMM INDIV: CPT

## 2024-01-02 PROCEDURE — 6370000000 HC RX 637 (ALT 250 FOR IP): Performed by: STUDENT IN AN ORGANIZED HEALTH CARE EDUCATION/TRAINING PROGRAM

## 2024-01-02 PROCEDURE — 82962 GLUCOSE BLOOD TEST: CPT

## 2024-01-02 PROCEDURE — 1180000000 HC REHAB R&B

## 2024-01-02 PROCEDURE — 6370000000 HC RX 637 (ALT 250 FOR IP): Performed by: PHYSICAL MEDICINE & REHABILITATION

## 2024-01-02 PROCEDURE — 99231 SBSQ HOSP IP/OBS SF/LOW 25: CPT | Performed by: PHYSICAL MEDICINE & REHABILITATION

## 2024-01-02 RX ORDER — INSULIN GLARGINE 100 [IU]/ML
20 INJECTION, SOLUTION SUBCUTANEOUS DAILY
Status: DISCONTINUED | OUTPATIENT
Start: 2024-01-03 | End: 2024-01-04

## 2024-01-02 RX ADMIN — ALBUTEROL SULFATE 2.5 MG: 2.5 SOLUTION RESPIRATORY (INHALATION) at 06:11

## 2024-01-02 RX ADMIN — INSULIN LISPRO 2 UNITS: 100 INJECTION, SOLUTION INTRAVENOUS; SUBCUTANEOUS at 16:55

## 2024-01-02 RX ADMIN — GUAIFENESIN 1200 MG: 600 TABLET ORAL at 08:09

## 2024-01-02 RX ADMIN — ALBUTEROL SULFATE 2.5 MG: 2.5 SOLUTION RESPIRATORY (INHALATION) at 11:19

## 2024-01-02 RX ADMIN — LISINOPRIL 20 MG: 20 TABLET ORAL at 08:10

## 2024-01-02 RX ADMIN — B-COMPLEX W/ C & FOLIC ACID TAB 1 TABLET: TAB at 08:09

## 2024-01-02 RX ADMIN — SERTRALINE 100 MG: 100 TABLET, FILM COATED ORAL at 08:09

## 2024-01-02 RX ADMIN — APIXABAN 5 MG: 5 TABLET, FILM COATED ORAL at 20:59

## 2024-01-02 RX ADMIN — AMIODARONE HYDROCHLORIDE 400 MG: 200 TABLET ORAL at 08:09

## 2024-01-02 RX ADMIN — INSULIN LISPRO 4 UNITS: 100 INJECTION, SOLUTION INTRAVENOUS; SUBCUTANEOUS at 12:09

## 2024-01-02 RX ADMIN — PREDNISONE 20 MG: 20 TABLET ORAL at 08:09

## 2024-01-02 RX ADMIN — Medication 50 MG: at 08:09

## 2024-01-02 RX ADMIN — VITAMIN D, TAB 1000IU (100/BT) 1000 UNITS: 25 TAB at 08:08

## 2024-01-02 RX ADMIN — Medication 1 AMPULE: at 08:07

## 2024-01-02 RX ADMIN — ATORVASTATIN CALCIUM 80 MG: 80 TABLET, FILM COATED ORAL at 20:58

## 2024-01-02 RX ADMIN — APIXABAN 5 MG: 5 TABLET, FILM COATED ORAL at 08:10

## 2024-01-02 RX ADMIN — FOLIC ACID 1 MG: 1 TABLET ORAL at 08:10

## 2024-01-02 RX ADMIN — FAMOTIDINE 20 MG: 20 TABLET ORAL at 08:09

## 2024-01-02 RX ADMIN — Medication 500 MG: at 08:09

## 2024-01-02 RX ADMIN — METOPROLOL SUCCINATE 25 MG: 25 TABLET, FILM COATED, EXTENDED RELEASE ORAL at 08:10

## 2024-01-02 RX ADMIN — METFORMIN HYDROCHLORIDE 1000 MG: 500 TABLET ORAL at 08:09

## 2024-01-02 RX ADMIN — DOCUSATE SODIUM 50 MG AND SENNOSIDES 8.6 MG 2 TABLET: 8.6; 5 TABLET, FILM COATED ORAL at 20:58

## 2024-01-02 RX ADMIN — INSULIN GLARGINE 10 UNITS: 100 INJECTION, SOLUTION SUBCUTANEOUS at 08:08

## 2024-01-02 RX ADMIN — FAMOTIDINE 20 MG: 20 TABLET ORAL at 20:59

## 2024-01-02 RX ADMIN — Medication 1 AMPULE: at 20:59

## 2024-01-02 RX ADMIN — BUDESONIDE INHALATION 500 MCG: 0.5 SUSPENSION RESPIRATORY (INHALATION) at 17:34

## 2024-01-02 RX ADMIN — BUDESONIDE INHALATION 500 MCG: 0.5 SUSPENSION RESPIRATORY (INHALATION) at 06:11

## 2024-01-02 RX ADMIN — METFORMIN HYDROCHLORIDE 1000 MG: 500 TABLET ORAL at 16:55

## 2024-01-02 RX ADMIN — AMIODARONE HYDROCHLORIDE 400 MG: 200 TABLET ORAL at 20:59

## 2024-01-02 RX ADMIN — ASPIRIN 81 MG: 81 TABLET ORAL at 08:08

## 2024-01-02 RX ADMIN — LEVOTHYROXINE SODIUM 150 MCG: 0.07 TABLET ORAL at 05:55

## 2024-01-02 RX ADMIN — TRAZODONE HYDROCHLORIDE 50 MG: 50 TABLET ORAL at 20:59

## 2024-01-02 RX ADMIN — ALBUTEROL SULFATE 2.5 MG: 2.5 SOLUTION RESPIRATORY (INHALATION) at 17:34

## 2024-01-02 RX ADMIN — GUAIFENESIN 1200 MG: 600 TABLET ORAL at 20:59

## 2024-01-02 ASSESSMENT — PAIN SCALES - GENERAL: PAINLEVEL_OUTOF10: 0

## 2024-01-02 NOTE — PROGRESS NOTES
Physical Therapy  PHYSICAL THERAPY DAILY NOTE  Time In:  1117 AM  Time Out:  1202 PM  Total Treatment Time:  (P) 45 Minutes  Pt. Seen for: AM, Therapeutic Exercise, and Transfer Training     Subjective: \" I did not sleep at all last night so I am tired.\"         Objective:  Precautions: Falls and Poor Safety Awareness    GROSS ASSESSMENT Daily Assessment           COGNITION Daily Assessment           BED/MAT MOBILITY Daily Assessment    Rolling Right: NT  Rolling Left: NT  Supine to Sit:   Sit to Supine:        TRANSFERS Daily Assessment    Sit to Stand: CGA  Stand to Sit: CGA  Transfer Type: Stand Pivot  Transfer Assistance: CGA  Car Transfers: NT         GAIT Daily Assessment    Amount of Assistance:   Distance (ft):   Assistive Device:   Surface:        STEPS/STAIRS Daily Assessment    Steps Ambulated:    Level of Assistance:    Railing:  Assistive Device:        BALANCE Daily Assessment    Static Sitting:   Dynamic Sitting:   Static Standing:   Dynamic Standing:        WHEELCHAIR MOBILITY Daily Assessment    Able to Propel (ft):   Assistance:   Surface:   Wheelchair Set-up:        LOWER EXTREMITY EXERCISES Daily Assessment    Co treated with OT in standing playing Tubaloo. He stood for approx 7 minutes at once. O2 at 3 liters and O2 sats remained around 92% with activity.     Pain level: no pain noted  Pain Location:    Pain Interventions:     Vital Signs:  BP (!) 140/74   Pulse 77   Temp 97.9 °F (36.6 °C) (Oral)   Resp 17   Ht 1.753 m (5' 9\")   Wt 84 kg (185 lb 3 oz)   SpO2 93%   BMI 27.35 kg/m²       Education:      Interdisciplinary Communication:     Pt. Left in recliner with lunch set up and call bell at reach.         Assessment: Patient is increasing endurance daily .         Plan of Care: Continue with plan of care.    Ivon Storey, PTA  1/2/2024

## 2024-01-02 NOTE — DIABETES MGMT
Patient s/p CABG. Blood glucose ranged 181-354 yesterday with patient receiving Lantus 10 units, Humalog 10 units, Metformin 2000mg, and Prednisone 20mg. Blood glucose this morning was 166. Reviewed patient current regimen: Lantus 10 units daily, Humalog correctional insulin, Metformin 1000mg BID, and Prednisone 20mg daily. Noted patient steroids to change to Prednisone 10mg daily tomorrow. As steroid therapy is weaned patient insulin needs will likely decrease.

## 2024-01-02 NOTE — PROGRESS NOTES
strategies when encountering difficulty.        PLAN    Duration/Frequency: n/a  GOALS:   LTG: Patient will improve neuro-linguistic abilities to increase safety and independence in functional living environment with min A.  GOAL MET 1/2/2024  STG: Patient will provide appropriate solutions to problems of daily living with 80% accuracy given moderate cues. GOAL MET 1/2/2024  STG: Patient will recall functional information using external memory aids as needed with 90% accuracy given min cues. GOAL MET 1/2/2024  STG: Patient will solve mathematical problems with 90% accuracy given moderate cueing. GOAL MET 1/2/2024  STG: Patient will complete attention tasks with 80% accuracy given min cues (e.g. Voice Mail, appointments). GOAL MET 1/2/2024    Medical Necessity/Other Comments: No further ST indicated as goals met.     PATIENT EDUCATION:  Education provided on the following topics: continue to complete iADLs with wife   Education provided to: patient  Response to education: verbalized understanding.  Interdisciplinary Collaboration:   OT    Safety:   Patient left in chair.  Call light left within reach.      Therapy Time  SLP Individual Minutes  Time In: 0920  Time Out: 0944  Minutes: 24          CAROLINE Ford, CCC-SLP    1/2/2024 9:45 AM

## 2024-01-02 NOTE — PROGRESS NOTES
OT Daily Note  Time In:      1117  Time Out:   1202     Subjective: \"My back is bothering me.\"  Pain: indicated in lower back  Education: Standing tolerance   Interdisciplinary Communication: with PTA during cotx    Mobility   Score Comments   Sit to Stand  CGA    Transfer Assist  CGA SPT with RW     Standing tolerance    Patient stood 2x for 7:33 and 5 minutes at standing table with bilateral hand support on RW in front of table during visual perceptual activity, in order to improve activity tolerance, standing balance, weight shifting, weight bearing, posture, and functional mobility. PTA facilitated weight shifts and midline posture with min assist. Provided verbal cues for upright posture in order to increase balance and stance for integration into functional activities.   While in stance, pt engaged in game of bingo working on visual perceptual skills, pt required min occasional cuing throughout for attention to two boards.        Assessment: Progressing in static standing balance, however, states lower back pain limits standing tolerance. Pt on 3 L of O2 at 93-94% and weened to 2 L after no change in SpO2 level while eating lunch.    Plan: Continue OT POC.     Mikey Enrique, OT   1/2/2024

## 2024-01-02 NOTE — PROGRESS NOTES
Michael Archie Paduano III  Admission Date: 12/20/2023         Daily Progress Note: 1/2/2024    The patient's chart is reviewed and the patient is discussed with the staff.    Background: Patient is a 73 y.o.  male seen and evaluated at the request of Dr. Sandoval.  He underwent elective CABG X 3 on 12/14/23.  He developed post op a fib and is being treated with Eliquis, Amiodarone and a beta blocker.  He underwent successful cardioversion on 12/18.  He was transferred to rehab on 12/20.  Over two days he felt congested and had developed a productive cough - yellow colored secretions. He feels short of breath.  He does not have any peripheral edema.  He smoked for 40 years - up to 2 ppd. He quit 10 years ago. He has never used any inhalers or had any lung problems.  He has STEFFEN and is using his home CPAP.  His WBC elevated to 13 and he had low grade fevers.  His CXR 12/23 shows improvement with LLL atx and a small effusion.    Subjective:     CXR yesterday looked better. Remains on 2 lpm but states he feels so much better.     Current Facility-Administered Medications   Medication Dose Route Frequency    insulin glargine (LANTUS) injection vial 10 Units  10 Units SubCUTAneous Daily    guaiFENesin (MUCINEX) extended release tablet 1,200 mg  1,200 mg Oral BID    [START ON 1/3/2024] predniSONE (DELTASONE) tablet 10 mg  10 mg Oral Daily    glucose chewable tablet 16 g  4 tablet Oral PRN    dextrose bolus 10% 125 mL  125 mL IntraVENous PRN    Or    dextrose bolus 10% 250 mL  250 mL IntraVENous PRN    glucagon (rDNA) injection 1 mg  1 mg SubCUTAneous PRN    dextrose 10 % infusion   IntraVENous Continuous PRN    metFORMIN (GLUCOPHAGE) tablet 1,000 mg  1,000 mg Oral BID WC    budesonide (PULMICORT) nebulizer suspension 500 mcg  0.5 mg Nebulization BID RT    albuterol (PROVENTIL) (2.5 MG/3ML) 0.083% nebulizer solution 2.5 mg  2.5 mg Nebulization TID RT    albuterol (PROVENTIL) (2.5 MG/3ML) 0.083% nebulizer  Oral Daily    Vitamin D (CHOLECALCIFEROL) tablet 1,000 Units  1,000 Units Oral Daily     Review of Systems: Comprehensive ROS negative except in HPI  Objective:   Blood pressure (!) 140/74, pulse 77, temperature 97.9 °F (36.6 °C), temperature source Oral, resp. rate 17, height 1.753 m (5' 9\"), weight 84 kg (185 lb 3 oz), SpO2 93 %. No intake or output data in the 24 hours ending 01/02/24 1325  Physical Exam:   Constitutional:  the patient is well developed and in no acute distress  EENMT:  Sclera clear, pupils equal, oral mucosa moist  Respiratory: symmetric chest rise. Fairly clear; on 2 lpm NC   Cardiovascular:  RRR without M,G,R. There is no lower extremity edema.  Gastrointestinal: soft and non-tender; with positive bowel sounds.  Musculoskeletal: warm without cyanosis. Normal muscle tone.   Skin:  no jaundice or rashes, post surgical incision   Neurologic: symmetric strength, fluent speech  Psychiatric:  calm, appropriate, oriented x 4    Imaging: I performed an independent interpretation of the patient's images.  CXR:   1/1      CT chest 12/24  Small left pleural effusion with left basilar atelectasis.       LAB:  No results for input(s): \"WBC\", \"HGB\", \"HCT\", \"PLT\", \"INR\", \"PROCAL\", \"LACTATE\" in the last 72 hours.  No results for input(s): \"NA\", \"K\", \"CL\", \"CO2\", \"GLU\", \"BUN\", \"CREATININE\", \"MG\", \"CA\", \"PHOS\", \"ALB\", \"BILITOT\", \"AST\", \"ALT\", \"ALKPHOS\" in the last 72 hours.  No results for input(s): \"TROPHS\", \"NTPROBNP\", \"CRP\", \"ESR\" in the last 72 hours.  No results for input(s): \"GLUCOSE\", \"A1\" in the last 72 hours.   Microbiology:   No results for input(s): \"CULTURE\" in the last 72 hours.  ECHO: 12/07/23    ECHO (TTE) LIMITED (PRN CONTRAST/BUBBLE/STRAIN/3D) 12/07/2023  1:49 PM (Final)    Interpretation Summary    Left Ventricle: Mildly reduced left ventricular systolic function with a visually estimated EF of 45 - 50%. Left ventricle size is normal. Mildly increased wall thickness. Findings consistent with  mild concentric hypertrophy. Mild global hypokinesis present.    Signed by: Seth Heard DO on 12/7/2023  1:49 PM    Assessment and Plan:  (Medical Decision Making)   Impression: 73 year old male s/p CABG X 3 on 12/14. Had post op a fib with successful cardioversion.  Transferred to rehab on 12/20. Developed chest congestion with a productive cough around 12/22.  Had low grade temp, elevated WBC, dyspnea.  CXR 12/21 shows improvement of LLL atx/effusion. CT chest also showing small L pleural effusion with atelectasis. Requiring low dose O2.      Principal Problem:    History of coronary artery bypass graft x 3  Plan: s/p CABG on 12/14; completing rehab on 9th floor     Active Problems:    STEFFEN on CPAP  Plan: using home CPAP at night     Atrial fibrillation (HCC)  Plan: s/p cardioversion; on amio and eliquis    Debility    Impaired functional mobility, balance, gait, and endurance    Decreased independence with activities of daily living    Encounter for rehabilitation  Plan: on 9th floor IRC; progressing well    Congestion of respiratory tract    Hypoxia   Plan: CXR improved, he feels much better  Continues to be on 2 lpm; continue to wean as tolerates. Will order an exercise oxymetry to determine O2 requirements; continue to wean steroids    History of tobacco use  Plan: 80 pack years quit 10 years ago; will need o/p follow up in our office with CPFT; continue nebs. Sent office a message to contact him for follow up    We will sign off at this time. Please call for questions.         More than 50% of the time documented was spent in face-to-face contact with the patient and in the care of the patient on the floor/unit where the patient is located.    In this split/shared evaluation I performed performed a medically appropriate history and exam, counseled and educated the patient and/or family member, ordered medications, tests or procedures, documented information in EMR, and coordinated care. which

## 2024-01-02 NOTE — PROGRESS NOTES
Physical Therapy  PHYSICAL THERAPY DAILY NOTE  Time In:  1349 PM  Time Out:  1452 PM  Total Treatment Time:  (P) 63 Minutes  Pt. Seen for: PM, Gait Training, Therapeutic Exercise, and Transfer Training     Subjective: \" I am sleepy but I feel stronger.\"         Objective:  Precautions: Falls, Poor Safety Awareness, and cardiac    GROSS ASSESSMENT Daily Assessment           COGNITION Daily Assessment           BED/MAT MOBILITY Daily Assessment    Rolling Right:   Rolling Left:   Supine to Sit:   Sit to Supine:        TRANSFERS Daily Assessment    Sit to Stand: CGA  Stand to Sit: CGA  Transfer Type: Stand Pivot and with rolling walker  Transfer Assistance: CGA  Car Transfers: NT         GAIT Daily Assessment    Amount of Assistance: CGA  Distance (ft): 60  Assistive Device: RW  Surface: Level Surface       STEPS/STAIRS Daily Assessment    Steps Ambulated:    Level of Assistance:    Railing:  Assistive Device:        BALANCE Daily Assessment    Static Sitting:   Dynamic Sitting:   Static Standing:   Dynamic Standing:        WHEELCHAIR MOBILITY Daily Assessment    Able to Propel (ft):   Assistance:   Surface:   Wheelchair Set-up:        LOWER EXTREMITY EXERCISES Daily Assessment    SEATED EXERCISES Sets Reps Comments   Ankle Pumps 2 10    Hip Flexion 2 10    Long Arc Quads 2 10    Hip Adduction/Ball Squeeze 2 10    Hip Abduction with green Theraband 2 10    Hamstring Curls with green Theraband 2 10    Hip Extension with green Theraband 2 10           Pain level: no pain noted.  Pain Location:    Pain Interventions:     Vital Signs:  BP (!) 111/93   Pulse 72   Temp 98.2 °F (36.8 °C) (Oral)   Resp 18   Ht 1.753 m (5' 9\")   Wt 84 kg (185 lb 3 oz)   SpO2 93%   BMI 27.35 kg/m²   O2 at 2 liters thru out session checked HR and O2 sats. O2 sats remained around 95 to 96% on 2 liters. Hr stayed around 77 to 84.    Education:      Interdisciplinary Communication:     Pt. Left in recliner with call bell at reach.

## 2024-01-02 NOTE — PROGRESS NOTES
Patient has home cpap. Patient stated that he will not be wearing cpap tonight. He is currently on 2L NC with SpO2 greater than 90% and no respiratory distress noted at this time.

## 2024-01-02 NOTE — DISCHARGE INSTRUCTIONS
[facility]  [address] [place] [address]   [phone]    The pulmonary office will call you in 1-2 business days to arrange follow up in the pulmonary office. If you have not heard from the office after 2 full business days, please call the office to arrange follow up.

## 2024-01-02 NOTE — PROGRESS NOTES
[place] [address]  Tel: [phone]     Physical Medicine and Rehabilitation Progress Note      Michael Archie Paduano III  Admit Date: 12/20/2023  Admit Diagnosis: Debility [R53.81]  History of coronary artery bypass graft x 3 [Z95.1]    Subjective     Patient seen face-to-face and evaluated. NAEO and he reports sleeping well again. He is eating and drinking fine, and continent of bowel and bladder. No pain reported, just some soreness around inferior thoracotomy area. He said his therapies are going very well. He has appeared much improved clinically lately breathing better. Although, he's still on 4L O2. He reported continued cough, but improved. Cardiology and Pulmonology consulted and workup including CXR (same/improved), BMP, CBC, PCT. CBC showed leukocytosis (WBCs 13.8, but had been elevated other than last CBC), PCT 0.06, CT Chest negative PE, afebrile. WBCs 31.3 on 12/26/23 -> 21.2 (12/30) likely d/t corticosteroids, Hg improved to 9.6 -> 8.7. His lungs are about th same today as they've been for several days, and he's breathing OK now on 4L. Glargine 10U daily added -> 20U (1/3/24).    Objective:     Current Facility-Administered Medications   Medication Dose Route Frequency    [START ON 1/3/2024] insulin glargine (LANTUS) injection vial 20 Units  20 Units SubCUTAneous Daily    guaiFENesin (MUCINEX) extended release tablet 1,200 mg  1,200 mg Oral BID    [START ON 1/3/2024] predniSONE (DELTASONE) tablet 10 mg  10 mg Oral Daily    glucose chewable tablet 16 g  4 tablet Oral PRN    dextrose bolus 10% 125 mL  125 mL IntraVENous PRN    Or    dextrose bolus 10% 250 mL  250 mL IntraVENous PRN    glucagon (rDNA) injection 1 mg  1 mg SubCUTAneous PRN    dextrose 10 % infusion   IntraVENous Continuous PRN    metFORMIN (GLUCOPHAGE) tablet 1,000 mg  1,000 mg Oral BID WC    budesonide (PULMICORT) nebulizer suspension 500 mcg  0.5 mg Nebulization BID RT  Min A  Stand to Sit: CGA and Min A  Transfer Type: Stand Pivot and with rolling walker  Transfer Assistance: CGA and Min A  Car Transfers: NT            GAIT Daily Assessment     Amount of Assistance: Min A  Distance (ft): 60  Assistive Device: RW  Surface: Level Surface      OT:    Functional Mobility    Score Comments   Sit to Stand 4: Supervision or touching A CGA   Transfer Assist 4: Supervision or touching A CGA with RW, assist to manage O2      Activities of Daily Living    Score Comments   Eating Independent I   Oral Hygiene Independent I seated in w/c at sink   Bathing Supervision or touching assistance sink bath completed while seated in wc, CGA in stance while pt washed bottom and moni area   Upper Body  Dressing Supervision or touching assistance increased assistance due to pt getting RUE caught on clothing   Lower Body Dressing Supervision or touching assistance CGA in stance and rest breaks between threading clothing and sit > stands, SpO2 level 93% on 3 L   Donning/[place] Footwear Partial/moderate assistance assist to don R sock secondary to fatigue, assist to don  socks over other socks       SLP:  Patient appears at cognitive baseline at this time. He is able to complete functional tasks such as medication management, money calculations, schedule/calendar with min assist, which is baseline as he and his wife manage iADLs together.      No further ST indicated.    Labs/Studies:  Recent Results (from the past 72 hour(s))   POCT Glucose    Collection Time: 12/30/23  4:45 PM   Result Value Ref Range    POC Glucose 336 (H) 65 - 100 mg/dL    Performed by: Monica    POCT Glucose    Collection Time: 12/30/23  8:14 PM   Result Value Ref Range    POC Glucose 308 (H) 65 - 100 mg/dL    Performed by: Malik    POCT Glucose    Collection Time: 12/31/23  5:24 AM   Result Value Ref Range    POC Glucose 192 (H) 65 - 100 mg/dL    Performed by: Hugh    POCT Glucose     Collection Time: 12/31/23 11:49 AM   Result Value Ref Range    POC Glucose 298 (H) 65 - 100 mg/dL    Performed by: Ervin[facility]arterCareCompanion    POCT Glucose    Collection Time: 12/31/23  2:29 PM   Result Value Ref Range    POC Glucose 332 (H) 65 - 100 mg/dL    Performed by: [facility]Companion    POCT Glucose    Collection Time: 12/31/23  4:16 PM   Result Value Ref Range    POC Glucose 355 (H) 65 - 100 mg/dL    Performed by: [facility]ion    POCT Glucose    Collection Time: 12/31/23  7:53 PM   Result Value Ref Range    POC Glucose 279 (H) 65 - 100 mg/dL    Performed by: Vivi    POCT Glucose    Collection Time: 01/01/24  5:30 AM   Result Value Ref Range    POC Glucose 181 (H) 65 - 100 mg/dL    Performed by: Malik    POCT Glucose    Collection Time: 01/01/24 11:31 AM   Result Value Ref Range    POC Glucose 354 (H) 65 - 100 mg/dL    Performed by: [facility]Companion    POCT Glucose    Collection Time: 01/01/24  4:54 PM   Result Value Ref Range    POC Glucose 236 (H) 65 - 100 mg/dL    Performed by: FestusvinCarterCareCompanion    POCT Glucose    Collection Time: 01/01/24  8:41 PM   Result Value Ref Range    POC Glucose 237 (H) 65 - 100 mg/dL    Performed by: Singh    POCT Glucose    Collection Time: 01/02/24  6:02 AM   Result Value Ref Range    POC Glucose 166 (H) 65 - 100 mg/dL    Performed by: MildredPCKUSH    POCT Glucose    Collection Time: 01/02/24 11:17 AM   Result Value Ref Range    POC Glucose 253 (H) 65 - 100 mg/dL    Performed by: Ca      CXR (12/23/23):  IMPRESSION:  Persistent, but decreased left pulmonary disease, small left pleural effusion.      Assessment:      This is a 73 y.o. debilitated M due to a 3v CABG in setting of OA and RA limiting his mobility.    Rehabilitation Plan  The patient has shown the ability to tolerate and benefit from 3 hours of therapy daily and is being admitted to a comprehensive acute inpatient rehabilitation

## 2024-01-02 NOTE — PROGRESS NOTES
OT DAILY NOTE    Time In:    0830     Time Out: 0917       Functional Mobility   Score Comments   Sit to Stand 4: Supervision or touching A CGA   Transfer Assist 4: Supervision or touching A CGA with RW, assist to manage O2     Activities of Daily Living   Score Comments   Eating Independent I   Oral Hygiene Independent I seated in w/c at sink   Bathing Supervision or touching assistance sink bath completed while seated in wc, CGA in stance while pt washed bottom and moni area   Upper Body  Dressing Supervision or touching assistance increased assistance due to pt getting RUE caught on clothing   Lower Body Dressing Supervision or touching assistance CGA in stance and rest breaks between threading clothing and sit > stands, SpO2 level 93% on 3 L   Donning/[place] Footwear Partial/moderate assistance assist to don R sock secondary to fatigue, assist to don  socks over other socks       Summary of Session: S: \"I am so tired today. I don't want to shower.\" Agreeable to therapy.   Focus of session was on morning ADL routine.   Patient was able to ambulate ~10 feet using a RW with CGA.   Pain not expressed.  BP (!) 140/74   Pulse 77   Temp 97.9 °F (36.6 °C) (Oral)   Resp 17   Ht 1.753 m (5' 9\")   Wt 84 kg (185 lb 3 oz)   SpO2 93%   BMI 27.35 kg/m²   SpO2 level 93% on 3 L    Collaborated with PT and confirmed patient is on track to reach goals as documented in the care plan.   Continue OT POC with focus on ADL/IADL skills, functional transfers, functional mobility, coordination, strength, static and dynamic balance, and activity tolerance to maximize safety and independence with ADLs and functional transfers.   Patient ended session in recliner with call remote and phone within reach.       Mikey Enrique, OT  1/2/2024

## 2024-01-03 LAB
APPEARANCE UR: CLEAR
BACTERIA URNS QL MICRO: ABNORMAL /HPF
BILIRUB UR QL: NEGATIVE
CASTS URNS QL MICRO: ABNORMAL /LPF
COLOR UR: ABNORMAL
EPI CELLS #/AREA URNS HPF: ABNORMAL /HPF
GLUCOSE BLD STRIP.AUTO-MCNC: 174 MG/DL (ref 65–100)
GLUCOSE BLD STRIP.AUTO-MCNC: 195 MG/DL (ref 65–100)
GLUCOSE BLD STRIP.AUTO-MCNC: 225 MG/DL (ref 65–100)
GLUCOSE BLD STRIP.AUTO-MCNC: 274 MG/DL (ref 65–100)
GLUCOSE UR STRIP.AUTO-MCNC: NEGATIVE MG/DL
HGB UR QL STRIP: ABNORMAL
KETONES UR QL STRIP.AUTO: ABNORMAL MG/DL
LEUKOCYTE ESTERASE UR QL STRIP.AUTO: ABNORMAL
MUCOUS THREADS URNS QL MICRO: 0 /LPF
NITRITE UR QL STRIP.AUTO: POSITIVE
PH UR STRIP: 5 (ref 5–9)
PROT UR STRIP-MCNC: 30 MG/DL
RBC #/AREA URNS HPF: ABNORMAL /HPF
SERVICE CMNT-IMP: ABNORMAL
SP GR UR REFRACTOMETRY: 1.03 (ref 1–1.02)
URINE CULTURE IF INDICATED: ABNORMAL
UROBILINOGEN UR QL STRIP.AUTO: 0.2 EU/DL (ref 0.2–1)
WBC URNS QL MICRO: ABNORMAL /HPF

## 2024-01-03 PROCEDURE — 81001 URINALYSIS AUTO W/SCOPE: CPT

## 2024-01-03 PROCEDURE — 6370000000 HC RX 637 (ALT 250 FOR IP): Performed by: PHYSICIAN ASSISTANT

## 2024-01-03 PROCEDURE — 6360000002 HC RX W HCPCS: Performed by: INTERNAL MEDICINE

## 2024-01-03 PROCEDURE — 82962 GLUCOSE BLOOD TEST: CPT

## 2024-01-03 PROCEDURE — 97110 THERAPEUTIC EXERCISES: CPT

## 2024-01-03 PROCEDURE — 94761 N-INVAS EAR/PLS OXIMETRY MLT: CPT

## 2024-01-03 PROCEDURE — 97530 THERAPEUTIC ACTIVITIES: CPT

## 2024-01-03 PROCEDURE — 87088 URINE BACTERIA CULTURE: CPT

## 2024-01-03 PROCEDURE — 6370000000 HC RX 637 (ALT 250 FOR IP): Performed by: STUDENT IN AN ORGANIZED HEALTH CARE EDUCATION/TRAINING PROGRAM

## 2024-01-03 PROCEDURE — 94640 AIRWAY INHALATION TREATMENT: CPT

## 2024-01-03 PROCEDURE — 1180000000 HC REHAB R&B

## 2024-01-03 PROCEDURE — 97116 GAIT TRAINING THERAPY: CPT

## 2024-01-03 PROCEDURE — 97535 SELF CARE MNGMENT TRAINING: CPT

## 2024-01-03 PROCEDURE — 6370000000 HC RX 637 (ALT 250 FOR IP): Performed by: PHYSICAL MEDICINE & REHABILITATION

## 2024-01-03 PROCEDURE — 87086 URINE CULTURE/COLONY COUNT: CPT

## 2024-01-03 PROCEDURE — 87186 SC STD MICRODIL/AGAR DIL: CPT

## 2024-01-03 RX ORDER — SULFAMETHOXAZOLE AND TRIMETHOPRIM 800; 160 MG/1; MG/1
1 TABLET ORAL EVERY 12 HOURS SCHEDULED
Status: DISCONTINUED | OUTPATIENT
Start: 2024-01-03 | End: 2024-01-11 | Stop reason: HOSPADM

## 2024-01-03 RX ADMIN — VITAMIN D, TAB 1000IU (100/BT) 1000 UNITS: 25 TAB at 08:00

## 2024-01-03 RX ADMIN — ASPIRIN 81 MG: 81 TABLET ORAL at 07:59

## 2024-01-03 RX ADMIN — APIXABAN 5 MG: 5 TABLET, FILM COATED ORAL at 08:00

## 2024-01-03 RX ADMIN — BUDESONIDE INHALATION 500 MCG: 0.5 SUSPENSION RESPIRATORY (INHALATION) at 17:50

## 2024-01-03 RX ADMIN — BUDESONIDE INHALATION 500 MCG: 0.5 SUSPENSION RESPIRATORY (INHALATION) at 06:13

## 2024-01-03 RX ADMIN — METFORMIN HYDROCHLORIDE 1000 MG: 500 TABLET ORAL at 08:00

## 2024-01-03 RX ADMIN — SULFAMETHOXAZOLE AND TRIMETHOPRIM 1 TABLET: 800; 160 TABLET ORAL at 17:34

## 2024-01-03 RX ADMIN — APIXABAN 5 MG: 5 TABLET, FILM COATED ORAL at 20:38

## 2024-01-03 RX ADMIN — DOCUSATE SODIUM 50 MG AND SENNOSIDES 8.6 MG 2 TABLET: 8.6; 5 TABLET, FILM COATED ORAL at 20:38

## 2024-01-03 RX ADMIN — ATORVASTATIN CALCIUM 80 MG: 80 TABLET, FILM COATED ORAL at 20:38

## 2024-01-03 RX ADMIN — ALBUTEROL SULFATE 2.5 MG: 2.5 SOLUTION RESPIRATORY (INHALATION) at 17:50

## 2024-01-03 RX ADMIN — GUAIFENESIN 1200 MG: 600 TABLET ORAL at 20:38

## 2024-01-03 RX ADMIN — Medication 1 AMPULE: at 20:38

## 2024-01-03 RX ADMIN — AMIODARONE HYDROCHLORIDE 400 MG: 200 TABLET ORAL at 07:59

## 2024-01-03 RX ADMIN — Medication 500 MG: at 08:00

## 2024-01-03 RX ADMIN — METFORMIN HYDROCHLORIDE 1000 MG: 500 TABLET ORAL at 17:34

## 2024-01-03 RX ADMIN — FOLIC ACID 1 MG: 1 TABLET ORAL at 07:59

## 2024-01-03 RX ADMIN — PREDNISONE 10 MG: 5 TABLET ORAL at 08:00

## 2024-01-03 RX ADMIN — SERTRALINE 100 MG: 100 TABLET, FILM COATED ORAL at 07:59

## 2024-01-03 RX ADMIN — AMIODARONE HYDROCHLORIDE 400 MG: 200 TABLET ORAL at 20:38

## 2024-01-03 RX ADMIN — METOPROLOL SUCCINATE 25 MG: 25 TABLET, FILM COATED, EXTENDED RELEASE ORAL at 08:04

## 2024-01-03 RX ADMIN — FAMOTIDINE 20 MG: 20 TABLET ORAL at 08:00

## 2024-01-03 RX ADMIN — LISINOPRIL 20 MG: 20 TABLET ORAL at 07:59

## 2024-01-03 RX ADMIN — GUAIFENESIN 1200 MG: 600 TABLET ORAL at 07:59

## 2024-01-03 RX ADMIN — INSULIN GLARGINE 20 UNITS: 100 INJECTION, SOLUTION SUBCUTANEOUS at 07:58

## 2024-01-03 RX ADMIN — INSULIN LISPRO 2 UNITS: 100 INJECTION, SOLUTION INTRAVENOUS; SUBCUTANEOUS at 12:09

## 2024-01-03 RX ADMIN — LEVOTHYROXINE SODIUM 150 MCG: 0.07 TABLET ORAL at 05:53

## 2024-01-03 RX ADMIN — Medication 50 MG: at 08:00

## 2024-01-03 RX ADMIN — B-COMPLEX W/ C & FOLIC ACID TAB 1 TABLET: TAB at 07:59

## 2024-01-03 RX ADMIN — Medication 1 AMPULE: at 08:07

## 2024-01-03 RX ADMIN — FAMOTIDINE 20 MG: 20 TABLET ORAL at 20:38

## 2024-01-03 RX ADMIN — ALBUTEROL SULFATE 2.5 MG: 2.5 SOLUTION RESPIRATORY (INHALATION) at 06:13

## 2024-01-03 RX ADMIN — BENZONATATE 100 MG: 100 CAPSULE ORAL at 05:57

## 2024-01-03 RX ADMIN — TRAZODONE HYDROCHLORIDE 50 MG: 50 TABLET ORAL at 20:38

## 2024-01-03 ASSESSMENT — PAIN SCALES - GENERAL
PAINLEVEL_OUTOF10: 0
PAINLEVEL_OUTOF10: 0

## 2024-01-03 NOTE — PROGRESS NOTES
OCCUPATIONAL THERAPY DAILY NOTE    Time In 1031      Time Out 1117        Subjective: Pt agreeable to treatment.   Pain: No pain expressed.  Interdisciplinary Communication: Collaborated with care team during Team Conference regarding d/c planning.   Precautions: Falls, Poor Safety Awareness, [place] Alarm, Sternal Precautions, and Monitor SpO2    FUNCTIONAL MOBILITY:       Bed Mobility NT    Sit to Stand SBA Cues for safety and to position RW prior to standing    Transfer  Cristine  Transfer Type: SPT  Equipment: RW Cues/assist to manage RW and O2 NC   Ambulation Cristine  Equipment: RW Cues/assist to manage RW and O2 NC      - Activity Tolerance - Cognition - Coordination - Strengthening   Pt practiced folding clothing items initially in stance at table, finished task seated in w/c at table. Pt with decreased standing tolerance, able to stand for ~2 minutes before requiring seated rest. Pt able to fold long pants with no to min cues, required max progressing to mod cues with visual demonstration to fold shirts. Pt folded towels and socks with S/U, washcloths with one verbal cue.      Education   Benefits of OT, Energy Conservation, Pacing, and Precautions     Assessment: Patient demonstrated good participation in OT treatment on 3L O2 NC throughout. Practiced ambulation from recliner to doorway to practice pt managing O2 NC and RW around furniture. Pt required mod cues throughout, demonstrated poor safety awareness, command following, and problem solving with mobility. Pt continues to benefit from skilled OT services to address remaining deficits and progress toward baseline level of independence and safety. Patient ended session seated in recliner with call bell and needs within reach and with chair/bed alarm activated.   Plan: Continue OT POC.     Sharita Johnson, OT   1/3/2024

## 2024-01-03 NOTE — CARE COORDINATION
CM reviewed patient medical chart for continue stay.  Patient needs are continue to be followed by Dr. Attila Sandoval..  Patient has no discharge date / plan at this time scheduled.  CM met with patient and made patient aware that his discharge has been DEFERRED.  CM will continue to follow / monitor for any needs, concerns or questions that may arise.         Name of Ins: Medicare Part A / B  Policy #: [id number]  Secondary Ins: [facility]  Policy #: [id number]  Auth #  Admission Date: 12/20/23  Approved Dates:   LOS: 14 days  Discharge Date:  DEFERERRED  Contact Name:  Contact #  Fax #:  Updated Clinicals: no updated clinicals needed

## 2024-01-03 NOTE — DIABETES MGMT
Patient admitted with s/p CABG. Blood glucose ranged 166-255 yesterday with patient receiving Lantus 10 units, Metformin 2000 mg, Humalog 6 units, and Prednisone 20 mg. Blood glucose this morning was 174. Reviewed patient current regimen: Lantus 20 units daily, Metformin 1000 mg BID, Humalog correctional insulin, and Prednisone 10 mg daily.  As steroid therapy weaned patient insulin needs will likely decrease.     Jean Brian(Attending)

## 2024-01-03 NOTE — CARE COORDINATION
Interdisciplinary Wednesday, Team Conference Meeting Notes     Interdisciplinary team conference meeting completed to discuss plan of care.      Estimated D/C Date: DEFERRED     Recommended Follow-Up Therapy: Staff has recommended that patient be DEFERRED until next Team Conference Meeting.  Patient new to the floor today.         Communication with family/caregivers: CM reviewed patient medical chart for continue stay.  Patient needs are continue to be followed by Dr. Attila Sandoval..  Patient has no discharge date / plan at this time scheduled.  CM met with patient and made patient aware that his discharge has been DEFERRED.  CM will continue to follow / monitor for any needs, concerns or questions that may arise.         Name of Ins: Medicare Part A / B  Policy #: [id number]  Secondary Ins: [employer]  Policy #: [id number]  Auth #  Admission Date: 12/20/23  Approved Dates:   LOS: 7 days   Contact Name:  Contact #  Fax #:  Updated Clinicals: no updated clinicals needed

## 2024-01-03 NOTE — PROGRESS NOTES
Physical Therapy  PHYSICAL THERAPY DAILY NOTE  Time In:  1310 PM  Time Out:  1400 PM  Total Treatment Time:  (P) 50 Minutes  Pt. Seen for: PM, Gait Training, Therapeutic Exercise, and Transfer Training     Subjective: \" I am disappointed that I am not going home this week.\"         Objective:  Precautions: Falls and Poor Safety Awareness    GROSS ASSESSMENT Daily Assessment           COGNITION Daily Assessment           BED/MAT MOBILITY Daily Assessment    Rolling Right: Supervision/Standby Assist  Rolling Left: Supervision/Standby Assist and CGA  Supine to Sit: CGA and Min A  Sit to Supine: CGA and Min A       TRANSFERS Daily Assessment    Sit to Stand: Min A  Stand to Sit: Min A  Transfer Type: Stand Pivot and with rolling walker  Transfer Assistance: Min A  Car Transfers: NT         GAIT Daily Assessment    Amount of Assistance: Mod A  Distance (ft): 70  Assistive Device: RW  Surface: Level Surface       STEPS/STAIRS Daily Assessment    Steps Ambulated:    Level of Assistance:    Railing:  Assistive Device:        BALANCE Daily Assessment    Static Sitting:   Dynamic Sitting:   Static Standing:   Dynamic Standing:        WHEELCHAIR MOBILITY Daily Assessment    Able to Propel (ft):   Assistance:   Surface:   Wheelchair Set-up:        LOWER EXTREMITY EXERCISES Daily Assessment    Rode motomed x 10 minutes     Pain level: no pain noted.  Pain Location:    Pain Interventions:     Vital Signs:  /69   Pulse 78   Temp 98.1 °F (36.7 °C) (Oral)   Resp 18   Ht 1.753 m (5' 9\")   Wt 83.3 kg (183 lb 10.3 oz)   SpO2 90%   BMI 27.12 kg/m²       Education:      Interdisciplinary Communication:     Pt. Left in recliner as he asked. At the beginning of session Patients daughter present at the beginning of session and said he wanted to get in bed after therapy.         Assessment: Patient seemed to be confused this afternoon in therapy . Discussed with nurse concerning this.         Plan of Care: Continue with plan of  deangelo.    Ivon Storey, PTA  1/3/2024

## 2024-01-03 NOTE — PROGRESS NOTES
Physical Therapy  PHYSICAL THERAPY DAILY NOTE  Time In:  915 AM  Time Out:  1007 AM  Total Treatment Time:  (P) 52 Minutes  Pt. Seen for: AM, Gait Training, Therapeutic Exercise, and Transfer Training     Subjective: \" I slept a little better last night.\"         Objective:  Precautions: Falls    GROSS ASSESSMENT Daily Assessment           COGNITION Daily Assessment    intact       BED/MAT MOBILITY Daily Assessment    Rolling Right: NT  Rolling Left: NT  Supine to Sit: NT  Sit to Supine: NT       TRANSFERS Daily Assessment    Sit to Stand: CGA and Min A  Stand to Sit: CGA and Min A  Transfer Type: Stand Pivot and with rolling walker  Transfer Assistance: CGA and Min A  Car Transfers: NT         GAIT Daily Assessment   Forward posture Amount of Assistance: Min A and Mod A  Distance (ft): 70  Assistive Device: RW  Surface: Level Surface       STEPS/STAIRS Daily Assessment    Steps Ambulated:    Level of Assistance:    Railing:  Assistive Device:        BALANCE Daily Assessment    Static Sitting:   Dynamic Sitting:   Static Standing:   Dynamic Standing:        WHEELCHAIR MOBILITY Daily Assessment    Able to Propel (ft):   Assistance:   Surface:   Wheelchair Set-up:        LOWER EXTREMITY EXERCISES Daily Assessment    SEATED EXERCISES Sets Reps Comments   Ankle Pumps 2 10    Hip Flexion 2 10    Long Arc Quads 2 10    Hip Adduction/Ball Squeeze 2 10    Hip Abduction with no Theraband 2 10    Hamstring Curls with no Theraband 2 10    Hip Extension with no Theraband 2 10           Pain level: no pain noted.  Pain Location:    Pain Interventions:     Vital Signs:  /69   Pulse 78   Temp 98.1 °F (36.7 °C) (Oral)   Resp 18   Ht 1.753 m (5' 9\")   Wt 83.3 kg (183 lb 10.3 oz)   SpO2 90%   BMI 27.12 kg/m²    O2 t 3 liters upon entering room. Discussed with nurse and she stated that O2 sats were 87% when she got here this morning on 2 liters. She turned up to 3 liters at the beginning of her shift.    Education:

## 2024-01-03 NOTE — PROGRESS NOTES
OCCUPATIONAL THERAPY PROGRESS NOTE    Time In 0833      Time Out 0915        GOALS:  Short Term Goals:  Time Frame for Short Term Goals : 7 days   STG 1: Patient will dress UB with Partial/Moderate Assistance using AE/DME PRN. 12/27/23 Goal met.   STG 2: Patient will dress LB with Partial/Moderate Assistance using AE/DME PRN. 12/27/23 Goal met.   STG 3: Patient will don footwear with Partial/Moderate Assistance using AE/DME PRN.12/27/23 Goal met.   STG 4: Patient will bathe with Partial/Moderate Assistance using AE/DME PRN. 12/27/23 Goal met.   STG 5: Patient will toilet with Partial/Moderate Assistance using AE/DME PRN. 12/27/23 Goal met.      Long Term Goals:  Time Frame for Long Term Goals : 14 days   LTG 1: Patient will dress UB with Setup Assistance using AE/DME PRN. 1/3/24 Progressing.   LTG 2: Patient will dress LB with Supervision or Touching Assistance using AE/DME PRN. 1/3/24 Goal met.   LTG 3: Patient will don footwear with Supervision or Touching Assistance using AE/DME PRN. 1/3/24 Progressing.   LTG 4: Patient will bathe with Supervision or Touching Assistance using AE/DME PRN. 1/3/24 Goal met.   LTG 5: Patient will toilet with Setup Assistance using AE/DME PRN. 1/3/24 Progressing.   LTG 6: Patient/caregiver will verbalize understanding of OT recommendations regarding ADLs, functional transfers, home safety, AE/DME, energy conservation, safety awareness, activity tolerance, and follow-up therapy to increase safety with functional tasks upon discharge. 1/3/24 Progressing.     Subjective: Patient agreeable to therapy. Pt reports he wants to discharge yesterday.   Pain: No pain expressed.  Precautions: Falls, Poor Safety Awareness, Parag Alarm, and Sternal Precautions    FUNCTIONAL MOBILITY:        Bed Mobility NT    Sit to Stand SBA    Transfer  SBA  Transfer Type: SPT  Equipment: RW    Ambulation Cristine  Equipment: RW Assist to manage O2 NC and RW, pt unable to follow verbal commands for safe RW management  safety.  Education: Adaptive ADL Techniques, Energy Conservation, Pacing, Functional Transfer Training, Rolling Walker Management, and Safety Awareness  Plan: Continue OT POC to address ADL skills, functional mobility, safety training, strength, balance, coordination, cognition, and activity tolerance to maximize safety and independence.    Patient ended session seated in recliner with call bell and needs within reach and with chair/bed alarm activated.    Sharita Johnson, OT  1/3/2024

## 2024-01-03 NOTE — CARE COORDINATION
CM reviewed patient medical chart for continue stay.  Patient needs are continue to be followed by Dr. Attila Sandoval..  Patient has no discharge date / plan at this time scheduled.  CM met with patient and made patient aware that his discharge has been DEFERRED.  CM will continue to follow / monitor for any needs, concerns or questions that may arise.         Name of Ins: Medicare Part A / B  Policy #: [id number]  Secondary Ins: [facility]  Policy #: [id number]  Auth #  Admission Date: 12/20/23  Approved Dates:   LOS: 6 days   Contact Name:  Contact #  Fax #:  Updated Clinicals: no updated clinicals needed

## 2024-01-04 ENCOUNTER — CARE COORDINATION (OUTPATIENT)
Dept: CARE COORDINATION | Facility: CLINIC | Age: 74
End: 2024-01-04

## 2024-01-04 PROBLEM — E78.5 HYPERLIPIDEMIA: Status: ACTIVE | Noted: 2020-08-07

## 2024-01-04 LAB
GLUCOSE BLD STRIP.AUTO-MCNC: 164 MG/DL (ref 65–100)
GLUCOSE BLD STRIP.AUTO-MCNC: 179 MG/DL (ref 65–100)
GLUCOSE BLD STRIP.AUTO-MCNC: 193 MG/DL (ref 65–100)
GLUCOSE BLD STRIP.AUTO-MCNC: 249 MG/DL (ref 65–100)
SERVICE CMNT-IMP: ABNORMAL

## 2024-01-04 PROCEDURE — 6360000002 HC RX W HCPCS: Performed by: INTERNAL MEDICINE

## 2024-01-04 PROCEDURE — 94761 N-INVAS EAR/PLS OXIMETRY MLT: CPT

## 2024-01-04 PROCEDURE — 2700000000 HC OXYGEN THERAPY PER DAY

## 2024-01-04 PROCEDURE — 6370000000 HC RX 637 (ALT 250 FOR IP): Performed by: PHYSICAL MEDICINE & REHABILITATION

## 2024-01-04 PROCEDURE — 97530 THERAPEUTIC ACTIVITIES: CPT

## 2024-01-04 PROCEDURE — 99232 SBSQ HOSP IP/OBS MODERATE 35: CPT | Performed by: PHYSICAL MEDICINE & REHABILITATION

## 2024-01-04 PROCEDURE — 97110 THERAPEUTIC EXERCISES: CPT

## 2024-01-04 PROCEDURE — 97535 SELF CARE MNGMENT TRAINING: CPT

## 2024-01-04 PROCEDURE — 6370000000 HC RX 637 (ALT 250 FOR IP): Performed by: PHYSICIAN ASSISTANT

## 2024-01-04 PROCEDURE — 94760 N-INVAS EAR/PLS OXIMETRY 1: CPT

## 2024-01-04 PROCEDURE — 82962 GLUCOSE BLOOD TEST: CPT

## 2024-01-04 PROCEDURE — 6370000000 HC RX 637 (ALT 250 FOR IP): Performed by: STUDENT IN AN ORGANIZED HEALTH CARE EDUCATION/TRAINING PROGRAM

## 2024-01-04 PROCEDURE — 97116 GAIT TRAINING THERAPY: CPT

## 2024-01-04 PROCEDURE — 1180000000 HC REHAB R&B

## 2024-01-04 PROCEDURE — 94640 AIRWAY INHALATION TREATMENT: CPT

## 2024-01-04 RX ORDER — INSULIN GLARGINE 100 [IU]/ML
30 INJECTION, SOLUTION SUBCUTANEOUS DAILY
Status: DISCONTINUED | OUTPATIENT
Start: 2024-01-05 | End: 2024-01-05

## 2024-01-04 RX ADMIN — FOLIC ACID 1 MG: 1 TABLET ORAL at 08:22

## 2024-01-04 RX ADMIN — SERTRALINE 100 MG: 100 TABLET, FILM COATED ORAL at 08:21

## 2024-01-04 RX ADMIN — APIXABAN 5 MG: 5 TABLET, FILM COATED ORAL at 08:22

## 2024-01-04 RX ADMIN — GUAIFENESIN 1200 MG: 600 TABLET ORAL at 21:37

## 2024-01-04 RX ADMIN — ALBUTEROL SULFATE 2.5 MG: 2.5 SOLUTION RESPIRATORY (INHALATION) at 05:43

## 2024-01-04 RX ADMIN — B-COMPLEX W/ C & FOLIC ACID TAB 1 TABLET: TAB at 08:22

## 2024-01-04 RX ADMIN — DOCUSATE SODIUM 50 MG AND SENNOSIDES 8.6 MG 2 TABLET: 8.6; 5 TABLET, FILM COATED ORAL at 21:38

## 2024-01-04 RX ADMIN — GUAIFENESIN 1200 MG: 600 TABLET ORAL at 08:22

## 2024-01-04 RX ADMIN — VITAMIN D, TAB 1000IU (100/BT) 1000 UNITS: 25 TAB at 08:21

## 2024-01-04 RX ADMIN — METFORMIN HYDROCHLORIDE 1000 MG: 500 TABLET ORAL at 17:13

## 2024-01-04 RX ADMIN — FAMOTIDINE 20 MG: 20 TABLET ORAL at 21:38

## 2024-01-04 RX ADMIN — SULFAMETHOXAZOLE AND TRIMETHOPRIM 1 TABLET: 800; 160 TABLET ORAL at 08:23

## 2024-01-04 RX ADMIN — LISINOPRIL 20 MG: 20 TABLET ORAL at 08:21

## 2024-01-04 RX ADMIN — Medication 50 MG: at 08:22

## 2024-01-04 RX ADMIN — APIXABAN 5 MG: 5 TABLET, FILM COATED ORAL at 21:37

## 2024-01-04 RX ADMIN — BUDESONIDE INHALATION 500 MCG: 0.5 SUSPENSION RESPIRATORY (INHALATION) at 18:06

## 2024-01-04 RX ADMIN — AMIODARONE HYDROCHLORIDE 400 MG: 200 TABLET ORAL at 08:22

## 2024-01-04 RX ADMIN — Medication 500 MG: at 08:22

## 2024-01-04 RX ADMIN — Medication 1 AMPULE: at 08:22

## 2024-01-04 RX ADMIN — ALBUTEROL SULFATE 2.5 MG: 2.5 SOLUTION RESPIRATORY (INHALATION) at 12:12

## 2024-01-04 RX ADMIN — ATORVASTATIN CALCIUM 80 MG: 80 TABLET, FILM COATED ORAL at 21:37

## 2024-01-04 RX ADMIN — FAMOTIDINE 20 MG: 20 TABLET ORAL at 08:22

## 2024-01-04 RX ADMIN — METOPROLOL SUCCINATE 25 MG: 25 TABLET, FILM COATED, EXTENDED RELEASE ORAL at 08:22

## 2024-01-04 RX ADMIN — DOCUSATE SODIUM 50 MG AND SENNOSIDES 8.6 MG 2 TABLET: 8.6; 5 TABLET, FILM COATED ORAL at 08:21

## 2024-01-04 RX ADMIN — ALBUTEROL SULFATE 2.5 MG: 2.5 SOLUTION RESPIRATORY (INHALATION) at 18:06

## 2024-01-04 RX ADMIN — METFORMIN HYDROCHLORIDE 1000 MG: 500 TABLET ORAL at 08:22

## 2024-01-04 RX ADMIN — AMIODARONE HYDROCHLORIDE 400 MG: 200 TABLET ORAL at 21:38

## 2024-01-04 RX ADMIN — Medication 1 AMPULE: at 21:37

## 2024-01-04 RX ADMIN — BUDESONIDE INHALATION 500 MCG: 0.5 SUSPENSION RESPIRATORY (INHALATION) at 05:44

## 2024-01-04 RX ADMIN — PREDNISONE 10 MG: 5 TABLET ORAL at 08:22

## 2024-01-04 RX ADMIN — SULFAMETHOXAZOLE AND TRIMETHOPRIM 1 TABLET: 800; 160 TABLET ORAL at 21:37

## 2024-01-04 RX ADMIN — INSULIN GLARGINE 20 UNITS: 100 INJECTION, SOLUTION SUBCUTANEOUS at 08:18

## 2024-01-04 RX ADMIN — ASPIRIN 81 MG: 81 TABLET ORAL at 08:22

## 2024-01-04 RX ADMIN — LEVOTHYROXINE SODIUM 150 MCG: 0.07 TABLET ORAL at 05:19

## 2024-01-04 RX ADMIN — INSULIN LISPRO 2 UNITS: 100 INJECTION, SOLUTION INTRAVENOUS; SUBCUTANEOUS at 17:14

## 2024-01-04 ASSESSMENT — PAIN SCALES - GENERAL
PAINLEVEL_OUTOF10: 0
PAINLEVEL_OUTOF10: 0

## 2024-01-04 NOTE — PROGRESS NOTES
OCCUPATIONAL THERAPY DAILY NOTE    Time In 0709      Time Out 0750        Subjective: Pt agreeable to treatment. Pt reports he slept well last night.   Pain: No pain expressed.  Interdisciplinary Communication: Collaborated with PT regarding pt status.   Precautions: Falls, Sternal Precautions, and Monitor SpO2    FUNCTIONAL MOBILITY:       Bed Mobility Modified Independent and Supervision    Sit to Stand SBA    Transfer  SBA  Transfer Type: SPT  Equipment: RW Assist to manage O2 NC   Ambulation SBA  Equipment: RW 5' bed > w/c, assist to manage O2 NC     ACTIVITIES OF DAILY LIVING:       Eating NT    Oral Hygiene Setup/Clean-up Assist Seated at sink   Bathing NT    Upper Body  Dressing Supervision Cues to problem solve threading O2 NC through shirt   Lower Body Dressing SBA SBA in stance at grab bar. Cues to problem solve threading LEs, pt threaded BLE into R pant leg 3x.    Donning/[place] Footwear Setup/Clean-up Assist Slip on shoes with backs   Toilet Transfer SBA and NT  Transfer Type: SPT  Equipment:  Grab bar    Toileting Hygiene SBA SBA in stance at grab bar, cues for hygiene sequencing for infection prevention   Education Safety Awareness and ADL training      Assessment: Patient demonstrated good participation in OT treatment, utilized 3L O2 NC throughout. Pt found to have been incontinent of urine in pull up overnight. Pt with increased cough noted this session. Pt with cognitive deficits impacting safety and independence, anticipate pt will need 24 hour supervision/assisst at d/c. Pt continues to benefit from skilled OT services to address remaining deficits and progress toward baseline level of independence and safety. Patient ended session seated in recliner with call bell and needs within reach, with chair/bed alarm activated, and with RN.   Plan: Continue OT POC.     Sharita Johnson, OT   1/4/2024

## 2024-01-04 NOTE — DIABETES MGMT
Patient admitted with s/p CABG. Blood glucose ranged 174-274 yesterday with patient receiving Lantus 20 units, Humalog 2 units, Metformin 2000 mg, and Prednisone 10 mg. Blood glucose this morning was 179. Reviewed patient current regimen: Lantus 20 units HS, Humalog correctional insulin, Metformin 1000 mg BID, and Prednisone 10 mg daily.  As steroid therapy weaned patient insulin needs will likely decrease. Will follow along.

## 2024-01-04 NOTE — PROGRESS NOTES
Physical Therapy  PHYSICAL THERAPY DAILY NOTE  Time In:  1300 PM  Time Out:  1347 PM  Total Treatment Time:  (P) 47 Minutes  Pt. Seen for: PM, Gait Training, Therapeutic Exercise, and Transfer Training     Subjective: Patient had no complaints.         Objective:  Precautions: Falls and Poor Safety Awareness    GROSS ASSESSMENT Daily Assessment           COGNITION Daily Assessment           BED/MAT MOBILITY Daily Assessment    Rolling Right:   Rolling Left:   Supine to Sit:   Sit to Supine:        TRANSFERS Daily Assessment    Sit to Stand: CGA  Stand to Sit: CGA  Transfer Type: Stand Pivot and with rolling walker  Transfer Assistance: CGA  Car Transfers: NT         GAIT Daily Assessment    Amount of Assistance: CGA  Distance (ft): 80  Assistive Device: RW  Surface: Level Surface       STEPS/STAIRS Daily Assessment    Steps Ambulated:  4 x 2  Level of Assistance:  CGA  Railing:Single Railingon R  Assistive Device: No A/D       BALANCE Daily Assessment    Static Sitting:   Dynamic Sitting:   Static Standing:   Dynamic Standing:        WHEELCHAIR MOBILITY Daily Assessment    Able to Propel (ft):   Assistance:   Surface:   Wheelchair Set-up:        LOWER EXTREMITY EXERCISES Daily Assessment         Pain level: no pain noted.  Pain Location:    Pain Interventions:     Vital Signs:  /68   Pulse 78   Temp 97.9 °F (36.6 °C) (Oral)   Resp 18   Ht 1.753 m (5' 9\")   Wt 83.1 kg (183 lb 4.8 oz)   SpO2 92%   BMI 27.07 kg/m²       Education:      Interdisciplinary Communication:     Pt. Left in recliner as requested with call bell at reach.         Assessment: Patient making good progress.         Plan of Care: Continue with plan of care.    Ivon Storey, PTA  1/4/2024

## 2024-01-04 NOTE — PROGRESS NOTES
OCCUPATIONAL THERAPY DAILY NOTE    Time In 1032      Time Out 1116        Subjective: Pt agreeable to treatment.   Pain: No pain expressed.  Interdisciplinary Communication: Collaborated with PT regarding pt performance.   Precautions: Falls, Poor Safety Awareness, Parag Alarm, Sternal Precautions, and Monitor SpO2    FUNCTIONAL MOBILITY:       Bed Mobility NT    Sit to Stand SBA    Transfer  SBA  Transfer Type: SPT  Equipment: RW Cues for RW proximity, assist and cueing to manage O2 NC   Ambulation CGA and Cristine  Equipment: RW Cues/assist to manage RW and O2 NC      - Activity Tolerance - Cognition - Coordination - Strengthening   Pt participated in pegboard task to address visual perceptual skills, coordination, problem solving, activity tolerance, and standing balance/tolerance. Pt used pegboard and large, multicolored pegs to copy visual designs of simple to moderate complexity. Pt completed task while standing. Pt required min cues for problem solving to fix errors. Pt stood for four trials: 7:51, 5:52, ~4:00, and 2:54. Pt demonstrated improved activity tolerance this session.      Education   Benefits of OT, Rolling Walker Management, and Safety Awareness     Assessment: Patient demonstrated good participation in OT treatment on 3L O2 NC throughout. Pt with improved standing tolerance this session, reports he feels good today. Pt continues to benefit from skilled OT services to address remaining deficits and progress toward baseline level of independence and safety. Patient ended session seated in recliner with call bell and needs within reach and with chair/bed alarm activated.   Plan: Continue OT POC.     Sharita Johnson, OT   1/4/2024

## 2024-01-04 NOTE — PROGRESS NOTES
Inpatient [place] [address]  Tel: [phone]     Physical Medicine and Rehabilitation Progress Note      Michael Archie Paduano III  Admit Date: 12/20/2023  Admit Diagnosis: Debility [R53.81]  History of coronary artery bypass graft x 3 [Z95.1]    Subjective     Patient seen face-to-face and evaluated. NAEO and he reports sleeping well again and said he had the best sleep yet since he has been on IPR. He is eating and drinking fine, and continent of bowel and bladder. No pain reported, just some soreness around inferior thoracotomy area. He said his therapies are going very well. He has appeared much improved clinically lately breathing better. Although, he's still on 3L O2. He reported continued cough, but improved. Cardiology and Pulmonology consulted and workup including CXR (same/improved), BMP, CBC, PCT. CBC showed leukocytosis (WBCs 13.8, but had been elevated other than last CBC), PCT 0.06, CT Chest negative PE, afebrile. WBCs 31.3 on 12/26/23 -> 21.2 (12/30) likely d/t corticosteroids, Hg down 9.6 -> 8.7. His lungs are a little rhonchus today, and he's breathing OK now on 3L, and he says he's breathing OK about the same. Glargine 10U daily added -> 20U (1/3/24) -> 30U (1/5/23).    Objective:     Current Facility-Administered Medications   Medication Dose Route Frequency    [START ON 1/5/2024] insulin glargine (LANTUS) injection vial 30 Units  30 Units SubCUTAneous Daily    sulfamethoxazole-trimethoprim (BACTRIM DS;SEPTRA DS) 800-160 MG per tablet 1 tablet  1 tablet Oral 2 times per day    guaiFENesin (MUCINEX) extended release tablet 1,200 mg  1,200 mg Oral BID    glucose chewable tablet 16 g  4 tablet Oral PRN    dextrose bolus 10% 125 mL  125 mL IntraVENous PRN    Or    dextrose bolus 10% 250 mL  250 mL IntraVENous PRN    glucagon (rDNA) injection 1 mg  1 mg SubCUTAneous PRN    dextrose 10 % infusion   IntraVENous Continuous PRN    metFORMIN  PRN    benzonatate (TESSALON) capsule 100 mg  100 mg Oral 4x Daily PRN    folic acid (FOLVITE) tablet 1 mg  1 mg Oral Daily    multivitamin 1 tablet  1 tablet Oral Daily    vitamin C tablet 500 mg  500 mg Oral Daily    zinc sulfate (ZINCATE) 220 mg capsule - elemental zinc 50 mg  50 mg Oral Daily    Vitamin D (CHOLECALCIFEROL) tablet 1,000 Units  1,000 Units Oral Daily     No Known Allergies    Visit Vitals  /68   Pulse 78   Temp 97.9 °F (36.6 °C) (Oral)   Resp 18   Ht 1.753 m (5' 9\")   Wt 83.1 kg (183 lb 4.8 oz)   SpO2 92%   BMI 27.07 kg/m²          Physical Exam:   General:  AAO with extra time, appears stated age. Appears improved clinically and more relaxed lately but still on 3L O2   HEENT:  Normocephalic, EOM and FOV intact with blurry vision, facial symmetry noted. Nares patent, oral mucosa moist without lesions.   Lungs:  Diminished breath sounds and rhonchus breath sounds L>R with poor air exchange and effort. Respirations minimally labored at rest on 3L O2.   Cardiovascular:  Appears regular rate and irregular rhythm with distant heart sounds, S1, S2. No obvious murmur. Palpable radial and weak pedal pulses. No/minimal BLE edema, no BLE Jason's.   Genitourinary: Deferred.   Abdomen:  Soft, non-tender, not distended.  Bowel sounds normoactive.  No obvious masses or organomegaly palpated.    Neuromuscular:  BUE  and BLEs 5/5 throughout. BUEs not tested proximally d/t sternal precautions, but he can easily aBduct arms to shoulder level. Sensation intact globally without paresthesias. Tremulous hands.   Skin:  Intact, dry, good skin turgor, age related changes present       Incision:  Sternotomy with an intact and well sealed [place].      Psych: Patient was oriented to person, place, and time.   Without hallucinations, abnormal affect or abnormal behaviors during the examination.        Functional Assessment:  Per PT:       BED/MAT MOBILITY Daily Assessment     Rolling Right:   Rolling Left:

## 2024-01-04 NOTE — PROGRESS NOTES
Physical Therapy  PHYSICAL THERAPY DAILY NOTE  Time In:  830 AM  Time Out:  917 AM  Total Treatment Time:  (P) 47 Minutes  Pt. Seen for: AM, Gait Training, Therapeutic Exercise, and Transfer Training     Subjective: \"I was messed up yesterday afternoon. I dont know what was wrong with me.\"         Objective:  Precautions: Falls    GROSS ASSESSMENT Daily Assessment           COGNITION Daily Assessment           BED/MAT MOBILITY Daily Assessment    Rolling Right: NT  Rolling Left: NT  Supine to Sit: NT  Sit to Supine: NT       TRANSFERS Daily Assessment    Sit to Stand: Min A  Stand to Sit: Min A  Transfer Type: Stand Pivot and with rolling walker  Transfer Assistance: Min A  Car Transfers: NT         GAIT Daily Assessment    Amount of Assistance: Min A and Mod A  Distance (ft): 70  Assistive Device: RW  Surface: Level Surface       STEPS/STAIRS Daily Assessment    Steps Ambulated:    Level of Assistance:    Railing:  Assistive Device:        BALANCE Daily Assessment    Static Sitting:   Dynamic Sitting:   Static Standing:   Dynamic Standing:        WHEELCHAIR MOBILITY Daily Assessment    Able to Propel (ft):   Assistance:   Surface:   Wheelchair Set-up:        LOWER EXTREMITY EXERCISES Daily Assessment    SEATED EXERCISES Sets Reps Comments   Ankle Pumps 2 10    Hip Flexion 2 10    Long Arc Quads 2 10    Hip Adduction/Ball Squeeze 2 10    Hip Abduction with green Theraband 2 10    Hamstring Curls with green Theraband 2 10    Hip Extension with green Theraband 2 10           Pain level: no pain noted.  Pain Location:    Pain Interventions:     Vital Signs:  /68   Pulse 78   Temp 97.9 °F (36.6 °C) (Oral)   Resp 18   Ht 1.753 m (5' 9\")   Wt 83.1 kg (183 lb 4.8 oz)   SpO2 92%   BMI 27.07 kg/m²   O2 at 3 liters and O2 sats remained around 96%.    Education:      Interdisciplinary Communication:     Pt. Left in recliner as he requested.         Assessment: Patient making progress but for safety will need 24  hour supervision.         Plan of Care: Continue with plan of care.    Ivon Storey, PTA  1/4/2024

## 2024-01-04 NOTE — PLAN OF CARE
Problem: Safety - Adult  Goal: Free from fall injury  Outcome: Progressing     Problem: Skin/Tissue Integrity  Goal: Absence of new skin breakdown  Description: 1.  Monitor for areas of redness and/or skin breakdown  2.  Assess vascular access sites hourly  3.  Every 4-6 hours minimum:  Change oxygen saturation probe site  4.  Every 4-6 hours:  If on nasal continuous positive airway pressure, respiratory therapy assess nares and determine need for appliance change or resting period.  Outcome: Progressing     Problem: Pain  Goal: Verbalizes/displays adequate comfort level or baseline comfort level  Outcome: Progressing     Problem: Respiratory - Adult  Goal: Achieves optimal ventilation and oxygenation  Outcome: Progressing

## 2024-01-04 NOTE — CARE COORDINATION
Care Transitions Post-Acute Facility Update Call    2024    Patient: Michael Archie Paduano III Patient : 1950   MRN: [id number]  Reason for Admission: S/P CABG x 3  Discharge Date: 23 RARS: Readmission Risk Score: 17.8         Care Transitions Post Acute Facility Update    Patient remains at IR with no plan for discharge at this time. Per SW note patient discharge deferred. Will continue to monitor for discharge plan.

## 2024-01-04 NOTE — CARE COORDINATION
Interdisciplinary Wednesday, Team Conference Meeting Notes    Interdisciplinary team conference meeting completed to discuss plan of care.     Estimated D/C Date: 01/11/24    Recommended Follow-Up Therapy: Staff has recommended (HH) PT/OT/RN. No DME's and family training.        Communication with family/caregivers:  CM reviewed / screen patient medical chart for continued stay.  Patient needs are continue to be followed by Dr. Attila Sandoval. Patient was admitted on 12/31/23.  Patient has been approved for 14 days.  Patient has discharge date / plan at this time scheduled for 01/11/24.  CM met with patient / spouse and made them aware of the Team Conference recommendations.  Staff has recommended (HH) PT/OT/RN, no DME's and family training.  Patient / spouse was agreeable for (SWETHA) and requested a referral be made to [facility].  Referral completed.  They have requested (Delmy) for PT at [facility].  Patient needs no DME. Spouse has scheduled family training for her / daughter on Monday 01/08/24 @ 8:30 AM.  Staff has been made aware of the family training.  CM will continue to follow patient plan of care and assist with supportive care.  CM will continue to follow / monitor for any needs, concerns or questions that may arise.     Name of Ins: Medicare Part A / B  Policy #: [id number]  Secondary Ins: [employer]  Policy #: [id number]  Auth #  Admission Date: 12/20/23  Approved Dates: 12 days   LOS: 14 days  Discharge Date: 01/11/24  Contact Name:  Contact #  Fax #:  Updated Clinicals: no updated clinicals needed     CM met with patient / spouse and made them aware of the Team Conference recommendations.  Staff has recommended (HH) PT/OT/RN, no DME's and family training.  Patient / spouse was agreeable for (SWETHA) and requested a referral be made to [facility].  Referral completed.  They have requested (Delmy) for PT at [facility].  Patient needs no DME. Spouse has scheduled family training for her / daughter on Monday 01/08/24 @ 8:30 AM.  CM will

## 2024-01-05 ENCOUNTER — HOME HEALTH ADMISSION (OUTPATIENT)
Dept: HOME HEALTH SERVICES | Facility: HOME HEALTH | Age: 74
End: 2024-01-05
Payer: MEDICARE

## 2024-01-05 LAB
ANION GAP SERPL CALC-SCNC: 8 MMOL/L (ref 2–11)
BUN SERPL-MCNC: 49 MG/DL (ref 8–23)
CALCIUM SERPL-MCNC: 9 MG/DL (ref 8.3–10.4)
CHLORIDE SERPL-SCNC: 111 MMOL/L (ref 103–113)
CO2 SERPL-SCNC: 20 MMOL/L (ref 21–32)
CREAT SERPL-MCNC: 1.4 MG/DL (ref 0.8–1.5)
ERYTHROCYTE [DISTWIDTH] IN BLOOD BY AUTOMATED COUNT: 15 % (ref 11.9–14.6)
GLUCOSE BLD STRIP.AUTO-MCNC: 134 MG/DL (ref 65–100)
GLUCOSE BLD STRIP.AUTO-MCNC: 150 MG/DL (ref 65–100)
GLUCOSE BLD STRIP.AUTO-MCNC: 150 MG/DL (ref 65–100)
GLUCOSE BLD STRIP.AUTO-MCNC: 99 MG/DL (ref 65–100)
GLUCOSE SERPL-MCNC: 152 MG/DL (ref 65–100)
HCT VFR BLD AUTO: 27.8 % (ref 41.1–50.3)
HGB BLD-MCNC: 8.6 G/DL (ref 13.6–17.2)
MCH RBC QN AUTO: 30.4 PG (ref 26.1–32.9)
MCHC RBC AUTO-ENTMCNC: 30.9 G/DL (ref 31.4–35)
MCV RBC AUTO: 98.2 FL (ref 82–102)
NRBC # BLD: 0 K/UL (ref 0–0.2)
PLATELET # BLD AUTO: 451 K/UL (ref 150–450)
PMV BLD AUTO: 9.1 FL (ref 9.4–12.3)
POTASSIUM SERPL-SCNC: 4.6 MMOL/L (ref 3.5–5.1)
RBC # BLD AUTO: 2.83 M/UL (ref 4.23–5.6)
SERVICE CMNT-IMP: ABNORMAL
SERVICE CMNT-IMP: NORMAL
SODIUM SERPL-SCNC: 139 MMOL/L (ref 136–146)
WBC # BLD AUTO: 22.8 K/UL (ref 4.3–11.1)

## 2024-01-05 PROCEDURE — 80048 BASIC METABOLIC PNL TOTAL CA: CPT

## 2024-01-05 PROCEDURE — 97535 SELF CARE MNGMENT TRAINING: CPT

## 2024-01-05 PROCEDURE — 6370000000 HC RX 637 (ALT 250 FOR IP): Performed by: STUDENT IN AN ORGANIZED HEALTH CARE EDUCATION/TRAINING PROGRAM

## 2024-01-05 PROCEDURE — 97530 THERAPEUTIC ACTIVITIES: CPT

## 2024-01-05 PROCEDURE — 1180000000 HC REHAB R&B

## 2024-01-05 PROCEDURE — 85027 COMPLETE CBC AUTOMATED: CPT

## 2024-01-05 PROCEDURE — 2700000000 HC OXYGEN THERAPY PER DAY

## 2024-01-05 PROCEDURE — 99231 SBSQ HOSP IP/OBS SF/LOW 25: CPT | Performed by: PHYSICAL MEDICINE & REHABILITATION

## 2024-01-05 PROCEDURE — 6360000002 HC RX W HCPCS: Performed by: INTERNAL MEDICINE

## 2024-01-05 PROCEDURE — 6370000000 HC RX 637 (ALT 250 FOR IP): Performed by: PHYSICIAN ASSISTANT

## 2024-01-05 PROCEDURE — 94760 N-INVAS EAR/PLS OXIMETRY 1: CPT

## 2024-01-05 PROCEDURE — 97116 GAIT TRAINING THERAPY: CPT

## 2024-01-05 PROCEDURE — 82962 GLUCOSE BLOOD TEST: CPT

## 2024-01-05 PROCEDURE — 94664 DEMO&/EVAL PT USE INHALER: CPT

## 2024-01-05 PROCEDURE — 6370000000 HC RX 637 (ALT 250 FOR IP): Performed by: PHYSICAL MEDICINE & REHABILITATION

## 2024-01-05 PROCEDURE — 97150 GROUP THERAPEUTIC PROCEDURES: CPT

## 2024-01-05 PROCEDURE — 94640 AIRWAY INHALATION TREATMENT: CPT

## 2024-01-05 RX ORDER — ALBUTEROL SULFATE 2.5 MG/3ML
2.5 SOLUTION RESPIRATORY (INHALATION) EVERY 6 HOURS PRN
Status: DISCONTINUED | OUTPATIENT
Start: 2024-01-05 | End: 2024-01-11 | Stop reason: HOSPADM

## 2024-01-05 RX ORDER — LEVALBUTEROL INHALATION SOLUTION 0.63 MG/3ML
0.63 SOLUTION RESPIRATORY (INHALATION)
Status: DISCONTINUED | OUTPATIENT
Start: 2024-01-05 | End: 2024-01-08

## 2024-01-05 RX ORDER — GLIPIZIDE 5 MG/1
2.5 TABLET ORAL
Status: DISCONTINUED | OUTPATIENT
Start: 2024-01-05 | End: 2024-01-11 | Stop reason: HOSPADM

## 2024-01-05 RX ADMIN — APIXABAN 5 MG: 5 TABLET, FILM COATED ORAL at 20:47

## 2024-01-05 RX ADMIN — Medication 500 MG: at 08:13

## 2024-01-05 RX ADMIN — BUDESONIDE INHALATION 500 MCG: 0.5 SUSPENSION RESPIRATORY (INHALATION) at 05:50

## 2024-01-05 RX ADMIN — BUDESONIDE INHALATION 500 MCG: 0.5 SUSPENSION RESPIRATORY (INHALATION) at 17:58

## 2024-01-05 RX ADMIN — ATORVASTATIN CALCIUM 80 MG: 80 TABLET, FILM COATED ORAL at 20:47

## 2024-01-05 RX ADMIN — FAMOTIDINE 20 MG: 20 TABLET ORAL at 08:11

## 2024-01-05 RX ADMIN — FAMOTIDINE 20 MG: 20 TABLET ORAL at 20:46

## 2024-01-05 RX ADMIN — B-COMPLEX W/ C & FOLIC ACID TAB 1 TABLET: TAB at 08:12

## 2024-01-05 RX ADMIN — Medication 50 MG: at 08:11

## 2024-01-05 RX ADMIN — GUAIFENESIN 1200 MG: 600 TABLET ORAL at 20:46

## 2024-01-05 RX ADMIN — INSULIN GLARGINE 30 UNITS: 100 INJECTION, SOLUTION SUBCUTANEOUS at 08:16

## 2024-01-05 RX ADMIN — FOLIC ACID 1 MG: 1 TABLET ORAL at 08:13

## 2024-01-05 RX ADMIN — AMIODARONE HYDROCHLORIDE 400 MG: 200 TABLET ORAL at 08:12

## 2024-01-05 RX ADMIN — Medication 1 AMPULE: at 20:47

## 2024-01-05 RX ADMIN — APIXABAN 5 MG: 5 TABLET, FILM COATED ORAL at 08:12

## 2024-01-05 RX ADMIN — GLIPIZIDE 2.5 MG: 5 TABLET ORAL at 17:14

## 2024-01-05 RX ADMIN — SULFAMETHOXAZOLE AND TRIMETHOPRIM 1 TABLET: 800; 160 TABLET ORAL at 21:18

## 2024-01-05 RX ADMIN — METFORMIN HYDROCHLORIDE 1000 MG: 500 TABLET ORAL at 17:14

## 2024-01-05 RX ADMIN — METFORMIN HYDROCHLORIDE 1000 MG: 500 TABLET ORAL at 08:12

## 2024-01-05 RX ADMIN — SERTRALINE 100 MG: 100 TABLET, FILM COATED ORAL at 08:12

## 2024-01-05 RX ADMIN — AMIODARONE HYDROCHLORIDE 400 MG: 200 TABLET ORAL at 21:22

## 2024-01-05 RX ADMIN — LISINOPRIL 20 MG: 20 TABLET ORAL at 08:13

## 2024-01-05 RX ADMIN — GUAIFENESIN 1200 MG: 600 TABLET ORAL at 08:11

## 2024-01-05 RX ADMIN — Medication 1 AMPULE: at 08:19

## 2024-01-05 RX ADMIN — VITAMIN D, TAB 1000IU (100/BT) 1000 UNITS: 25 TAB at 08:11

## 2024-01-05 RX ADMIN — LEVOTHYROXINE SODIUM 150 MCG: 0.07 TABLET ORAL at 05:26

## 2024-01-05 RX ADMIN — ALBUTEROL SULFATE 2.5 MG: 2.5 SOLUTION RESPIRATORY (INHALATION) at 05:50

## 2024-01-05 RX ADMIN — ASPIRIN 81 MG: 81 TABLET ORAL at 08:11

## 2024-01-05 RX ADMIN — METOPROLOL SUCCINATE 25 MG: 25 TABLET, FILM COATED, EXTENDED RELEASE ORAL at 08:13

## 2024-01-05 RX ADMIN — SULFAMETHOXAZOLE AND TRIMETHOPRIM 1 TABLET: 800; 160 TABLET ORAL at 08:12

## 2024-01-05 RX ADMIN — ALBUTEROL SULFATE 2.5 MG: 2.5 SOLUTION RESPIRATORY (INHALATION) at 17:58

## 2024-01-05 NOTE — CARE COORDINATION
CM reviewed / screen patient medical chart for continued stay.  Patient needs are continue to be followed by Dr. Attila Sandoval. Patient was admitted on 12/31/23.  Patient has been approved for 14 days.  Patient has discharge date / plan at this time scheduled for 01/11/24.  CM made referral to [facility].  Referral completed.  They have requested (Delmy) for PT at [facility].  Patient needs no DME. Spouse has scheduled family training for her / daughter on Monday 01/08/24 @ 8:30 AM.  Staff has been made aware of the family training.  CM will continue to follow patient plan of care and assist with supportive care.  CM will continue to follow / monitor for any needs, concerns or questions that may arise.      Name of Ins: Medicare Part A / B  Policy #: [id number]  Secondary Ins: [employer]  Policy #: [id number]  Auth #  Admission Date: 12/20/23  Approved Dates: 12 days   LOS: 16 days  Discharge Date: 01/11/24  Contact Name:  Contact #  Fax #:  Updated Clinicals: no updated clinicals needed

## 2024-01-05 NOTE — PROGRESS NOTES
Physical Therapy  PHYSICAL THERAPY DAILY NOTE  Time In:  832   Time Out:  917  Total Treatment Time:  45 Minutes  Pt. Seen for: AM, Balance Training, Gait Training, and Transfer Training     Subjective: Patient states he is feeling better.           Objective:  Pt was on 5L O2 NC in the room.  Pt feels like that was left over from his PM CPAP.  Encouraged pt to ask what his O2 levels are set at when people are helping him set up.    3L 100% sats at rest.  Brought down to 2L for therapy with sats from 92-97% during activity  .  Precautions: Falls and Poor Safety Awareness    GROSS ASSESSMENT Daily Assessment           COGNITION Daily Assessment           BED/MAT MOBILITY Daily Assessment    Rolling Right:   Rolling Left:   Supine to Sit:   Sit to Supine:        TRANSFERS Daily Assessment    Sit to Stand: Supervision/Standby Assist  Stand to Sit: Supervision/Standby Assist  Transfer Type: Stand Pivot and with rolling walker  Transfer Assistance: Supervision/Standby Assist  Car Transfers: NT         GAIT Daily Assessment   Performed HHA x 25' with good ability - cues for more upright posture.  O2 sats low 90s.  HR high upon sitting - 180 bpm.  Pt states he could feel it.  Not ready for HHA for that distance.  Returned to 70s-80s in ~2 minutes.    ~10 minute rest prior to walking again.      Walking with RW and O2 line in a stationary spot to work on line management in his home x 100' with good ability.  O2 sats 95 - 97 on 2L. Amount of Assistance: Supervision/Standby Assist  Distance (ft): 100  Assistive Device: RW  Surface: Level Surface       STEPS/STAIRS Daily Assessment   Walking on uneven surfaces.   Steps Ambulated:  1 curb  Level of Assistance:  CGA  Railing:No Rails  Assistive Device: RW       BALANCE Daily Assessment    Static Sitting:   Dynamic Sitting:   Static Standing:   Dynamic Standing:        WHEELCHAIR MOBILITY Daily Assessment    Able to Propel (ft):   Assistance:   Surface:   Wheelchair Set-up:

## 2024-01-05 NOTE — PROGRESS NOTES
Physical Therapy  PHYSICAL THERAPY DAILY NOTE  Time In:  1129   Time Out:  1204  Total Treatment Time:  35 Minutes  Pt. Seen for: AM, Balance Training, Gait Training, and Transfer Training     Subjective: Pleasant conversation - checking about O2 sats and HR.    Objective:  Pt on 1L with sats mid to upper 90s with activity and walking 100'.  .  Precautions: Falls and Poor Safety Awareness    GROSS ASSESSMENT Daily Assessment           COGNITION Daily Assessment           BED/MAT MOBILITY Daily Assessment    Rolling Right:   Rolling Left:   Supine to Sit:   Sit to Supine:        TRANSFERS Daily Assessment    Sit to Stand: Supervision/Standby Assist  Stand to Sit: Supervision/Standby Assist  Transfer Type: Stand Pivot and with rolling walker  Transfer Assistance: Supervision/Standby Assist  Car Transfers: NT         GAIT Daily Assessment    1L O2 - walked 100' with rolling walker.  O2 sat 97;  Amount of Assistance: Supervision/Standby Assist  Distance (ft): 100  Assistive Device: RW  Surface: Level Surface       STEPS/STAIRS Daily Assessment     Steps Ambulated:  0  Level of Assistance:  CGA  Railing:No Rails  Assistive Device: RW       BALANCE Daily Assessment   Standing balance/exercises  Standing heel raises - x 20 Jump in HR - 150   Seated rest  Single leg standing with other foot on step x 2 minutes each leg with seated rest.    Cues for upright posture activating quads and gluts   Static Sitting:   Dynamic Sitting:   Static Standing:   Dynamic Standing:        WHEELCHAIR MOBILITY Daily Assessment    Able to Propel (ft):   Assistance:   Surface:   Wheelchair Set-up:        LOWER EXTREMITY EXERCISES Daily Assessment         Pain level: no pain noted.  Pain Location:    Pain Interventions:     Vital Signs:  /64   Pulse 72   Temp 97.3 °F (36.3 °C) (Axillary)   Resp 16   Ht 1.753 m (5' 9\")   Wt 83.1 kg (183 lb 4.8 oz)   SpO2 98%   BMI 27.07 kg/m²       Education:  Instructed to indicate if he feels

## 2024-01-05 NOTE — PROGRESS NOTES
OCCUPATIONAL THERAPY DAILY NOTE    Time In 0708      Time Out 0746        Subjective: Pt agreeable to treatment. Pt reports he would like a shower.   Pain: No pain expressed.  Interdisciplinary Communication: Collaborated with  CNA  regarding handoff communication.   Precautions: Falls, Sternal Precautions, and Monitor SpO2    FUNCTIONAL MOBILITY:       Bed Mobility NT    Sit to Stand SBA    Transfer  SBA  Transfer Type: SPT  Equipment:  Assist to manage O2 NC   Ambulation SBA and CGA  Equipment: RW In room and bathroom      ACTIVITIES OF DAILY LIVING:       Eating NT    Oral Hygiene NT     Bathing Supervision and SBA Shower with SBA in stance at grab bar, S seated on TTB   Upper Body  Dressing Supervision Cues to replace O2 NC after changing shirt    Lower Body Dressing SBA Cues to sequence doffing, SBA-S in stance to don.    Donning/[place] Footwear Setup/Clean-up Assist Socks   Toilet Transfer SBA  Transfer Type: SPT  Equipment:  Grab bar    Toileting Hygiene SBA SBA in stance at RW   Education Safety Awareness and ADL training      Assessment: Patient demonstrated good participation in OT treatment to address shower. Pt demonstrates improvement with activity tolerance for functional tasks. Pt continues to benefit from skilled OT services to address remaining deficits and progress toward baseline level of independence and safety. Patient ended session seated in recliner with call bell and needs within reach, with chair/bed alarm activated, and with CNA .   Plan: Continue OT POC.     Sharita Johnson, OT   1/5/2024

## 2024-01-05 NOTE — PROGRESS NOTES
[place] [address]  Tel: [phone]     Physical Medicine and Rehabilitation Progress Note      Michael Archie Paduano III  Admit Date: 12/20/2023  Admit Diagnosis: Debility [R53.81]  History of coronary artery bypass graft x 3 [Z95.1]    Subjective     Patient seen face-to-face and evaluated. NAEO and he reports sleeping well again. He is eating and drinking fine, and continent of bowel and bladder. No pain reported, just some soreness around inferior thoracotomy area. He said his therapies are going very well. He has appeared much improved clinically lately breathing better and now on only 1L O2. He reported continued cough, but improved. Cardiology and Pulmonology consulted and workup including CXR (same/improved), BMP, CBC, PCT. CBC showed leukocytosis (WBCs 13.8, but had been elevated other than last CBC), PCT 0.06, CT Chest negative PE, afebrile. WBCs 31.3 on 12/26/23 -> 21.2 (12/30) -> 22.8 (1/5/24) likely d/t corticosteroids, Hg down 9.6 -> 8.7 -> 8.6 (1/5/24). Glargine 10U daily added -> 20U (1/3/24) -> 30U (1/5/23), DC'd 1/5/23 and restart home Glipizide 2.5mg BIDAC now off corticosteroids. UCx (1/3/24): >100K GNRs on Bactrim.    Objective:     Current Facility-Administered Medications   Medication Dose Route Frequency    glipiZIDE (GLUCOTROL) tablet 2.5 mg  2.5 mg Oral BID AC    sulfamethoxazole-trimethoprim (BACTRIM DS;SEPTRA DS) 800-160 MG per tablet 1 tablet  1 tablet Oral 2 times per day    guaiFENesin (MUCINEX) extended release tablet 1,200 mg  1,200 mg Oral BID    glucose chewable tablet 16 g  4 tablet Oral PRN    dextrose bolus 10% 125 mL  125 mL IntraVENous PRN    Or    dextrose bolus 10% 250 mL  250 mL IntraVENous PRN    glucagon (rDNA) injection 1 mg  1 mg SubCUTAneous PRN    dextrose 10 % infusion   IntraVENous Continuous PRN    metFORMIN (GLUCOPHAGE) tablet 1,000 mg  1,000 mg Oral BID WC    budesonide (PULMICORT) nebulizer  Time: 01/03/24 11:35 AM   Result Value Ref Range    POC Glucose 225 (H) 65 - 100 mg/dL    Performed by: ColletteKampyleBETTY    Urinalysis with Reflex to Culture    Collection Time: 01/03/24  3:20 PM    Specimen: Urine   Result Value Ref Range    Color, UA DARK YELLOW      Appearance CLEAR      Specific Gravity, UA 1.026 (H) 1.001 - 1.023      pH, Urine 5.0 5.0 - 9.0      Protein, UA 30 (A) NEG mg/dL    Glucose, UA Negative mg/dL    Ketones, Urine TRACE (A) NEG mg/dL    Bilirubin Urine Negative NEG      Blood, Urine LARGE (A) NEG      Urobilinogen, Urine 0.2 0.2 - 1.0 EU/dL    Nitrite, Urine Positive (A) NEG      Leukocyte Esterase, Urine SMALL (A) NEG      Urine Culture if Indicated URINE CULTURE ORDERED      WBC, UA 20-50 (A) U4 /hpf    RBC, UA 5-10 (A) U5 /hpf    BACTERIA, URINE 2+ (A) NEG /hpf    Epithelial Cells UA 0-5 U5 /hpf    Casts 5-10 (A) U2 /lpf    Mucus, UA 0 0 /lpf   Culture, Urine    Collection Time: 01/03/24  3:20 PM    Specimen: Urine   Result Value Ref Range    Special Requests NO SPECIAL REQUESTS  Reflexed from [id number]        Culture (A)       >100,000 COLONIES/mL Gram negative rods IDENTIFICATION AND SUSCEPTIBILITY TO FOLLOW   POCT Glucose    Collection Time: 01/03/24  4:41 PM   Result Value Ref Range    POC Glucose 195 (H) 65 - 100 mg/dL    Performed by: Ca    POCT Glucose    Collection Time: 01/03/24  7:39 PM   Result Value Ref Range    POC Glucose 274 (H) 65 - 100 mg/dL    Performed by: Bernadette(Studentextern)    POCT Glucose    Collection Time: 01/04/24  6:11 AM   Result Value Ref Range    POC Glucose 179 (H) 65 - 100 mg/dL    Performed by: Gregorio    POCT Glucose    Collection Time: 01/04/24 11:22 AM   Result Value Ref Range    POC Glucose 193 (H) 65 - 100 mg/dL    Performed by: Rancho    POCT Glucose    Collection Time: 01/04/24  4:48 PM   Result Value Ref Range    POC Glucose 249 (H) 65 - 100 mg/dL    Performed by: Rancho    POCT Glucose

## 2024-01-05 NOTE — PROGRESS NOTES
OCCUPATIONAL THERAPY DAILY NOTE    Time In 1030     Time Out 1128      Subjective: Pt agreeable to treatment, reports he feels more unsteady today.   Pain: No pain expressed.  Interdisciplinary Communication: Collaborated with PT regarding pt status and handoff communication.   Precautions: Falls, Poor Safety Awareness, [place] Alarm, Sternal Precautions, and Monitor SpO2    FUNCTIONAL MOBILITY:       Bed Mobility NT    Sit to Stand SBA    Transfer  SBA  Transfer Type: SPT  Equipment: RW Cues for RW proximity, assist and cueing to manage O2 NC   Ambulation CGA  Equipment: RW Cues/assist to manage RW and O2 NC. Practiced pt managing O2 NC in room/around furniture with Cristine/cueing throughout.       - Activity Tolerance - Cognition - Coordination - Strengthening   Pt completed 12 minutes on the upper body ergometer, ½ forward and ½ backward, with light and moderate resistance to increase upper body strength and activity tolerance for integration into functional tasks. SpO2 98% on 2L O2 NC, pt weaned to 1L O2 NC.     Pt engaged in games of Connect 4 at table to address coordination, functional reach, standing balance, standing tolerance, and overall activity tolerance. Pt required SBA-S in stance. Pt stood for two games, sat for the third. Pt utilized 1L O2 NC throughout, SpO2 97-98%, HR 68. Pt stood 2-5 minutes x 2 reps. Pt required min cues throughout to identify game moves.      Education   Benefits of OT, Rolling Walker Management, and Safety Awareness     Assessment: Patient demonstrated good participation in OT treatment. Pt with improved respiratory status this session, able to tolerate standing task on 1L O2 NC with SpO2 97%, HR 68. Pt continues to benefit from skilled OT services to address remaining deficits and progress toward baseline level of independence and safety. Patient ended session seated in w/c with PT.   Plan: Continue OT POC.     Sharita Johnson, OT   1/5/2024

## 2024-01-05 NOTE — DIABETES MGMT
Patient s/p CABG x3. Blood glucose ranged 164-249 yesterday with patient receiving Lantus 20 units, Humalog 2 units, Metformin 2000mg, and Prednisone 10mg. Blood glucose this morning was 150. Creatinine 1.40. GFR 53. Reviewed patient current regimen: Lantus 30 units daily, Humalog correctional insulin, and Metformin 1000mg BID. Noted patient steroids have been stopped. Patient would likely benefit from change from basal insulin back to glipizide tomorrow for discharge planning as prior to steroid therapy glycemic control control stable on oral diabetes medications. Provider updated via [employer] regarding recommendations and patient glycemic control.

## 2024-01-06 LAB
BACTERIA SPEC CULT: ABNORMAL
GLUCOSE BLD STRIP.AUTO-MCNC: 117 MG/DL (ref 65–100)
GLUCOSE BLD STRIP.AUTO-MCNC: 72 MG/DL (ref 65–100)
GLUCOSE BLD STRIP.AUTO-MCNC: 74 MG/DL (ref 65–100)
GLUCOSE BLD STRIP.AUTO-MCNC: 81 MG/DL (ref 65–100)
SERVICE CMNT-IMP: ABNORMAL
SERVICE CMNT-IMP: ABNORMAL
SERVICE CMNT-IMP: NORMAL

## 2024-01-06 PROCEDURE — 82962 GLUCOSE BLOOD TEST: CPT

## 2024-01-06 PROCEDURE — 94760 N-INVAS EAR/PLS OXIMETRY 1: CPT

## 2024-01-06 PROCEDURE — 6370000000 HC RX 637 (ALT 250 FOR IP): Performed by: PHYSICIAN ASSISTANT

## 2024-01-06 PROCEDURE — 1180000000 HC REHAB R&B

## 2024-01-06 PROCEDURE — 94660 CPAP INITIATION&MGMT: CPT

## 2024-01-06 PROCEDURE — 97116 GAIT TRAINING THERAPY: CPT

## 2024-01-06 PROCEDURE — 6360000002 HC RX W HCPCS: Performed by: PHYSICAL MEDICINE & REHABILITATION

## 2024-01-06 PROCEDURE — 6370000000 HC RX 637 (ALT 250 FOR IP): Performed by: STUDENT IN AN ORGANIZED HEALTH CARE EDUCATION/TRAINING PROGRAM

## 2024-01-06 PROCEDURE — 6360000002 HC RX W HCPCS: Performed by: INTERNAL MEDICINE

## 2024-01-06 PROCEDURE — 94640 AIRWAY INHALATION TREATMENT: CPT

## 2024-01-06 PROCEDURE — 97530 THERAPEUTIC ACTIVITIES: CPT

## 2024-01-06 PROCEDURE — 6370000000 HC RX 637 (ALT 250 FOR IP): Performed by: PHYSICAL MEDICINE & REHABILITATION

## 2024-01-06 PROCEDURE — 97110 THERAPEUTIC EXERCISES: CPT

## 2024-01-06 RX ADMIN — TRAZODONE HYDROCHLORIDE 50 MG: 50 TABLET ORAL at 21:23

## 2024-01-06 RX ADMIN — Medication 1 AMPULE: at 21:32

## 2024-01-06 RX ADMIN — FOLIC ACID 1 MG: 1 TABLET ORAL at 08:05

## 2024-01-06 RX ADMIN — SULFAMETHOXAZOLE AND TRIMETHOPRIM 1 TABLET: 800; 160 TABLET ORAL at 21:23

## 2024-01-06 RX ADMIN — HYDROCORTISONE: 25 CREAM TOPICAL at 08:07

## 2024-01-06 RX ADMIN — LEVALBUTEROL HYDROCHLORIDE 0.63 MG: 0.63 SOLUTION RESPIRATORY (INHALATION) at 06:18

## 2024-01-06 RX ADMIN — LEVALBUTEROL HYDROCHLORIDE 0.63 MG: 0.63 SOLUTION RESPIRATORY (INHALATION) at 14:31

## 2024-01-06 RX ADMIN — FAMOTIDINE 20 MG: 20 TABLET ORAL at 21:24

## 2024-01-06 RX ADMIN — BUDESONIDE INHALATION 500 MCG: 0.5 SUSPENSION RESPIRATORY (INHALATION) at 06:16

## 2024-01-06 RX ADMIN — AMIODARONE HYDROCHLORIDE 400 MG: 200 TABLET ORAL at 08:01

## 2024-01-06 RX ADMIN — GUAIFENESIN 1200 MG: 600 TABLET ORAL at 21:23

## 2024-01-06 RX ADMIN — SERTRALINE 100 MG: 100 TABLET, FILM COATED ORAL at 08:06

## 2024-01-06 RX ADMIN — APIXABAN 5 MG: 5 TABLET, FILM COATED ORAL at 21:24

## 2024-01-06 RX ADMIN — METFORMIN HYDROCHLORIDE 1000 MG: 500 TABLET ORAL at 16:33

## 2024-01-06 RX ADMIN — Medication 500 MG: at 08:04

## 2024-01-06 RX ADMIN — Medication 50 MG: at 08:05

## 2024-01-06 RX ADMIN — LEVALBUTEROL HYDROCHLORIDE 0.63 MG: 0.63 SOLUTION RESPIRATORY (INHALATION) at 22:34

## 2024-01-06 RX ADMIN — LISINOPRIL 20 MG: 20 TABLET ORAL at 08:04

## 2024-01-06 RX ADMIN — B-COMPLEX W/ C & FOLIC ACID TAB 1 TABLET: TAB at 08:05

## 2024-01-06 RX ADMIN — Medication 1 AMPULE: at 08:12

## 2024-01-06 RX ADMIN — AMIODARONE HYDROCHLORIDE 400 MG: 200 TABLET ORAL at 21:23

## 2024-01-06 RX ADMIN — ASPIRIN 81 MG: 81 TABLET ORAL at 08:02

## 2024-01-06 RX ADMIN — ATORVASTATIN CALCIUM 80 MG: 80 TABLET, FILM COATED ORAL at 21:24

## 2024-01-06 RX ADMIN — LEVOTHYROXINE SODIUM 150 MCG: 0.07 TABLET ORAL at 06:32

## 2024-01-06 RX ADMIN — SULFAMETHOXAZOLE AND TRIMETHOPRIM 1 TABLET: 800; 160 TABLET ORAL at 08:06

## 2024-01-06 RX ADMIN — GLIPIZIDE 2.5 MG: 5 TABLET ORAL at 06:32

## 2024-01-06 RX ADMIN — APIXABAN 5 MG: 5 TABLET, FILM COATED ORAL at 08:05

## 2024-01-06 RX ADMIN — VITAMIN D, TAB 1000IU (100/BT) 1000 UNITS: 25 TAB at 08:01

## 2024-01-06 RX ADMIN — DOCUSATE SODIUM 50 MG AND SENNOSIDES 8.6 MG 2 TABLET: 8.6; 5 TABLET, FILM COATED ORAL at 21:22

## 2024-01-06 RX ADMIN — GLIPIZIDE 2.5 MG: 5 TABLET ORAL at 16:33

## 2024-01-06 RX ADMIN — METOPROLOL SUCCINATE 25 MG: 25 TABLET, FILM COATED, EXTENDED RELEASE ORAL at 08:03

## 2024-01-06 RX ADMIN — FAMOTIDINE 20 MG: 20 TABLET ORAL at 08:06

## 2024-01-06 RX ADMIN — METFORMIN HYDROCHLORIDE 1000 MG: 500 TABLET ORAL at 08:05

## 2024-01-06 RX ADMIN — BUDESONIDE INHALATION 500 MCG: 0.5 SUSPENSION RESPIRATORY (INHALATION) at 17:51

## 2024-01-06 RX ADMIN — GUAIFENESIN 1200 MG: 600 TABLET ORAL at 08:02

## 2024-01-06 NOTE — PROGRESS NOTES
PHYSICAL THERAPY DAILY NOTE  Time In:  0830  Time Out:  0925  Total Treatment Time:  55 Minutes  Pt. Seen for: Balance Training, Gait Training, Therapeutic Exercise, and Transfer Training     Subjective: I seem to be feeling much better today         Objective:  Precautions: Falls and Sternal    GROSS ASSESSMENT Daily Assessment           COGNITION Daily Assessment    A&O x 4, pleasant       BED/MAT MOBILITY Daily Assessment    NA       TRANSFERS Daily Assessment    Sit to Stand: Supervision/Standby Assist  Stand to Sit: Supervision/Standby Assist           GAIT Daily Assessment   2 x 10 ft with RW from recliner <> WC in hallway  1 x 150 ft RW  1 x 222 ft RW    Cues for staying within RW NATY and posture   Cues for step length Amount of Assistance: Supervision/Standby Assist  Distance (ft): 2 x 10 ft, 1 x 150 ft, 1 x 222 ft   Assistive Device: RW  Surface: Level Surface       STEPS/STAIRS Daily Assessment    NA       BALANCE Daily Assessment    Static Sitting: Good:  Pt. able to maintain balance w/o UE support;  exhibits some postural sway  Dynamic Sitting: Good - accepts moderate challenge;  can maintain balance while picking object off the floor  Static Standing: Fair:  Pt. requires UE support;  may need occasional min A  Dynamic Standing: Fair - accepts minimal challenge;  can maintain balance while turning head/trunk       WHEELCHAIR MOBILITY Daily Assessment           LOWER EXTREMITY EXERCISES Daily Assessment   Bike gear 4 x 8 min, gear 3 x 4 min  Seated therex:  - glute squeezes 2 x 10 hold 5 sec  - hip add squeeze 2 x 10 hold 5 sec  - hip abd with GTB 2 x 10  - LAQ with GTB 2 x 10  - heel/toe raises 2 x 10  - marches with GTB 2 x 10  - ham curls with GTB 2 x 10  - hip ext with GTB 2 x 10 Patient tolerates well, more fatigue to LLE than RLE     Pain level: 0  Pain Location:  NA  Pain Interventions: NA    Vital Signs:  /63   Pulse 66   Temp 97.7 °F (36.5 °C) (Oral)   Resp 18   Ht 1.753 m (5' 9\")   Wt  83.1 kg (183 lb 4.8 oz)   SpO2 93%   BMI 27.07 kg/m²     Patient removed from supplemental O2 this AM, O2 stayed WNL throughout session   94% and 74 bpm on bike  97% and high of 78 bpm with amb      Education:  PLB, POC, safety awareness, gait training    Interdisciplinary Communication: coordinated with OT POC and patient prognosis    Pt. Left Seated in recliner, non slip footwear donned, call bell within reach, chair alarm on and all other needs met         Assessment: Upon arrival patient seated in recliner in no apparent distress, motivated for session today. Patient exhibits improved strength, transfers gait and endurance by ability to complete 15 min on bike and amb much further than he has in previous sessions. Patient also maintained normal O2 levels throughout exertion. Patient did have a few instances of coughing and had to be reminded of sternal precautions/to use cough pillow about 20% of the time. Patient will continue to benefit from skilled IPR PT to improve strength, transfers, gait, balance and endurance along with safety awareness.          Plan of Care: progress per POC as tolerated.     JEANA PELLETIER, PT  1/6/2024

## 2024-01-06 NOTE — PROGRESS NOTES
It is with great elyse we pray for your family today:        \"Because you dared to believe,    Your yoly has healed you.    Go with peace in your heart,    And be free from your suffering!\"    TORY 5            Lord,    I believe that if you would touch my body I shall be healed.    Give me the yoly to come boldly into your presence.    Sade Brown   [phone]

## 2024-01-07 VITALS
DIASTOLIC BLOOD PRESSURE: 64 MMHG | BODY MASS INDEX: 27.15 KG/M2 | WEIGHT: 183.3 LBS | HEART RATE: 74 BPM | TEMPERATURE: 97.2 F | RESPIRATION RATE: 20 BRPM | HEIGHT: 69 IN | SYSTOLIC BLOOD PRESSURE: 119 MMHG | OXYGEN SATURATION: 97 %

## 2024-01-07 LAB
GLUCOSE BLD STRIP.AUTO-MCNC: 100 MG/DL (ref 65–100)
GLUCOSE BLD STRIP.AUTO-MCNC: 110 MG/DL (ref 65–100)
GLUCOSE BLD STRIP.AUTO-MCNC: 135 MG/DL (ref 65–100)
GLUCOSE BLD STRIP.AUTO-MCNC: 146 MG/DL (ref 65–100)
GLUCOSE BLD STRIP.AUTO-MCNC: 89 MG/DL (ref 65–100)
SERVICE CMNT-IMP: ABNORMAL
SERVICE CMNT-IMP: NORMAL
SERVICE CMNT-IMP: NORMAL

## 2024-01-07 PROCEDURE — 6360000002 HC RX W HCPCS: Performed by: INTERNAL MEDICINE

## 2024-01-07 PROCEDURE — 6370000000 HC RX 637 (ALT 250 FOR IP): Performed by: PHYSICIAN ASSISTANT

## 2024-01-07 PROCEDURE — 6370000000 HC RX 637 (ALT 250 FOR IP): Performed by: STUDENT IN AN ORGANIZED HEALTH CARE EDUCATION/TRAINING PROGRAM

## 2024-01-07 PROCEDURE — 94660 CPAP INITIATION&MGMT: CPT

## 2024-01-07 PROCEDURE — 6370000000 HC RX 637 (ALT 250 FOR IP): Performed by: PHYSICAL MEDICINE & REHABILITATION

## 2024-01-07 PROCEDURE — 1180000000 HC REHAB R&B

## 2024-01-07 PROCEDURE — 94640 AIRWAY INHALATION TREATMENT: CPT

## 2024-01-07 PROCEDURE — 94760 N-INVAS EAR/PLS OXIMETRY 1: CPT

## 2024-01-07 PROCEDURE — 82962 GLUCOSE BLOOD TEST: CPT

## 2024-01-07 PROCEDURE — 6360000002 HC RX W HCPCS: Performed by: PHYSICAL MEDICINE & REHABILITATION

## 2024-01-07 RX ADMIN — APIXABAN 5 MG: 5 TABLET, FILM COATED ORAL at 08:01

## 2024-01-07 RX ADMIN — SULFAMETHOXAZOLE AND TRIMETHOPRIM 1 TABLET: 800; 160 TABLET ORAL at 07:58

## 2024-01-07 RX ADMIN — BUDESONIDE INHALATION 500 MCG: 0.5 SUSPENSION RESPIRATORY (INHALATION) at 05:34

## 2024-01-07 RX ADMIN — SERTRALINE 100 MG: 100 TABLET, FILM COATED ORAL at 08:03

## 2024-01-07 RX ADMIN — FAMOTIDINE 20 MG: 20 TABLET ORAL at 08:00

## 2024-01-07 RX ADMIN — GUAIFENESIN 1200 MG: 600 TABLET ORAL at 21:14

## 2024-01-07 RX ADMIN — FOLIC ACID 1 MG: 1 TABLET ORAL at 08:03

## 2024-01-07 RX ADMIN — ATORVASTATIN CALCIUM 80 MG: 80 TABLET, FILM COATED ORAL at 21:14

## 2024-01-07 RX ADMIN — BUDESONIDE INHALATION 500 MCG: 0.5 SUSPENSION RESPIRATORY (INHALATION) at 17:53

## 2024-01-07 RX ADMIN — GLIPIZIDE 2.5 MG: 5 TABLET ORAL at 17:05

## 2024-01-07 RX ADMIN — AMIODARONE HYDROCHLORIDE 400 MG: 200 TABLET ORAL at 08:02

## 2024-01-07 RX ADMIN — LISINOPRIL 20 MG: 20 TABLET ORAL at 08:02

## 2024-01-07 RX ADMIN — SULFAMETHOXAZOLE AND TRIMETHOPRIM 1 TABLET: 800; 160 TABLET ORAL at 21:14

## 2024-01-07 RX ADMIN — LEVALBUTEROL HYDROCHLORIDE 0.63 MG: 0.63 SOLUTION RESPIRATORY (INHALATION) at 17:53

## 2024-01-07 RX ADMIN — Medication 1 AMPULE: at 21:14

## 2024-01-07 RX ADMIN — METOPROLOL SUCCINATE 25 MG: 25 TABLET, FILM COATED, EXTENDED RELEASE ORAL at 08:00

## 2024-01-07 RX ADMIN — METFORMIN HYDROCHLORIDE 1000 MG: 500 TABLET ORAL at 17:04

## 2024-01-07 RX ADMIN — B-COMPLEX W/ C & FOLIC ACID TAB 1 TABLET: TAB at 07:59

## 2024-01-07 RX ADMIN — AMIODARONE HYDROCHLORIDE 400 MG: 200 TABLET ORAL at 21:14

## 2024-01-07 RX ADMIN — Medication 500 MG: at 08:00

## 2024-01-07 RX ADMIN — GLIPIZIDE 2.5 MG: 5 TABLET ORAL at 06:25

## 2024-01-07 RX ADMIN — FAMOTIDINE 20 MG: 20 TABLET ORAL at 21:14

## 2024-01-07 RX ADMIN — Medication 1 AMPULE: at 08:07

## 2024-01-07 RX ADMIN — VITAMIN D, TAB 1000IU (100/BT) 1000 UNITS: 25 TAB at 07:59

## 2024-01-07 RX ADMIN — ASPIRIN 81 MG: 81 TABLET ORAL at 08:02

## 2024-01-07 RX ADMIN — GUAIFENESIN 1200 MG: 600 TABLET ORAL at 08:00

## 2024-01-07 RX ADMIN — Medication 50 MG: at 07:59

## 2024-01-07 RX ADMIN — LEVOTHYROXINE SODIUM 150 MCG: 0.07 TABLET ORAL at 06:27

## 2024-01-07 RX ADMIN — DOCUSATE SODIUM 50 MG AND SENNOSIDES 8.6 MG 2 TABLET: 8.6; 5 TABLET, FILM COATED ORAL at 21:13

## 2024-01-07 RX ADMIN — METFORMIN HYDROCHLORIDE 1000 MG: 500 TABLET ORAL at 08:03

## 2024-01-07 RX ADMIN — APIXABAN 5 MG: 5 TABLET, FILM COATED ORAL at 21:14

## 2024-01-07 ASSESSMENT — PAIN SCALES - GENERAL: PAINLEVEL_OUTOF10: 0

## 2024-01-08 LAB
GLUCOSE BLD STRIP.AUTO-MCNC: 163 MG/DL (ref 65–100)
GLUCOSE BLD STRIP.AUTO-MCNC: 200 MG/DL (ref 65–100)
GLUCOSE BLD STRIP.AUTO-MCNC: 77 MG/DL (ref 65–100)
GLUCOSE BLD STRIP.AUTO-MCNC: 89 MG/DL (ref 65–100)
SERVICE CMNT-IMP: ABNORMAL
SERVICE CMNT-IMP: ABNORMAL
SERVICE CMNT-IMP: NORMAL
SERVICE CMNT-IMP: NORMAL

## 2024-01-08 PROCEDURE — 6370000000 HC RX 637 (ALT 250 FOR IP): Performed by: PHYSICAL MEDICINE & REHABILITATION

## 2024-01-08 PROCEDURE — 97110 THERAPEUTIC EXERCISES: CPT

## 2024-01-08 PROCEDURE — 97116 GAIT TRAINING THERAPY: CPT

## 2024-01-08 PROCEDURE — 6360000002 HC RX W HCPCS: Performed by: INTERNAL MEDICINE

## 2024-01-08 PROCEDURE — 82962 GLUCOSE BLOOD TEST: CPT

## 2024-01-08 PROCEDURE — 94640 AIRWAY INHALATION TREATMENT: CPT

## 2024-01-08 PROCEDURE — 1180000000 HC REHAB R&B

## 2024-01-08 PROCEDURE — 94761 N-INVAS EAR/PLS OXIMETRY MLT: CPT

## 2024-01-08 PROCEDURE — 97530 THERAPEUTIC ACTIVITIES: CPT

## 2024-01-08 PROCEDURE — 6370000000 HC RX 637 (ALT 250 FOR IP): Performed by: STUDENT IN AN ORGANIZED HEALTH CARE EDUCATION/TRAINING PROGRAM

## 2024-01-08 PROCEDURE — 94760 N-INVAS EAR/PLS OXIMETRY 1: CPT

## 2024-01-08 PROCEDURE — 99231 SBSQ HOSP IP/OBS SF/LOW 25: CPT | Performed by: PHYSICAL MEDICINE & REHABILITATION

## 2024-01-08 PROCEDURE — 97535 SELF CARE MNGMENT TRAINING: CPT

## 2024-01-08 PROCEDURE — 6370000000 HC RX 637 (ALT 250 FOR IP): Performed by: PHYSICIAN ASSISTANT

## 2024-01-08 PROCEDURE — 6360000002 HC RX W HCPCS: Performed by: PHYSICAL MEDICINE & REHABILITATION

## 2024-01-08 RX ORDER — LEVALBUTEROL INHALATION SOLUTION 0.63 MG/3ML
0.63 SOLUTION RESPIRATORY (INHALATION) 2 TIMES DAILY
Status: DISCONTINUED | OUTPATIENT
Start: 2024-01-08 | End: 2024-01-11 | Stop reason: HOSPADM

## 2024-01-08 RX ORDER — LEVALBUTEROL INHALATION SOLUTION 0.63 MG/3ML
0.63 SOLUTION RESPIRATORY (INHALATION) 2 TIMES DAILY
Status: DISCONTINUED | OUTPATIENT
Start: 2024-01-09 | End: 2024-01-08

## 2024-01-08 RX ADMIN — METOPROLOL SUCCINATE 25 MG: 25 TABLET, FILM COATED, EXTENDED RELEASE ORAL at 07:59

## 2024-01-08 RX ADMIN — GLIPIZIDE 2.5 MG: 5 TABLET ORAL at 07:58

## 2024-01-08 RX ADMIN — SULFAMETHOXAZOLE AND TRIMETHOPRIM 1 TABLET: 800; 160 TABLET ORAL at 20:28

## 2024-01-08 RX ADMIN — TRAZODONE HYDROCHLORIDE 50 MG: 50 TABLET ORAL at 20:28

## 2024-01-08 RX ADMIN — Medication 1 AMPULE: at 20:28

## 2024-01-08 RX ADMIN — Medication 500 MG: at 07:58

## 2024-01-08 RX ADMIN — FOLIC ACID 1 MG: 1 TABLET ORAL at 08:00

## 2024-01-08 RX ADMIN — LEVALBUTEROL HYDROCHLORIDE 0.63 MG: 0.63 SOLUTION RESPIRATORY (INHALATION) at 17:39

## 2024-01-08 RX ADMIN — METFORMIN HYDROCHLORIDE 1000 MG: 500 TABLET ORAL at 07:35

## 2024-01-08 RX ADMIN — Medication 1 AMPULE: at 08:01

## 2024-01-08 RX ADMIN — APIXABAN 5 MG: 5 TABLET, FILM COATED ORAL at 20:28

## 2024-01-08 RX ADMIN — FAMOTIDINE 20 MG: 20 TABLET ORAL at 07:57

## 2024-01-08 RX ADMIN — AMIODARONE HYDROCHLORIDE 400 MG: 200 TABLET ORAL at 20:28

## 2024-01-08 RX ADMIN — GLIPIZIDE 2.5 MG: 5 TABLET ORAL at 17:41

## 2024-01-08 RX ADMIN — GUAIFENESIN 1200 MG: 600 TABLET ORAL at 07:59

## 2024-01-08 RX ADMIN — BUDESONIDE INHALATION 500 MCG: 0.5 SUSPENSION RESPIRATORY (INHALATION) at 06:13

## 2024-01-08 RX ADMIN — Medication 50 MG: at 08:00

## 2024-01-08 RX ADMIN — FAMOTIDINE 20 MG: 20 TABLET ORAL at 20:28

## 2024-01-08 RX ADMIN — METFORMIN HYDROCHLORIDE 1000 MG: 500 TABLET ORAL at 17:41

## 2024-01-08 RX ADMIN — AMIODARONE HYDROCHLORIDE 400 MG: 200 TABLET ORAL at 08:00

## 2024-01-08 RX ADMIN — LISINOPRIL 20 MG: 20 TABLET ORAL at 08:02

## 2024-01-08 RX ADMIN — LEVOTHYROXINE SODIUM 150 MCG: 0.07 TABLET ORAL at 04:52

## 2024-01-08 RX ADMIN — ATORVASTATIN CALCIUM 80 MG: 80 TABLET, FILM COATED ORAL at 20:28

## 2024-01-08 RX ADMIN — BUDESONIDE INHALATION 500 MCG: 0.5 SUSPENSION RESPIRATORY (INHALATION) at 17:39

## 2024-01-08 RX ADMIN — APIXABAN 5 MG: 5 TABLET, FILM COATED ORAL at 07:59

## 2024-01-08 RX ADMIN — VITAMIN D, TAB 1000IU (100/BT) 1000 UNITS: 25 TAB at 07:57

## 2024-01-08 RX ADMIN — LEVALBUTEROL HYDROCHLORIDE 0.63 MG: 0.63 SOLUTION RESPIRATORY (INHALATION) at 06:14

## 2024-01-08 RX ADMIN — B-COMPLEX W/ C & FOLIC ACID TAB 1 TABLET: TAB at 07:58

## 2024-01-08 RX ADMIN — INSULIN LISPRO 2 UNITS: 100 INJECTION, SOLUTION INTRAVENOUS; SUBCUTANEOUS at 12:16

## 2024-01-08 RX ADMIN — SULFAMETHOXAZOLE AND TRIMETHOPRIM 1 TABLET: 800; 160 TABLET ORAL at 07:59

## 2024-01-08 RX ADMIN — DOCUSATE SODIUM 50 MG AND SENNOSIDES 8.6 MG 2 TABLET: 8.6; 5 TABLET, FILM COATED ORAL at 20:28

## 2024-01-08 RX ADMIN — ASPIRIN 81 MG: 81 TABLET ORAL at 07:58

## 2024-01-08 RX ADMIN — GUAIFENESIN 1200 MG: 600 TABLET ORAL at 21:27

## 2024-01-08 RX ADMIN — SERTRALINE 100 MG: 100 TABLET, FILM COATED ORAL at 08:00

## 2024-01-08 ASSESSMENT — PAIN SCALES - GENERAL: PAINLEVEL_OUTOF10: 0

## 2024-01-08 NOTE — CARE COORDINATION
CM reviewed / screen patient medical chart for continued stay.  Patient needs are continue to be followed by Dr. Attila Sandoval. Patient was admitted on 12/31/23.  Patient has been approved for 14 days.  Patient has discharge date / plan at this time scheduled for 01/11/24.  Referral completed fro [facility].  Patient needs no DME. Spouse / daughter on her for family training this morning.  Staff has recommended DME's (RW).  CM will provide patient DME's (RW) from the closet.  Manual Ticket will be signed and faxed.  CM will continue to follow patient plan of care and assist with supportive care.  CM will continue to follow / monitor for any needs, concerns or questions that may arise.      Name of Ins: Medicare Part A / B  Policy #: [id number]  Secondary Ins: BCBS  Policy #: [id number]  Auth #  Admission Date: 12/20/23  Approved Dates: 12 days   LOS: 19 days  Discharge Date: 01/11/24  Contact Name:  Contact #  Fax #:  Updated Clinicals: no updated clinicals needed

## 2024-01-08 NOTE — PROGRESS NOTES
Physical Therapy  PHYSICAL THERAPY DAILY NOTE  Time In:  915 AM  Time Out:  1009 AM  Total Treatment Time:  (P) 54 Minutes  Pt. Seen for: AM, Family Training, Gait Training, Therapeutic Exercise, and Transfer Training     Subjective: Patient s wife and daughter present for training.         Objective:  Precautions: Falls and Poor Safety Awareness    GROSS ASSESSMENT Daily Assessment           COGNITION Daily Assessment           BED/MAT MOBILITY Daily Assessment    Rolling Right:   Rolling Left:   Supine to Sit: NT  Sit to Supine: NT       TRANSFERS Daily Assessment    Sit to Stand: Supervision/Standby Assist  Stand to Sit: Supervision/Standby Assist  Transfer Type: Stand Pivot and with rolling walker  Transfer Assistance: Supervision/Standby Assist  Car Transfers: Supervision/Standby Assist         GAIT Daily Assessment    Amount of Assistance: Supervision/Standby Assist  Distance (ft): 100  Assistive Device: RW  Surface: Level Surface       STEPS/STAIRS Daily Assessment    Steps Ambulated:  4  Level of Assistance:  Supervision/Standby Assist  Railing:Single Railingon R  Assistive Device: No A/D       BALANCE Daily Assessment    Static Sitting:   Dynamic Sitting:   Static Standing:   Dynamic Standing:        WHEELCHAIR MOBILITY Daily Assessment    Able to Propel (ft):   Assistance:   Surface:   Wheelchair Set-up:        LOWER EXTREMITY EXERCISES Daily Assessment    SEATED EXERCISES Sets Reps Comments   Ankle Pumps 1 15    Hip Flexion 1 15    Long Arc Quads 1 15    Hip Adduction/Ball Squeeze 1 15    Hip Abduction with red Theraband 1 15    Hamstring Curls with red Theraband 1 15    Hip Extension with red Theraband 1 15           Pain level: no pain noted  Pain Location:    Pain Interventions:     Vital Signs:  /64   Pulse 76   Temp 97.9 °F (36.6 °C) (Oral)   Resp 18   Ht 1.753 m (5' 9\")   Wt 83.4 kg (183 lb 14.4 oz)   SpO2 96%   BMI 27.16 kg/m²       Education:      Interdisciplinary Communication:

## 2024-01-08 NOTE — PROGRESS NOTES
OT Daily Note  Time In:    1117     Time Out: 1203        Subjective: \"I think the training went really well. I was pleased with it\"  Pain: not expressed  Education: activity tolerance building   Interdisciplinary Communication: with PTA regarding family training this morning    Mobility   Score Comments   Sit to Stand  Supervision    Transfer Assist  Supervision      Activity Tolerance   Pt completed 14 minutes on the ergometer frontwards and backwards with medium resistance to increase UB strength and activity tolerance for integration into functional tasks.        Standing tolerance   Pt stood for 4:10 minutes with SBA working on static standing tolerance and balance for carryover into ADLs. Pt engaged in game of connect 4 with min cuing throughout for attention to detail.        Assessment: Pt states his family and he were happy with family trainings this morning and felt comforted by recommendations given. Pt is excited and ready for discharge on Thursday. Pt was on RA entire session and SpO2 level 94-96% and HR 70s. Continues to benefit from skilled OT services to rehab remaining deficits.   Plan: Continue OT POC.     Mikey Enrique, OT   1/8/2024

## 2024-01-08 NOTE — PROGRESS NOTES
OT DAILY NOTE    Time In:    0830     Time Out: 0915       Functional Mobility   Score Comments   Sit to Stand 4: Supervision or touching A SBA   Transfer Assist 4: Supervision or touching A SBA ambulating with RW     Activities of Daily Living   Score Comments   Eating Independent I   Oral Hygiene Independent Pt stood at sink with Supervision, extra time for brushing teeth due to BUE tremors   Bathing Supervision or touching assistance SBA in stance, cues prn for safety and rest breaks   Upper Body  Dressing Setup or clean-up assistance Pt on RA and did not have to manage O2 cord   Lower Body Dressing Supervision or touching assistance SBA in stance at RW   Donning/[place] Footwear Setup or clean-up assistance setup of socks and shoes       Summary of Session: S: \"We have a shower chair but its in a tub/shower and it only fits sideways.\" Agreeable to therapy.   Focus of session was on morning ADL routine and family training.     Patient, his wife, and his youngest daughter were present for family training and were educated regarding recommendation for TTB for increased safety and independence with bathing in shower at discharge according to home bathroom environment, and educated regarding TTB setup, safe transfer technique, reimbursement issues, recommended features, and ideas for obtaining.  Patient verbalized and demonstrated understanding, transferred <> TTB in bathtub shower training room with CGA.  Patient provided information for acquiring TTB for discharge. Additionally, highly recommend family obtaining and mounting grab bars for improved shower safety. Pt and family verbalized agreement. Family also has toilet rails for improving toilet transfers. Discussed sternal precautions, pt's decreased safety awareness and problem solving deficits requiring 24 hour supervision. Family verbalized understanding and agreement with recommendations.     Pain not expressed.  Pt on RA with SpO2 level 94-95% throughout.

## 2024-01-08 NOTE — PROGRESS NOTES
01/08/24 1746   RT Protocol   History Pulmonary Disease 1   Respiratory pattern 0   Breath sounds 2   Cough 0   Indications for Bronchodilator Therapy Decreased or absent breath sounds   Bronchodilator Assessment Score 3     Qualified to reduce xopenex to q4PRN but continued as BID along with pulmicort.

## 2024-01-08 NOTE — PROGRESS NOTES
Physical Therapy  PHYSICAL THERAPY DAILY NOTE  Time In:  1432 PM  Time Out:  1518 PM  Total Treatment Time:  (P) 46 Minutes  Pt. Seen for: PM, Gait Training, Therapeutic Exercise, and Transfer Training     Subjective: \" I am so excited about going home Thursday.\"         Objective:  Precautions: Falls and Poor Safety Awareness    GROSS ASSESSMENT Daily Assessment           COGNITION Daily Assessment           BED/MAT MOBILITY Daily Assessment    Rolling Right:   Rolling Left:   Supine to Sit: NT  Sit to Supine: NT       TRANSFERS Daily Assessment    Sit to Stand: Supervision/Standby Assist  Stand to Sit: Supervision/Standby Assist  Transfer Type: Stand Pivot and with rolling walker  Transfer Assistance: Supervision/Standby Assist  Car Transfers: NT         GAIT Daily Assessment    Amount of Assistance: Supervision/Standby Assist  Distance (ft): 150  Assistive Device: RW  Surface: Level Surface       STEPS/STAIRS Daily Assessment    Steps Ambulated:    Level of Assistance:    Railing:  Assistive Device:        BALANCE Daily Assessment    Static Sitting:   Dynamic Sitting:   Static Standing:   Dynamic Standing:        WHEELCHAIR MOBILITY Daily Assessment    Able to Propel (ft):   Assistance:   Surface:   Wheelchair Set-up:        LOWER EXTREMITY EXERCISES Daily Assessment    SEATED EXERCISES Sets Reps Comments   Ankle Pumps 1 15    Hip Flexion 1 15    Long Arc Quads 1 15    Hip Adduction/Ball Squeeze 1 15    Hip Abduction with red Theraband 1 15    Hamstring Curls with red Theraband 1 15    Hip Extension with red Theraband 1 15           Pain level: no pain noted  Pain Location:    Pain Interventions:     Vital Signs:  /63   Pulse 64   Temp 98.2 °F (36.8 °C) (Oral)   Resp 18   Ht 1.753 m (5' 9\")   Wt 83.4 kg (183 lb 14.4 oz)   SpO2 99%   BMI 27.16 kg/m²       Education:      Interdisciplinary Communication:     Pt. Left in recliner as he requested with call bell at reach and alarm on.         Assessment:

## 2024-01-08 NOTE — DIABETES MGMT
Patient admitted with s/p CABG. Blood glucose ranged  yesterday with patient receiving Glipizide 5 mg and Metformin 2000 mg. Blood glucose this morning was 77. Reviewed patient current regimen: Glipizide 2.5 mg BID, Metformin 1000 mg BID, and Humalog correctional insulin.  Glycemic control stable on current regimen.  Will follow along loosely.

## 2024-01-08 NOTE — PROGRESS NOTES
[place]patient [place] [address]  Tel: [phone]     Physical Medicine and Rehabilitation Progress Note      Michael Archie Paduano III  Admit Date: 12/20/2023  Admit Diagnosis: Debility [R53.81]  History of coronary artery bypass graft x 3 [Z95.1]    Subjective     Patient seen face-to-face and evaluated. NAEO and he reports sleeping well again. He is eating and drinking fine, and continent of bowel and bladder. No pain reported, just some soreness around inferior thoracotomy area. He said his therapies are going very well. He has appeared much improved clinically lately breathing better and now and off supplemental O2. He reported continued cough, but improved. Cardiology and Pulmonology consulted and workup including CXR (same/improved), BMP, CBC, PCT. CBC showed leukocytosis (WBCs 13.8, but had been elevated other than last CBC), PCT 0.06, CT Chest negative PE, afebrile. WBCs 31.3 on 12/26/23 -> 21.2 (12/30) -> 22.8 (1/5/24) likely d/t corticosteroids, Hg down 9.6 -> 8.7 -> 8.6 (1/5/24). Glargine 10U daily added -> 20U (1/3/24) -> 30U (1/5/23), DC'd 1/5/23 and restart home Glipizide 2.5mg BIDAC now off corticosteroids. BSs much improved. UCx (1/3/24) +>100K Klebsiella Pneumoniae sensitive to Bactrim.    Objective:     Current Facility-Administered Medications   Medication Dose Route Frequency    glipiZIDE (GLUCOTROL) tablet 2.5 mg  2.5 mg Oral BID AC    albuterol (PROVENTIL) (2.5 MG/3ML) 0.083% nebulizer solution 2.5 mg  2.5 mg Nebulization Q6H PRN    levalbuterol (XOPENEX) nebulization 0.63 mg  0.63 mg Nebulization TID RT    sulfamethoxazole-trimethoprim (BACTRIM DS;SEPTRA DS) 800-160 MG per tablet 1 tablet  1 tablet Oral 2 times per day    guaiFENesin (MUCINEX) extended release tablet 1,200 mg  1,200 mg Oral BID    glucose chewable tablet 16 g  4 tablet Oral PRN    dextrose bolus 10% 125 mL  125 mL IntraVENous PRN    Or    dextrose bolus 10% 250 mL  250 mL

## 2024-01-09 LAB
GLUCOSE BLD STRIP.AUTO-MCNC: 136 MG/DL (ref 65–100)
GLUCOSE BLD STRIP.AUTO-MCNC: 180 MG/DL (ref 65–100)
GLUCOSE BLD STRIP.AUTO-MCNC: 98 MG/DL (ref 65–100)
SERVICE CMNT-IMP: ABNORMAL
SERVICE CMNT-IMP: ABNORMAL
SERVICE CMNT-IMP: NORMAL

## 2024-01-09 PROCEDURE — 6370000000 HC RX 637 (ALT 250 FOR IP): Performed by: STUDENT IN AN ORGANIZED HEALTH CARE EDUCATION/TRAINING PROGRAM

## 2024-01-09 PROCEDURE — 97530 THERAPEUTIC ACTIVITIES: CPT

## 2024-01-09 PROCEDURE — 99232 SBSQ HOSP IP/OBS MODERATE 35: CPT | Performed by: PHYSICAL MEDICINE & REHABILITATION

## 2024-01-09 PROCEDURE — 97535 SELF CARE MNGMENT TRAINING: CPT

## 2024-01-09 PROCEDURE — 6370000000 HC RX 637 (ALT 250 FOR IP): Performed by: PHYSICAL MEDICINE & REHABILITATION

## 2024-01-09 PROCEDURE — 1180000000 HC REHAB R&B

## 2024-01-09 PROCEDURE — 94640 AIRWAY INHALATION TREATMENT: CPT

## 2024-01-09 PROCEDURE — 6360000002 HC RX W HCPCS: Performed by: PHYSICAL MEDICINE & REHABILITATION

## 2024-01-09 PROCEDURE — 97116 GAIT TRAINING THERAPY: CPT

## 2024-01-09 PROCEDURE — 6370000000 HC RX 637 (ALT 250 FOR IP): Performed by: PHYSICIAN ASSISTANT

## 2024-01-09 PROCEDURE — 97110 THERAPEUTIC EXERCISES: CPT

## 2024-01-09 PROCEDURE — 6360000002 HC RX W HCPCS: Performed by: INTERNAL MEDICINE

## 2024-01-09 PROCEDURE — 94762 N-INVAS EAR/PLS OXIMTRY CONT: CPT

## 2024-01-09 PROCEDURE — 82962 GLUCOSE BLOOD TEST: CPT

## 2024-01-09 RX ORDER — FUROSEMIDE 20 MG/1
20 TABLET ORAL 2 TIMES DAILY
Status: DISCONTINUED | OUTPATIENT
Start: 2024-01-10 | End: 2024-01-11 | Stop reason: HOSPADM

## 2024-01-09 RX ORDER — FUROSEMIDE 40 MG/1
40 TABLET ORAL ONCE
Status: COMPLETED | OUTPATIENT
Start: 2024-01-09 | End: 2024-01-09

## 2024-01-09 RX ADMIN — APIXABAN 5 MG: 5 TABLET, FILM COATED ORAL at 20:06

## 2024-01-09 RX ADMIN — ASPIRIN 81 MG: 81 TABLET ORAL at 08:07

## 2024-01-09 RX ADMIN — GLIPIZIDE 2.5 MG: 5 TABLET ORAL at 08:07

## 2024-01-09 RX ADMIN — LISINOPRIL 20 MG: 20 TABLET ORAL at 08:06

## 2024-01-09 RX ADMIN — GUAIFENESIN 1200 MG: 600 TABLET ORAL at 20:06

## 2024-01-09 RX ADMIN — SERTRALINE 100 MG: 100 TABLET, FILM COATED ORAL at 08:06

## 2024-01-09 RX ADMIN — APIXABAN 5 MG: 5 TABLET, FILM COATED ORAL at 08:06

## 2024-01-09 RX ADMIN — LEVALBUTEROL HYDROCHLORIDE 0.63 MG: 0.63 SOLUTION RESPIRATORY (INHALATION) at 05:06

## 2024-01-09 RX ADMIN — LEVALBUTEROL HYDROCHLORIDE 0.63 MG: 0.63 SOLUTION RESPIRATORY (INHALATION) at 18:10

## 2024-01-09 RX ADMIN — SULFAMETHOXAZOLE AND TRIMETHOPRIM 1 TABLET: 800; 160 TABLET ORAL at 20:06

## 2024-01-09 RX ADMIN — FOLIC ACID 1 MG: 1 TABLET ORAL at 08:06

## 2024-01-09 RX ADMIN — GLIPIZIDE 2.5 MG: 5 TABLET ORAL at 17:40

## 2024-01-09 RX ADMIN — SULFAMETHOXAZOLE AND TRIMETHOPRIM 1 TABLET: 800; 160 TABLET ORAL at 08:06

## 2024-01-09 RX ADMIN — GUAIFENESIN 1200 MG: 600 TABLET ORAL at 08:08

## 2024-01-09 RX ADMIN — AMIODARONE HYDROCHLORIDE 400 MG: 200 TABLET ORAL at 20:06

## 2024-01-09 RX ADMIN — Medication 1 AMPULE: at 08:06

## 2024-01-09 RX ADMIN — LEVOTHYROXINE SODIUM 150 MCG: 0.07 TABLET ORAL at 05:35

## 2024-01-09 RX ADMIN — B-COMPLEX W/ C & FOLIC ACID TAB 1 TABLET: TAB at 08:06

## 2024-01-09 RX ADMIN — BUDESONIDE INHALATION 500 MCG: 0.5 SUSPENSION RESPIRATORY (INHALATION) at 05:06

## 2024-01-09 RX ADMIN — Medication 1 AMPULE: at 20:06

## 2024-01-09 RX ADMIN — METFORMIN HYDROCHLORIDE 1000 MG: 500 TABLET ORAL at 17:40

## 2024-01-09 RX ADMIN — BUDESONIDE INHALATION 500 MCG: 0.5 SUSPENSION RESPIRATORY (INHALATION) at 18:10

## 2024-01-09 RX ADMIN — Medication 50 MG: at 08:06

## 2024-01-09 RX ADMIN — VITAMIN D, TAB 1000IU (100/BT) 1000 UNITS: 25 TAB at 08:06

## 2024-01-09 RX ADMIN — METOPROLOL SUCCINATE 25 MG: 25 TABLET, FILM COATED, EXTENDED RELEASE ORAL at 08:05

## 2024-01-09 RX ADMIN — Medication 500 MG: at 08:07

## 2024-01-09 RX ADMIN — AMIODARONE HYDROCHLORIDE 400 MG: 200 TABLET ORAL at 08:06

## 2024-01-09 RX ADMIN — METFORMIN HYDROCHLORIDE 1000 MG: 500 TABLET ORAL at 08:06

## 2024-01-09 RX ADMIN — DOCUSATE SODIUM 50 MG AND SENNOSIDES 8.6 MG 2 TABLET: 8.6; 5 TABLET, FILM COATED ORAL at 08:06

## 2024-01-09 RX ADMIN — FAMOTIDINE 20 MG: 20 TABLET ORAL at 08:05

## 2024-01-09 RX ADMIN — FUROSEMIDE 40 MG: 40 TABLET ORAL at 17:40

## 2024-01-09 RX ADMIN — FAMOTIDINE 20 MG: 20 TABLET ORAL at 20:06

## 2024-01-09 RX ADMIN — ATORVASTATIN CALCIUM 80 MG: 80 TABLET, FILM COATED ORAL at 20:06

## 2024-01-09 ASSESSMENT — PAIN SCALES - GENERAL
PAINLEVEL_OUTOF10: 0

## 2024-01-09 NOTE — PROGRESS NOTES
Physical Therapy  PHYSICAL THERAPY DAILY NOTE  Time In:  1116 AM  Time Out:  1201 PM  Total Treatment Time:  (P) 45 Minutes  Pt. Seen for: AM, Gait Training, Therapeutic Exercise, and Transfer Training     Subjective: \"I am excited about going home Thursday.\"         Objective:  Precautions: Falls    GROSS ASSESSMENT Daily Assessment           COGNITION Daily Assessment           BED/MAT MOBILITY Daily Assessment    Rolling Right:   Rolling Left:   Supine to Sit: Supervision/Standby Assist  Sit to Supine: Supervision/Standby Assist       TRANSFERS Daily Assessment    Sit to Stand: Supervision/Standby Assist  Stand to Sit: Supervision/Standby Assist  Transfer Type: Stand Pivot  Transfer Assistance: Supervision/Standby Assist  Car Transfers: NT         GAIT Daily Assessment    Amount of Assistance: Supervision/Standby Assist  Distance (ft): 100  Assistive Device: RW  Surface: Level Surface       STEPS/STAIRS Daily Assessment    Steps Ambulated:    Level of Assistance:    Railing:  Assistive Device:        BALANCE Daily Assessment    Static Sitting:   Dynamic Sitting:   Static Standing:   Dynamic Standing:        WHEELCHAIR MOBILITY Daily Assessment    Able to Propel (ft):   Assistance:   Surface:   Wheelchair Set-up:        LOWER EXTREMITY EXERCISES Daily Assessment    Aneta iqbalomed x 10 minutes     Pain level: no pain noted.  Pain Location:    Pain Interventions:     Vital Signs:  /72   Pulse 70   Temp 97.5 °F (36.4 °C) (Oral)   Resp 18   Ht 1.753 m (5' 9\")   Wt 83.4 kg (183 lb 14.4 oz)   SpO2 94%   BMI 27.16 kg/m²       Education:      Interdisciplinary Communication:     Pt. Left in recliner with call bell at reach.         Assessment: Patient making good progress.         Plan of Care: Continue with plan of care.    Ivon Storey, LEI  1/9/2024

## 2024-01-09 NOTE — PROGRESS NOTES
OCCUPATIONAL THERAPY DAILY NOTE    Time In   1355    Time Out 1430      Subjective: Pt agreeable to treatment, reports he needs to toilet.   Pain: No pain expressed.  Interdisciplinary Communication: Collaborated with PT regarding handoff communication.   Precautions: Falls, Poor Safety Awareness, [place] Alarm, and Sternal Precautions    FUNCTIONAL MOBILITY:       Bed Mobility NT    Sit to Stand SBA    Transfer  SBA  Transfer Type: SPT  Equipment: RW Cues for RW proximity   Ambulation SBA  Equipment: RW Cues for RW proximity     - Self-Care   Pt toileted with SBA for SPT, S for hygiene. Pt washed hands in stance at sink with SBA-S.         - Activity Tolerance - Cognition - Coordination - Strengthening   Pt completed 12 minutes on the upper body ergometer, ½ forward and ½ backward, with light and moderate resistance to increase upper body strength and activity tolerance for integration into functional tasks.    Pt engaged in games of Connect 4 in stance at table to address coordination, functional reach, standing balance, standing tolerance, and overall activity tolerance. Pt required S in stance. Pt stood for 4:30 and 4:13. Pt required min cues throughout to identify game moves and assist for problem solving.      Education   Rolling Walker Management and Safety Awareness     Assessment: Patient demonstrated good participation in OT treatment. Pt continues to benefit from skilled OT services to address remaining deficits and progress toward baseline level of independence and safety. Patient ended session seated in w/c with PT.   Plan: Continue OT POC.     Sharita Johnson, OT   1/9/2024

## 2024-01-09 NOTE — PROGRESS NOTES
Physical Therapy  PHYSICAL THERAPY DAILY NOTE  Time In:  1430 PM  Time Out:  1514 PM  Total Treatment Time:  (P) 44 Minutes  Pt. Seen for: PM, Gait Training, Therapeutic Exercise, and Transfer Training     Subjective: Patient had no complaints.         Objective:  Precautions: Falls and Poor Safety Awareness    GROSS ASSESSMENT Daily Assessment           COGNITION Daily Assessment           BED/MAT MOBILITY Daily Assessment    Rolling Right:   Rolling Left:   Supine to Sit: NT  Sit to Supine: NT       TRANSFERS Daily Assessment    Sit to Stand: Supervision/Standby Assist  Stand to Sit: Supervision/Standby Assist  Transfer Type: Stand Pivot and with rolling walker  Transfer Assistance: Supervision/Standby Assist  Car Transfers: NT         GAIT Daily Assessment    Amount of Assistance: Supervision/Standby Assist  Distance (ft): 150  Assistive Device: RW  Surface: Level Surface       STEPS/STAIRS Daily Assessment    Steps Ambulated:    Level of Assistance:    Railing:  Assistive Device:        BALANCE Daily Assessment    Static Sitting:   Dynamic Sitting:   Static Standing:   Dynamic Standing:        WHEELCHAIR MOBILITY Daily Assessment    Able to Propel (ft):   Assistance:   Surface:   Wheelchair Set-up:        LOWER EXTREMITY EXERCISES Daily Assessment    SEATED EXERCISES Sets Reps Comments   Ankle Pumps 1 15    Hip Flexion 1 15    Long Arc Quads 1 15    Hip Adduction/Ball Squeeze 1 15    Hip Abduction with red Theraband 1 15    Hamstring Curls with red Theraband 1 15    Hip Extension with red Theraband 1 15           Pain level: no pain noted  Pain Location:    Pain Interventions:     Vital Signs:  /60   Pulse 64   Temp 98.2 °F (36.8 °C) (Oral)   Resp 18   Ht 1.753 m (5' 9\")   Wt 83.4 kg (183 lb 14.4 oz)   SpO2 98%   BMI 27.16 kg/m²       Education:      Interdisciplinary Communication:     Pt. Left in recliner as he requested with call bell at reach and alarm on.         Assessment: Patient making good

## 2024-01-09 NOTE — PROGRESS NOTES
OCCUPATIONAL THERAPY DAILY NOTE    Time In 0830      Time Out 0914        Subjective: Pt agreeable to treatment. Pt reports he slept well. Reports fatigue at end of ADL session.  Pain: No pain expressed.  Interdisciplinary Communication: Collaborated with  NA  regarding  NA .   Precautions: Falls and Sternal Precautions    FUNCTIONAL MOBILITY:       Bed Mobility NT    Sit to Stand SBA    Transfer  SBA  Transfer Type: SPT  Equipment: RW Cues for safety/RW proximity   Ambulation SBA and CGA  Equipment: RW In room and bathroom, cues for safety and RW management     ACTIVITIES OF DAILY LIVING:       Eating NT    Oral Hygiene Independent Seated at sink   Bathing Supervision and SBA Sponge bath seated at sink with S seated, SBA in stance   Upper Body  Dressing Setup/Clean-up Assist Assist to orient clothing despite cues. Pt practiced retrieving clothing from wardrobe with RW/SBA.    Lower Body Dressing SBA SBA-S in stance at RW. Cues for orientation/problem solving. Pt practiced retrieving clothing from wardrobe with RW/SBA.    Donning/[place] Footwear Setup/Clean-up Assist Socks and tennis shoes    Toilet Transfer NT  Transfer Type: NA  Equipment: NA    Toileting Hygiene NT     Education Safety Awareness and ADL training      Assessment: Patient demonstrated good participation in OT treatment. Pt demonstrates progress with respiratory status and activity tolerance throughout rehab stay as evidenced by completing sponge bath on room air with SpO2 97% following task. Pt safer with ambulation now that he does not have to manage O2 NC. Pt continues to benefit from skilled OT services to address remaining deficits and progress toward baseline level of independence and safety. Patient ended session seated in recliner with call bell and needs within reach and with chair/bed alarm activated.   Plan: Continue OT POC.     Sharita Johnson, OT   1/9/2024

## 2024-01-09 NOTE — PROGRESS NOTES
sulfamethoxazole-trimethoprim (BACTRIM DS;SEPTRA DS) 800-160 MG per tablet 1 tablet  1 tablet Oral 2 times per day    guaiFENesin (MUCINEX) extended release tablet 1,200 mg  1,200 mg Oral BID    glucose chewable tablet 16 g  4 tablet Oral PRN    dextrose bolus 10% 125 mL  125 mL IntraVENous PRN    Or    dextrose bolus 10% 250 mL  250 mL IntraVENous PRN    glucagon (rDNA) injection 1 mg  1 mg SubCUTAneous PRN    dextrose 10 % infusion   IntraVENous Continuous PRN    metFORMIN (GLUCOPHAGE) tablet 1,000 mg  1,000 mg Oral BID WC    budesonide (PULMICORT) nebulizer suspension 500 mcg  0.5 mg Nebulization BID RT    albuterol (PROVENTIL) (2.5 MG/3ML) 0.083% nebulizer solution 2.5 mg  2.5 mg Nebulization Q4H PRN    hydrocortisone (ANUSOL-HC) 2.5 % rectal cream   Rectal BID    insulin lispro (HUMALOG) injection vial 0-8 Units  0-8 Units SubCUTAneous TID WC    insulin lispro (HUMALOG) injection vial 0-4 Units  0-4 Units SubCUTAneous Nightly    acetaminophen (TYLENOL) tablet 650 mg  650 mg Oral Q4H PRN    alcohol 62% (NOZIN) nasal  1 ampule  1 ampule Topical Q12H    amiodarone (CORDARONE) tablet 400 mg  400 mg Oral BID    apixaban (ELIQUIS) tablet 5 mg  5 mg Oral BID    aspirin EC tablet 81 mg  81 mg Oral Daily    atorvastatin (LIPITOR) tablet 80 mg  80 mg Oral Nightly    diphenhydrAMINE (BENADRYL) capsule 25 mg  25 mg Oral Nightly PRN    famotidine (PEPCID) tablet 20 mg  20 mg Oral BID    Or    famotidine (PEPCID) 20 mg in sodium chloride (PF) 0.9 % 10 mL injection  20 mg IntraVENous BID    lactulose (CHRONULAC) 10 GM/15ML solution 20 g  20 g Oral Daily PRN    levothyroxine (SYNTHROID) tablet 150 mcg  150 mcg Oral Daily    magnesium hydroxide (MILK OF MAGNESIA) 400 MG/5ML suspension 30 mL  30 mL Oral Daily PRN    melatonin tablet 6 mg  6 mg Oral Nightly PRN    metoprolol succinate (TOPROL XL) extended release tablet 25 mg  25 mg Oral Daily    ondansetron (ZOFRAN-ODT) disintegrating tablet 4 mg  4 mg Oral Q8H PRN     polyethylene glycol (GLYCOLAX) packet 17 g  17 g Oral Daily PRN    sennosides-docusate sodium (SENOKOT-S) 8.6-50 MG tablet 2 tablet  2 tablet Oral BID    sertraline (ZOLOFT) tablet 100 mg  100 mg Oral Daily    traMADol (ULTRAM) tablet 50 mg  50 mg Oral Q6H PRN    lisinopril (PRINIVIL;ZESTRIL) tablet 20 mg  20 mg Oral Daily    traZODone (DESYREL) tablet 50 mg  50 mg Oral Nightly PRN    benzonatate (TESSALON) capsule 100 mg  100 mg Oral 4x Daily PRN    folic acid (FOLVITE) tablet 1 mg  1 mg Oral Daily    multivitamin 1 tablet  1 tablet Oral Daily    vitamin C tablet 500 mg  500 mg Oral Daily    zinc sulfate (ZINCATE) 220 mg capsule - elemental zinc 50 mg  50 mg Oral Daily    Vitamin D (CHOLECALCIFEROL) tablet 1,000 Units  1,000 Units Oral Daily     No Known Allergies    Visit Vitals  /60   Pulse 64   Temp 98.2 °F (36.8 °C) (Oral)   Resp 18   Ht 1.753 m (5' 9\")   Wt 83.4 kg (183 lb 14.4 oz)   SpO2 98%   BMI 27.16 kg/m²          Physical Exam:   General:  AAO with extra time, appears stated age. Appears improved clinically and more relaxed lately now off O2. Wife and daughter present.    HEENT:  Normocephalic, EOM and FOV intact with blurry vision, facial symmetry noted. Nares patent, oral mucosa moist without lesions.   Lungs:  Diminished breath sounds with crackles L>R with poor air exchange and effort. Respirations minimally/non labored at rest on RA.   Cardiovascular:  Appears slightly irregular rate and irregular rhythm with distant heart sounds, S1, S2. No obvious murmur. Palpable radial and weak pedal pulses. L>R BLE edema, no BLE Jason's.   Genitourinary: Deferred.   Abdomen:  Soft, non-tender, not distended.  Bowel sounds normoactive.  No obvious masses or organomegaly palpated.    Neuromuscular:  BUE  and BLEs 5/5 throughout. BUEs not tested proximally d/t sternal precautions, but he can easily aBduct arms to shoulder level. Sensation intact globally without paresthesias. Tremulous hands.   Skin:

## 2024-01-10 LAB
GLUCOSE BLD STRIP.AUTO-MCNC: 107 MG/DL (ref 65–100)
GLUCOSE BLD STRIP.AUTO-MCNC: 132 MG/DL (ref 65–100)
GLUCOSE BLD STRIP.AUTO-MCNC: 154 MG/DL (ref 65–100)
GLUCOSE BLD STRIP.AUTO-MCNC: 85 MG/DL (ref 65–100)
SERVICE CMNT-IMP: ABNORMAL
SERVICE CMNT-IMP: NORMAL

## 2024-01-10 PROCEDURE — 97116 GAIT TRAINING THERAPY: CPT

## 2024-01-10 PROCEDURE — 82962 GLUCOSE BLOOD TEST: CPT

## 2024-01-10 PROCEDURE — 6360000002 HC RX W HCPCS: Performed by: PHYSICAL MEDICINE & REHABILITATION

## 2024-01-10 PROCEDURE — 1180000000 HC REHAB R&B

## 2024-01-10 PROCEDURE — 6370000000 HC RX 637 (ALT 250 FOR IP): Performed by: PHYSICIAN ASSISTANT

## 2024-01-10 PROCEDURE — 94760 N-INVAS EAR/PLS OXIMETRY 1: CPT

## 2024-01-10 PROCEDURE — 6370000000 HC RX 637 (ALT 250 FOR IP): Performed by: PHYSICAL MEDICINE & REHABILITATION

## 2024-01-10 PROCEDURE — 94660 CPAP INITIATION&MGMT: CPT

## 2024-01-10 PROCEDURE — 97110 THERAPEUTIC EXERCISES: CPT

## 2024-01-10 PROCEDURE — 97530 THERAPEUTIC ACTIVITIES: CPT

## 2024-01-10 PROCEDURE — 94640 AIRWAY INHALATION TREATMENT: CPT

## 2024-01-10 PROCEDURE — 6360000002 HC RX W HCPCS: Performed by: INTERNAL MEDICINE

## 2024-01-10 PROCEDURE — 97535 SELF CARE MNGMENT TRAINING: CPT

## 2024-01-10 PROCEDURE — 6370000000 HC RX 637 (ALT 250 FOR IP): Performed by: STUDENT IN AN ORGANIZED HEALTH CARE EDUCATION/TRAINING PROGRAM

## 2024-01-10 RX ORDER — GLIPIZIDE 2.5 MG/1
2.5 TABLET ORAL
Qty: 60 TABLET | Refills: 0 | Status: SHIPPED | OUTPATIENT
Start: 2024-01-10 | End: 2024-01-16

## 2024-01-10 RX ORDER — TRAZODONE HYDROCHLORIDE 50 MG/1
50 TABLET ORAL NIGHTLY PRN
Qty: 30 TABLET | Refills: 0 | Status: SHIPPED | OUTPATIENT
Start: 2024-01-10 | End: 2024-01-16

## 2024-01-10 RX ORDER — SULFAMETHOXAZOLE AND TRIMETHOPRIM 800; 160 MG/1; MG/1
1 TABLET ORAL EVERY 12 HOURS SCHEDULED
Qty: 4 TABLET | Refills: 0 | Status: SHIPPED | OUTPATIENT
Start: 2024-01-11 | End: 2024-01-13

## 2024-01-10 RX ORDER — FUROSEMIDE 20 MG/1
20 TABLET ORAL DAILY
Qty: 20 TABLET | Refills: 0 | Status: SHIPPED | OUTPATIENT
Start: 2024-01-10

## 2024-01-10 RX ORDER — ATORVASTATIN CALCIUM 80 MG/1
80 TABLET, FILM COATED ORAL NIGHTLY
Qty: 30 TABLET | Refills: 0 | Status: SHIPPED | OUTPATIENT
Start: 2024-01-10 | End: 2024-01-20 | Stop reason: SDUPTHER

## 2024-01-10 RX ORDER — HYDROCORTISONE 25 MG/G
CREAM TOPICAL
Qty: 28 G | Refills: 0 | Status: SHIPPED | OUTPATIENT
Start: 2024-01-10 | End: 2024-01-18 | Stop reason: ALTCHOICE

## 2024-01-10 RX ORDER — SENNA AND DOCUSATE SODIUM 50; 8.6 MG/1; MG/1
2 TABLET, FILM COATED ORAL 2 TIMES DAILY
Qty: 120 TABLET | Refills: 0 | COMMUNITY
Start: 2024-01-10 | End: 2024-01-18 | Stop reason: ALTCHOICE

## 2024-01-10 RX ORDER — GUAIFENESIN 600 MG/1
1200 TABLET, EXTENDED RELEASE ORAL 2 TIMES DAILY
Qty: 120 TABLET | Refills: 0 | Status: SHIPPED | OUTPATIENT
Start: 2024-01-10 | End: 2024-01-18 | Stop reason: ALTCHOICE

## 2024-01-10 RX ORDER — BUDESONIDE 0.5 MG/2ML
0.5 INHALANT ORAL
Qty: 60 EACH | Refills: 0 | Status: SHIPPED | OUTPATIENT
Start: 2024-01-10

## 2024-01-10 RX ORDER — LEVALBUTEROL INHALATION SOLUTION 0.63 MG/3ML
0.63 SOLUTION RESPIRATORY (INHALATION) 2 TIMES DAILY
Qty: 60 EACH | Refills: 0 | Status: SHIPPED | OUTPATIENT
Start: 2024-01-10 | End: 2024-01-16

## 2024-01-10 RX ADMIN — METFORMIN HYDROCHLORIDE 1000 MG: 500 TABLET ORAL at 08:00

## 2024-01-10 RX ADMIN — ASPIRIN 81 MG: 81 TABLET ORAL at 08:01

## 2024-01-10 RX ADMIN — GLIPIZIDE 2.5 MG: 5 TABLET ORAL at 08:01

## 2024-01-10 RX ADMIN — Medication 1 AMPULE: at 08:04

## 2024-01-10 RX ADMIN — Medication 500 MG: at 08:00

## 2024-01-10 RX ADMIN — APIXABAN 5 MG: 5 TABLET, FILM COATED ORAL at 20:00

## 2024-01-10 RX ADMIN — LISINOPRIL 20 MG: 20 TABLET ORAL at 08:01

## 2024-01-10 RX ADMIN — METOPROLOL SUCCINATE 25 MG: 25 TABLET, FILM COATED, EXTENDED RELEASE ORAL at 08:00

## 2024-01-10 RX ADMIN — GUAIFENESIN 1200 MG: 600 TABLET ORAL at 20:00

## 2024-01-10 RX ADMIN — FUROSEMIDE 20 MG: 20 TABLET ORAL at 08:00

## 2024-01-10 RX ADMIN — SULFAMETHOXAZOLE AND TRIMETHOPRIM 1 TABLET: 800; 160 TABLET ORAL at 08:00

## 2024-01-10 RX ADMIN — FOLIC ACID 1 MG: 1 TABLET ORAL at 08:00

## 2024-01-10 RX ADMIN — FAMOTIDINE 20 MG: 20 TABLET ORAL at 20:00

## 2024-01-10 RX ADMIN — SULFAMETHOXAZOLE AND TRIMETHOPRIM 1 TABLET: 800; 160 TABLET ORAL at 20:00

## 2024-01-10 RX ADMIN — METFORMIN HYDROCHLORIDE 1000 MG: 500 TABLET ORAL at 16:50

## 2024-01-10 RX ADMIN — ATORVASTATIN CALCIUM 80 MG: 80 TABLET, FILM COATED ORAL at 20:00

## 2024-01-10 RX ADMIN — DOCUSATE SODIUM 50 MG AND SENNOSIDES 8.6 MG 2 TABLET: 8.6; 5 TABLET, FILM COATED ORAL at 08:01

## 2024-01-10 RX ADMIN — LEVALBUTEROL HYDROCHLORIDE 0.63 MG: 0.63 SOLUTION RESPIRATORY (INHALATION) at 16:17

## 2024-01-10 RX ADMIN — SERTRALINE 100 MG: 100 TABLET, FILM COATED ORAL at 08:00

## 2024-01-10 RX ADMIN — BUDESONIDE INHALATION 500 MCG: 0.5 SUSPENSION RESPIRATORY (INHALATION) at 05:18

## 2024-01-10 RX ADMIN — Medication 1 AMPULE: at 20:00

## 2024-01-10 RX ADMIN — GUAIFENESIN 1200 MG: 600 TABLET ORAL at 08:00

## 2024-01-10 RX ADMIN — FUROSEMIDE 20 MG: 20 TABLET ORAL at 16:51

## 2024-01-10 RX ADMIN — VITAMIN D, TAB 1000IU (100/BT) 1000 UNITS: 25 TAB at 08:07

## 2024-01-10 RX ADMIN — BUDESONIDE INHALATION 500 MCG: 0.5 SUSPENSION RESPIRATORY (INHALATION) at 16:17

## 2024-01-10 RX ADMIN — APIXABAN 5 MG: 5 TABLET, FILM COATED ORAL at 08:00

## 2024-01-10 RX ADMIN — FAMOTIDINE 20 MG: 20 TABLET ORAL at 08:01

## 2024-01-10 RX ADMIN — LEVALBUTEROL HYDROCHLORIDE 0.63 MG: 0.63 SOLUTION RESPIRATORY (INHALATION) at 05:18

## 2024-01-10 RX ADMIN — LEVOTHYROXINE SODIUM 150 MCG: 0.07 TABLET ORAL at 05:28

## 2024-01-10 RX ADMIN — GLIPIZIDE 2.5 MG: 5 TABLET ORAL at 16:50

## 2024-01-10 RX ADMIN — B-COMPLEX W/ C & FOLIC ACID TAB 1 TABLET: TAB at 08:01

## 2024-01-10 RX ADMIN — AMIODARONE HYDROCHLORIDE 400 MG: 200 TABLET ORAL at 08:00

## 2024-01-10 ASSESSMENT — PAIN SCALES - GENERAL
PAINLEVEL_OUTOF10: 0
PAINLEVEL_OUTOF10: 0

## 2024-01-10 NOTE — CARE COORDINATION
Interdisciplinary Wednesday, Team Conference Meeting Notes    Interdisciplinary team conference meeting completed to discuss plan of care.     Estimated D/C Date: 1/11/24    Recommended Follow-Up Therapy: Staff has recommended (HH) PT/OT/Aide,, DME's (RW) and family training.        Communication with family/caregivers:  CM reviewed / screen patient medical chart for continued stay.   Patient needs are continue to be followed by Dr. Attila Sandoval.  Patient was admitted on 12/20/23.  Patient was approved for 12 days.  Patient has discharge date / plan at this time scheduled for 1/11/24.  CM met with patient and made patient aware of  staff recommendations at the Team Conference Meeting.  CM informed patient that his discharge date remain for 1/11/24.  CM made patient aware that staff has requested a DME's (RW).  Patient was agreeable to the DME's (RW).  CM will complete the referral for RW. Provider for the DME's (RW) [facility].  Patient was pleased with the date remaining the same.  CM will continue to follow patient plan of care and assist with supportive care. CM will continue to follow / monitor for any needs, concerns or questions that may arise.     Name of Ins: Medicare Part A / B  Policy #: [id number]  Secondary Ins: [facility]  Policy #: [id number]  Auth #  Admission Date: 12/20/23  Approved Dates: 12 days   LOS: 21 days  Discharge Date: 01/11/24  Contact Name:  Contact #  Fax #:  Updated Clinicals: no updated clinicals needed     CM met with patient and made patient aware of  staff recommendations at the Team Conference Meeting.  CM informed patient that his discharge date remain for 1/11/24.  CM made patient aware that staff has requested a DME's (RW).  Patient was agreeable to the DME's (RW).  CM will complete the referral for RW. Provider for the DME's (RW) [facility].  Patient was pleased with the date remaining the same.  CM will continue to follow patient plan of care and assist with

## 2024-01-10 NOTE — PROGRESS NOTES
Physical Therapy  PHYSICAL THERAPY DAILY NOTE  Time In:  1433 PM  Time Out:  1521 PM  Total Treatment Time:  (P) 48 Minutes  Pt. Seen for: PM, Gait Training, Therapeutic Exercise, and Transfer Training     Subjective: \" I had to pee all night.\"         Objective:  Precautions: Falls and Poor Safety Awareness    GROSS ASSESSMENT Daily Assessment           COGNITION Daily Assessment           BED/MAT MOBILITY Daily Assessment    Rolling Right:   Rolling Left:   Supine to Sit: NT  Sit to Supine: NT       TRANSFERS Daily Assessment    Sit to Stand: Supervision/Standby Assist  Stand to Sit: Supervision/Standby Assist  Transfer Type: Stand Pivot and with rolling walker  Transfer Assistance: Supervision/Standby Assist  Car Transfers: NT         GAIT Daily Assessment    Amount of Assistance: Supervision/Standby Assist  Distance (ft): 120  Assistive Device: RW  Surface: Level Surface       STEPS/STAIRS Daily Assessment    Steps Ambulated:    Level of Assistance:    Railing:  Assistive Device:        BALANCE Daily Assessment    Static Sitting:   Dynamic Sitting:   Static Standing:   Dynamic Standing:        WHEELCHAIR MOBILITY Daily Assessment    Able to Propel (ft):   Assistance:   Surface:   Wheelchair Set-up:        LOWER EXTREMITY EXERCISES Daily Assessment    Rode motomed x 10 minutes     Pain level: no pain noted.  Pain Location:    Pain Interventions:     Vital Signs:  /64   Pulse 67   Temp 97.9 °F (36.6 °C) (Oral)   Resp 18   Ht 1.753 m (5' 9\")   Wt 85.3 kg (188 lb)   SpO2 95%   BMI 27.76 kg/m²       Education:      Interdisciplinary Communication:     Pt. Left in recliner with call bell at reach.         Assessment: Patient making good progress.         Plan of Care: Continue with plan of care.    Ivon Storey, PTA  1/10/2024

## 2024-01-10 NOTE — PROGRESS NOTES
OCCUPATIONAL THERAPY DAILY NOTE    Time In   1355    Time Out 1432      Subjective: Pt agreeable to treatment.   Pain: No pain expressed.  Interdisciplinary Communication: Collaborated with PT regarding handoff communication.   Precautions: Falls, Poor Safety Awareness, [place] Alarm, and Sternal Precautions    FUNCTIONAL MOBILITY:       Bed Mobility NT    Sit to Stand SBA    Transfer  SBA  Transfer Type: SPT  Equipment: RW Cues for RW proximity   Ambulation SBA  Equipment: RW Cues for RW proximity      - Activity Tolerance - Cognition - Coordination - Strengthening   Pt completed 12 minutes on the upper body ergometer, ½ forward and ½ backward, with light and moderate resistance to increase upper body strength and activity tolerance for integration into functional tasks.    Pt engaged in game of cornAeroDron while standing in front of w/c to target UE strength/coordination and standing balance/tolerance. Pt played 4 rounds with board placed anteriorly ~10' away. Pt required seated rest breaks between games. Pt then practiced catching bags tossed to him in seated position to further address BUE coordination.      Education   Rolling Walker Management and Safety Awareness     Assessment: Patient demonstrated good participation in OT treatment. Pt continues to benefit from skilled OT services to address remaining deficits and progress toward baseline level of independence and safety. Patient ended session seated in w/c with PT.   Plan: Continue OT POC.     Sharita Johnson, OT   1/10/2024

## 2024-01-10 NOTE — PLAN OF CARE
Problem: Safety - Adult  Goal: Free from fall injury  1/10/2024 0724 by Fitz Morgan, RN  Outcome: Progressing  1/9/2024 1224 by Winifred Arellano RN  Outcome: Progressing

## 2024-01-10 NOTE — PROGRESS NOTES
OCCUPATIONAL THERAPY DISCHARGE NOTE    Time In 0710      Time Out 0750        GOALS:  Short Term Goals:  Time Frame for Short Term Goals : 7 days   STG 1: Patient will dress UB with Partial/Moderate Assistance using AE/DME PRN. 12/27/23 Goal met.   STG 2: Patient will dress LB with Partial/Moderate Assistance using AE/DME PRN. 12/27/23 Goal met.   STG 3: Patient will don footwear with Partial/Moderate Assistance using AE/DME PRN.12/27/23 Goal met.   STG 4: Patient will bathe with Partial/Moderate Assistance using AE/DME PRN. 12/27/23 Goal met.   STG 5: Patient will toilet with Partial/Moderate Assistance using AE/DME PRN. 12/27/23 Goal met.      Long Term Goals:  Time Frame for Long Term Goals : 14 days   LTG 1: Patient will dress UB with Setup Assistance using AE/DME PRN. 1/3/24 Progressing. 1/10/24 Goal met.   LTG 2: Patient will dress LB with Supervision or Touching Assistance using AE/DME PRN. 1/3/24 Goal met.   LTG 3: Patient will don footwear with Supervision or Touching Assistance using AE/DME PRN. 1/3/24 Progressing. 1/10/24 Goal met.   LTG 4: Patient will bathe with Supervision or Touching Assistance using AE/DME PRN. 1/3/24 Goal met.   LTG 5: Patient will toilet with Setup Assistance using AE/DME PRN. 1/3/24 Progressing. 1/10/24 Goal not met.   LTG 6: Patient/caregiver will verbalize understanding of OT recommendations regarding ADLs, functional transfers, home safety, AE/DME, energy conservation, safety awareness, activity tolerance, and follow-up therapy to increase safety with functional tasks upon discharge. 1/3/24 Progressing. 1/10/24 Goal met with pt's spouse and daughter.     Subjective: Patient agreeable to therapy. \"Why is my left foot swollen?\" Pt reports he was up 6 x overnight to urinate.   Pain: No pain expressed.  Precautions: Falls, Poor Safety Awareness, [place] Alarm, and Sternal Precautions    FUNCTIONAL MOBILITY:        Bed Mobility NT    Sit to Stand Supervision, SBA, and CGA    Transfer

## 2024-01-10 NOTE — PROGRESS NOTES
PHYSICAL THERAPY DISCHARGE SUMMARY    TIME IN: 0833  TIME OUT: 0916  Total Treatment Time:  43 Minutes      Precautions at discharge:   Falls and Sternal     Problem List:    Decreased strength B LE  [x]     Decreased strength trunk/core  [x]     Decreased AROM   []     Decreased PROM  []     Decreased balance sitting  []     Decreased balance standing  [x]     Decreased endurance  [x]     Pain  []        Functional Limitations:   Decreased independence with bed mobility  []     Decreased independence with functional transfers  [x]     Decreased independence with ambulation  [x]     Decreased independence with stair negotiation  [x]               Objective:     Vital Signs:/64   Pulse 67   Temp 97.9 °F (36.6 °C) (Oral)   Resp 18   Ht 1.753 m (5' 9\")   Wt 85.3 kg (188 lb)   SpO2 95%   BMI 27.76 kg/m²       Pain level: 0  Pain location:   Pain interventions:      Patient education: Pt instructed in bed mobility, transfers, gait with RW, balance training, postural awareness, sternal precautions, activity pacing, breathing ex.      Interdisciplinary Communication: Collaborated w/ PTA regarding pt's progress.      Cognition:   Communication:able to express needs, able to follow one step commands without assistance  Social Interaction:cooperative, appropriate with PT, participating, motivated to improve  Memory:appears mildly impaired     Lower Extremity Function:     LLE RLE   ROM WFL WFL   Fine Motor Coordination Intact Intact   Tone NT NT   Sensation Light Touch Intact Light Touch Intact   Strength Grossly WFL Grossly WFL     B LE strength 4/5 to 5/5     PRIMARY MODE OF LOCOMOTION: Ambulation      Functional Assessment:    Balance  Comments   Static Sitting Normal:  Pt. able to maintain balance w/o UE support    Dynamic Sitting Good - accepts moderate challenge;  can maintain balance while picking object off the floor    Static Standing Good:  Pt. able to maintain balance w/o UE support;  exhibits some

## 2024-01-11 VITALS
RESPIRATION RATE: 16 BRPM | TEMPERATURE: 97.5 F | BODY MASS INDEX: 27.21 KG/M2 | OXYGEN SATURATION: 95 % | DIASTOLIC BLOOD PRESSURE: 69 MMHG | WEIGHT: 183.7 LBS | SYSTOLIC BLOOD PRESSURE: 117 MMHG | HEART RATE: 71 BPM | HEIGHT: 69 IN

## 2024-01-11 DIAGNOSIS — J44.1 CHRONIC OBSTRUCTIVE PULMONARY DISEASE WITH ACUTE EXACERBATION (HCC): Primary | ICD-10-CM

## 2024-01-11 PROBLEM — R09.89 RHONCHI: Status: RESOLVED | Noted: 2023-12-24 | Resolved: 2024-01-11

## 2024-01-11 PROBLEM — R06.2 WHEEZING: Status: RESOLVED | Noted: 2023-12-27 | Resolved: 2024-01-11

## 2024-01-11 LAB
GLUCOSE BLD STRIP.AUTO-MCNC: 81 MG/DL (ref 65–100)
SERVICE CMNT-IMP: NORMAL

## 2024-01-11 PROCEDURE — 6360000002 HC RX W HCPCS: Performed by: INTERNAL MEDICINE

## 2024-01-11 PROCEDURE — 94640 AIRWAY INHALATION TREATMENT: CPT

## 2024-01-11 PROCEDURE — 6370000000 HC RX 637 (ALT 250 FOR IP): Performed by: STUDENT IN AN ORGANIZED HEALTH CARE EDUCATION/TRAINING PROGRAM

## 2024-01-11 PROCEDURE — 6360000002 HC RX W HCPCS: Performed by: PHYSICAL MEDICINE & REHABILITATION

## 2024-01-11 PROCEDURE — 94760 N-INVAS EAR/PLS OXIMETRY 1: CPT

## 2024-01-11 PROCEDURE — 6370000000 HC RX 637 (ALT 250 FOR IP): Performed by: PHYSICAL MEDICINE & REHABILITATION

## 2024-01-11 PROCEDURE — 6370000000 HC RX 637 (ALT 250 FOR IP): Performed by: PHYSICIAN ASSISTANT

## 2024-01-11 PROCEDURE — 99239 HOSP IP/OBS DSCHRG MGMT >30: CPT | Performed by: PHYSICIAN ASSISTANT

## 2024-01-11 PROCEDURE — 82962 GLUCOSE BLOOD TEST: CPT

## 2024-01-11 RX ADMIN — METFORMIN HYDROCHLORIDE 1000 MG: 500 TABLET ORAL at 08:08

## 2024-01-11 RX ADMIN — SULFAMETHOXAZOLE AND TRIMETHOPRIM 1 TABLET: 800; 160 TABLET ORAL at 08:08

## 2024-01-11 RX ADMIN — LISINOPRIL 20 MG: 20 TABLET ORAL at 08:09

## 2024-01-11 RX ADMIN — LEVOTHYROXINE SODIUM 150 MCG: 0.07 TABLET ORAL at 05:04

## 2024-01-11 RX ADMIN — FOLIC ACID 1 MG: 1 TABLET ORAL at 08:09

## 2024-01-11 RX ADMIN — DOCUSATE SODIUM 50 MG AND SENNOSIDES 8.6 MG 2 TABLET: 8.6; 5 TABLET, FILM COATED ORAL at 08:07

## 2024-01-11 RX ADMIN — SERTRALINE 100 MG: 100 TABLET, FILM COATED ORAL at 08:09

## 2024-01-11 RX ADMIN — ASPIRIN 81 MG: 81 TABLET ORAL at 08:09

## 2024-01-11 RX ADMIN — LEVALBUTEROL HYDROCHLORIDE 0.63 MG: 0.63 SOLUTION RESPIRATORY (INHALATION) at 05:50

## 2024-01-11 RX ADMIN — FAMOTIDINE 20 MG: 20 TABLET ORAL at 08:07

## 2024-01-11 RX ADMIN — METOPROLOL SUCCINATE 25 MG: 25 TABLET, FILM COATED, EXTENDED RELEASE ORAL at 08:09

## 2024-01-11 RX ADMIN — FUROSEMIDE 20 MG: 20 TABLET ORAL at 08:07

## 2024-01-11 RX ADMIN — Medication 500 MG: at 08:08

## 2024-01-11 RX ADMIN — APIXABAN 5 MG: 5 TABLET, FILM COATED ORAL at 08:08

## 2024-01-11 RX ADMIN — Medication 50 MG: at 08:09

## 2024-01-11 RX ADMIN — GLIPIZIDE 2.5 MG: 5 TABLET ORAL at 08:08

## 2024-01-11 RX ADMIN — VITAMIN D, TAB 1000IU (100/BT) 1000 UNITS: 25 TAB at 08:08

## 2024-01-11 RX ADMIN — B-COMPLEX W/ C & FOLIC ACID TAB 1 TABLET: TAB at 08:09

## 2024-01-11 RX ADMIN — GUAIFENESIN 1200 MG: 600 TABLET ORAL at 08:08

## 2024-01-11 RX ADMIN — BUDESONIDE INHALATION 500 MCG: 0.5 SUSPENSION RESPIRATORY (INHALATION) at 05:50

## 2024-01-11 ASSESSMENT — PAIN SCALES - GENERAL: PAINLEVEL_OUTOF10: 0

## 2024-01-11 NOTE — PROGRESS NOTES
01/10/24 3018   NIV Type   $NIV $Daily Charge   Equipment Type Home Resmed   Mode Auto-PAP   Mask Type Full face mask   Mask Size Medium   Assessment   Pulse 84   Respirations 20   SpO2 95 %   Comfort Level Good   Using Accessory Muscles No   Mask Compliance Good   Skin Assessment Clean, dry, & intact   Settings/Measurements   CPAP/EPAP   (autoset)   Mask Leak (lpm)   (good seal)   Patient's Home Machine Yes (type/vendor)  (resmed)   Electrical Safety Check Performed Yes

## 2024-01-11 NOTE — PLAN OF CARE
Problem: Safety - Adult  Goal: Free from fall injury  1/11/2024 1144 by Fitz Morgan RN  Outcome: HH/HSPC Resolved Met  1/11/2024 0900 by Fitz Morgan RN  Outcome: Progressing  1/10/2024 2204 by Winifred Arellano RN  Outcome: Progressing     Problem: Skin/Tissue Integrity  Goal: Absence of new skin breakdown  Description: 1.  Monitor for areas of redness and/or skin breakdown  2.  Assess vascular access sites hourly  3.  Every 4-6 hours minimum:  Change oxygen saturation probe site  4.  Every 4-6 hours:  If on nasal continuous positive airway pressure, respiratory therapy assess nares and determine need for appliance change or resting period.  1/11/2024 1144 by Fitz Morgan RN  Outcome: HH/HSPC Resolved Met  1/10/2024 2204 by Winifred Arellano RN  Outcome: Progressing     Problem: Pain  Goal: Verbalizes/displays adequate comfort level or baseline comfort level  1/11/2024 1144 by Fitz Morgan RN  Outcome: HH/HSPC Resolved Met  1/10/2024 2204 by Winifred Arellano RN  Outcome: Progressing     Problem: Respiratory - Adult  Goal: Achieves optimal ventilation and oxygenation  1/11/2024 1144 by Fitz Morgan RN  Outcome: HH/HSPC Resolved Met  1/10/2024 2204 by Winifred Arellano RN  Outcome: Progressing

## 2024-01-11 NOTE — DISCHARGE SUMMARY
Sig Start Date End Date Taking? Authorizing Provider   hydrocortisone (ANUSOL-HC) 2.5 % CREA rectal cream Apply sparingly to anus / external bottom twice daily as needed. 1/10/24  Yes Jackson, Marylee C, PA   budesonide (PULMICORT) 0.5 MG/2ML nebulizer suspension Take 2 mLs by nebulization in the morning and 2 mLs in the evening. Rinse out mouth with water (without swallowing) after every dose.. 1/10/24  Yes Jackson, Marylee C, PA   levalbuterol (XOPENEX) 0.63 MG/3ML nebulization Take 3 mLs by nebulization 2 times daily 1/10/24  Yes Jackson, Marylee C, PA   apixaban (ELIQUIS) 5 MG TABS tablet Take 1 tablet by mouth 2 times daily 1/10/24  Yes Jackson, Marylee C, PA   traZODone (DESYREL) 50 MG tablet Take 1 tablet by mouth nightly as needed (for difficulty sleeping) PCP will prescribe this medication if it is necessary to continue. 1/10/24  Yes Jackson, Marylee C, PA   glipiZIDE 2.5 MG TABS Take 2.5 mg by mouth 2 times daily (before meals) 1/10/24  Yes Jackson, Marylee C, PA   glucose 4 g chewable tablet Take 4 tablets by mouth as needed for Low blood sugar 1/10/24  Yes Jackson, Marylee C, PA   atorvastatin (LIPITOR) 80 MG tablet Take 1 tablet by mouth at bedtime Cardiologist or PCP will continue to prescribe this medication. 1/10/24  Yes Jackson, Marylee C, PA   guaiFENesin (MUCINEX) 600 MG extended release tablet Take 2 tablets by mouth 2 times daily 1/10/24  Yes Jackson, Marylee C, PA   furosemide (LASIX) 20 MG tablet Take 1 tablet by mouth daily Cardiologist or PCP will prescribe this medication if it is necessary to continue. 1/10/24  Yes Jackson, Marylee C, PA   sennosides-docusate sodium (SENOKOT-S) 8.6-50 MG tablet Take 2 tablets by mouth 2 times daily 1/10/24  Yes Jackson, Marylee C, PA   sulfamethoxazole-trimethoprim (BACTRIM DS;SEPTRA DS) 800-160 MG per tablet Take 1 tablet by mouth every 12 hours for 2 days (to complete course started in hospital. Final tablet to be taken Saturday, 1/13 at 9 AM.) 1/11/24  (DESYREL) 50 MG tablet Take 1 tablet by mouth nightly as needed (for difficulty sleeping) PCP will prescribe this medication if it is necessary to continue.  Qty: 30 tablet, Refills: 0    Associated Diagnoses: Difficulty sleeping      glipiZIDE 2.5 MG TABS Take 2.5 mg by mouth 2 times daily (before meals)  Qty: 60 tablet, Refills: 0    Associated Diagnoses: Diabetes mellitus due to underlying condition with hyperglycemia, without long-term current use of insulin ([facility])      glucose 4 g chewable tablet Take 4 tablets by mouth as needed for Low blood sugar  Qty: 60 tablet, Refills: 3      guaiFENesin (MUCINEX) 600 MG extended release tablet Take 2 tablets by mouth 2 times daily  Qty: 120 tablet, Refills: 0    Associated Diagnoses: Congestion of respiratory tract; Rhonchi      furosemide (LASIX) 20 MG tablet Take 1 tablet by mouth daily Cardiologist or PCP will prescribe this medication if it is necessary to continue.  Qty: 20 tablet, Refills: 0      sennosides-docusate sodium (SENOKOT-S) 8.6-50 MG tablet Take 2 tablets by mouth 2 times daily  Qty: 120 tablet, Refills: 0      sulfamethoxazole-trimethoprim (BACTRIM DS;SEPTRA DS) 800-160 MG per tablet Take 1 tablet by mouth every 12 hours for 2 days (to complete course started in hospital. Final tablet to be taken Saturday, 1/13 at 9 AM.)  Qty: 4 tablet, Refills: 0    Associated Diagnoses: Acute cystitis with hematuria        CONTINUE these medications which have CHANGED    Details   apixaban (ELIQUIS) 5 MG TABS tablet Take 1 tablet by mouth 2 times daily  Qty: 60 tablet, Refills: 0    Associated Diagnoses: Typical atrial flutter ([facility])      atorvastatin (LIPITOR) 80 MG tablet Take 1 tablet by mouth at bedtime Cardiologist or PCP will continue to prescribe this medication.  Qty: 30 tablet, Refills: 0    Associated Diagnoses: Mixed hyperlipidemia        CONTINUE these medications which have NOT CHANGED    Details   metFORMIN (GLUCOPHAGE) 500 MG tablet Take 2 tablets by

## 2024-01-11 NOTE — PLAN OF CARE
Problem: Safety - Adult  Goal: Free from fall injury  1/11/2024 0900 by Fitz Morgan, RN  Outcome: Progressing  1/10/2024 2204 by Winifred Arellano RN  Outcome: Progressing

## 2024-01-12 ENCOUNTER — TELEPHONE (OUTPATIENT)
Age: 74
End: 2024-01-12

## 2024-01-12 ENCOUNTER — CARE COORDINATION (OUTPATIENT)
Dept: CARE COORDINATION | Facility: CLINIC | Age: 74
End: 2024-01-12

## 2024-01-12 DIAGNOSIS — Z95.1 S/P CABG X 3: Primary | ICD-10-CM

## 2024-01-12 NOTE — CARE COORDINATION
CM reviewed / screen patient for discharge today.  CM completed a referral to (HH) [facility] (PT/OT/RN).  Patient has been accepted and approved at [facility].  Patient requested a PT by the name of Delmy.  CM made that request known in the referral.  Referral completed for DME's (RW).  Provider for DME's (RW) [facility].  DME's (RW) has been delivered to patient room.  Patient  are agreeable with discharge for today.  Spouse will transport patient home.  Patient has met all treatment goals / milestones.  CM will continue to follow and remain available for any needs, concerns or questions that may arise.         12/21/23 0928   Service Assessment   Patient Orientation Alert and Oriented;Person;Place;Situation;Self   Cognition Alert   History Provided By Patient;Medical Record   Primary Caregiver Self   Support Systems Spouse/Significant Other   Patient's Healthcare Decision Maker is: Legal Next of Kin   PCP Verified by CM Yes   Last Visit to PCP Within last 3 months   Prior Functional Level Independent in ADLs/IADLs   Current Functional Level Assistance with the following:;Mobility;Dressing;Bathing   Can patient return to prior living arrangement Yes   Ability to make needs known: Good   Family able to assist with home care needs: Yes   Would you like for me to discuss the discharge plan with any other family members/significant others, and if so, who? Yes   Financial Resources Medicare   Community Resources None   Social/Functional History   Lives With Spouse   Type of Home House   Home Layout One level   Home Access Stairs to enter with rails   Entrance Stairs - Number of Steps 2-3   Entrance Stairs - Rails Both   Bathroom Shower/Tub Walk-in shower   Bathroom Toilet Standard   Bathroom Equipment Commode   Bathroom Accessibility Accessible   Home Equipment Cane;Walker, standard   Receives Help From Family   ADL Assistance Needs assistance   Toileting Needs assistance   Homemaking Assistance Needs assistance    Homemaking Responsibilities Yes   Ambulation Assistance Needs assistance   Transfer Assistance Needs assistance   Active  Yes   Mode of Transportation Motorcycle;SUV   Occupation Retired   Discharge Planning   Type of Residence House   Living Arrangements Spouse/Significant Other   Current Services Prior To Admission Durable Medical Equipment;C-pap   Current DME Prior to Arrival Cane;Walker   Potential Assistance Needed Home Care   DME Ordered? No   Potential Assistance Purchasing Medications No   Type of Home Care Services None   Patient expects to be discharged to: House   One/Two Story Residence One story   History of falls? 0   Services At/After Discharge   Transition of Care Consult (CM Consult) Home Health   Internal Home Health No    Resource Information Provided? No   Confirm Follow Up Transport Family   Condition of Participation: Discharge Planning   The Plan for Transition of Care is related to the following treatment goals: anticipates returning home

## 2024-01-12 NOTE — CARE COORDINATION
scheduled, appointment scheduling offered: Yes.   Is follow up appointment scheduled within 7 days of discharge? Yes.    Follow Up  Future Appointments   Date Time Provider Department Center   1/17/2024  8:30 AM BS INFUSION [facility] AM[facility]   1/18/2024 10:30 AM Paduano, Julia Schmidt, SWATHI - CNP [facility] AMB   1/23/2024  8:15 AM Chapo Allred MD [facility] AMB   1/24/2024  2:50 PM Shlomo Winters MD [facility] AMB   5/28/2024  9:00 AM Katia Perez MD [facility] AMB   5/29/2024 10:15 AM Paduano, Julia Schmidt, SWATHI - CNP [facility] AMB       Care Transition Nurse provided contact information.  Plan for follow-up call in 5-7 days based on severity of symptoms and risk factors.  Plan for next call: symptom management    Natividad Nguyen RN

## 2024-01-13 ENCOUNTER — HOME CARE VISIT (OUTPATIENT)
Dept: SCHEDULING | Facility: HOME HEALTH | Age: 74
End: 2024-01-13

## 2024-01-13 VITALS
BODY MASS INDEX: 25.48 KG/M2 | HEIGHT: 71 IN | WEIGHT: 182 LBS | OXYGEN SATURATION: 96 % | SYSTOLIC BLOOD PRESSURE: 98 MMHG | TEMPERATURE: 97 F | RESPIRATION RATE: 24 BRPM | HEART RATE: 55 BPM | DIASTOLIC BLOOD PRESSURE: 40 MMHG

## 2024-01-13 PROCEDURE — 0221000100 HH NO PAY CLAIM PROCEDURE

## 2024-01-13 PROCEDURE — G0299 HHS/HOSPICE OF RN EA 15 MIN: HCPCS

## 2024-01-13 ASSESSMENT — ENCOUNTER SYMPTOMS
HEMOPTYSIS: 0
DYSPNEA ACTIVITY LEVEL: AFTER AMBULATING 10 - 20 FT
STOOL DESCRIPTION: SOFT FORMED
COUGH CHARACTERISTICS: NON-PRODUCTIVE
COUGH: 1

## 2024-01-15 ENCOUNTER — TELEPHONE (OUTPATIENT)
Dept: RHEUMATOLOGY | Age: 74
End: 2024-01-15

## 2024-01-15 ENCOUNTER — HOME CARE VISIT (OUTPATIENT)
Dept: SCHEDULING | Facility: HOME HEALTH | Age: 74
End: 2024-01-15

## 2024-01-15 VITALS
RESPIRATION RATE: 18 BRPM | TEMPERATURE: 97.4 F | SYSTOLIC BLOOD PRESSURE: 122 MMHG | DIASTOLIC BLOOD PRESSURE: 72 MMHG | OXYGEN SATURATION: 98 % | HEART RATE: 72 BPM

## 2024-01-15 PROBLEM — Z01.818 PRE-OP TESTING: Status: RESOLVED | Noted: 2023-12-14 | Resolved: 2024-01-15

## 2024-01-15 PROCEDURE — G0151 HHCP-SERV OF PT,EA 15 MIN: HCPCS

## 2024-01-15 ASSESSMENT — ENCOUNTER SYMPTOMS
DYSPNEA ACTIVITY LEVEL: AFTER AMBULATING 10 - 20 FT
PAIN LOCATION - PAIN QUALITY: SORE

## 2024-01-15 NOTE — TELEPHONE ENCOUNTER
Pt's wife called today 1341 hours, to cancel his infusion for 1/16/2024 due to having CABG 12/14/2023 and just got home 1/14/2024.  She will call when able to infuse.   This is just a note to let you know, he is DR Perez's pt.    Anything need to done before allowing him to infuse??

## 2024-01-16 ENCOUNTER — HOME CARE VISIT (OUTPATIENT)
Dept: SCHEDULING | Facility: HOME HEALTH | Age: 74
End: 2024-01-16

## 2024-01-16 VITALS
DIASTOLIC BLOOD PRESSURE: 62 MMHG | HEART RATE: 73 BPM | TEMPERATURE: 98.2 F | OXYGEN SATURATION: 99 % | SYSTOLIC BLOOD PRESSURE: 118 MMHG | RESPIRATION RATE: 18 BRPM

## 2024-01-16 DIAGNOSIS — I48.3 TYPICAL ATRIAL FLUTTER (HCC): ICD-10-CM

## 2024-01-16 PROCEDURE — G0299 HHS/HOSPICE OF RN EA 15 MIN: HCPCS

## 2024-01-16 ASSESSMENT — ENCOUNTER SYMPTOMS
STOOL DESCRIPTION: SOFT FORMED
DYSPNEA ACTIVITY LEVEL: AFTER AMBULATING MORE THAN 20 FT

## 2024-01-16 NOTE — TELEPHONE ENCOUNTER
Faxed Rx to [facility].    Requested Prescriptions     Signed Prescriptions Disp Refills    apixaban (ELIQUIS) 5 MG TABS tablet 180 tablet 3     Sig: Take 1 tablet by mouth 2 times daily     Authorizing Provider: PADUANO, JULIA SCHMIDT Julia K Paduano, APRN - CNP

## 2024-01-17 ENCOUNTER — HOME CARE VISIT (OUTPATIENT)
Dept: SCHEDULING | Facility: HOME HEALTH | Age: 74
End: 2024-01-17

## 2024-01-17 ENCOUNTER — CARE COORDINATION (OUTPATIENT)
Dept: CARE COORDINATION | Facility: CLINIC | Age: 74
End: 2024-01-17

## 2024-01-17 PROCEDURE — G0152 HHCP-SERV OF OT,EA 15 MIN: HCPCS

## 2024-01-17 NOTE — CARE COORDINATION
2/5/2024 To Be Determined Delmy De Leon, LEI [facility]   2/6/2024  4:00 PM Chapo Allred MD [facility] AMB   2/7/2024 To Be Determined Wendy Padron RN [facility]   2/8/2024 To Be Determined Calin Jay, PT [facility]   2/14/2024 To Be Determined Wendy Padron RN [facility]   2/21/2024 To Be Determined Wendy Padron RN [facility]   2/28/2024 To Be Determined Wendy Padron RN [facility]   3/6/2024 To Be Determined Wendy Padron RN [facility]   3/12/2024 To Be Determined Wendy Padron RN [facility]   5/28/2024  9:00 AM Katia Perez MD [facility] AMB   5/29/2024 10:15 AM Paduano, Julia Schmidt, APRN - CNP [facility]     External follow up appointment(s): n/a    Care Transition Nurse reviewed medical action plan with spouse/partner and discussed any barriers to care and/or understanding of plan of care after discharge. Discussed appropriate site of care based on symptoms and resources available to patient including: PCP  Specialist  Urgent care clinics  [facility]. The spouse/partner agrees to contact the PCP office for questions related to their healthcare.     Advance Care Planning:   decision maker updated.     Patients top risk factors for readmission: S/P CABG x 3  Interventions to address risk factors:  Continue taking all medications as ordered  Continue with adequate nutritional and fluid intake  Advance activities as recommended by HH therapy   Goals Addressed                   This Visit's Progress     Returns to baseline activity level.   On track     Patient/Family able to obtain medicine after d/c     Patient/Family able to verbalize medicine changes     Patient/Family aware and attends follow up appointments s/p d/c.     Patient/Family agrees to notify provider of any barriers to plan of care.    Patient/Family agrees to notify provider of any symptoms that indicate a worsening of

## 2024-01-18 ENCOUNTER — OFFICE VISIT (OUTPATIENT)
Dept: INTERNAL MEDICINE CLINIC | Facility: CLINIC | Age: 74
End: 2024-01-18

## 2024-01-18 VITALS
HEIGHT: 71 IN | TEMPERATURE: 97.2 F | DIASTOLIC BLOOD PRESSURE: 66 MMHG | WEIGHT: 190 LBS | HEART RATE: 60 BPM | SYSTOLIC BLOOD PRESSURE: 114 MMHG | BODY MASS INDEX: 26.6 KG/M2

## 2024-01-18 VITALS
DIASTOLIC BLOOD PRESSURE: 60 MMHG | SYSTOLIC BLOOD PRESSURE: 122 MMHG | OXYGEN SATURATION: 98 % | TEMPERATURE: 97.8 F | HEART RATE: 64 BPM | RESPIRATION RATE: 16 BRPM

## 2024-01-18 DIAGNOSIS — Z09 HOSPITAL DISCHARGE FOLLOW-UP: Primary | ICD-10-CM

## 2024-01-18 DIAGNOSIS — I25.110 ATHEROSCLEROSIS OF NATIVE CORONARY ARTERY OF NATIVE HEART WITH UNSTABLE ANGINA PECTORIS (HCC): ICD-10-CM

## 2024-01-18 DIAGNOSIS — I25.10 CAD, MULTIPLE VESSEL: ICD-10-CM

## 2024-01-18 DIAGNOSIS — I73.9 PAD (PERIPHERAL ARTERY DISEASE) (HCC): ICD-10-CM

## 2024-01-18 DIAGNOSIS — F33.41 MAJOR DEPRESSIVE DISORDER, RECURRENT, IN PARTIAL REMISSION (HCC): ICD-10-CM

## 2024-01-18 DIAGNOSIS — E89.0 HYPOTHYROIDISM FOLLOWING RADIOIODINE THERAPY: ICD-10-CM

## 2024-01-18 DIAGNOSIS — E27.8 ADRENAL NODULE (HCC): ICD-10-CM

## 2024-01-18 DIAGNOSIS — E11.42 TYPE 2 DIABETES MELLITUS WITH DIABETIC POLYNEUROPATHY, WITHOUT LONG-TERM CURRENT USE OF INSULIN (HCC): ICD-10-CM

## 2024-01-18 DIAGNOSIS — Z95.1 S/P CORONARY ARTERY BYPASS GRAFT X 3: ICD-10-CM

## 2024-01-18 DIAGNOSIS — E08.65 DIABETES MELLITUS DUE TO UNDERLYING CONDITION WITH HYPERGLYCEMIA, WITHOUT LONG-TERM CURRENT USE OF INSULIN (HCC): ICD-10-CM

## 2024-01-18 LAB
BASOPHILS # BLD: 0.1 K/UL (ref 0–0.2)
BASOPHILS NFR BLD: 1 % (ref 0–2)
DIFFERENTIAL METHOD BLD: ABNORMAL
EOSINOPHIL # BLD: 0.3 K/UL (ref 0–0.8)
EOSINOPHIL NFR BLD: 5 % (ref 0.5–7.8)
ERYTHROCYTE [DISTWIDTH] IN BLOOD BY AUTOMATED COUNT: 17.2 % (ref 11.9–14.6)
HCT VFR BLD AUTO: 30.4 % (ref 41.1–50.3)
HGB BLD-MCNC: 9.1 G/DL (ref 13.6–17.2)
IMM GRANULOCYTES # BLD AUTO: 0 K/UL (ref 0–0.5)
IMM GRANULOCYTES NFR BLD AUTO: 1 % (ref 0–5)
LYMPHOCYTES # BLD: 1.5 K/UL (ref 0.5–4.6)
LYMPHOCYTES NFR BLD: 22 % (ref 13–44)
MCH RBC QN AUTO: 30.5 PG (ref 26.1–32.9)
MCHC RBC AUTO-ENTMCNC: 29.9 G/DL (ref 31.4–35)
MCV RBC AUTO: 102 FL (ref 82–102)
MONOCYTES # BLD: 0.4 K/UL (ref 0.1–1.3)
MONOCYTES NFR BLD: 6 % (ref 4–12)
NEUTS SEG # BLD: 4.3 K/UL (ref 1.7–8.2)
NEUTS SEG NFR BLD: 66 % (ref 43–78)
NRBC # BLD: 0 K/UL (ref 0–0.2)
PLATELET # BLD AUTO: 366 K/UL (ref 150–450)
PMV BLD AUTO: 10 FL (ref 9.4–12.3)
RBC # BLD AUTO: 2.98 M/UL (ref 4.23–5.6)
WBC # BLD AUTO: 6.6 K/UL (ref 4.3–11.1)

## 2024-01-18 RX ORDER — AMLODIPINE BESYLATE 2.5 MG/1
2.5 TABLET ORAL DAILY
COMMUNITY

## 2024-01-18 RX ORDER — AMLODIPINE BESYLATE 5 MG/1
5 TABLET ORAL DAILY
COMMUNITY
End: 2024-01-18 | Stop reason: DRUGHIGH

## 2024-01-18 ASSESSMENT — ENCOUNTER SYMPTOMS
CONSTIPATION: 0
WHEEZING: 0
GASTROINTESTINAL NEGATIVE: 1
SHORTNESS OF BREATH: 0
DIARRHEA: 0
COUGH: 1
BLOOD IN STOOL: 0
PAIN LOCATION - PAIN QUALITY: ACHEY/SORE
DOUBLE VISION: 0
ABDOMINAL PAIN: 0

## 2024-01-18 ASSESSMENT — PATIENT HEALTH QUESTIONNAIRE - PHQ9
SUM OF ALL RESPONSES TO PHQ QUESTIONS 1-9: 0
5. POOR APPETITE OR OVEREATING: 0
SUM OF ALL RESPONSES TO PHQ9 QUESTIONS 1 & 2: 0
9. THOUGHTS THAT YOU WOULD BE BETTER OFF DEAD, OR OF HURTING YOURSELF: 0
SUM OF ALL RESPONSES TO PHQ QUESTIONS 1-9: 0
3. TROUBLE FALLING OR STAYING ASLEEP: 0
6. FEELING BAD ABOUT YOURSELF - OR THAT YOU ARE A FAILURE OR HAVE LET YOURSELF OR YOUR FAMILY DOWN: 0
2. FEELING DOWN, DEPRESSED OR HOPELESS: 0
SUM OF ALL RESPONSES TO PHQ QUESTIONS 1-9: 0
7. TROUBLE CONCENTRATING ON THINGS, SUCH AS READING THE NEWSPAPER OR WATCHING TELEVISION: 0
1. LITTLE INTEREST OR PLEASURE IN DOING THINGS: 0
10. IF YOU CHECKED OFF ANY PROBLEMS, HOW DIFFICULT HAVE THESE PROBLEMS MADE IT FOR YOU TO DO YOUR WORK, TAKE CARE OF THINGS AT HOME, OR GET ALONG WITH OTHER PEOPLE: 0
4. FEELING TIRED OR HAVING LITTLE ENERGY: 0
8. MOVING OR SPEAKING SO SLOWLY THAT OTHER PEOPLE COULD HAVE NOTICED. OR THE OPPOSITE, BEING SO FIGETY OR RESTLESS THAT YOU HAVE BEEN MOVING AROUND A LOT MORE THAN USUAL: 0
SUM OF ALL RESPONSES TO PHQ QUESTIONS 1-9: 0

## 2024-01-18 NOTE — PROGRESS NOTES
Post-Discharge Transitional Care Follow Up      Michael Archie Paduano III   YOB: 1950    Date of Office Visit:  1/18/2024  Date of Hospital Admission: 12/20/23  Date of Hospital Discharge: 1/11/24  Readmission Risk Score (high >=14%. Medium >=10%):Readmission Risk Score: 18.2      Care management risk score Rising risk (score 2-5) and Complex Care (Scores >=6): No Risk Score On File     Non face to face  following discharge, date last encounter closed (first attempt may have been earlier): 01/12/2024     Call initiated 2 business days of discharge: Yes     Hospital discharge follow-up  -     DE DISCHARGE MEDS RECONCILED W/ CURRENT OUTPATIENT MED LIST  Diabetes mellitus due to underlying condition with hyperglycemia, without long-term current use of insulin (HCC)  -     Comprehensive Metabolic Panel  -     CBC with Auto Differential  -     [facility] Kentrell [facility] DPM, [facility]     [address] - Venipuncture  PAD (peripheral artery disease) (HCC)  Major depressive disorder, recurrent, in partial remission (HCC)  Adrenal nodule (HCC)  Type 2 diabetes mellitus with diabetic polyneuropathy, without long-term current use of insulin (HCC)  Hypothyroidism following radioiodine therapy  -     TSH with Reflex  Atherosclerosis of native coronary artery of native heart with unstable angina pectoris (HCC)  CAD, multiple vessel  S/P coronary artery bypass graft x 3    Labs drawn today - will contact with results.  Decrease amlodipine to 2.5mg daily.  Monitor BP with goal >90<140/>60<90 - instructed to call with readings outside parameters.  For now, continue lisinopril 20mg and metoprolol 25mg daily.  Blood sugars are well controlled at 140's fasting. Do not start glipizide that was prescribed at hospital discharge. Hemoglobin A1c 7.6 last month.  Recommended DANIEL hose - on during the day and off at night. Continue to limit salt and carbohydrates in diet.  Follow up as scheduled with

## 2024-01-19 ENCOUNTER — HOME CARE VISIT (OUTPATIENT)
Dept: SCHEDULING | Facility: HOME HEALTH | Age: 74
End: 2024-01-19

## 2024-01-19 VITALS
TEMPERATURE: 98.6 F | RESPIRATION RATE: 24 BRPM | DIASTOLIC BLOOD PRESSURE: 62 MMHG | HEART RATE: 86 BPM | SYSTOLIC BLOOD PRESSURE: 124 MMHG

## 2024-01-19 VITALS
DIASTOLIC BLOOD PRESSURE: 68 MMHG | SYSTOLIC BLOOD PRESSURE: 132 MMHG | HEART RATE: 68 BPM | RESPIRATION RATE: 19 BRPM | TEMPERATURE: 97.4 F | OXYGEN SATURATION: 95 %

## 2024-01-19 DIAGNOSIS — E08.65 DIABETES MELLITUS DUE TO UNDERLYING CONDITION WITH HYPERGLYCEMIA, WITHOUT LONG-TERM CURRENT USE OF INSULIN (HCC): Primary | ICD-10-CM

## 2024-01-19 LAB
ALBUMIN SERPL-MCNC: 2.7 G/DL (ref 3.2–4.6)
ALBUMIN/GLOB SERPL: 0.7 (ref 0.4–1.6)
ALP SERPL-CCNC: 95 U/L (ref 50–136)
ALT SERPL-CCNC: 44 U/L (ref 12–65)
ANION GAP SERPL CALC-SCNC: 7 MMOL/L (ref 2–11)
AST SERPL-CCNC: 30 U/L (ref 15–37)
BILIRUB SERPL-MCNC: 0.3 MG/DL (ref 0.2–1.1)
BUN SERPL-MCNC: 23 MG/DL (ref 8–23)
CALCIUM SERPL-MCNC: 9 MG/DL (ref 8.3–10.4)
CHLORIDE SERPL-SCNC: 113 MMOL/L (ref 103–113)
CO2 SERPL-SCNC: 18 MMOL/L (ref 21–32)
CREAT SERPL-MCNC: 1.1 MG/DL (ref 0.8–1.5)
GLOBULIN SER CALC-MCNC: 3.9 G/DL (ref 2.8–4.5)
GLUCOSE SERPL-MCNC: 162 MG/DL (ref 65–100)
POTASSIUM SERPL-SCNC: 5.6 MMOL/L (ref 3.5–5.1)
PROT SERPL-MCNC: 6.6 G/DL (ref 6.3–8.2)
SODIUM SERPL-SCNC: 138 MMOL/L (ref 136–146)
TSH W FREE THYROID IF ABNORMAL: 3.19 UIU/ML (ref 0.36–3.74)

## 2024-01-19 PROCEDURE — G0299 HHS/HOSPICE OF RN EA 15 MIN: HCPCS

## 2024-01-19 PROCEDURE — G0151 HHCP-SERV OF PT,EA 15 MIN: HCPCS

## 2024-01-19 ASSESSMENT — ENCOUNTER SYMPTOMS
COUGH: 1
COUGH CHARACTERISTICS: DRY
PAIN LOCATION - PAIN QUALITY: ACHE
STOOL DESCRIPTION: FORMED
DYSPNEA ACTIVITY LEVEL: AFTER AMBULATING MORE THAN 20 FT

## 2024-01-20 ENCOUNTER — TELEPHONE (OUTPATIENT)
Dept: INTERNAL MEDICINE CLINIC | Facility: CLINIC | Age: 74
End: 2024-01-20

## 2024-01-20 DIAGNOSIS — E78.2 MIXED HYPERLIPIDEMIA: Primary | ICD-10-CM

## 2024-01-20 RX ORDER — BENZONATATE 200 MG/1
200 CAPSULE ORAL 3 TIMES DAILY PRN
Qty: 90 CAPSULE | Refills: 2 | Status: SHIPPED | OUTPATIENT
Start: 2024-01-20

## 2024-01-20 RX ORDER — ATORVASTATIN CALCIUM 80 MG/1
80 TABLET, FILM COATED ORAL NIGHTLY
Qty: 90 TABLET | Refills: 3 | Status: SHIPPED | OUTPATIENT
Start: 2024-01-20

## 2024-01-22 DIAGNOSIS — E08.65 DIABETES MELLITUS DUE TO UNDERLYING CONDITION WITH HYPERGLYCEMIA, WITHOUT LONG-TERM CURRENT USE OF INSULIN (HCC): ICD-10-CM

## 2024-01-22 LAB
ANION GAP SERPL CALC-SCNC: 5 MMOL/L (ref 2–11)
BUN SERPL-MCNC: 22 MG/DL (ref 8–23)
CALCIUM SERPL-MCNC: 9.5 MG/DL (ref 8.3–10.4)
CHLORIDE SERPL-SCNC: 107 MMOL/L (ref 103–113)
CO2 SERPL-SCNC: 25 MMOL/L (ref 21–32)
CREAT SERPL-MCNC: 1.2 MG/DL (ref 0.8–1.5)
GLUCOSE SERPL-MCNC: 103 MG/DL (ref 65–100)
POTASSIUM SERPL-SCNC: 5.1 MMOL/L (ref 3.5–5.1)
SODIUM SERPL-SCNC: 137 MMOL/L (ref 136–146)

## 2024-01-22 NOTE — TELEPHONE ENCOUNTER
Requested Prescriptions     Signed Prescriptions Disp Refills    atorvastatin (LIPITOR) 80 MG tablet 90 tablet 3     Sig: Take 1 tablet by mouth at bedtime Cardiologist or PCP will continue to prescribe this medication.     Authorizing Provider: PADUANO, JULIA SCHMIDT    benzonatate (TESSALON) 200 MG capsule 90 capsule 2     Sig: Take 1 capsule by mouth 3 times daily as needed for Cough     Authorizing Provider: PADUANO, JULIA SCHMIDT

## 2024-01-23 ENCOUNTER — HOME CARE VISIT (OUTPATIENT)
Dept: SCHEDULING | Facility: HOME HEALTH | Age: 74
End: 2024-01-23
Payer: MEDICARE

## 2024-01-23 ENCOUNTER — CARE COORDINATION (OUTPATIENT)
Dept: CARE COORDINATION | Facility: CLINIC | Age: 74
End: 2024-01-23

## 2024-01-23 VITALS
TEMPERATURE: 97.8 F | RESPIRATION RATE: 18 BRPM | DIASTOLIC BLOOD PRESSURE: 62 MMHG | HEART RATE: 72 BPM | OXYGEN SATURATION: 97 % | SYSTOLIC BLOOD PRESSURE: 118 MMHG

## 2024-01-23 VITALS
HEART RATE: 74 BPM | SYSTOLIC BLOOD PRESSURE: 118 MMHG | OXYGEN SATURATION: 97 % | TEMPERATURE: 97.3 F | DIASTOLIC BLOOD PRESSURE: 62 MMHG | RESPIRATION RATE: 19 BRPM

## 2024-01-23 PROCEDURE — G0299 HHS/HOSPICE OF RN EA 15 MIN: HCPCS

## 2024-01-23 PROCEDURE — G0157 HHC PT ASSISTANT EA 15: HCPCS

## 2024-01-23 ASSESSMENT — ENCOUNTER SYMPTOMS
SPUTUM AMOUNT: MODERATE
DYSPNEA ACTIVITY LEVEL: AFTER AMBULATING MORE THAN 20 FT
STOOL DESCRIPTION: SOFT FORMED
SPUTUM PRODUCTION: 1
SPUTUM COLOR: CLEAR
COUGH CHARACTERISTICS: PRODUCTIVE
COUGH: 1
SPUTUM CONSISTENCY: THICK

## 2024-01-23 NOTE — CARE COORDINATION
Care Transitions Follow Up Call    Patient Current Location:  Home: [address] [facility] [address]    Care Transition Nurse contacted the patient by telephone to follow up after admission on 2024.  Verified name and  with patient as identifiers.    Patient: Michael Archie Paduano III  Patient : 1950   MRN: [id number]  Reason for Admission:  S/P CABG x 3  Discharge Date: 24 RARS: Readmission Risk Score: 18.2      Needs to be reviewed by the provider   Additional needs identified to be addressed with provider: No  none             Method of communication with provider: none.    Addressed changes since last contact: Patient reports feeling better with no issues at time of call. Remains engaged with [facility]. Confirmed he will attend hospital follow up appointment with Dr. Winters at [facility] 2024 at 2:50 pm Agrees to contact CTN with any issues prior to next outreach.  Discussed follow-up appointments. If no appointment was previously scheduled, appointment scheduling offered: Yes.   Is follow up appointment scheduled within 7 days of discharge? Yes.    Follow Up  Future Appointments   Date Time Provider Department Center   2024 10:45 AM Wendy Padron RN [facility] HOME HEAL   2024  1:00 PM Delmy De Leon PTA [facility] HOME HEAL   2024  2:50 PM Shlomo Winters MD [facility] AMB   2024 To Be Determined Delmy De Leon PTA [facility] HOME HEAL   2024 To Be Determined Wendy Padron RN [facility] HOME HEAL   2024 To Be Determined Delmy De Leon PTA [facility] HOME HEAL   2024 To Be Determined Wendy Padron RN [facility] HOME HEAL   2024 To Be Determined Delmy De Leon PTA [facility] HOME HEAL   2024 To Be Determined Delmy De Leon PTA [facility] HOME HEAL   2024  4:00 PM Chapo Allred MD [facility] AMB   2024 To Be Determined Wendy Padron RN [facility] HOME HEAL   2024 To Be

## 2024-01-24 ENCOUNTER — OFFICE VISIT (OUTPATIENT)
Dept: CARDIOTHORACIC SURGERY | Age: 74
End: 2024-01-24

## 2024-01-24 VITALS
DIASTOLIC BLOOD PRESSURE: 76 MMHG | HEIGHT: 71 IN | HEART RATE: 75 BPM | BODY MASS INDEX: 27.41 KG/M2 | SYSTOLIC BLOOD PRESSURE: 142 MMHG | WEIGHT: 195.8 LBS

## 2024-01-24 DIAGNOSIS — Z95.1 S/P CABG X 3: Primary | ICD-10-CM

## 2024-01-24 PROCEDURE — 99024 POSTOP FOLLOW-UP VISIT: CPT | Performed by: PHYSICIAN ASSISTANT

## 2024-01-24 NOTE — PROGRESS NOTES
[facility]  [facility]  MD Shlomo Cowan MD          Michael Archie Paduano III       [id number]  1/24/2024 1950      Michael Archie Paduano III is seen today for follow-up status post CABG x 3 with LIMA to the LAD, reverse SVG to the OM1 and reverse SVG to the OM2 on 12/14/23. He did well post operatively and was transferred to  stepdown on POD 1. He developed atrial fibrillation/flutter with RVR and was started on IV Cardizem. He remained in a-fib/flutter and was transitioned to IV amiodarone. He was weaned off of O2. He was started on Eliquis for anticoagulation. He remained in a-fib/flutter and cardioversion was planned. He underwent successful cardioversion from a-fib/flutter to sinus rhythm on 12/18. Inpatient rehab was recommended for continued therapy. He remained in sinus rhythm. He was discharged to [facility]. He stayed at [facility] until 1/12/24.       he is fairly active and ambulatory, working with PT twice daily. He is using a walker for now. There is no fever or shortness of breath.    Appetite is good.   Pain has improved.      EXAM:  Vitals:  Blood pressure (!) 142/76, pulse 75, height 1.803 m (5' 11\"), weight 88.8 kg (195 lb 12.8 oz).  Lungs:  Clear to auscultation bilaterally.  Heart: Regular rate and rhythm without murmurs.  Incision: Sternum stable. Incision healing well. Leg incision healing well.     IMPRESSION and PLAN:  DC from CT surgery. Continue HH PT for now. Recommend cardiac rehab eventually.    Sternal precautions: Pt advised to avoid any heavy lifting for another 4 weeks but can increase activity as tolerated.     Pt has cardiology follow-up with Dr Allred.   No need to schedule follow-up at this point but the patient may call or return anytime if issues or questions arise.      Mony Gonzales PA-C  1/24/2024

## 2024-01-25 ENCOUNTER — HOME CARE VISIT (OUTPATIENT)
Dept: SCHEDULING | Facility: HOME HEALTH | Age: 74
End: 2024-01-25
Payer: MEDICARE

## 2024-01-25 VITALS
DIASTOLIC BLOOD PRESSURE: 64 MMHG | OXYGEN SATURATION: 96 % | TEMPERATURE: 97.5 F | HEART RATE: 78 BPM | RESPIRATION RATE: 16 BRPM | SYSTOLIC BLOOD PRESSURE: 120 MMHG

## 2024-01-25 VITALS
SYSTOLIC BLOOD PRESSURE: 128 MMHG | RESPIRATION RATE: 19 BRPM | DIASTOLIC BLOOD PRESSURE: 70 MMHG | HEART RATE: 84 BPM | OXYGEN SATURATION: 97 % | TEMPERATURE: 97.3 F

## 2024-01-25 DIAGNOSIS — M06.00 SERONEGATIVE RHEUMATOID ARTHRITIS (HCC): ICD-10-CM

## 2024-01-25 DIAGNOSIS — Z79.631 LONG TERM METHOTREXATE USER: Primary | ICD-10-CM

## 2024-01-25 DIAGNOSIS — Z79.631 LONG TERM METHOTREXATE USER: ICD-10-CM

## 2024-01-25 LAB
ALBUMIN SERPL-MCNC: 2.9 G/DL (ref 3.2–4.6)
ALBUMIN/GLOB SERPL: 0.7 (ref 0.4–1.6)
ALP SERPL-CCNC: 98 U/L (ref 50–136)
ALT SERPL-CCNC: 39 U/L (ref 12–65)
ANION GAP SERPL CALC-SCNC: 2 MMOL/L (ref 2–11)
AST SERPL-CCNC: 28 U/L (ref 15–37)
BASOPHILS # BLD: 0.1 K/UL (ref 0–0.2)
BASOPHILS NFR BLD: 1 % (ref 0–2)
BILIRUB SERPL-MCNC: 0.3 MG/DL (ref 0.2–1.1)
BUN SERPL-MCNC: 15 MG/DL (ref 8–23)
CALCIUM SERPL-MCNC: 9.1 MG/DL (ref 8.3–10.4)
CHLORIDE SERPL-SCNC: 109 MMOL/L (ref 103–113)
CO2 SERPL-SCNC: 28 MMOL/L (ref 21–32)
CREAT SERPL-MCNC: 1.1 MG/DL (ref 0.8–1.5)
CRP SERPL-MCNC: 1.2 MG/DL (ref 0–0.9)
DIFFERENTIAL METHOD BLD: ABNORMAL
EOSINOPHIL # BLD: 0.4 K/UL (ref 0–0.8)
EOSINOPHIL NFR BLD: 6 % (ref 0.5–7.8)
ERYTHROCYTE [DISTWIDTH] IN BLOOD BY AUTOMATED COUNT: 17.4 % (ref 11.9–14.6)
ERYTHROCYTE [SEDIMENTATION RATE] IN BLOOD: 40 MM/HR (ref 0–20)
GLOBULIN SER CALC-MCNC: 4.2 G/DL (ref 2.8–4.5)
GLUCOSE SERPL-MCNC: 119 MG/DL (ref 65–100)
HCT VFR BLD AUTO: 29.8 % (ref 41.1–50.3)
HGB BLD-MCNC: 8.9 G/DL (ref 13.6–17.2)
IMM GRANULOCYTES # BLD AUTO: 0.1 K/UL (ref 0–0.5)
IMM GRANULOCYTES NFR BLD AUTO: 1 % (ref 0–5)
LYMPHOCYTES # BLD: 2 K/UL (ref 0.5–4.6)
LYMPHOCYTES NFR BLD: 27 % (ref 13–44)
MCH RBC QN AUTO: 30.2 PG (ref 26.1–32.9)
MCHC RBC AUTO-ENTMCNC: 29.9 G/DL (ref 31.4–35)
MCV RBC AUTO: 101 FL (ref 82–102)
MONOCYTES # BLD: 0.5 K/UL (ref 0.1–1.3)
MONOCYTES NFR BLD: 7 % (ref 4–12)
NEUTS SEG # BLD: 4.4 K/UL (ref 1.7–8.2)
NEUTS SEG NFR BLD: 59 % (ref 43–78)
NRBC # BLD: 0 K/UL (ref 0–0.2)
PLATELET # BLD AUTO: 353 K/UL (ref 150–450)
PMV BLD AUTO: 9.4 FL (ref 9.4–12.3)
POTASSIUM SERPL-SCNC: 4.9 MMOL/L (ref 3.5–5.1)
PROT SERPL-MCNC: 7.1 G/DL (ref 6.3–8.2)
RBC # BLD AUTO: 2.95 M/UL (ref 4.23–5.6)
SODIUM SERPL-SCNC: 139 MMOL/L (ref 136–146)
WBC # BLD AUTO: 7.5 K/UL (ref 4.3–11.1)

## 2024-01-25 PROCEDURE — G0299 HHS/HOSPICE OF RN EA 15 MIN: HCPCS

## 2024-01-25 PROCEDURE — G0157 HHC PT ASSISTANT EA 15: HCPCS

## 2024-01-25 ASSESSMENT — ENCOUNTER SYMPTOMS: DYSPNEA ACTIVITY LEVEL: AFTER AMBULATING 10 - 20 FT

## 2024-01-25 NOTE — TELEPHONE ENCOUNTER
Swp wife.  Pt has been at home x 4 wk s/p CABG x3.  Is recovering well and has HHN 2x/wk.    Needs refill mtx, labs resulted 1/25/24.  Lov 11.27.23   Refill pended

## 2024-01-26 RX ORDER — METHOTREXATE 2.5 MG/1
25 TABLET ORAL WEEKLY
Qty: 120 TABLET | Refills: 0 | Status: SHIPPED | OUTPATIENT
Start: 2024-01-26 | End: 2024-04-19

## 2024-01-29 ENCOUNTER — HOME CARE VISIT (OUTPATIENT)
Dept: SCHEDULING | Facility: HOME HEALTH | Age: 74
End: 2024-01-29
Payer: MEDICARE

## 2024-01-29 VITALS
TEMPERATURE: 97.4 F | OXYGEN SATURATION: 97 % | DIASTOLIC BLOOD PRESSURE: 66 MMHG | RESPIRATION RATE: 18 BRPM | HEART RATE: 62 BPM | SYSTOLIC BLOOD PRESSURE: 120 MMHG

## 2024-01-29 PROCEDURE — G0299 HHS/HOSPICE OF RN EA 15 MIN: HCPCS

## 2024-01-29 ASSESSMENT — ENCOUNTER SYMPTOMS
STOOL DESCRIPTION: SOFT FORMED
DYSPNEA ACTIVITY LEVEL: AFTER AMBULATING MORE THAN 20 FT

## 2024-01-30 ENCOUNTER — HOME CARE VISIT (OUTPATIENT)
Dept: SCHEDULING | Facility: HOME HEALTH | Age: 74
End: 2024-01-30
Payer: MEDICARE

## 2024-01-30 ENCOUNTER — CARE COORDINATION (OUTPATIENT)
Dept: CARE COORDINATION | Facility: CLINIC | Age: 74
End: 2024-01-30

## 2024-01-30 VITALS
DIASTOLIC BLOOD PRESSURE: 66 MMHG | RESPIRATION RATE: 19 BRPM | OXYGEN SATURATION: 98 % | HEART RATE: 76 BPM | TEMPERATURE: 97.5 F | SYSTOLIC BLOOD PRESSURE: 126 MMHG

## 2024-01-30 DIAGNOSIS — G47.33 OSA ON CPAP: Primary | ICD-10-CM

## 2024-01-30 PROCEDURE — G0157 HHC PT ASSISTANT EA 15: HCPCS

## 2024-01-30 NOTE — CARE COORDINATION
Patient has graduated from the Care Transitions program on 1/30/2024.  Patient/family has the ability to self-manage at this time. Patient declined referral to the ACM team for further management.     Patient has Care Transition Nurse's contact information for any further questions, concerns, or needs.  Patients upcoming visits:    Future Appointments   Date Time Provider Department Center   2/1/2024 To Be Determined Delmy De Leon PTA [facility] HOME [facility]   2/5/2024 To Be Determined Delmy De Leon PTA [facility] HOME [facility]   2/6/2024  4:00 PM Chapo Allred MD [facility] AMB   2/7/2024 To Be Determined Wendy Padron RN [facility] HOME [facility]   2/8/2024 To Be Determined Calin Jay, PT [facility]   2/14/2024 To Be Determined Wendy Padron RN [facility] HOME [facility]   2/21/2024 To Be Determined Wendy Padron RN [facility] HOME [facility]   2/28/2024 To Be Determined Wendy Padron RN [facility] HOME [facility]   3/6/2024 To Be Determined Wendy Padron RN [facility] HOME [facility]   3/12/2024 To Be Determined Wendy Padron RN [facility] HOME [facility]   5/28/2024  9:00 AM Katia Perez MD [facility] AMB   5/29/2024 10:15 AM Paduano, Julia Schmidt, APRN - CNP [facility] AMB

## 2024-02-01 ENCOUNTER — HOME CARE VISIT (OUTPATIENT)
Dept: SCHEDULING | Facility: HOME HEALTH | Age: 74
End: 2024-02-01
Payer: MEDICARE

## 2024-02-01 VITALS
HEART RATE: 76 BPM | SYSTOLIC BLOOD PRESSURE: 124 MMHG | TEMPERATURE: 97.2 F | DIASTOLIC BLOOD PRESSURE: 68 MMHG | RESPIRATION RATE: 19 BRPM | OXYGEN SATURATION: 98 %

## 2024-02-01 PROCEDURE — G0157 HHC PT ASSISTANT EA 15: HCPCS

## 2024-02-05 ENCOUNTER — HOME CARE VISIT (OUTPATIENT)
Dept: SCHEDULING | Facility: HOME HEALTH | Age: 74
End: 2024-02-05
Payer: MEDICARE

## 2024-02-05 VITALS
RESPIRATION RATE: 19 BRPM | HEART RATE: 78 BPM | DIASTOLIC BLOOD PRESSURE: 66 MMHG | SYSTOLIC BLOOD PRESSURE: 126 MMHG | OXYGEN SATURATION: 96 % | TEMPERATURE: 97.2 F

## 2024-02-05 PROCEDURE — G0157 HHC PT ASSISTANT EA 15: HCPCS

## 2024-02-06 ENCOUNTER — OFFICE VISIT (OUTPATIENT)
Age: 74
End: 2024-02-06
Payer: MEDICARE

## 2024-02-06 VITALS
DIASTOLIC BLOOD PRESSURE: 66 MMHG | HEART RATE: 84 BPM | BODY MASS INDEX: 28.28 KG/M2 | SYSTOLIC BLOOD PRESSURE: 122 MMHG | HEIGHT: 71 IN | WEIGHT: 202 LBS

## 2024-02-06 DIAGNOSIS — I73.9 PAD (PERIPHERAL ARTERY DISEASE) (HCC): ICD-10-CM

## 2024-02-06 DIAGNOSIS — I25.810 CORONARY ARTERY DISEASE INVOLVING CORONARY BYPASS GRAFT OF NATIVE HEART WITHOUT ANGINA PECTORIS: Primary | ICD-10-CM

## 2024-02-06 DIAGNOSIS — I10 ESSENTIAL HYPERTENSION: ICD-10-CM

## 2024-02-06 DIAGNOSIS — E78.2 MIXED HYPERLIPIDEMIA: ICD-10-CM

## 2024-02-06 DIAGNOSIS — I48.92 PAROXYSMAL ATRIAL FLUTTER (HCC): ICD-10-CM

## 2024-02-06 DIAGNOSIS — G47.33 OSA ON CPAP: ICD-10-CM

## 2024-02-06 PROCEDURE — 99214 OFFICE O/P EST MOD 30 MIN: CPT | Performed by: INTERNAL MEDICINE

## 2024-02-06 PROCEDURE — 1111F DSCHRG MED/CURRENT MED MERGE: CPT | Performed by: INTERNAL MEDICINE

## 2024-02-06 PROCEDURE — 3074F SYST BP LT 130 MM HG: CPT | Performed by: INTERNAL MEDICINE

## 2024-02-06 PROCEDURE — 1036F TOBACCO NON-USER: CPT | Performed by: INTERNAL MEDICINE

## 2024-02-06 PROCEDURE — G8484 FLU IMMUNIZE NO ADMIN: HCPCS | Performed by: INTERNAL MEDICINE

## 2024-02-06 PROCEDURE — 3078F DIAST BP <80 MM HG: CPT | Performed by: INTERNAL MEDICINE

## 2024-02-06 PROCEDURE — G8417 CALC BMI ABV UP PARAM F/U: HCPCS | Performed by: INTERNAL MEDICINE

## 2024-02-06 PROCEDURE — G8427 DOCREV CUR MEDS BY ELIG CLIN: HCPCS | Performed by: INTERNAL MEDICINE

## 2024-02-06 PROCEDURE — 3017F COLORECTAL CA SCREEN DOC REV: CPT | Performed by: INTERNAL MEDICINE

## 2024-02-06 PROCEDURE — 1123F ACP DISCUSS/DSCN MKR DOCD: CPT | Performed by: INTERNAL MEDICINE

## 2024-02-06 ASSESSMENT — ENCOUNTER SYMPTOMS
HEMOPTYSIS: 0
WHEEZING: 0
HEMATOCHEZIA: 0
HOARSE VOICE: 0
ABDOMINAL PAIN: 0
STRIDOR: 0
EYE REDNESS: 0
HEMATEMESIS: 0
DOUBLE VISION: 0

## 2024-02-06 NOTE — PROGRESS NOTES
CPAP  Continue CPAP therapy    PAD (peripheral artery disease) ([facility])  -Lower extremity arterial Doppler-12/6/2023-moderate to severe proximal right popliteal artery stenosis but with normal VANESA of 0.99.  Moderate left distal common femoral artery stenosis with VANESA of 1.5  -Currently on aspirin, Eliquis and high intensity statin therapy.  We might eventually have him follow-up with vascular surgery    Overall Impression  -He has done well following CABG and his cardioversion.  Reviewed all results while he was in the hospital and summarized as mentioned above.  Maintaining sinus rhythm, blood pressure and heart rates are excellent.  He is completely euvolemic on exam.  He needs to gradually get into cardiac rehab when he is done with physical therapy at home.  Eventually, we will get him to see vascular surgery for his significant peripheral arterial disease.  -We will also consider stopping Eliquis when we see him in follow-up if he has no recurrence of atrial flutter on a follow-up Holter monitor.    On this date I have spent 35 minutes personally reviewing previous notes/outside records, test results and face to face time with the patient discussing the diagnosis and importance of compliance with the treatment plan as well as documenting on the day of the visit.       Elements of this note have been dictated using speech recognition software. As a result, errors of speech recognition may have occurred.    Return in about 2 months (around 4/6/2024) for cad, htn, hld, atrial flutter.     Thank you for allowing us to participate in the care of your patient.  If you have any further questions, please do not hesitate to contact us.  Sincerely,        Chapo Calixto MD   2/6/2024

## 2024-02-07 ENCOUNTER — HOME CARE VISIT (OUTPATIENT)
Dept: SCHEDULING | Facility: HOME HEALTH | Age: 74
End: 2024-02-07
Payer: MEDICARE

## 2024-02-07 VITALS
OXYGEN SATURATION: 100 % | TEMPERATURE: 98.6 F | DIASTOLIC BLOOD PRESSURE: 76 MMHG | SYSTOLIC BLOOD PRESSURE: 128 MMHG | RESPIRATION RATE: 24 BRPM | HEART RATE: 84 BPM

## 2024-02-07 PROCEDURE — G0299 HHS/HOSPICE OF RN EA 15 MIN: HCPCS

## 2024-02-07 PROCEDURE — G0151 HHCP-SERV OF PT,EA 15 MIN: HCPCS

## 2024-02-08 VITALS
HEART RATE: 64 BPM | OXYGEN SATURATION: 98 % | DIASTOLIC BLOOD PRESSURE: 72 MMHG | RESPIRATION RATE: 18 BRPM | TEMPERATURE: 98.6 F | SYSTOLIC BLOOD PRESSURE: 128 MMHG

## 2024-02-08 ASSESSMENT — ENCOUNTER SYMPTOMS
DYSPNEA ACTIVITY LEVEL: AFTER AMBULATING MORE THAN 20 FT
STOOL DESCRIPTION: SOFT FORMED

## 2024-02-10 ENCOUNTER — TELEPHONE (OUTPATIENT)
Dept: INTERNAL MEDICINE CLINIC | Facility: CLINIC | Age: 74
End: 2024-02-10

## 2024-02-10 RX ORDER — LEVALBUTEROL INHALATION SOLUTION 0.63 MG/3ML
0.63 SOLUTION RESPIRATORY (INHALATION) EVERY 4 HOURS PRN
Qty: 120 EACH | Refills: 2 | Status: SHIPPED | OUTPATIENT
Start: 2024-02-10

## 2024-02-12 NOTE — TELEPHONE ENCOUNTER
Requested Prescriptions     Signed Prescriptions Disp Refills    levalbuterol (XOPENEX) 0.63 MG/3ML nebulization 120 each 2     Sig: Take 3 mLs by nebulization every 4 hours as needed for Wheezing     Authorizing Provider: PADUANO, JULIA SCHMIDT

## 2024-02-13 ENCOUNTER — HOME CARE VISIT (OUTPATIENT)
Dept: SCHEDULING | Facility: HOME HEALTH | Age: 74
End: 2024-02-13
Payer: MEDICARE

## 2024-02-13 VITALS
SYSTOLIC BLOOD PRESSURE: 122 MMHG | DIASTOLIC BLOOD PRESSURE: 68 MMHG | HEART RATE: 77 BPM | TEMPERATURE: 97.8 F | RESPIRATION RATE: 16 BRPM | OXYGEN SATURATION: 98 %

## 2024-02-13 PROCEDURE — G0299 HHS/HOSPICE OF RN EA 15 MIN: HCPCS

## 2024-02-14 ENCOUNTER — NURSE ONLY (OUTPATIENT)
Dept: RHEUMATOLOGY | Age: 74
End: 2024-02-14
Payer: MEDICARE

## 2024-02-14 ENCOUNTER — HOSPITAL ENCOUNTER (OUTPATIENT)
Dept: CARDIAC REHAB | Age: 74
Setting detail: RECURRING SERIES
Discharge: HOME | End: 2024-02-17

## 2024-02-14 VITALS
TEMPERATURE: 97.2 F | SYSTOLIC BLOOD PRESSURE: 131 MMHG | DIASTOLIC BLOOD PRESSURE: 81 MMHG | WEIGHT: 202.2 LBS | HEART RATE: 71 BPM | BODY MASS INDEX: 28.2 KG/M2

## 2024-02-14 DIAGNOSIS — M06.00 SERONEGATIVE RHEUMATOID ARTHRITIS (HCC): Primary | ICD-10-CM

## 2024-02-14 PROCEDURE — 96365 THER/PROPH/DIAG IV INF INIT: CPT | Performed by: INTERNAL MEDICINE

## 2024-02-14 NOTE — PROGRESS NOTES
[facility]  [address] [place] [address], [address]  [place] [address]  Office : [phone], Fax: [phone]        Simponi aria infusion today. 2 mg/kg= 185 mg infused in 100 ml NaCl at a rate of 200 ml/hr over 30 minutes. 15 mg of Simponi Aria was wasted.   Infusion placed in left forearm. Next infusion in 8 weeks 4/10/24.     IV insertion time: 0920  Medication start time: 0935  Medication completion time: 1005    Patient discharged feeling fine and instructed to call the office with any post-infusion issues.    Pre-Infusion Questionnaire  Has your insurance changed or have you received a new card since your last visit?  no  Have you had an infection since your last infusion?   no  Since your last visit, have you been hospitalized, been to the ER, or Urgent Care Center for any reason?  no  Have you recently had GI problems, open wounds, urinary problems, or chest pain?   no  Are you currently taking antibiotics?   no  Have you recently had or are you scheduled for any surgical procedures?   no  Have you had a TB test in the last year?   yes, 8/21/23 (negative)  Did you take an antihistamine today?  no  Have you received any vaccinations recently?  no  When was your last appointment with Dr. Schaffer, Dr. Perry, or Dr. Perez or Dr. Earl.   11/27/23  When was your last infusion?        11/22/23  12. Are you in a skilled nursing facility?      no    Reviewed and Confirmed by Michael Archie Paduano III

## 2024-02-17 ENCOUNTER — TELEPHONE (OUTPATIENT)
Dept: INTERNAL MEDICINE CLINIC | Facility: CLINIC | Age: 74
End: 2024-02-17

## 2024-02-17 DIAGNOSIS — E08.65 DIABETES MELLITUS DUE TO UNDERLYING CONDITION WITH HYPERGLYCEMIA, WITHOUT LONG-TERM CURRENT USE OF INSULIN (HCC): Primary | ICD-10-CM

## 2024-02-19 NOTE — TELEPHONE ENCOUNTER
Requested Prescriptions     Signed Prescriptions Disp Refills    metFORMIN (GLUCOPHAGE) 1000 MG tablet 180 tablet 3     Sig: Take 1 tablet by mouth 2 times daily (with meals)     Authorizing Provider: PADUANO, JULIA SCHMIDT

## 2024-02-22 ENCOUNTER — HOSPITAL ENCOUNTER (OUTPATIENT)
Dept: CARDIAC REHAB | Age: 74
Setting detail: RECURRING SERIES
Discharge: HOME | End: 2024-02-25
Payer: MEDICARE

## 2024-02-22 VITALS — HEIGHT: 70 IN | BODY MASS INDEX: 28.66 KG/M2 | OXYGEN SATURATION: 98 % | WEIGHT: 200.2 LBS

## 2024-02-22 PROCEDURE — 93798 PHYS/QHP OP CAR RHAB W/ECG: CPT

## 2024-02-22 ASSESSMENT — EXERCISE STRESS TEST
PEAK_METS: 2.3
PEAK_BP: 154/78
PEAK_HR: 85
PEAK_RPE: 13
PEAK_BP: 154/78
PEAK_BP: 154/78
PEAK_HR: 85

## 2024-02-22 ASSESSMENT — LIFESTYLE VARIABLES: SMOKELESS_TOBACCO: NO

## 2024-02-23 ENCOUNTER — HOSPITAL ENCOUNTER (OUTPATIENT)
Dept: CARDIAC REHAB | Age: 74
Setting detail: RECURRING SERIES
Discharge: HOME | End: 2024-02-26
Payer: MEDICARE

## 2024-02-23 PROCEDURE — 93798 PHYS/QHP OP CAR RHAB W/ECG: CPT

## 2024-02-23 ASSESSMENT — EXERCISE STRESS TEST
PEAK_BP: 130/78
PEAK_METS: 2.3
PEAK_HR: 104

## 2024-02-26 ENCOUNTER — HOSPITAL ENCOUNTER (OUTPATIENT)
Dept: CARDIAC REHAB | Age: 74
Setting detail: RECURRING SERIES
Discharge: HOME | End: 2024-02-29
Payer: MEDICARE

## 2024-02-26 PROCEDURE — 93798 PHYS/QHP OP CAR RHAB W/ECG: CPT

## 2024-02-26 ASSESSMENT — EXERCISE STRESS TEST
PEAK_METS: 2.3
PEAK_HR: 99
PEAK_BP: 132/68

## 2024-02-26 NOTE — TELEPHONE ENCOUNTER
At this time, based on [facility] recommendations pap smear is ASCUS, HPV negative and would recommend follow up pap smear in 1 year.    They want to know when he can shower.

## 2024-02-28 ENCOUNTER — HOSPITAL ENCOUNTER (OUTPATIENT)
Dept: CARDIAC REHAB | Age: 74
Setting detail: RECURRING SERIES
Discharge: HOME | End: 2024-03-02
Payer: MEDICARE

## 2024-02-28 VITALS — BODY MASS INDEX: 27.86 KG/M2 | WEIGHT: 194.2 LBS

## 2024-02-28 PROCEDURE — 93798 PHYS/QHP OP CAR RHAB W/ECG: CPT

## 2024-02-28 ASSESSMENT — EXERCISE STRESS TEST
PEAK_HR: 96
PEAK_METS: 2.3

## 2024-02-28 NOTE — PROGRESS NOTES
Cardiac Rehabilitation Nutrition Education    Patient attended cardiac rehab nutrition education class.  Topics included: role of nutrition in managing heart disease, [place] Healthy Eating Plate, Mediterranean diet, principles of a heart healthy diet, foods/beverages effect on metabolic parameters (blood pressure, lipids, glucose, weight), food sources and portion size of carbohydrate, fat, protein, discussed fiber and benefits of a plant-based eating pattern at length, examples of heart healthy meals and snacks, limiting sodium, added sugars, saturated fat, food label reading. Additionally covered importance of adequate sleep, stress management, and aerobic exercise for overall health.     Learners: patient   Method: group class, copy of power point with space to take notes provided.     Encouraged lifestyle modifications and to follow up with any questions for RD during rehab sessions.    Winnie King, MS, RD, LDN   Cardiopulmonary Rehab Dietitian  [facility]

## 2024-03-01 ENCOUNTER — HOSPITAL ENCOUNTER (OUTPATIENT)
Dept: CARDIAC REHAB | Age: 74
Setting detail: RECURRING SERIES
Discharge: HOME | End: 2024-03-04
Payer: MEDICARE

## 2024-03-01 PROCEDURE — 93798 PHYS/QHP OP CAR RHAB W/ECG: CPT

## 2024-03-01 ASSESSMENT — EXERCISE STRESS TEST
PEAK_BP: 120/60
PEAK_METS: 2.3
PEAK_HR: 95

## 2024-03-04 ENCOUNTER — HOSPITAL ENCOUNTER (OUTPATIENT)
Dept: CARDIAC REHAB | Age: 74
Setting detail: RECURRING SERIES
Discharge: HOME | End: 2024-03-07
Payer: MEDICARE

## 2024-03-04 PROCEDURE — 93798 PHYS/QHP OP CAR RHAB W/ECG: CPT

## 2024-03-04 ASSESSMENT — EXERCISE STRESS TEST
PEAK_HR: 95
PEAK_METS: 2.3
PEAK_BP: 130/70

## 2024-03-06 ENCOUNTER — HOSPITAL ENCOUNTER (OUTPATIENT)
Dept: CARDIAC REHAB | Age: 74
Setting detail: RECURRING SERIES
Discharge: HOME | End: 2024-03-09
Payer: MEDICARE

## 2024-03-06 VITALS — BODY MASS INDEX: 28.35 KG/M2 | WEIGHT: 197.6 LBS

## 2024-03-06 PROCEDURE — 93798 PHYS/QHP OP CAR RHAB W/ECG: CPT

## 2024-03-06 ASSESSMENT — EXERCISE STRESS TEST
PEAK_METS: 2.4
PEAK_HR: 99

## 2024-03-08 ENCOUNTER — HOSPITAL ENCOUNTER (OUTPATIENT)
Dept: CARDIAC REHAB | Age: 74
Setting detail: RECURRING SERIES
Discharge: HOME | End: 2024-03-11
Payer: MEDICARE

## 2024-03-08 PROCEDURE — 93798 PHYS/QHP OP CAR RHAB W/ECG: CPT

## 2024-03-08 ASSESSMENT — EXERCISE STRESS TEST
PEAK_METS: 2.4
PEAK_HR: 95

## 2024-03-11 ENCOUNTER — HOSPITAL ENCOUNTER (OUTPATIENT)
Dept: CARDIAC REHAB | Age: 74
Setting detail: RECURRING SERIES
Discharge: HOME | End: 2024-03-14
Payer: MEDICARE

## 2024-03-11 PROCEDURE — 93798 PHYS/QHP OP CAR RHAB W/ECG: CPT

## 2024-03-11 ASSESSMENT — EXERCISE STRESS TEST
PEAK_HR: 99
PEAK_BP: 142/64
PEAK_METS: 2.4
PEAK_BP: 142/64

## 2024-03-11 ASSESSMENT — LIFESTYLE VARIABLES: SMOKELESS_TOBACCO: NO

## 2024-03-12 DIAGNOSIS — E78.2 MIXED HYPERLIPIDEMIA: ICD-10-CM

## 2024-03-12 DIAGNOSIS — E89.0 HYPOTHYROIDISM FOLLOWING RADIOIODINE THERAPY: ICD-10-CM

## 2024-03-12 DIAGNOSIS — M79.10 MYALGIA: ICD-10-CM

## 2024-03-12 DIAGNOSIS — D64.9 NORMOCYTIC ANEMIA: ICD-10-CM

## 2024-03-12 DIAGNOSIS — E08.65 DIABETES MELLITUS DUE TO UNDERLYING CONDITION WITH HYPERGLYCEMIA, WITHOUT LONG-TERM CURRENT USE OF INSULIN (HCC): Primary | ICD-10-CM

## 2024-03-12 DIAGNOSIS — M06.9 RHEUMATOID ARTHRITIS INVOLVING MULTIPLE JOINTS (HCC): ICD-10-CM

## 2024-03-13 ENCOUNTER — HOSPITAL ENCOUNTER (OUTPATIENT)
Dept: CARDIAC REHAB | Age: 74
Setting detail: RECURRING SERIES
Discharge: HOME | End: 2024-03-16
Payer: MEDICARE

## 2024-03-13 VITALS — WEIGHT: 193.8 LBS | BODY MASS INDEX: 27.81 KG/M2

## 2024-03-13 DIAGNOSIS — E89.0 HYPOTHYROIDISM FOLLOWING RADIOIODINE THERAPY: ICD-10-CM

## 2024-03-13 DIAGNOSIS — E78.2 MIXED HYPERLIPIDEMIA: ICD-10-CM

## 2024-03-13 DIAGNOSIS — M79.10 MYALGIA: ICD-10-CM

## 2024-03-13 DIAGNOSIS — D64.9 NORMOCYTIC ANEMIA: ICD-10-CM

## 2024-03-13 DIAGNOSIS — M06.9 RHEUMATOID ARTHRITIS INVOLVING MULTIPLE JOINTS (HCC): ICD-10-CM

## 2024-03-13 DIAGNOSIS — E08.65 DIABETES MELLITUS DUE TO UNDERLYING CONDITION WITH HYPERGLYCEMIA, WITHOUT LONG-TERM CURRENT USE OF INSULIN (HCC): ICD-10-CM

## 2024-03-13 LAB
ALBUMIN SERPL-MCNC: 3.4 G/DL (ref 3.2–4.6)
ALBUMIN/GLOB SERPL: 0.9 (ref 0.4–1.6)
ALP SERPL-CCNC: 97 U/L (ref 50–136)
ALT SERPL-CCNC: 37 U/L (ref 12–65)
ANION GAP SERPL CALC-SCNC: 10 MMOL/L (ref 2–11)
AST SERPL-CCNC: 24 U/L (ref 15–37)
BASOPHILS # BLD: 0.1 K/UL (ref 0–0.2)
BASOPHILS NFR BLD: 1 % (ref 0–2)
BILIRUB SERPL-MCNC: 0.3 MG/DL (ref 0.2–1.1)
BUN SERPL-MCNC: 24 MG/DL (ref 8–23)
CALCIUM SERPL-MCNC: 9.8 MG/DL (ref 8.3–10.4)
CHLORIDE SERPL-SCNC: 106 MMOL/L (ref 103–113)
CHOLEST SERPL-MCNC: 78 MG/DL
CK SERPL-CCNC: 138 U/L (ref 21–215)
CO2 SERPL-SCNC: 24 MMOL/L (ref 21–32)
CREAT SERPL-MCNC: 1.2 MG/DL (ref 0.8–1.5)
CRP SERPL-MCNC: <0.3 MG/DL (ref 0–0.9)
DIFFERENTIAL METHOD BLD: ABNORMAL
EOSINOPHIL # BLD: 0.3 K/UL (ref 0–0.8)
EOSINOPHIL NFR BLD: 3 % (ref 0.5–7.8)
ERYTHROCYTE [DISTWIDTH] IN BLOOD BY AUTOMATED COUNT: 17.7 % (ref 11.9–14.6)
ERYTHROCYTE [SEDIMENTATION RATE] IN BLOOD: 13 MM/HR
GLOBULIN SER CALC-MCNC: 3.6 G/DL (ref 2.8–4.5)
GLUCOSE SERPL-MCNC: 258 MG/DL (ref 65–100)
HCT VFR BLD AUTO: 35.5 % (ref 41.1–50.3)
HDLC SERPL-MCNC: 55 MG/DL (ref 40–60)
HDLC SERPL: 1.4
HGB BLD-MCNC: 10.9 G/DL (ref 13.6–17.2)
IMM GRANULOCYTES # BLD AUTO: 0.1 K/UL (ref 0–0.5)
IMM GRANULOCYTES NFR BLD AUTO: 1 % (ref 0–5)
LDLC SERPL CALC-MCNC: 9.4 MG/DL
LYMPHOCYTES # BLD: 1.5 K/UL (ref 0.5–4.6)
LYMPHOCYTES NFR BLD: 14 % (ref 13–44)
MCH RBC QN AUTO: 30.6 PG (ref 26.1–32.9)
MCHC RBC AUTO-ENTMCNC: 30.7 G/DL (ref 31.4–35)
MCV RBC AUTO: 99.7 FL (ref 82–102)
MONOCYTES # BLD: 1 K/UL (ref 0.1–1.3)
MONOCYTES NFR BLD: 9 % (ref 4–12)
NEUTS SEG # BLD: 8.4 K/UL (ref 1.7–8.2)
NEUTS SEG NFR BLD: 72 % (ref 43–78)
NRBC # BLD: 0 K/UL (ref 0–0.2)
PLATELET # BLD AUTO: 407 K/UL (ref 150–450)
PMV BLD AUTO: 10 FL (ref 9.4–12.3)
POTASSIUM SERPL-SCNC: 4.9 MMOL/L (ref 3.5–5.1)
PROT SERPL-MCNC: 7 G/DL (ref 6.3–8.2)
RBC # BLD AUTO: 3.56 M/UL (ref 4.23–5.6)
SODIUM SERPL-SCNC: 140 MMOL/L (ref 136–146)
TRIGL SERPL-MCNC: 68 MG/DL (ref 35–150)
TSH W FREE THYROID IF ABNORMAL: 1.83 UIU/ML (ref 0.36–3.74)
VLDLC SERPL CALC-MCNC: 13.6 MG/DL (ref 6–23)
WBC # BLD AUTO: 11.3 K/UL (ref 4.3–11.1)

## 2024-03-13 PROCEDURE — 93798 PHYS/QHP OP CAR RHAB W/ECG: CPT

## 2024-03-13 ASSESSMENT — EXERCISE STRESS TEST
PEAK_METS: 2.4
PEAK_HR: 87

## 2024-03-15 ENCOUNTER — HOSPITAL ENCOUNTER (OUTPATIENT)
Dept: CARDIAC REHAB | Age: 74
Setting detail: RECURRING SERIES
Discharge: HOME | End: 2024-03-18
Payer: MEDICARE

## 2024-03-15 PROCEDURE — 93798 PHYS/QHP OP CAR RHAB W/ECG: CPT

## 2024-03-15 ASSESSMENT — EXERCISE STRESS TEST
PEAK_HR: 91
PEAK_METS: 2.4
PEAK_RPE: 13

## 2024-03-18 ENCOUNTER — HOSPITAL ENCOUNTER (OUTPATIENT)
Dept: CARDIAC REHAB | Age: 74
Setting detail: RECURRING SERIES
Discharge: HOME | End: 2024-03-21
Payer: MEDICARE

## 2024-03-18 PROCEDURE — 93798 PHYS/QHP OP CAR RHAB W/ECG: CPT

## 2024-03-18 ASSESSMENT — EXERCISE STRESS TEST
PEAK_BP: 148/78
PEAK_HR: 89
PEAK_METS: 2.4

## 2024-03-20 ENCOUNTER — HOSPITAL ENCOUNTER (OUTPATIENT)
Dept: CARDIAC REHAB | Age: 74
Setting detail: RECURRING SERIES
Discharge: HOME | End: 2024-03-23
Payer: MEDICARE

## 2024-03-20 VITALS — WEIGHT: 191.4 LBS | BODY MASS INDEX: 27.46 KG/M2

## 2024-03-20 PROCEDURE — 93798 PHYS/QHP OP CAR RHAB W/ECG: CPT

## 2024-03-20 ASSESSMENT — EXERCISE STRESS TEST
PEAK_METS: 2.4
PEAK_HR: 87

## 2024-03-22 ENCOUNTER — HOSPITAL ENCOUNTER (OUTPATIENT)
Dept: CARDIAC REHAB | Age: 74
Setting detail: RECURRING SERIES
Discharge: HOME | End: 2024-03-25
Payer: MEDICARE

## 2024-03-22 PROCEDURE — 93798 PHYS/QHP OP CAR RHAB W/ECG: CPT

## 2024-03-22 ASSESSMENT — EXERCISE STRESS TEST
PEAK_METS: 2.4
PEAK_HR: 84

## 2024-03-25 ENCOUNTER — HOSPITAL ENCOUNTER (OUTPATIENT)
Dept: CARDIAC REHAB | Age: 74
Setting detail: RECURRING SERIES
Discharge: HOME | End: 2024-03-28
Payer: MEDICARE

## 2024-03-25 PROCEDURE — 93798 PHYS/QHP OP CAR RHAB W/ECG: CPT

## 2024-03-25 ASSESSMENT — EXERCISE STRESS TEST
PEAK_HR: 103
PEAK_BP: 108/60
PEAK_METS: 2.4

## 2024-03-27 ENCOUNTER — HOSPITAL ENCOUNTER (OUTPATIENT)
Dept: CARDIAC REHAB | Age: 74
Setting detail: RECURRING SERIES
Discharge: HOME | End: 2024-03-30
Payer: MEDICARE

## 2024-03-27 PROCEDURE — 93798 PHYS/QHP OP CAR RHAB W/ECG: CPT

## 2024-03-27 ASSESSMENT — EXERCISE STRESS TEST
PEAK_HR: 90
PEAK_METS: 2.4

## 2024-03-28 ENCOUNTER — HOSPITAL ENCOUNTER (OUTPATIENT)
Dept: CARDIAC REHAB | Age: 74
Setting detail: RECURRING SERIES
Discharge: HOME | End: 2024-03-31
Payer: MEDICARE

## 2024-03-28 VITALS — WEIGHT: 191.8 LBS | BODY MASS INDEX: 27.52 KG/M2

## 2024-03-28 PROCEDURE — 93798 PHYS/QHP OP CAR RHAB W/ECG: CPT

## 2024-03-28 ASSESSMENT — LIFESTYLE VARIABLES: SMOKELESS_TOBACCO: NO

## 2024-03-28 ASSESSMENT — EXERCISE STRESS TEST
PEAK_METS: 2.5
PEAK_BP: 148/78
PEAK_HR: 86

## 2024-03-29 ENCOUNTER — APPOINTMENT (OUTPATIENT)
Dept: CARDIAC REHAB | Age: 74
End: 2024-03-29
Payer: MEDICARE

## 2024-04-01 ENCOUNTER — HOSPITAL ENCOUNTER (OUTPATIENT)
Dept: CARDIAC REHAB | Age: 74
Setting detail: RECURRING SERIES
Discharge: HOME | End: 2024-04-04
Payer: MEDICARE

## 2024-04-01 PROCEDURE — 93798 PHYS/QHP OP CAR RHAB W/ECG: CPT

## 2024-04-01 ASSESSMENT — EXERCISE STRESS TEST
PEAK_HR: 97
PEAK_METS: 2.5

## 2024-04-03 ENCOUNTER — HOSPITAL ENCOUNTER (OUTPATIENT)
Dept: CARDIAC REHAB | Age: 74
Setting detail: RECURRING SERIES
Discharge: HOME | End: 2024-04-06
Payer: MEDICARE

## 2024-04-03 VITALS — BODY MASS INDEX: 27.38 KG/M2 | WEIGHT: 190.8 LBS

## 2024-04-03 PROCEDURE — 93798 PHYS/QHP OP CAR RHAB W/ECG: CPT

## 2024-04-03 ASSESSMENT — EXERCISE STRESS TEST
PEAK_RPE: 2.5
PEAK_HR: 89

## 2024-04-05 ENCOUNTER — HOSPITAL ENCOUNTER (OUTPATIENT)
Dept: CARDIAC REHAB | Age: 74
Setting detail: RECURRING SERIES
Discharge: HOME | End: 2024-04-08
Payer: MEDICARE

## 2024-04-05 PROCEDURE — 93798 PHYS/QHP OP CAR RHAB W/ECG: CPT

## 2024-04-05 ASSESSMENT — EXERCISE STRESS TEST
PEAK_HR: 87
PEAK_METS: 2.5

## 2024-04-08 ENCOUNTER — HOSPITAL ENCOUNTER (OUTPATIENT)
Dept: CARDIAC REHAB | Age: 74
Setting detail: RECURRING SERIES
Discharge: HOME | End: 2024-04-11
Payer: MEDICARE

## 2024-04-08 PROCEDURE — 93798 PHYS/QHP OP CAR RHAB W/ECG: CPT

## 2024-04-08 ASSESSMENT — EXERCISE STRESS TEST
PEAK_BP: 120/64
PEAK_HR: 85
PEAK_METS: 2.5

## 2024-04-09 ENCOUNTER — OFFICE VISIT (OUTPATIENT)
Age: 74
End: 2024-04-09
Payer: MEDICARE

## 2024-04-09 VITALS
WEIGHT: 194.6 LBS | SYSTOLIC BLOOD PRESSURE: 128 MMHG | DIASTOLIC BLOOD PRESSURE: 68 MMHG | BODY MASS INDEX: 27.86 KG/M2 | HEIGHT: 70 IN | HEART RATE: 76 BPM

## 2024-04-09 DIAGNOSIS — I10 ESSENTIAL HYPERTENSION: ICD-10-CM

## 2024-04-09 DIAGNOSIS — E78.2 MIXED HYPERLIPIDEMIA: ICD-10-CM

## 2024-04-09 DIAGNOSIS — I73.9 PAD (PERIPHERAL ARTERY DISEASE) (HCC): ICD-10-CM

## 2024-04-09 DIAGNOSIS — I25.810 CORONARY ARTERY DISEASE INVOLVING CORONARY BYPASS GRAFT OF NATIVE HEART WITHOUT ANGINA PECTORIS: Primary | ICD-10-CM

## 2024-04-09 DIAGNOSIS — I48.3 TYPICAL ATRIAL FLUTTER (HCC): ICD-10-CM

## 2024-04-09 PROCEDURE — 3078F DIAST BP <80 MM HG: CPT | Performed by: INTERNAL MEDICINE

## 2024-04-09 PROCEDURE — 3017F COLORECTAL CA SCREEN DOC REV: CPT | Performed by: INTERNAL MEDICINE

## 2024-04-09 PROCEDURE — G8417 CALC BMI ABV UP PARAM F/U: HCPCS | Performed by: INTERNAL MEDICINE

## 2024-04-09 PROCEDURE — 1036F TOBACCO NON-USER: CPT | Performed by: INTERNAL MEDICINE

## 2024-04-09 PROCEDURE — 3074F SYST BP LT 130 MM HG: CPT | Performed by: INTERNAL MEDICINE

## 2024-04-09 PROCEDURE — G8427 DOCREV CUR MEDS BY ELIG CLIN: HCPCS | Performed by: INTERNAL MEDICINE

## 2024-04-09 PROCEDURE — 99214 OFFICE O/P EST MOD 30 MIN: CPT | Performed by: INTERNAL MEDICINE

## 2024-04-09 PROCEDURE — 1123F ACP DISCUSS/DSCN MKR DOCD: CPT | Performed by: INTERNAL MEDICINE

## 2024-04-09 ASSESSMENT — ENCOUNTER SYMPTOMS
EYE REDNESS: 0
HEMATOCHEZIA: 0
ABDOMINAL PAIN: 0
HOARSE VOICE: 0
HEMOPTYSIS: 0
HEMATEMESIS: 0
WHEEZING: 0
STRIDOR: 0
DOUBLE VISION: 0

## 2024-04-09 NOTE — PROGRESS NOTES
involving the hand joints-rheumatoid arthritis  Skin: No significant rashes noted the of the exposed regions.       Medical problems and test results were reviewed with the patient today.       Lab Results   Component Value Date/Time    CHOL 78 03/13/2024 08:31 AM    HDL 55 03/13/2024 08:31 AM    VLDL 17 02/14/2022 12:43 PM   ,hemoglobin, basic metabolic panel,   Lab Results   Component Value Date/Time    TSH 0.465 02/14/2022 12:43 PM    ,  Lab Results   Component Value Date/Time     03/13/2024 08:31 AM    K 4.9 03/13/2024 08:31 AM     03/13/2024 08:31 AM    CO2 24 03/13/2024 08:31 AM    BUN 24 03/13/2024 08:31 AM    GFRAA >60 08/01/2022 09:30 AM    GLOB 3.6 03/13/2024 08:31 AM    ALT 37 03/13/2024 08:31 AM    AST 24 03/13/2024 08:31 AM      Lab Results   Component Value Date    LDLCALC 9.4 03/13/2024      Lab Results   Component Value Date    CREATININE 1.20 03/13/2024      Lab Results   Component Value Date    HGB 10.9 (L) 03/13/2024      Lab Results   Component Value Date     03/13/2024        EKG from 11/3/2023 showed sinus rhythm at 65 bpm, normal axis, first-degree AV block, nonspecific intraventricular conduction delay with a QRS duration of 114 ms, nonspecific ST depression in the inferior leads.    ASSESSMENT and PLAN      Coronary artery disease involving bypass coronary artery of native heart without angina pectoris  -Continue aspirin, beta-blocker therapy, high intensity statin therapy-now s/p three-vessel CABG    Postoperative typical atrial flutter  -Needed to be cardioverted back to sinus rhythm on 12/18/2024-maintaining sinus rhythm on metoprolol and Eliquis-potentially will come off anticoagulation when we see him next    Essential hypertension  Echo shows preserved LV systolic function but moderate concentric LVH.  Blood pressures are reasonable currently on low-dose metoprolol succinate and lisinopril.  Continue    Mixed hyperlipidemia  -Continue atorvastatin-continue to monitor

## 2024-04-10 ENCOUNTER — NURSE ONLY (OUTPATIENT)
Dept: RHEUMATOLOGY | Age: 74
End: 2024-04-10
Payer: MEDICARE

## 2024-04-10 ENCOUNTER — APPOINTMENT (OUTPATIENT)
Dept: CARDIAC REHAB | Age: 74
End: 2024-04-10
Payer: MEDICARE

## 2024-04-10 VITALS
WEIGHT: 195.4 LBS | HEIGHT: 70 IN | BODY MASS INDEX: 27.97 KG/M2 | TEMPERATURE: 97.3 F | HEART RATE: 75 BPM | DIASTOLIC BLOOD PRESSURE: 84 MMHG | SYSTOLIC BLOOD PRESSURE: 129 MMHG

## 2024-04-10 DIAGNOSIS — Z79.631 LONG TERM METHOTREXATE USER: ICD-10-CM

## 2024-04-10 DIAGNOSIS — M06.00 SERONEGATIVE RHEUMATOID ARTHRITIS (HCC): ICD-10-CM

## 2024-04-10 DIAGNOSIS — M06.00 SERONEGATIVE RHEUMATOID ARTHRITIS (HCC): Primary | ICD-10-CM

## 2024-04-10 PROCEDURE — 96365 THER/PROPH/DIAG IV INF INIT: CPT | Performed by: INTERNAL MEDICINE

## 2024-04-10 RX ORDER — METHOTREXATE 2.5 MG/1
25 TABLET ORAL WEEKLY
Qty: 120 TABLET | Refills: 0 | Status: SHIPPED | OUTPATIENT
Start: 2024-04-10 | End: 2024-07-03

## 2024-04-10 NOTE — PROGRESS NOTES
[facility]HEUM[facility]  [address] [place] [address], [address]  [place] [address]  Office : [phone], Fax: [phone]        Simponi aria infusion today. 2 mg/kg= 180 mg infused in 100 ml NaCl at a rate of 200 ml/hr over 30 minutes. 20 mg of Simponi Aria was wasted.   Infusion placed in left forearm. Next infusion in 8 weeks 6/12/24.     IV insertion time: 0920  Medication start time: 0930  Medication completion time: 1000    Patient discharged feeling fine and instructed to call the office with any post-infusion issues.    Pre-Infusion Questionnaire  Has your insurance changed or have you received a new card since your last visit?  no  Have you had an infection since your last infusion?   no  Since your last visit, have you been hospitalized, been to the ER, or Urgent Care Center for any reason?  no  Have you recently had GI problems, open wounds, urinary problems, or chest pain?   no  Are you currently taking antibiotics?   no  Have you recently had or are you scheduled for any surgical procedures?   no  Have you had a TB test in the last year?   yes, 8/21/23 (negative)  Did you take an antihistamine today?  no  Have you received any vaccinations recently?  no  When was your last appointment with Dr. Schaffer, Dr. Perry, or Dr. Perez or Dr. Earl.   11/27/23  When was your last infusion?        2/14/24  12. Are you in a skilled nursing facility?      no    Reviewed and Confirmed by Michael Archie Paduano III

## 2024-04-11 LAB
ALBUMIN SERPL-MCNC: 3.5 G/DL (ref 3.2–4.6)
ALBUMIN/GLOB SERPL: 1 (ref 0.4–1.6)
ALP SERPL-CCNC: 94 U/L (ref 50–136)
ALT SERPL-CCNC: 45 U/L (ref 12–65)
ANION GAP SERPL CALC-SCNC: 4 MMOL/L (ref 2–11)
AST SERPL-CCNC: 26 U/L (ref 15–37)
BILIRUB SERPL-MCNC: 0.3 MG/DL (ref 0.2–1.1)
BUN SERPL-MCNC: 21 MG/DL (ref 8–23)
CALCIUM SERPL-MCNC: 9.9 MG/DL (ref 8.3–10.4)
CHLORIDE SERPL-SCNC: 109 MMOL/L (ref 103–113)
CO2 SERPL-SCNC: 25 MMOL/L (ref 21–32)
CREAT SERPL-MCNC: 1.1 MG/DL (ref 0.8–1.5)
CRP SERPL-MCNC: 0.3 MG/DL (ref 0–0.9)
GLOBULIN SER CALC-MCNC: 3.4 G/DL (ref 2.8–4.5)
GLUCOSE SERPL-MCNC: 171 MG/DL (ref 65–100)
POTASSIUM SERPL-SCNC: 5 MMOL/L (ref 3.5–5.1)
PROT SERPL-MCNC: 6.9 G/DL (ref 6.3–8.2)
SODIUM SERPL-SCNC: 138 MMOL/L (ref 136–146)

## 2024-04-12 ENCOUNTER — HOSPITAL ENCOUNTER (OUTPATIENT)
Dept: CARDIAC REHAB | Age: 74
Setting detail: RECURRING SERIES
Discharge: HOME | End: 2024-04-15
Payer: MEDICARE

## 2024-04-12 PROCEDURE — 93798 PHYS/QHP OP CAR RHAB W/ECG: CPT

## 2024-04-12 ASSESSMENT — EXERCISE STRESS TEST
PEAK_HR: 92
PEAK_METS: 2.5

## 2024-04-15 ENCOUNTER — HOSPITAL ENCOUNTER (OUTPATIENT)
Dept: CARDIAC REHAB | Age: 74
Setting detail: RECURRING SERIES
Discharge: HOME | End: 2024-04-18
Payer: MEDICARE

## 2024-04-15 PROCEDURE — 93798 PHYS/QHP OP CAR RHAB W/ECG: CPT

## 2024-04-15 ASSESSMENT — EXERCISE STRESS TEST
PEAK_METS: 2.5
PEAK_HR: 98
PEAK_BP: 122/70

## 2024-04-16 ENCOUNTER — OFFICE VISIT (OUTPATIENT)
Dept: INTERNAL MEDICINE CLINIC | Facility: CLINIC | Age: 74
End: 2024-04-16
Payer: MEDICARE

## 2024-04-16 VITALS
HEIGHT: 70 IN | SYSTOLIC BLOOD PRESSURE: 120 MMHG | BODY MASS INDEX: 27.6 KG/M2 | TEMPERATURE: 97.2 F | HEART RATE: 68 BPM | DIASTOLIC BLOOD PRESSURE: 62 MMHG | OXYGEN SATURATION: 98 % | WEIGHT: 192.8 LBS

## 2024-04-16 DIAGNOSIS — Z89.419 HISTORY OF AMPUTATION OF GREAT TOE (HCC): Chronic | ICD-10-CM

## 2024-04-16 DIAGNOSIS — E89.0 HYPOTHYROIDISM FOLLOWING RADIOIODINE THERAPY: ICD-10-CM

## 2024-04-16 DIAGNOSIS — I73.9 PAD (PERIPHERAL ARTERY DISEASE) (HCC): ICD-10-CM

## 2024-04-16 DIAGNOSIS — I10 PRIMARY HYPERTENSION: ICD-10-CM

## 2024-04-16 DIAGNOSIS — G47.33 OSA ON CPAP: Chronic | ICD-10-CM

## 2024-04-16 DIAGNOSIS — M06.09 RHEUMATOID ARTHRITIS OF MULTIPLE SITES WITHOUT RHEUMATOID FACTOR (HCC): ICD-10-CM

## 2024-04-16 DIAGNOSIS — I25.110 ATHEROSCLEROSIS OF NATIVE CORONARY ARTERY OF NATIVE HEART WITH UNSTABLE ANGINA PECTORIS (HCC): ICD-10-CM

## 2024-04-16 DIAGNOSIS — I48.0 PAROXYSMAL ATRIAL FIBRILLATION (HCC): ICD-10-CM

## 2024-04-16 DIAGNOSIS — E78.2 MIXED HYPERLIPIDEMIA: ICD-10-CM

## 2024-04-16 DIAGNOSIS — F33.41 MAJOR DEPRESSIVE DISORDER, RECURRENT, IN PARTIAL REMISSION (HCC): ICD-10-CM

## 2024-04-16 DIAGNOSIS — E08.65 DIABETES MELLITUS DUE TO UNDERLYING CONDITION WITH HYPERGLYCEMIA, WITHOUT LONG-TERM CURRENT USE OF INSULIN (HCC): Primary | Chronic | ICD-10-CM

## 2024-04-16 DIAGNOSIS — E11.42 TYPE 2 DIABETES MELLITUS WITH DIABETIC POLYNEUROPATHY, WITHOUT LONG-TERM CURRENT USE OF INSULIN (HCC): ICD-10-CM

## 2024-04-16 PROBLEM — R94.39 ABNORMAL NUCLEAR STRESS TEST: Status: RESOLVED | Noted: 2023-12-01 | Resolved: 2024-04-16

## 2024-04-16 PROBLEM — E03.9 HYPOTHYROIDISM: Chronic | Status: RESOLVED | Noted: 2023-12-20 | Resolved: 2024-04-16

## 2024-04-16 PROBLEM — I25.10 CAD, MULTIPLE VESSEL: Status: RESOLVED | Noted: 2023-12-14 | Resolved: 2024-04-16

## 2024-04-16 PROBLEM — J98.8 CONGESTION OF RESPIRATORY TRACT: Status: RESOLVED | Noted: 2023-12-24 | Resolved: 2024-04-16

## 2024-04-16 PROBLEM — Z95.1 HISTORY OF CORONARY ARTERY BYPASS GRAFT X 3: Status: RESOLVED | Noted: 2023-12-20 | Resolved: 2024-04-16

## 2024-04-16 PROBLEM — R94.39 ABNORMAL CARDIOVASCULAR STRESS TEST: Status: RESOLVED | Noted: 2023-12-07 | Resolved: 2024-04-16

## 2024-04-16 PROBLEM — J98.11 ATELECTASIS: Status: RESOLVED | Noted: 2023-12-15 | Resolved: 2024-04-16

## 2024-04-16 PROBLEM — Z78.9 DECREASED INDEPENDENCE WITH ACTIVITIES OF DAILY LIVING: Status: RESOLVED | Noted: 2023-12-14 | Resolved: 2024-04-16

## 2024-04-16 PROBLEM — I48.3 TYPICAL ATRIAL FLUTTER (HCC): Status: RESOLVED | Noted: 2023-12-17 | Resolved: 2024-04-16

## 2024-04-16 PROBLEM — R09.02 HYPOXEMIA: Status: RESOLVED | Noted: 2023-12-15 | Resolved: 2024-04-16

## 2024-04-16 PROCEDURE — 3017F COLORECTAL CA SCREEN DOC REV: CPT | Performed by: NURSE PRACTITIONER

## 2024-04-16 PROCEDURE — 3078F DIAST BP <80 MM HG: CPT | Performed by: NURSE PRACTITIONER

## 2024-04-16 PROCEDURE — G8427 DOCREV CUR MEDS BY ELIG CLIN: HCPCS | Performed by: NURSE PRACTITIONER

## 2024-04-16 PROCEDURE — 3046F HEMOGLOBIN A1C LEVEL >9.0%: CPT | Performed by: NURSE PRACTITIONER

## 2024-04-16 PROCEDURE — G8417 CALC BMI ABV UP PARAM F/U: HCPCS | Performed by: NURSE PRACTITIONER

## 2024-04-16 PROCEDURE — 1036F TOBACCO NON-USER: CPT | Performed by: NURSE PRACTITIONER

## 2024-04-16 PROCEDURE — 1123F ACP DISCUSS/DSCN MKR DOCD: CPT | Performed by: NURSE PRACTITIONER

## 2024-04-16 PROCEDURE — 3074F SYST BP LT 130 MM HG: CPT | Performed by: NURSE PRACTITIONER

## 2024-04-16 PROCEDURE — 99214 OFFICE O/P EST MOD 30 MIN: CPT | Performed by: NURSE PRACTITIONER

## 2024-04-16 PROCEDURE — 2022F DILAT RTA XM EVC RTNOPTHY: CPT | Performed by: NURSE PRACTITIONER

## 2024-04-16 RX ORDER — UBIDECARENONE 50 MG
CAPSULE ORAL
COMMUNITY

## 2024-04-16 ASSESSMENT — PATIENT HEALTH QUESTIONNAIRE - PHQ9
SUM OF ALL RESPONSES TO PHQ QUESTIONS 1-9: 0
SUM OF ALL RESPONSES TO PHQ QUESTIONS 1-9: 0
1. LITTLE INTEREST OR PLEASURE IN DOING THINGS: NOT AT ALL
2. FEELING DOWN, DEPRESSED OR HOPELESS: NOT AT ALL
SUM OF ALL RESPONSES TO PHQ9 QUESTIONS 1 & 2: 0
SUM OF ALL RESPONSES TO PHQ QUESTIONS 1-9: 0
SUM OF ALL RESPONSES TO PHQ QUESTIONS 1-9: 0

## 2024-04-16 NOTE — ASSESSMENT & PLAN NOTE
Monitored by specialist- no acute findings meriting change in the plan. Stable post CABG in 12/2023 with Dr. Shlomo Winters.

## 2024-04-16 NOTE — PROGRESS NOTES
Michael Archie Paduano III (: 1950) presents today c/o    No chief complaint on file.    Patient Active Problem List   Diagnosis    STEFFEN on CPAP    Hypertension    Hyperlipidemia    Class 1 obesity    BPH associated with nocturia    Chronic pain of both knees    Rheumatoid arthritis of multiple sites without rheumatoid factor (HCC)    Hypothyroidism following radioiodine therapy    Rheumatoid arthritis (HCC)    Type 2 diabetes mellitus with diabetic polyneuropathy, without long-term current use of insulin (HCC)    Normocytic anemia    Major depressive disorder, recurrent, in partial remission (HCC)    Baker's cyst of knee, right    Acquired claw toe of right foot    Localized, primary osteoarthritis of hand    Polyarthropathy    Osteochondropathy    Diverticulosis of large intestine without perforation or abscess without bleeding    Kidney stone    Adrenal nodule ([facility])    Duodenal ulcer, unspecified as acute or chronic, without hemorrhage or perforation    Abnormal nuclear stress test    Abnormal cardiovascular stress test    Atherosclerotic heart disease of native coronary artery with unstable angina pectoris ([facility])    CAD, multiple vessel    S/P CABG x 3    Hypoxemia    Atelectasis    Typical atrial flutter (HCC)    History of coronary artery bypass graft x 3    Debility    Atrial fibrillation ([facility])    Gastroesophageal reflux disease    Hypothyroidism    Impaired functional mobility, balance, gait, and endurance    Decreased independence with activities of daily living    Encounter for rehabilitation    Congestion of respiratory tract    History of tobacco use    Leukocytosis    Diabetes mellitus due to underlying condition with hyperglycemia, without long-term current use of insulin ([facility])    PAD (peripheral artery disease) ([facility])        Reviewed and updated this visit by provider:         Immunizations:  Immunization status: {immuniz status:528255::\"up to date and documented\"}.    Review of Systems -

## 2024-04-16 NOTE — PROGRESS NOTES
Michael Archie Paduano III (: 1950) presents today 5 month recheck. He brings a form for diabetic shoes.  He is being worked up for PAD due to abnormal VANESA's. He does have some callous on the tips of his right toes. He sees Dr. Jefferson (podiatry) every 10 weeks for toenail trimming. He is s/o right great toe partial amputation with Dr. Willett in 2023.  He has chronic leg pain and hx/o neuropathy so he has trouble distinguishing if pain is better/worse. Blood sugar average is 145 fasting.  Of note, he is s/p CABG x 3 with Dr. SISSY Winters in 2023 and had prolonged hospitalization secondary to oxygen dependence/atelectasis and physical debility. He has longstanding hx/o Rheumatoid Arthritis and had been quite stagnant physically for many years. After CABG and rehab, he has been attending cardio rehab 3x/week and is doing very well.     Chief Complaint   Patient presents with    foot exam     Patient Active Problem List   Diagnosis    STEFFEN on CPAP    Hypertension    Hyperlipidemia    Class 1 obesity    BPH associated with nocturia    Chronic pain of both knees    Rheumatoid arthritis of multiple sites without rheumatoid factor (HCC)    Hypothyroidism following radioiodine therapy    Type 2 diabetes mellitus with diabetic polyneuropathy, without long-term current use of insulin (HCC)    Normocytic anemia    Major depressive disorder, recurrent, in partial remission (HCC)    Baker's cyst of knee, right    Acquired claw toe of right foot    Localized, primary osteoarthritis of hand    Polyarthropathy    Osteochondropathy    Diverticulosis of large intestine without perforation or abscess without bleeding    Kidney stone    Adrenal nodule (HCC)    Duodenal ulcer, unspecified as acute or chronic, without hemorrhage or perforation    Atherosclerotic heart disease of native coronary artery with unstable angina pectoris (HCC)    S/P CABG x 3    Debility    Atrial fibrillation (HCC)

## 2024-04-16 NOTE — ASSESSMENT & PLAN NOTE
-Continue chronic medications, which is only metformin 500mg BID. Labs today.  -Education: Reviewed ‘ABCs’ of diabetes management (respective goals in parentheses):  A1C (<7), blood pressure (<130/80), and cholesterol (LDL <100).  -Diabetic treatment medications and compliance is emphasized to achieve glycemic control and prevent the potential long term diabetic complications (retinopathy, stroke, heart attack, neuropathy, oral disease, increased cancer risk as well as many others)   Encouraged efforts to improve compliance efforts with moderate cardiovascular exercise dietary modifications.  Glycemic control is promoted by cardiovascular exercise (walking, swimming, aerobics) for 30-45 minutes, 3-4 times weekly.  A more mediterranean type diet with legumes, lean meats, fish and poultry, and lean sources of protein and limiting excessive red meat is recommended.   The importance of a healthy lifestyle are discussed.  Avoid high fructose containing foods, frequent fast food meals, overly processed foods.  A healthy BMI will be easier to achieve if by moderating portion sizes.    -Monitor blood sugar at home daily as directed and keep record to bring to next appointment.  -Discussion/Counseling provided today include: interpretation of lab results, blood sugar goals, complications of diabetes mellitus, nutrition and annual eye exams and diabetic foot care precautions.

## 2024-04-17 ENCOUNTER — HOSPITAL ENCOUNTER (OUTPATIENT)
Dept: CARDIAC REHAB | Age: 74
Setting detail: RECURRING SERIES
Discharge: HOME | End: 2024-04-20
Payer: MEDICARE

## 2024-04-17 VITALS — BODY MASS INDEX: 27.12 KG/M2 | WEIGHT: 189 LBS

## 2024-04-17 LAB
EST. AVERAGE GLUCOSE BLD GHB EST-MCNC: 177 MG/DL
HBA1C MFR BLD: 7.8 % (ref 4.8–5.6)

## 2024-04-17 PROCEDURE — 93798 PHYS/QHP OP CAR RHAB W/ECG: CPT

## 2024-04-17 ASSESSMENT — EXERCISE STRESS TEST
PEAK_METS: 2.7
PEAK_HR: 100

## 2024-04-19 ENCOUNTER — HOSPITAL ENCOUNTER (OUTPATIENT)
Dept: CARDIAC REHAB | Age: 74
Setting detail: RECURRING SERIES
Discharge: HOME | End: 2024-04-22
Payer: MEDICARE

## 2024-04-19 PROCEDURE — 93798 PHYS/QHP OP CAR RHAB W/ECG: CPT

## 2024-04-19 ASSESSMENT — EXERCISE STRESS TEST
PEAK_METS: 2.7
PEAK_HR: 92

## 2024-04-22 ENCOUNTER — HOSPITAL ENCOUNTER (OUTPATIENT)
Dept: CARDIAC REHAB | Age: 74
Setting detail: RECURRING SERIES
Discharge: HOME | End: 2024-04-25
Payer: MEDICARE

## 2024-04-22 PROCEDURE — 93798 PHYS/QHP OP CAR RHAB W/ECG: CPT

## 2024-04-22 ASSESSMENT — EXERCISE STRESS TEST
PEAK_METS: 2.8
PEAK_BP: 134/68
PEAK_HR: 86

## 2024-04-23 ENCOUNTER — OFFICE VISIT (OUTPATIENT)
Dept: VASCULAR SURGERY | Age: 74
End: 2024-04-23
Payer: MEDICARE

## 2024-04-23 VITALS
BODY MASS INDEX: 27.49 KG/M2 | HEIGHT: 70 IN | OXYGEN SATURATION: 98 % | DIASTOLIC BLOOD PRESSURE: 81 MMHG | WEIGHT: 192 LBS | SYSTOLIC BLOOD PRESSURE: 145 MMHG | HEART RATE: 67 BPM

## 2024-04-23 DIAGNOSIS — I73.9 PVD (PERIPHERAL VASCULAR DISEASE) WITH CLAUDICATION (HCC): Primary | ICD-10-CM

## 2024-04-23 PROCEDURE — 3017F COLORECTAL CA SCREEN DOC REV: CPT | Performed by: SURGERY

## 2024-04-23 PROCEDURE — G8427 DOCREV CUR MEDS BY ELIG CLIN: HCPCS | Performed by: SURGERY

## 2024-04-23 PROCEDURE — 1123F ACP DISCUSS/DSCN MKR DOCD: CPT | Performed by: SURGERY

## 2024-04-23 PROCEDURE — 1036F TOBACCO NON-USER: CPT | Performed by: SURGERY

## 2024-04-23 PROCEDURE — G8417 CALC BMI ABV UP PARAM F/U: HCPCS | Performed by: SURGERY

## 2024-04-23 PROCEDURE — 3077F SYST BP >= 140 MM HG: CPT | Performed by: SURGERY

## 2024-04-23 PROCEDURE — 99213 OFFICE O/P EST LOW 20 MIN: CPT | Performed by: SURGERY

## 2024-04-23 PROCEDURE — 3079F DIAST BP 80-89 MM HG: CPT | Performed by: SURGERY

## 2024-04-23 NOTE — PROGRESS NOTES
[address] [address]  [phone] FAX: [phone]    Michael Archie Paduano III  : 1950    Chief Complaint:     History of Present Illness:   Patient follows up today for follow-up duplex study.  Patient status post open heart surgery back in December.  Patient described that he has pain in his legs all the time.  Has swelling in his knees.    CURRENT MEDICATIONS:  Current Outpatient Medications   Medication Sig Dispense Refill    Coenzyme Q10 (COQ10) 50 MG CAPS Take by mouth      methotrexate (RHEUMATREX) 2.5 MG chemo tablet Take 10 tablets by mouth once a week 120 tablet 0    apixaban (ELIQUIS) 5 MG TABS tablet Take 1 tablet by mouth 2 times daily 180 tablet 3    metFORMIN (GLUCOPHAGE) 1000 MG tablet Take 1 tablet by mouth 2 times daily (with meals) 180 tablet 3    levalbuterol (XOPENEX) 0.63 MG/3ML nebulization Take 3 mLs by nebulization every 4 hours as needed for Wheezing 120 each 2    atorvastatin (LIPITOR) 80 MG tablet Take 1 tablet by mouth at bedtime Cardiologist or PCP will continue to prescribe this medication. (Patient taking differently: Take 0.5 tablets by mouth at bedtime Cardiologist or PCP will continue to prescribe this medication.) 90 tablet 3    benzonatate (TESSALON) 200 MG capsule Take 1 capsule by mouth 3 times daily as needed for Cough 90 capsule 2    fluticasone (FLONASE) 50 MCG/ACT nasal spray 1 spray by Nasal route daily      amLODIPine (NORVASC) 2.5 MG tablet Take 1 tablet by mouth daily      budesonide (PULMICORT) 0.5 MG/2ML nebulizer suspension Take 2 mLs by nebulization in the morning and 2 mLs in the evening. Rinse out mouth with water (without swallowing) after every dose.. 60 each 0    acetaminophen (TYLENOL) 325 MG tablet Take 2 tablets by mouth every 6 hours as needed for Pain 120 tablet 3    sertraline (ZOLOFT) 100 MG tablet Take 1 tablet by mouth daily 90 tablet 1    metoprolol succinate (TOPROL XL) 25 MG extended release

## 2024-04-24 ENCOUNTER — HOSPITAL ENCOUNTER (OUTPATIENT)
Dept: CARDIAC REHAB | Age: 74
Setting detail: RECURRING SERIES
Discharge: HOME | End: 2024-04-27
Payer: MEDICARE

## 2024-04-24 PROCEDURE — 93798 PHYS/QHP OP CAR RHAB W/ECG: CPT

## 2024-04-24 ASSESSMENT — EXERCISE STRESS TEST
PEAK_HR: 82
PEAK_METS: 2.8

## 2024-04-25 ASSESSMENT — LIFESTYLE VARIABLES: SMOKELESS_TOBACCO: NO

## 2024-04-25 ASSESSMENT — EXERCISE STRESS TEST: PEAK_BP: 134/68

## 2024-04-26 ENCOUNTER — APPOINTMENT (OUTPATIENT)
Dept: CARDIAC REHAB | Age: 74
End: 2024-04-26
Payer: MEDICARE

## 2024-04-29 ENCOUNTER — HOSPITAL ENCOUNTER (OUTPATIENT)
Dept: CARDIAC REHAB | Age: 74
Setting detail: RECURRING SERIES
Discharge: HOME | End: 2024-05-02
Payer: MEDICARE

## 2024-04-29 PROCEDURE — 93798 PHYS/QHP OP CAR RHAB W/ECG: CPT

## 2024-04-29 ASSESSMENT — EXERCISE STRESS TEST
PEAK_HR: 93
PEAK_BP: 132/70
PEAK_METS: 2.8

## 2024-05-01 ENCOUNTER — HOSPITAL ENCOUNTER (OUTPATIENT)
Dept: CARDIAC REHAB | Age: 74
Setting detail: RECURRING SERIES
Discharge: HOME | End: 2024-05-04
Payer: MEDICARE

## 2024-05-01 VITALS — BODY MASS INDEX: 27.61 KG/M2 | WEIGHT: 192.4 LBS

## 2024-05-01 PROCEDURE — 93798 PHYS/QHP OP CAR RHAB W/ECG: CPT

## 2024-05-01 ASSESSMENT — EXERCISE STRESS TEST
PEAK_HR: 81
PEAK_METS: 3

## 2024-05-03 ENCOUNTER — NURSE ONLY (OUTPATIENT)
Age: 74
End: 2024-05-03

## 2024-05-03 ENCOUNTER — HOSPITAL ENCOUNTER (OUTPATIENT)
Dept: CARDIAC REHAB | Age: 74
Setting detail: RECURRING SERIES
Discharge: HOME | End: 2024-05-06
Payer: MEDICARE

## 2024-05-03 PROCEDURE — 93798 PHYS/QHP OP CAR RHAB W/ECG: CPT

## 2024-05-03 ASSESSMENT — EXERCISE STRESS TEST
PEAK_HR: 84
PEAK_METS: 3

## 2024-05-03 NOTE — PROGRESS NOTES
Patient showed up at the office questioning if he needs to be on the Eliquis any longer. At patients last appointment stopping Eliquis was discussed. Per Dr.De Nydia bowman to stop the Eliquis, pt will keep track of heart rate and report back to the office in a few weeks.

## 2024-05-06 ENCOUNTER — HOSPITAL ENCOUNTER (OUTPATIENT)
Dept: CARDIAC REHAB | Age: 74
Setting detail: RECURRING SERIES
Discharge: HOME | End: 2024-05-09
Payer: MEDICARE

## 2024-05-06 PROCEDURE — 93798 PHYS/QHP OP CAR RHAB W/ECG: CPT

## 2024-05-06 ASSESSMENT — EXERCISE STRESS TEST
PEAK_BP: 152/72
PEAK_RPD: 2
PEAK_RPE: 12
PEAK_BP: 152/72
PEAK_HR: 91
PEAK_METS: 3.2

## 2024-05-08 ENCOUNTER — HOSPITAL ENCOUNTER (OUTPATIENT)
Dept: CARDIAC REHAB | Age: 74
Setting detail: RECURRING SERIES
Discharge: HOME | End: 2024-05-11
Payer: MEDICARE

## 2024-05-08 VITALS — BODY MASS INDEX: 27.23 KG/M2 | WEIGHT: 189.8 LBS

## 2024-05-08 DIAGNOSIS — E89.0 HYPOTHYROIDISM FOLLOWING RADIOIODINE THERAPY: ICD-10-CM

## 2024-05-08 PROCEDURE — 93798 PHYS/QHP OP CAR RHAB W/ECG: CPT

## 2024-05-08 RX ORDER — LEVOTHYROXINE SODIUM 0.15 MG/1
150 TABLET ORAL
Qty: 90 TABLET | Refills: 3 | Status: SHIPPED | OUTPATIENT
Start: 2024-05-08

## 2024-05-08 ASSESSMENT — PATIENT HEALTH QUESTIONNAIRE - PHQ9
9. THOUGHTS THAT YOU WOULD BE BETTER OFF DEAD, OR OF HURTING YOURSELF: NOT AT ALL
1. LITTLE INTEREST OR PLEASURE IN DOING THINGS: SEVERAL DAYS
SUM OF ALL RESPONSES TO PHQ QUESTIONS 1-9: 3
3. TROUBLE FALLING OR STAYING ASLEEP: SEVERAL DAYS
6. FEELING BAD ABOUT YOURSELF - OR THAT YOU ARE A FAILURE OR HAVE LET YOURSELF OR YOUR FAMILY DOWN: NOT AT ALL
2. FEELING DOWN, DEPRESSED OR HOPELESS: NOT AT ALL
SUM OF ALL RESPONSES TO PHQ QUESTIONS 1-9: 3
SUM OF ALL RESPONSES TO PHQ9 QUESTIONS 1 & 2: 1
SUM OF ALL RESPONSES TO PHQ QUESTIONS 1-9: 3
7. TROUBLE CONCENTRATING ON THINGS, SUCH AS READING THE NEWSPAPER OR WATCHING TELEVISION: NOT AT ALL
SUM OF ALL RESPONSES TO PHQ QUESTIONS 1-9: 3
4. FEELING TIRED OR HAVING LITTLE ENERGY: SEVERAL DAYS
8. MOVING OR SPEAKING SO SLOWLY THAT OTHER PEOPLE COULD HAVE NOTICED. OR THE OPPOSITE, BEING SO FIGETY OR RESTLESS THAT YOU HAVE BEEN MOVING AROUND A LOT MORE THAN USUAL: NOT AT ALL
5. POOR APPETITE OR OVEREATING: NOT AT ALL

## 2024-05-08 ASSESSMENT — EXERCISE STRESS TEST
PEAK_METS: 3.2
PEAK_HR: 91

## 2024-05-08 NOTE — TELEPHONE ENCOUNTER
Requested Prescriptions     Signed Prescriptions Disp Refills    levothyroxine (SYNTHROID) 150 MCG tablet 90 tablet 3     Sig: Take 1 tablet by mouth every morning (before breakfast)     Authorizing Provider: PADUANO, JULIA SCHMIDT

## 2024-05-10 ENCOUNTER — HOSPITAL ENCOUNTER (OUTPATIENT)
Dept: CARDIAC REHAB | Age: 74
Setting detail: RECURRING SERIES
Discharge: HOME | End: 2024-05-13
Payer: MEDICARE

## 2024-05-10 PROCEDURE — 93798 PHYS/QHP OP CAR RHAB W/ECG: CPT

## 2024-05-10 ASSESSMENT — EXERCISE STRESS TEST
PEAK_METS: 3.2
PEAK_HR: 92

## 2024-05-11 ENCOUNTER — TELEPHONE (OUTPATIENT)
Dept: INTERNAL MEDICINE CLINIC | Facility: CLINIC | Age: 74
End: 2024-05-11

## 2024-05-11 DIAGNOSIS — I10 PRIMARY HYPERTENSION: Primary | ICD-10-CM

## 2024-05-11 RX ORDER — AMLODIPINE BESYLATE 2.5 MG/1
2.5 TABLET ORAL DAILY
Qty: 90 TABLET | Refills: 3 | Status: SHIPPED | OUTPATIENT
Start: 2024-05-11

## 2024-05-13 ENCOUNTER — HOSPITAL ENCOUNTER (OUTPATIENT)
Dept: CARDIAC REHAB | Age: 74
Setting detail: RECURRING SERIES
Discharge: HOME | End: 2024-05-16
Payer: MEDICARE

## 2024-05-13 PROCEDURE — 93798 PHYS/QHP OP CAR RHAB W/ECG: CPT

## 2024-05-13 ASSESSMENT — EXERCISE STRESS TEST
PEAK_METS: 3.2
PEAK_HR: 84
PEAK_BP: 122/68

## 2024-05-13 NOTE — TELEPHONE ENCOUNTER
Requested Prescriptions     Signed Prescriptions Disp Refills    amLODIPine (NORVASC) 2.5 MG tablet 90 tablet 3     Sig: Take 1 tablet by mouth daily     Authorizing Provider: PADUANO, JULIA SCHMIDT

## 2024-05-15 ENCOUNTER — HOSPITAL ENCOUNTER (OUTPATIENT)
Dept: CARDIAC REHAB | Age: 74
Setting detail: RECURRING SERIES
Discharge: HOME | End: 2024-05-18
Payer: MEDICARE

## 2024-05-15 VITALS — WEIGHT: 188.6 LBS | BODY MASS INDEX: 27.06 KG/M2

## 2024-05-15 PROCEDURE — 93798 PHYS/QHP OP CAR RHAB W/ECG: CPT

## 2024-05-15 ASSESSMENT — EXERCISE STRESS TEST
PEAK_HR: 87
PEAK_METS: 3.2

## 2024-05-17 ENCOUNTER — HOSPITAL ENCOUNTER (OUTPATIENT)
Dept: CARDIAC REHAB | Age: 74
Setting detail: RECURRING SERIES
Discharge: HOME | End: 2024-05-20
Payer: MEDICARE

## 2024-05-17 ENCOUNTER — TELEPHONE (OUTPATIENT)
Age: 74
End: 2024-05-17

## 2024-05-17 PROCEDURE — 93798 PHYS/QHP OP CAR RHAB W/ECG: CPT

## 2024-05-17 ASSESSMENT — EXERCISE STRESS TEST
PEAK_BP: 124/68
PEAK_METS: 3.2
PEAK_HR: 86

## 2024-05-17 ASSESSMENT — LIFESTYLE VARIABLES: SMOKELESS_TOBACCO: NO

## 2024-05-20 ENCOUNTER — TELEPHONE (OUTPATIENT)
Age: 74
End: 2024-05-20

## 2024-05-20 NOTE — TELEPHONE ENCOUNTER
----- Message from Chapo Allred MD sent at 5/17/2024  5:19 PM EDT -----  Regarding: heart rates  He dropped off a log of his oxygen levels and pulse rates-heart rates-they are all well within normal range and this is very reassuring.  Please call him and let him know this-no changes for now.  Thanks,  AD

## 2024-05-22 ENCOUNTER — TELEPHONE (OUTPATIENT)
Dept: RHEUMATOLOGY | Age: 74
End: 2024-05-22

## 2024-05-22 ENCOUNTER — OFFICE VISIT (OUTPATIENT)
Dept: RHEUMATOLOGY | Age: 74
End: 2024-05-22
Payer: MEDICARE

## 2024-05-22 ENCOUNTER — TELEPHONE (OUTPATIENT)
Dept: UROLOGY | Age: 74
End: 2024-05-22

## 2024-05-22 VITALS
DIASTOLIC BLOOD PRESSURE: 64 MMHG | SYSTOLIC BLOOD PRESSURE: 118 MMHG | HEART RATE: 72 BPM | WEIGHT: 192 LBS | HEIGHT: 70 IN | RESPIRATION RATE: 18 BRPM | BODY MASS INDEX: 27.49 KG/M2

## 2024-05-22 DIAGNOSIS — Z79.631 LONG TERM METHOTREXATE USER: ICD-10-CM

## 2024-05-22 DIAGNOSIS — M15.4 EROSIVE OSTEOARTHRITIS OF BOTH HANDS: ICD-10-CM

## 2024-05-22 DIAGNOSIS — D64.9 NORMOCYTIC ANEMIA: ICD-10-CM

## 2024-05-22 DIAGNOSIS — G47.33 OSA (OBSTRUCTIVE SLEEP APNEA): ICD-10-CM

## 2024-05-22 DIAGNOSIS — Z79.899 LONG-TERM USE OF PLAQUENIL: ICD-10-CM

## 2024-05-22 DIAGNOSIS — Z95.1 S/P CABG X 3: ICD-10-CM

## 2024-05-22 DIAGNOSIS — R53.83 OTHER FATIGUE: ICD-10-CM

## 2024-05-22 DIAGNOSIS — M17.0 BILATERAL PRIMARY OSTEOARTHRITIS OF KNEE: ICD-10-CM

## 2024-05-22 DIAGNOSIS — M06.00 SERONEGATIVE RHEUMATOID ARTHRITIS (HCC): Primary | ICD-10-CM

## 2024-05-22 DIAGNOSIS — Z79.899 LONG-TERM CURRENT USE OF GOLIMUMAB: ICD-10-CM

## 2024-05-22 DIAGNOSIS — M15.9 PRIMARY OSTEOARTHRITIS INVOLVING MULTIPLE JOINTS: ICD-10-CM

## 2024-05-22 PROCEDURE — G8427 DOCREV CUR MEDS BY ELIG CLIN: HCPCS | Performed by: INTERNAL MEDICINE

## 2024-05-22 PROCEDURE — 1036F TOBACCO NON-USER: CPT | Performed by: INTERNAL MEDICINE

## 2024-05-22 PROCEDURE — 99215 OFFICE O/P EST HI 40 MIN: CPT | Performed by: INTERNAL MEDICINE

## 2024-05-22 PROCEDURE — 3017F COLORECTAL CA SCREEN DOC REV: CPT | Performed by: INTERNAL MEDICINE

## 2024-05-22 PROCEDURE — G8417 CALC BMI ABV UP PARAM F/U: HCPCS | Performed by: INTERNAL MEDICINE

## 2024-05-22 PROCEDURE — 3078F DIAST BP <80 MM HG: CPT | Performed by: INTERNAL MEDICINE

## 2024-05-22 PROCEDURE — 1123F ACP DISCUSS/DSCN MKR DOCD: CPT | Performed by: INTERNAL MEDICINE

## 2024-05-22 PROCEDURE — 3074F SYST BP LT 130 MM HG: CPT | Performed by: INTERNAL MEDICINE

## 2024-05-22 RX ORDER — FOLIC ACID 1 MG/1
1 TABLET ORAL DAILY
Qty: 90 TABLET | Refills: 3 | Status: SHIPPED | OUTPATIENT
Start: 2024-05-22

## 2024-05-22 RX ORDER — DOXYCYCLINE HYCLATE 100 MG/1
100 CAPSULE ORAL 2 TIMES DAILY
COMMUNITY

## 2024-05-22 RX ORDER — COLCHICINE 0.6 MG/1
0.6 TABLET ORAL DAILY
Qty: 30 TABLET | Refills: 3 | Status: SHIPPED | OUTPATIENT
Start: 2024-05-22

## 2024-05-22 RX ORDER — HYDROXYCHLOROQUINE SULFATE 200 MG/1
200 TABLET, FILM COATED ORAL 2 TIMES DAILY
Qty: 180 TABLET | Refills: 1 | Status: SHIPPED | OUTPATIENT
Start: 2024-05-22

## 2024-05-22 ASSESSMENT — ROUTINE ASSESSMENT OF PATIENT INDEX DATA (RAPID3)
ON A SCALE OF ONE TO TEN, HOW MUCH PAIN HAVE YOU HAD BECAUSE OF YOUR CONDITION OVER THE PAST WEEK?: 4
WHEN YOU AWAKENED IN THE MORNING OVER THE LAST WEEK, PLEASE INDICATE THE AMOUNT OF TIME IT TAKES UNTIL YOU ARE AS LIMBER AS YOU WILL BE FOR THE DAY: 45 MIN
ON A SCALE OF ONE TO TEN, HOW DIFFICULT WAS IT FOR YOU TO COMPLETE THE LISTED DAILY PHYSICAL TASKS OVER THE LAST WEEK: 0.9
ON A SCALE OF ONE TO TEN, CONSIDERING ALL THE WAYS IN WHICH ILLNESS AND HEALTH CONDITIONS MAY AFFECT YOU AT THIS TIME, PLEASE INDICATE BELOW HOW YOU ARE DOING:: 5

## 2024-05-22 NOTE — TELEPHONE ENCOUNTER
Tried to reach [facility] dr gonzalez to obtain hcq clearance not specified in eye exam from sept 2023.  Was sent via switchboard to clinic and got vm for clinical staff.  Left detailed msg and my direct number for call back.

## 2024-05-22 NOTE — PROGRESS NOTES
5/22/24      SUBJECTIVE:  Michael A. Paduano is a 73 y.o.male that is here for follow-up of:  Seronegative rheumatoid arthritis Dx ~2016  Erosive osteoarthritis   MTX, HCQ (started Oct 2021)   Simponi aria - started Aug 2023 - May 2023  Failed: humira, simponi aria - improvement in pain but not swelling - changed due to cost/convenience   Knee osteoarthritis - gelsyn injection bilaterally July & Aug 2023    PMH:  - R DVT - 2020 - ongoing coumadin use  - Graves s/p ablation  - HTN HL DM2  - S/p R toe amputation on 7/20/2023 for osteomyelitis.   -Peptic ulcer disease   Dx: rheumatoid arthritis ~2015.  ---  Last OV Nov 2023. Maintained on MTX 25 mg weekly split,  mg BID and on simponi aria started 8/30/2023.  Since last visit, had CABG in Dec 2023. Completing cardiac rehab. Infusion delayed from mid Nov - mid Feb 2024 due to surgery. Echo completed 12/7/2023 EF -  Left Ventricle: Mildly reduced left ventricular systolic function with a visually estimated EF of 45 - 50%. Left ventricle size is normal. Mildly increased wall thickness. Findings consistent with mild concentric hypertrophy. Mild global hypokinesis present.     Feels more tired than usual. Major concern today is bilateral knee pain, hand pain.     Bilateral knee pain - 24h/day now - improvement with activity but no change with time of day. Cracking noises.  Last x-rays reviewed 2022 showing moderate to severe osteoarthritis    Bilateral DIP pain is 24h/day - no change with time of day activity rest  without swelling.     Am stiffness 30-45 mins  +gelling with inactivity    Established with podiatry yesterday - started on doxycycline yesterday x 10 days for possible L 2nd toe infection. S/p R toe amputation on 7/20/2023 for osteomyelitis by Dr. Willett    No infection/fevers     Denies side effects to infusion or oral therapies    Dx with stomach ulcer with symptoms of diarrhea since last visit using PPI, had lithotripsy for kidney stone. Planning to

## 2024-05-22 NOTE — TELEPHONE ENCOUNTER
Pt having issues with incontinence. He's urinating on himself and sometimes on the floor. Pt wanted to see jessica but he has not openings till July so pt opted to see a np and wanted one in [place]. Pt scheduled to see Gavi on 6/4 @ 245 in [place] office

## 2024-05-23 ENCOUNTER — NURSE ONLY (OUTPATIENT)
Dept: INTERNAL MEDICINE CLINIC | Facility: CLINIC | Age: 74
End: 2024-05-23
Payer: MEDICARE

## 2024-05-23 DIAGNOSIS — Z12.5 SCREENING PSA (PROSTATE SPECIFIC ANTIGEN): ICD-10-CM

## 2024-05-23 DIAGNOSIS — R32 URINARY INCONTINENCE, UNSPECIFIED TYPE: Primary | ICD-10-CM

## 2024-05-23 LAB
BILIRUBIN, URINE, POC: NEGATIVE
BLOOD URINE, POC: NORMAL
GLUCOSE URINE, POC: 500
KETONES, URINE, POC: NEGATIVE
LEUKOCYTE ESTERASE, URINE, POC: NEGATIVE
NITRITE, URINE, POC: NEGATIVE
PH, URINE, POC: 6 (ref 4.6–8)
PROTEIN,URINE, POC: NEGATIVE
SPECIFIC GRAVITY, URINE, POC: 1.01 (ref 1–1.03)
URINALYSIS CLARITY, POC: CLEAR
URINALYSIS COLOR, POC: YELLOW
UROBILINOGEN, POC: NORMAL

## 2024-05-23 PROCEDURE — 81003 URINALYSIS AUTO W/O SCOPE: CPT | Performed by: NURSE PRACTITIONER

## 2024-05-23 NOTE — PROGRESS NOTES
Pt dropped off urine with c/o urinary incontinence x two weeks.    Results for orders placed or performed in visit on 05/23/24   AMB POC URINALYSIS DIP STICK AUTO W/O MICRO   Result Value Ref Range    Color, Urine, POC Yellow     Clarity, Urine, POC Clear     Glucose, Urine,      Bilirubin, Urine, POC Negative     Ketones, Urine, POC Negative     Specific Gravity, Urine, POC 1.010 1.001 - 1.035    Blood, Urine, POC Trace     pH, Urine, POC 6.0 4.6 - 8.0    Protein, Urine, POC Negative     Urobilinogen, POC 0.2 mg/dL     Nitrite, Urine, POC Negative     Leukocyte Esterase, Urine, POC Negative      Assessment/Plan:  1. Urinary incontinence, unspecified type  -     AMB POC URINALYSIS DIP STICK AUTO W/O MICRO  -     Culture, Urine  -     Comprehensive Metabolic Panel; Future  -     CBC with Auto Differential; Future  2. Screening PSA (prostate specific antigen)  -     PSA Screening; Future    Urine sent for culture; labs ordered - will contact with results.  Follow up as scheduled with urology on 6/4/2024.    Julia K Paduano, APRN - CNP

## 2024-05-23 NOTE — PROGRESS NOTES
Patient with symptoms of urinary incontinence.  Results for orders placed or performed in visit on 05/23/24   AMB POC URINALYSIS DIP STICK AUTO W/O MICRO   Result Value Ref Range    Color, Urine, POC Yellow     Clarity, Urine, POC Clear     Glucose, Urine,      Bilirubin, Urine, POC Negative     Ketones, Urine, POC Negative     Specific Gravity, Urine, POC 1.010 1.001 - 1.035    Blood, Urine, POC Trace     pH, Urine, POC 6.0 4.6 - 8.0    Protein, Urine, POC Negative     Urobilinogen, POC 0.2 mg/dL     Nitrite, Urine, POC Negative     Leukocyte Esterase, Urine, POC Negative      Will send urine for culture per JSP

## 2024-05-24 ENCOUNTER — HOSPITAL ENCOUNTER (EMERGENCY)
Age: 74
Discharge: HOME | End: 2024-05-24
Attending: EMERGENCY MEDICINE
Payer: MEDICARE

## 2024-05-24 ENCOUNTER — NURSE ONLY (OUTPATIENT)
Dept: INTERNAL MEDICINE CLINIC | Facility: CLINIC | Age: 74
End: 2024-05-24
Payer: MEDICARE

## 2024-05-24 VITALS
HEIGHT: 70 IN | DIASTOLIC BLOOD PRESSURE: 78 MMHG | TEMPERATURE: 98 F | OXYGEN SATURATION: 99 % | WEIGHT: 192 LBS | BODY MASS INDEX: 27.49 KG/M2 | HEART RATE: 61 BPM | SYSTOLIC BLOOD PRESSURE: 152 MMHG | RESPIRATION RATE: 17 BRPM

## 2024-05-24 DIAGNOSIS — Z12.5 SCREENING PSA (PROSTATE SPECIFIC ANTIGEN): ICD-10-CM

## 2024-05-24 DIAGNOSIS — E08.65 DIABETES MELLITUS DUE TO UNDERLYING CONDITION WITH HYPERGLYCEMIA, WITHOUT LONG-TERM CURRENT USE OF INSULIN (HCC): ICD-10-CM

## 2024-05-24 DIAGNOSIS — E11.65 HYPERGLYCEMIA DUE TO DIABETES MELLITUS (HCC): Primary | ICD-10-CM

## 2024-05-24 DIAGNOSIS — R32 URINARY INCONTINENCE, UNSPECIFIED TYPE: ICD-10-CM

## 2024-05-24 LAB
ALBUMIN SERPL-MCNC: 3.1 G/DL (ref 3.2–4.6)
ALBUMIN SERPL-MCNC: 3.7 G/DL (ref 3.2–4.6)
ALBUMIN/GLOB SERPL: 1 (ref 1–1.9)
ALBUMIN/GLOB SERPL: 1.5 (ref 0.4–1.6)
ALP SERPL-CCNC: 87 U/L (ref 40–129)
ALP SERPL-CCNC: 92 U/L (ref 45–117)
ALT SERPL-CCNC: 26 U/L (ref 13–61)
ALT SERPL-CCNC: 30 U/L (ref 12–65)
ANION GAP SERPL CALC-SCNC: 12 MMOL/L (ref 9–18)
ANION GAP SERPL CALC-SCNC: 14 MMOL/L (ref 2–11)
APPEARANCE UR: CLEAR
AST SERPL-CCNC: 13 U/L (ref 15–37)
AST SERPL-CCNC: 17 U/L (ref 15–37)
BASOPHILS # BLD: 0 K/UL (ref 0–0.2)
BASOPHILS # BLD: 0 K/UL (ref 0–0.2)
BASOPHILS NFR BLD: 0 % (ref 0–2)
BASOPHILS NFR BLD: 0 % (ref 0–2)
BILIRUB SERPL-MCNC: 0.2 MG/DL (ref 0.2–1.1)
BILIRUB SERPL-MCNC: 0.4 MG/DL (ref 0–1.2)
BILIRUB UR QL: NEGATIVE
BUN SERPL-MCNC: 23 MG/DL (ref 8–23)
BUN SERPL-MCNC: 27 MG/DL (ref 8–23)
CALCIUM SERPL-MCNC: 9.1 MG/DL (ref 8.8–10.2)
CALCIUM SERPL-MCNC: 9.4 MG/DL (ref 8.3–10.4)
CHLORIDE SERPL-SCNC: 97 MMOL/L (ref 98–107)
CHLORIDE SERPL-SCNC: 97 MMOL/L (ref 98–107)
CO2 SERPL-SCNC: 22 MMOL/L (ref 20–28)
CO2 SERPL-SCNC: 23 MMOL/L (ref 21–32)
COLOR UR: YELLOW
CREAT SERPL-MCNC: 1.08 MG/DL (ref 0.8–1.3)
CREAT SERPL-MCNC: 1.21 MG/DL (ref 0.8–1.5)
DIFFERENTIAL METHOD BLD: ABNORMAL
DIFFERENTIAL METHOD BLD: ABNORMAL
EOSINOPHIL # BLD: 0 K/UL (ref 0–0.8)
EOSINOPHIL # BLD: 0.1 K/UL (ref 0–0.8)
EOSINOPHIL NFR BLD: 0 % (ref 0.5–7.8)
EOSINOPHIL NFR BLD: 1 % (ref 0.5–7.8)
ERYTHROCYTE [DISTWIDTH] IN BLOOD BY AUTOMATED COUNT: 15.2 % (ref 11.9–14.6)
ERYTHROCYTE [DISTWIDTH] IN BLOOD BY AUTOMATED COUNT: 15.3 % (ref 11.9–14.6)
GLOBULIN SER CALC-MCNC: 2.5 G/DL (ref 2.8–4.5)
GLOBULIN SER CALC-MCNC: 3 G/DL (ref 2.3–3.5)
GLUCOSE BLD STRIP.AUTO-MCNC: 385 MG/DL (ref 65–100)
GLUCOSE BLD STRIP.AUTO-MCNC: 577 MG/DL (ref 65–100)
GLUCOSE SERPL-MCNC: 546 MG/DL (ref 70–99)
GLUCOSE SERPL-MCNC: 557 MG/DL (ref 65–100)
GLUCOSE UR STRIP.AUTO-MCNC: 1000 MG/DL
HCT VFR BLD AUTO: 33.3 % (ref 41.1–50.3)
HCT VFR BLD AUTO: 35.1 % (ref 41.1–50.3)
HGB BLD-MCNC: 10.8 G/DL (ref 13.6–17.2)
HGB BLD-MCNC: 11.1 G/DL (ref 13.6–17.2)
HGB UR QL STRIP: NEGATIVE
IMM GRANULOCYTES # BLD AUTO: 0.1 K/UL (ref 0–0.5)
IMM GRANULOCYTES # BLD AUTO: 0.1 K/UL (ref 0–0.5)
IMM GRANULOCYTES NFR BLD AUTO: 1 % (ref 0–5)
IMM GRANULOCYTES NFR BLD AUTO: 1 % (ref 0–5)
KETONES UR QL STRIP.AUTO: NEGATIVE MG/DL
LEUKOCYTE ESTERASE UR QL STRIP.AUTO: NEGATIVE
LIPASE SERPL-CCNC: 42 U/L (ref 13–60)
LYMPHOCYTES # BLD: 0.8 K/UL (ref 0.5–4.6)
LYMPHOCYTES # BLD: 1.9 K/UL (ref 0.5–4.6)
LYMPHOCYTES NFR BLD: 20 % (ref 13–44)
LYMPHOCYTES NFR BLD: 9 % (ref 13–44)
MAGNESIUM SERPL-MCNC: 1.6 MG/DL (ref 1.2–2.6)
MCH RBC QN AUTO: 30.9 PG (ref 26.1–32.9)
MCH RBC QN AUTO: 31.4 PG (ref 26.1–32.9)
MCHC RBC AUTO-ENTMCNC: 31.6 G/DL (ref 31.4–35)
MCHC RBC AUTO-ENTMCNC: 32.4 G/DL (ref 31.4–35)
MCV RBC AUTO: 95.1 FL (ref 82–102)
MCV RBC AUTO: 99.2 FL (ref 82–102)
MONOCYTES # BLD: 0.3 K/UL (ref 0.1–1.3)
MONOCYTES # BLD: 0.6 K/UL (ref 0.1–1.3)
MONOCYTES NFR BLD: 3 % (ref 4–12)
MONOCYTES NFR BLD: 6 % (ref 4–12)
NEUTS SEG # BLD: 7 K/UL (ref 1.7–8.2)
NEUTS SEG # BLD: 8.7 K/UL (ref 1.7–8.2)
NEUTS SEG NFR BLD: 72 % (ref 43–78)
NEUTS SEG NFR BLD: 87 % (ref 43–78)
NITRITE UR QL STRIP.AUTO: NEGATIVE
NRBC # BLD: 0 K/UL (ref 0–0.2)
NRBC # BLD: 0 K/UL (ref 0–0.2)
PH UR STRIP: 5 (ref 5–9)
PLATELET # BLD AUTO: 277 K/UL (ref 150–450)
PLATELET # BLD AUTO: 292 K/UL (ref 150–450)
PMV BLD AUTO: 11 FL (ref 9.4–12.3)
PMV BLD AUTO: 9.6 FL (ref 9.4–12.3)
POTASSIUM SERPL-SCNC: 4.7 MMOL/L (ref 3.5–5.1)
POTASSIUM SERPL-SCNC: 5 MMOL/L (ref 3.5–5.1)
PROT SERPL-MCNC: 6.1 G/DL (ref 6.3–8.2)
PROT SERPL-MCNC: 6.2 G/DL (ref 6.4–8.2)
PROT UR STRIP-MCNC: NEGATIVE MG/DL
PSA SERPL-MCNC: 1.6 NG/ML (ref 0–4)
RBC # BLD AUTO: 3.5 M/UL (ref 4.23–5.6)
RBC # BLD AUTO: 3.54 M/UL (ref 4.23–5.6)
SERVICE CMNT-IMP: ABNORMAL
SERVICE CMNT-IMP: ABNORMAL
SODIUM SERPL-SCNC: 130 MMOL/L (ref 136–145)
SODIUM SERPL-SCNC: 134 MMOL/L (ref 133–143)
SP GR UR REFRACTOMETRY: 1.01 (ref 1–1.02)
UROBILINOGEN UR QL STRIP.AUTO: 0.2 EU/DL (ref 0.2–1)
WBC # BLD AUTO: 9.8 K/UL (ref 4.3–11.1)
WBC # BLD AUTO: 9.9 K/UL (ref 4.3–11.1)

## 2024-05-24 PROCEDURE — 2580000003 HC RX 258: Performed by: EMERGENCY MEDICINE

## 2024-05-24 PROCEDURE — 81003 URINALYSIS AUTO W/O SCOPE: CPT

## 2024-05-24 PROCEDURE — 96361 HYDRATE IV INFUSION ADD-ON: CPT

## 2024-05-24 PROCEDURE — 36415 COLL VENOUS BLD VENIPUNCTURE: CPT | Performed by: NURSE PRACTITIONER

## 2024-05-24 PROCEDURE — 99284 EMERGENCY DEPT VISIT MOD MDM: CPT

## 2024-05-24 PROCEDURE — 85025 COMPLETE CBC W/AUTO DIFF WBC: CPT

## 2024-05-24 PROCEDURE — 80053 COMPREHEN METABOLIC PANEL: CPT

## 2024-05-24 PROCEDURE — 83735 ASSAY OF MAGNESIUM: CPT

## 2024-05-24 PROCEDURE — 6370000000 HC RX 637 (ALT 250 FOR IP): Performed by: EMERGENCY MEDICINE

## 2024-05-24 PROCEDURE — 82962 GLUCOSE BLOOD TEST: CPT

## 2024-05-24 PROCEDURE — 96374 THER/PROPH/DIAG INJ IV PUSH: CPT

## 2024-05-24 PROCEDURE — 83690 ASSAY OF LIPASE: CPT

## 2024-05-24 RX ORDER — 0.9 % SODIUM CHLORIDE 0.9 %
1000 INTRAVENOUS SOLUTION INTRAVENOUS
Status: COMPLETED | OUTPATIENT
Start: 2024-05-24 | End: 2024-05-24

## 2024-05-24 RX ADMIN — INSULIN HUMAN 10 UNITS: 100 INJECTION, SOLUTION PARENTERAL at 22:39

## 2024-05-24 RX ADMIN — SODIUM CHLORIDE 1000 ML: 9 INJECTION, SOLUTION INTRAVENOUS at 21:49

## 2024-05-24 ASSESSMENT — ENCOUNTER SYMPTOMS
SHORTNESS OF BREATH: 0
EYE REDNESS: 0
EYE PAIN: 0
CHEST TIGHTNESS: 0
NAUSEA: 0
VOMITING: 0
BACK PAIN: 0
TROUBLE SWALLOWING: 0
CHOKING: 0
BLURRED VISION: 0
VOICE CHANGE: 0
ABDOMINAL PAIN: 0
COLOR CHANGE: 0

## 2024-05-24 ASSESSMENT — PAIN - FUNCTIONAL ASSESSMENT: PAIN_FUNCTIONAL_ASSESSMENT: NONE - DENIES PAIN

## 2024-05-25 LAB
GLUCOSE BLD STRIP.AUTO-MCNC: 287 MG/DL (ref 65–100)
SERVICE CMNT-IMP: ABNORMAL

## 2024-05-25 NOTE — ED TRIAGE NOTES
Pt to ED with c/o hyperglycemia. Pt states got a call after blood work this am that bgl was over 500. Pt states this evening it was 150 before having sherbert. Bgl currently 577 at this time. Pt states increased thirst over the past week. Pt states metformin twice daily. Pt alert ambulatory and in no acute distress at this time.

## 2024-05-25 NOTE — ED NOTES
I have reviewed discharge instructions with the patient.  The patient verbalized understanding.    Patient left ED via Discharge Method: ambulatory to Home with wife and daughter.    Opportunity for questions and clarification provided.       Patient given 0 scripts.

## 2024-05-25 NOTE — ED PROVIDER NOTES
Emergency Department Provider Note       PCP: Paduano, Julia Schmidt, APRN - CNP   Age: 73 y.o.   Sex: male     DISPOSITION Decision To Discharge 05/24/2024 11:47:57 PM       ICD-10-CM    1. Hyperglycemia due to diabetes mellitus (HCC)  E11.65       2. Diabetes mellitus due to underlying condition with hyperglycemia, without long-term current use of insulin (HCC)  E08.65 metFORMIN (GLUCOPHAGE) 1000 MG tablet          Medical Decision Making     Clinically well-appearing.  History of type 2 diabetes will check labs to rule out signs of DKA.  Will start IV fluids    10:32 PM EDT  Labs showed elevated glucose without any signs of DKA.  Will treat with saline and IV insulin    11:49 PM EDT  Glucose had improved.  Plan to discharge home.  Will increase metformin dosing.  Will refer back to PCP.  May need additional coverage for his type 2 diabetes.       1 chronic illness with exacerbation.  Prescription drug management performed.  Shared medical decision making was utilized in creating the patients health plan today.    I independently ordered and reviewed each unique test.  I reviewed external records: ED visit note from an outside group.  I reviewed external records: provider visit note from PCP.  I reviewed external records: provider visit note from outside specialist.  I reviewed external records: previous lab results from outside ED.  I reviewed external records: previous imaging study including radiologist interpretation.   The patients assessment required an independent historian: Family gives supplemental history.  The reason they were needed is important historical information not provided by the patient.                History     Patient presents the ER with family concerns about elevated blood sugar.  Patient is known type II diabetic.  Has had increased thirst and increased urinary frequency the past couple days.  Was seen by PCP and had labs drawn.  Was concerned that he received critical value elevated

## 2024-05-25 NOTE — DISCHARGE INSTRUCTIONS
Take medications as prescribed  Monitor your blood sugar at home  Return to the ER for any new, worsening life-threatening symptoms

## 2024-05-26 ENCOUNTER — TELEPHONE (OUTPATIENT)
Dept: INTERNAL MEDICINE CLINIC | Facility: CLINIC | Age: 74
End: 2024-05-26

## 2024-05-26 DIAGNOSIS — N30.01 ACUTE CYSTITIS WITH HEMATURIA: Primary | ICD-10-CM

## 2024-05-26 DIAGNOSIS — Z86.19 HISTORY OF ESBL KLEBSIELLA PNEUMONIAE INFECTION: ICD-10-CM

## 2024-05-26 LAB
BACTERIA SPEC CULT: ABNORMAL
BACTERIA SPEC CULT: ABNORMAL
SERVICE CMNT-IMP: ABNORMAL

## 2024-05-26 RX ORDER — CIPROFLOXACIN 500 MG/1
500 TABLET, FILM COATED ORAL 2 TIMES DAILY
Qty: 14 TABLET | Refills: 0 | Status: SHIPPED | OUTPATIENT
Start: 2024-05-26 | End: 2024-05-27 | Stop reason: SDUPTHER

## 2024-05-27 RX ORDER — CIPROFLOXACIN 500 MG/1
500 TABLET, FILM COATED ORAL 2 TIMES DAILY
Qty: 14 TABLET | Refills: 0 | Status: SHIPPED | OUTPATIENT
Start: 2024-05-27 | End: 2024-06-03

## 2024-05-28 NOTE — TELEPHONE ENCOUNTER
Urine cx with klebsiella pneumoniae - EBSL.     Requested Prescriptions     Signed Prescriptions Disp Refills    ciprofloxacin (CIPRO) 500 MG tablet 14 tablet 0     Sig: Take 1 tablet by mouth 2 times daily for 7 days     Authorizing Provider: PADUANO, JULIA SCHMIDT Cipro as prescribed  - directions for use and side effects discussed.  Increase water intake.    Recheck urine cx when antibiotics are completed.    Julia K Paduano, APRN - CNP

## 2024-06-04 ENCOUNTER — OFFICE VISIT (OUTPATIENT)
Dept: UROLOGY | Age: 74
End: 2024-06-04
Payer: MEDICARE

## 2024-06-04 DIAGNOSIS — R32 URINARY INCONTINENCE, UNSPECIFIED TYPE: ICD-10-CM

## 2024-06-04 DIAGNOSIS — R33.9 URINARY RETENTION: Primary | ICD-10-CM

## 2024-06-04 LAB
BILIRUBIN, URINE, POC: NEGATIVE
BLOOD URINE, POC: NEGATIVE
GLUCOSE URINE, POC: NEGATIVE
KETONES, URINE, POC: NEGATIVE
LEUKOCYTE ESTERASE, URINE, POC: NEGATIVE
NITRITE, URINE, POC: NEGATIVE
PH, URINE, POC: 5.5 (ref 4.6–8)
PROTEIN,URINE, POC: NORMAL
PVR, POC: 210 CC
SPECIFIC GRAVITY, URINE, POC: 1.03 (ref 1–1.03)
URINALYSIS CLARITY, POC: NORMAL
URINALYSIS COLOR, POC: NORMAL
UROBILINOGEN, POC: NORMAL

## 2024-06-04 PROCEDURE — G8417 CALC BMI ABV UP PARAM F/U: HCPCS | Performed by: NURSE PRACTITIONER

## 2024-06-04 PROCEDURE — 3017F COLORECTAL CA SCREEN DOC REV: CPT | Performed by: NURSE PRACTITIONER

## 2024-06-04 PROCEDURE — 1036F TOBACCO NON-USER: CPT | Performed by: NURSE PRACTITIONER

## 2024-06-04 PROCEDURE — 81003 URINALYSIS AUTO W/O SCOPE: CPT | Performed by: NURSE PRACTITIONER

## 2024-06-04 PROCEDURE — 1123F ACP DISCUSS/DSCN MKR DOCD: CPT | Performed by: NURSE PRACTITIONER

## 2024-06-04 PROCEDURE — 99214 OFFICE O/P EST MOD 30 MIN: CPT | Performed by: NURSE PRACTITIONER

## 2024-06-04 PROCEDURE — G8428 CUR MEDS NOT DOCUMENT: HCPCS | Performed by: NURSE PRACTITIONER

## 2024-06-04 PROCEDURE — 51798 US URINE CAPACITY MEASURE: CPT | Performed by: NURSE PRACTITIONER

## 2024-06-04 RX ORDER — ALFUZOSIN HYDROCHLORIDE 10 MG/1
10 TABLET, EXTENDED RELEASE ORAL DAILY
Qty: 90 TABLET | Refills: 3 | Status: SHIPPED | OUTPATIENT
Start: 2024-06-04

## 2024-06-04 ASSESSMENT — ENCOUNTER SYMPTOMS: NAUSEA: 0

## 2024-06-04 NOTE — PROGRESS NOTES
[facility]  [address] [place] [id number]  [phone]          Michael Archie Paduano III  : 1950    Chief Complaint   Patient presents with    Incontinence          HPI     Michael Archie Paduano III is a 73 y.o. male  Here today with complaints of increased urinary frequency, urgency and also urinary incontinence.  Patient says at times he feels that he needs to strain in order to completely empty the bladder.  We have seen him in the past due to stone disease.  He is not having any issues with stones today.    His wife accompanies him today and tells me that a couple weeks ago the patient could not stop urinating.  In reviewing the records however it does appear that he was seen in the ER secondary to elevated blood glucose of over 500.  This most certainly would have caused him to have constant urination.  He understands this.  His urine today is perfectly clear.  There is actually no sugar in the urinalysis today.  He has just completed a round of an antibiotic not only do for urinary infection but also for infection in his toe.  PVR however today reveals 210 mL in the bladder.    He has not experienced any pain with urination hematuria fever or chills.    PSAs have been normal.  PSA 2020 was 1.3, PSA  was 2.2, recent PSA  is 1.6.    Other significant history of DM    Past Medical History:   Diagnosis Date    Acquired hypothyroidism 3/27/2016    after treatment for Graves, levothyroxine    Acute deep vein thrombosis (DVT) ([facility])     right leg, off blood thinners    Anemia     denies hx of blood transfusions    Arthritis     Atherosclerotic heart disease of native coronary artery with unstable angina pectoris (HCC) 2023    Bilateral knee pain 2020    Dr. Cespedes     CAD (coronary artery disease)     scheduled CABG 23    Cataracts, bilateral     Chronic deep vein thrombosis (DVT) of popliteal vein of right lower extremity ([facility]) 2021    Chronic

## 2024-07-05 ENCOUNTER — OFFICE VISIT (OUTPATIENT)
Dept: UROLOGY | Age: 74
End: 2024-07-05

## 2024-07-05 DIAGNOSIS — R33.9 URINARY RETENTION: Primary | ICD-10-CM

## 2024-07-05 DIAGNOSIS — R32 URINARY INCONTINENCE, UNSPECIFIED TYPE: ICD-10-CM

## 2024-07-05 LAB — PVR, POC: 83 CC

## 2024-07-05 ASSESSMENT — ENCOUNTER SYMPTOMS
NAUSEA: 0
BACK PAIN: 0

## 2024-07-05 NOTE — PROGRESS NOTES
[facility]  [address] [place] [id number]  [phone]          Michael Archie Paduano III  : 1950    Chief Complaint   Patient presents with    Follow-up    Urinary Retention          HPI     Michael Archie Paduano III is a 73 y.o. male  back today for recheck. He was give Alfuzosin at his last visit and he has seen much improvement of his LUTs. He is up no more that 2 times a night to void. Last night he did not get up at all. PVR down to 83mls. Pt was not able to give urine sample today.    Initially referred secondary to complaints of increased urinary frequency, urgency and also urinary incontinence.  Patient says at times he feels that he needs to strain in order to completely empty the bladder.  We have seen him in the past due to stone disease.  He is not having any issues with stones today.     His wife accompanies him today and tells me that a couple weeks ago the patient could not stop urinating.  In reviewing the records however it does appear that he was seen in the ER secondary to elevated blood glucose of over 500.  This most certainly would have caused him to have constant urination.  He understands this.  His urine today is perfectly clear.  There is actually no sugar in the urinalysis today.  He has just completed a round of an antibiotic not only do for urinary infection but also for infection in his toe.  PVR however today reveals 210 mL in the bladder.     He has not experienced any pain with urination hematuria fever or chills.     PSAs have been normal.  PSA 2020 was 1.3, PSA  was 2.2, recent PSA  is 1.6.     Other significant history of DM      Past Medical History:   Diagnosis Date    Acquired hypothyroidism 3/27/2016    after treatment for Graves, levothyroxine    Acute deep vein thrombosis (DVT) (HCC)     right leg, off blood thinners    Anemia     denies hx of blood transfusions    Arthritis     Atherosclerotic heart disease of native

## 2024-07-09 ENCOUNTER — TELEPHONE (OUTPATIENT)
Dept: INTERNAL MEDICINE CLINIC | Facility: CLINIC | Age: 74
End: 2024-07-09

## 2024-07-09 RX ORDER — SERTRALINE HYDROCHLORIDE 100 MG/1
100 TABLET, FILM COATED ORAL DAILY
Qty: 90 TABLET | Refills: 1 | Status: SHIPPED | OUTPATIENT
Start: 2024-07-09

## 2024-07-10 ENCOUNTER — TELEPHONE (OUTPATIENT)
Dept: RHEUMATOLOGY | Age: 74
End: 2024-07-10

## 2024-07-10 DIAGNOSIS — M06.00 SERONEGATIVE RHEUMATOID ARTHRITIS (HCC): Primary | ICD-10-CM

## 2024-07-10 DIAGNOSIS — Z79.631 LONG TERM METHOTREXATE USER: ICD-10-CM

## 2024-07-10 DIAGNOSIS — M15.9 PRIMARY OSTEOARTHRITIS INVOLVING MULTIPLE JOINTS: ICD-10-CM

## 2024-07-10 DIAGNOSIS — Z79.899 LONG-TERM USE OF PLAQUENIL: ICD-10-CM

## 2024-07-10 RX ORDER — METHOTREXATE 2.5 MG/1
TABLET ORAL
Qty: 120 TABLET | Refills: 0 | Status: CANCELLED | OUTPATIENT
Start: 2024-07-10

## 2024-07-10 NOTE — TELEPHONE ENCOUNTER
Abby.  He needs refills of mtx.  Other meds appear to be ok at this time.  He is not sure about colchicine that was started.  Neeta manages his pill routine.    [facility]    Jimi needing to do labs and xrays in chart.  He will go to [place] to complete.  Need to liaison ester sweeney so she is aware and also to set up OV not scheduled.    Called neeta to discuss this and to try to make 8wk f/u.  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  Recommended 930 spots; she will check calendars and call my direct tomorrow.

## 2024-07-10 NOTE — TELEPHONE ENCOUNTER
Requested Prescriptions     Signed Prescriptions Disp Refills    sertraline (ZOLOFT) 100 MG tablet 90 tablet 1     Sig: Take 1 tablet by mouth daily     Authorizing Provider: PADUANO, JULIA SCHMIDT

## 2024-07-12 ENCOUNTER — HOSPITAL ENCOUNTER (OUTPATIENT)
Dept: GENERAL RADIOLOGY | Age: 74
End: 2024-07-12
Payer: MEDICARE

## 2024-07-12 DIAGNOSIS — M17.0 BILATERAL PRIMARY OSTEOARTHRITIS OF KNEE: ICD-10-CM

## 2024-07-12 DIAGNOSIS — M06.00 SERONEGATIVE RHEUMATOID ARTHRITIS (HCC): ICD-10-CM

## 2024-07-12 DIAGNOSIS — Z79.899 LONG-TERM USE OF PLAQUENIL: ICD-10-CM

## 2024-07-12 DIAGNOSIS — Z79.631 LONG TERM METHOTREXATE USER: ICD-10-CM

## 2024-07-12 LAB
ALBUMIN SERPL-MCNC: 3.5 G/DL (ref 3.2–4.6)
ALBUMIN/GLOB SERPL: 1.2 (ref 1–1.9)
ALP SERPL-CCNC: 87 U/L (ref 40–129)
ALT SERPL-CCNC: 22 U/L (ref 12–65)
ANION GAP SERPL CALC-SCNC: 12 MMOL/L (ref 9–18)
AST SERPL-CCNC: 19 U/L (ref 15–37)
BASOPHILS # BLD: 0.1 K/UL (ref 0–0.2)
BASOPHILS NFR BLD: 1 % (ref 0–2)
BILIRUB SERPL-MCNC: <0.2 MG/DL (ref 0–1.2)
BUN SERPL-MCNC: 26 MG/DL (ref 8–23)
CALCIUM SERPL-MCNC: 9.3 MG/DL (ref 8.8–10.2)
CHLORIDE SERPL-SCNC: 109 MMOL/L (ref 98–107)
CO2 SERPL-SCNC: 19 MMOL/L (ref 20–28)
CREAT SERPL-MCNC: 1.03 MG/DL (ref 0.8–1.3)
DIFFERENTIAL METHOD BLD: ABNORMAL
EOSINOPHIL # BLD: 0.3 K/UL (ref 0–0.8)
EOSINOPHIL NFR BLD: 3 % (ref 0.5–7.8)
ERYTHROCYTE [DISTWIDTH] IN BLOOD BY AUTOMATED COUNT: 15.6 % (ref 11.9–14.6)
ERYTHROCYTE [SEDIMENTATION RATE] IN BLOOD: 29 MM/HR
GLOBULIN SER CALC-MCNC: 2.9 G/DL (ref 2.3–3.5)
GLUCOSE SERPL-MCNC: 147 MG/DL (ref 70–99)
HCT VFR BLD AUTO: 33.1 % (ref 41.1–50.3)
HGB BLD-MCNC: 10.1 G/DL (ref 13.6–17.2)
IMM GRANULOCYTES # BLD AUTO: 0.1 K/UL (ref 0–0.5)
IMM GRANULOCYTES NFR BLD AUTO: 1 % (ref 0–5)
LYMPHOCYTES # BLD: 1.4 K/UL (ref 0.5–4.6)
LYMPHOCYTES NFR BLD: 14 % (ref 13–44)
MCH RBC QN AUTO: 31.4 PG (ref 26.1–32.9)
MCHC RBC AUTO-ENTMCNC: 30.5 G/DL (ref 31.4–35)
MCV RBC AUTO: 102.8 FL (ref 82–102)
MONOCYTES # BLD: 0.9 K/UL (ref 0.1–1.3)
MONOCYTES NFR BLD: 9 % (ref 4–12)
NEUTS SEG # BLD: 7.1 K/UL (ref 1.7–8.2)
NEUTS SEG NFR BLD: 72 % (ref 43–78)
NRBC # BLD: 0 K/UL (ref 0–0.2)
PLATELET # BLD AUTO: 334 K/UL (ref 150–450)
PMV BLD AUTO: 10.6 FL (ref 9.4–12.3)
POTASSIUM SERPL-SCNC: 5.1 MMOL/L (ref 3.5–5.1)
PROT SERPL-MCNC: 6.4 G/DL (ref 6.3–8.2)
RBC # BLD AUTO: 3.22 M/UL (ref 4.23–5.6)
SODIUM SERPL-SCNC: 139 MMOL/L (ref 136–145)
WBC # BLD AUTO: 9.9 K/UL (ref 4.3–11.1)

## 2024-07-12 PROCEDURE — 73565 X-RAY EXAM OF KNEES: CPT

## 2024-07-12 PROCEDURE — 73562 X-RAY EXAM OF KNEE 3: CPT

## 2024-07-14 LAB — CRP SERPL-MCNC: 2 MG/L (ref 0–10)

## 2024-07-16 ENCOUNTER — OFFICE VISIT (OUTPATIENT)
Dept: RHEUMATOLOGY | Age: 74
End: 2024-07-16

## 2024-07-16 VITALS
OXYGEN SATURATION: 98 % | SYSTOLIC BLOOD PRESSURE: 102 MMHG | HEIGHT: 70 IN | HEART RATE: 68 BPM | BODY MASS INDEX: 27.35 KG/M2 | DIASTOLIC BLOOD PRESSURE: 64 MMHG | WEIGHT: 191 LBS | RESPIRATION RATE: 16 BRPM

## 2024-07-16 DIAGNOSIS — M15.4 EROSIVE OSTEOARTHRITIS OF BOTH HANDS: ICD-10-CM

## 2024-07-16 DIAGNOSIS — M86.8X7 OTHER OSTEOMYELITIS OF FOOT, UNSPECIFIED LATERALITY (HCC): ICD-10-CM

## 2024-07-16 DIAGNOSIS — M06.00 SERONEGATIVE RHEUMATOID ARTHRITIS (HCC): Primary | ICD-10-CM

## 2024-07-16 DIAGNOSIS — R70.0 ELEVATED SED RATE: ICD-10-CM

## 2024-07-16 DIAGNOSIS — Z79.899 LONG-TERM USE OF PLAQUENIL: ICD-10-CM

## 2024-07-16 DIAGNOSIS — Z72.820 POOR SLEEP: ICD-10-CM

## 2024-07-16 DIAGNOSIS — Z95.1 S/P CABG X 3: ICD-10-CM

## 2024-07-16 DIAGNOSIS — R53.83 OTHER FATIGUE: ICD-10-CM

## 2024-07-16 DIAGNOSIS — Z79.631 LONG TERM METHOTREXATE USER: ICD-10-CM

## 2024-07-16 RX ORDER — SULFASALAZINE 500 MG/1
1500 TABLET, DELAYED RELEASE ORAL 2 TIMES DAILY
Qty: 180 TABLET | Refills: 0 | Status: SHIPPED | OUTPATIENT
Start: 2024-07-16

## 2024-07-16 RX ORDER — AMOXICILLIN AND CLAVULANATE POTASSIUM 875; 125 MG/1; MG/1
1 TABLET, FILM COATED ORAL 2 TIMES DAILY
COMMUNITY

## 2024-07-16 ASSESSMENT — ROUTINE ASSESSMENT OF PATIENT INDEX DATA (RAPID3)
ON A SCALE OF ONE TO TEN, HOW DIFFICULT WAS IT FOR YOU TO COMPLETE THE LISTED DAILY PHYSICAL TASKS OVER THE LAST WEEK: 0.9
ON A SCALE OF ONE TO TEN, HOW MUCH PAIN HAVE YOU HAD BECAUSE OF YOUR CONDITION OVER THE PAST WEEK?: 6
ON A SCALE OF ONE TO TEN, CONSIDERING ALL THE WAYS IN WHICH ILLNESS AND HEALTH CONDITIONS MAY AFFECT YOU AT THIS TIME, PLEASE INDICATE BELOW HOW YOU ARE DOING:: 6
ON A SCALE OF ONE TO TEN, HOW MUCH OF A PROBLEM HAS UNUSUAL FATIGUE OR TIREDNESS BEEN FOR YOU OVER THE PAST WEEK?: 7
WHEN YOU AWAKENED IN THE MORNING OVER THE LAST WEEK, PLEASE INDICATE THE AMOUNT OF TIME IT TAKES UNTIL YOU ARE AS LIMBER AS YOU WILL BE FOR THE DAY: 30 MIN

## 2024-07-16 NOTE — PATIENT INSTRUCTIONS
Menthol lidocaine capsaicin  Paraffin wax    Do labs in chart in 4 weeks, return for visit in 4-8 weeks.

## 2024-07-16 NOTE — PROGRESS NOTES
specifically discuss if Plaquenil screening has been completed as of the last 2 notes have not mentioned this.  Patient has an upcoming visit in September and he will  have testing done  -Recommend lifestyle changes to consider including anti-inflammatory diet, fish oil, high-fiber diet      3.  Bilateral knee OA, generalized osteoarthritis: Last found as moderate to severe in bilateral knees  - Previously completed PT, failed steroid and hyaluronic acid injections.  He is open to surgery at present due to severity of symptoms at present  - Referral to pain clinic for possible genicular nerve ablation placed  - Previously discussed medial  brace but he declines    4.  Bilateral hand OA, erosive osteoarthritis  - Continue Plaquenil  - Start trial of colchicine 1 tab daily.   -If no benefit consider alternative options  - Suggested OTC therapies, paraffin wax    5.  Other fatigue, normocytic anemia, status post CABG, reduced EF, depression, STEFFEN  Suspect fatigue is multifactorial.  If anemia persist would refer to hematology    Standing labs in 4 weeks, follow-up with me in 4 to 8 weeks  40 minutes in total with greater than 50% in face-to-face    Katia Perez MD  [facility]  [address] [place] [id number] [phone]

## 2024-08-09 ENCOUNTER — TELEPHONE (OUTPATIENT)
Dept: RHEUMATOLOGY | Age: 74
End: 2024-08-09

## 2024-08-09 DIAGNOSIS — Z79.899 LONG-TERM USE OF PLAQUENIL: ICD-10-CM

## 2024-08-09 DIAGNOSIS — M06.00 SERONEGATIVE RHEUMATOID ARTHRITIS (HCC): Primary | ICD-10-CM

## 2024-08-09 DIAGNOSIS — Z79.631 LONG TERM METHOTREXATE USER: ICD-10-CM

## 2024-08-09 DIAGNOSIS — M06.00 SERONEGATIVE RHEUMATOID ARTHRITIS (HCC): ICD-10-CM

## 2024-08-09 LAB
ALBUMIN SERPL-MCNC: 3.6 G/DL (ref 3.2–4.6)
ALBUMIN/GLOB SERPL: 1.3 (ref 1–1.9)
ALP SERPL-CCNC: 82 U/L (ref 40–129)
ALT SERPL-CCNC: 15 U/L (ref 12–65)
ANION GAP SERPL CALC-SCNC: 13 MMOL/L (ref 9–18)
AST SERPL-CCNC: 20 U/L (ref 15–37)
BASOPHILS # BLD: 0.1 K/UL (ref 0–0.2)
BASOPHILS NFR BLD: 2 % (ref 0–2)
BILIRUB SERPL-MCNC: <0.2 MG/DL (ref 0–1.2)
BUN SERPL-MCNC: 17 MG/DL (ref 8–23)
CALCIUM SERPL-MCNC: 10 MG/DL (ref 8.8–10.2)
CHLORIDE SERPL-SCNC: 103 MMOL/L (ref 98–107)
CO2 SERPL-SCNC: 22 MMOL/L (ref 20–28)
CREAT SERPL-MCNC: 0.95 MG/DL (ref 0.8–1.3)
DIFFERENTIAL METHOD BLD: ABNORMAL
EOSINOPHIL # BLD: 0.2 K/UL (ref 0–0.8)
EOSINOPHIL NFR BLD: 2 % (ref 0.5–7.8)
ERYTHROCYTE [DISTWIDTH] IN BLOOD BY AUTOMATED COUNT: 15 % (ref 11.9–14.6)
ERYTHROCYTE [SEDIMENTATION RATE] IN BLOOD: 7 MM/HR
GLOBULIN SER CALC-MCNC: 2.7 G/DL (ref 2.3–3.5)
GLUCOSE SERPL-MCNC: 136 MG/DL (ref 70–99)
HCT VFR BLD AUTO: 34.8 % (ref 41.1–50.3)
HGB BLD-MCNC: 11 G/DL (ref 13.6–17.2)
IMM GRANULOCYTES # BLD AUTO: 0 K/UL (ref 0–0.5)
IMM GRANULOCYTES NFR BLD AUTO: 1 % (ref 0–5)
LYMPHOCYTES # BLD: 1.9 K/UL (ref 0.5–4.6)
LYMPHOCYTES NFR BLD: 25 % (ref 13–44)
MCH RBC QN AUTO: 31.8 PG (ref 26.1–32.9)
MCHC RBC AUTO-ENTMCNC: 31.6 G/DL (ref 31.4–35)
MCV RBC AUTO: 100.6 FL (ref 82–102)
MONOCYTES # BLD: 0.9 K/UL (ref 0.1–1.3)
MONOCYTES NFR BLD: 12 % (ref 4–12)
NEUTS SEG # BLD: 4.4 K/UL (ref 1.7–8.2)
NEUTS SEG NFR BLD: 58 % (ref 43–78)
NRBC # BLD: 0 K/UL (ref 0–0.2)
PLATELET # BLD AUTO: 293 K/UL (ref 150–450)
PMV BLD AUTO: 11.3 FL (ref 9.4–12.3)
POTASSIUM SERPL-SCNC: 4.2 MMOL/L (ref 3.5–5.1)
PROT SERPL-MCNC: 6.3 G/DL (ref 6.3–8.2)
RBC # BLD AUTO: 3.46 M/UL (ref 4.23–5.6)
SODIUM SERPL-SCNC: 139 MMOL/L (ref 136–145)
WBC # BLD AUTO: 7.5 K/UL (ref 4.3–11.1)

## 2024-08-11 LAB — CRP SERPL-MCNC: <1 MG/L (ref 0–10)

## 2024-08-12 ENCOUNTER — OFFICE VISIT (OUTPATIENT)
Dept: RHEUMATOLOGY | Age: 74
End: 2024-08-12
Payer: MEDICARE

## 2024-08-12 VITALS
DIASTOLIC BLOOD PRESSURE: 61 MMHG | SYSTOLIC BLOOD PRESSURE: 116 MMHG | HEIGHT: 70 IN | WEIGHT: 185 LBS | HEART RATE: 82 BPM | BODY MASS INDEX: 26.48 KG/M2

## 2024-08-12 DIAGNOSIS — M15.4 EROSIVE OSTEOARTHRITIS OF BOTH HANDS: ICD-10-CM

## 2024-08-12 DIAGNOSIS — Z79.899 LONG-TERM USE OF PLAQUENIL: ICD-10-CM

## 2024-08-12 DIAGNOSIS — Z01.89 ENCOUNTER FOR OTHER SPECIFIED SPECIAL EXAMINATIONS: ICD-10-CM

## 2024-08-12 DIAGNOSIS — Z79.899 ON COLCHICINE THERAPY: ICD-10-CM

## 2024-08-12 DIAGNOSIS — R19.7 DIARRHEA, UNSPECIFIED TYPE: ICD-10-CM

## 2024-08-12 DIAGNOSIS — M06.00 SERONEGATIVE RHEUMATOID ARTHRITIS (HCC): ICD-10-CM

## 2024-08-12 DIAGNOSIS — Z79.631 LONG TERM METHOTREXATE USER: Primary | ICD-10-CM

## 2024-08-12 DIAGNOSIS — Z79.899 ON SULFASALAZINE THERAPY: ICD-10-CM

## 2024-08-12 PROCEDURE — 3078F DIAST BP <80 MM HG: CPT | Performed by: INTERNAL MEDICINE

## 2024-08-12 PROCEDURE — G2211 COMPLEX E/M VISIT ADD ON: HCPCS | Performed by: INTERNAL MEDICINE

## 2024-08-12 PROCEDURE — 3017F COLORECTAL CA SCREEN DOC REV: CPT | Performed by: INTERNAL MEDICINE

## 2024-08-12 PROCEDURE — G8427 DOCREV CUR MEDS BY ELIG CLIN: HCPCS | Performed by: INTERNAL MEDICINE

## 2024-08-12 PROCEDURE — G8417 CALC BMI ABV UP PARAM F/U: HCPCS | Performed by: INTERNAL MEDICINE

## 2024-08-12 PROCEDURE — 1036F TOBACCO NON-USER: CPT | Performed by: INTERNAL MEDICINE

## 2024-08-12 PROCEDURE — 99215 OFFICE O/P EST HI 40 MIN: CPT | Performed by: INTERNAL MEDICINE

## 2024-08-12 PROCEDURE — 1123F ACP DISCUSS/DSCN MKR DOCD: CPT | Performed by: INTERNAL MEDICINE

## 2024-08-12 PROCEDURE — 3074F SYST BP LT 130 MM HG: CPT | Performed by: INTERNAL MEDICINE

## 2024-08-12 RX ORDER — SULFASALAZINE 500 MG/1
1500 TABLET, DELAYED RELEASE ORAL 2 TIMES DAILY
Qty: 520 TABLET | Refills: 0 | Status: SHIPPED | OUTPATIENT
Start: 2024-08-12

## 2024-08-12 ASSESSMENT — ROUTINE ASSESSMENT OF PATIENT INDEX DATA (RAPID3)
ON A SCALE OF ONE TO TEN, CONSIDERING ALL THE WAYS IN WHICH ILLNESS AND HEALTH CONDITIONS MAY AFFECT YOU AT THIS TIME, PLEASE INDICATE BELOW HOW YOU ARE DOING:: 6
ON A SCALE OF ONE TO TEN, HOW MUCH OF A PROBLEM HAS UNUSUAL FATIGUE OR TIREDNESS BEEN FOR YOU OVER THE PAST WEEK?: 7
ON A SCALE OF ONE TO TEN, HOW DIFFICULT WAS IT FOR YOU TO COMPLETE THE LISTED DAILY PHYSICAL TASKS OVER THE LAST WEEK: 0.2
ON A SCALE OF ONE TO TEN, HOW MUCH PAIN HAVE YOU HAD BECAUSE OF YOUR CONDITION OVER THE PAST WEEK?: 6
WHEN YOU AWAKENED IN THE MORNING OVER THE LAST WEEK, PLEASE INDICATE THE AMOUNT OF TIME IT TAKES UNTIL YOU ARE AS LIMBER AS YOU WILL BE FOR THE DAY: 45 MIN

## 2024-08-12 NOTE — PROGRESS NOTES
8/12/24      SUBJECTIVE:  Michael A. Paduano is a 73 y.o.male that is here for follow-up of:  Seronegative rheumatoid arthritis Dx ~2016  Erosive osteoarthritis   MTX, HCQ (started Oct 2021)   Simponi aria - started Aug 2023 - May 2023  Failed: gely segura aria - improvement in pain but not swelling - changed due to cost/convenience   Knee osteoarthritis - gelsyn injection bilaterally July & Aug 2023    PMH:  - CAD s/p CABG Dec 2023  - CHF EF 45-50%  - R DVT - 2020 - ongoing coumadin use  - Graves s/p ablation  - HTN HL DM2  - S/p R toe amputation on 7/20/2023 for osteomyelitis.   -Peptic ulcer disease   Dx: rheumatoid arthritis ~2015.  ---  Last OV July 2024-started on colchicine for erosive osteoarthritis. Saw Dr. Santos 8/8/24- resumed antibiotics for L toe ulcer. Didn't take SSZ as he didn't know this couldn't take. Adherent to HCQ, colchicine. Off SSZ since last weekend. MRI L foot scheduled later this month.     Feels worse since last visit. Feels that MTX and HCQ has been most effective therapy. Doesn't think hansd are any better since last visit.     No recent MTX since July 2024. No side effects to colchicine, numbness/tingling, abnormal BM's. Ongoing diarrhea for 1 month.     Am stiffness 45 mins but likely longer  Next ophtho Sept 2024 with Dr. Bhatt    Reviewed labs 8-9-2024    Bilateral knee pain - seeing Dr. Fowler 8/16/2024. 24h/day now - improvement with activity but no change with time of day.  Experiences cracking noises that are audible to patient and wife, at times has locking episodes.  Reviewed x-rays from 7- showing IMPRESSION:  Severe degenerative changes noted at both knees more pronounced at the medial tibiofemoral compartments.  Pain is significant enough now that patient is willing to consider knee replacement.    Bilateral DIP pain is 24h/day - no change with time of day activity rest  without swelling.     No other infection/fevers     Denies side effects to

## 2024-08-13 ENCOUNTER — OFFICE VISIT (OUTPATIENT)
Age: 74
End: 2024-08-13
Payer: MEDICARE

## 2024-08-13 VITALS
SYSTOLIC BLOOD PRESSURE: 110 MMHG | HEIGHT: 71 IN | BODY MASS INDEX: 26.04 KG/M2 | DIASTOLIC BLOOD PRESSURE: 68 MMHG | WEIGHT: 186 LBS | HEART RATE: 78 BPM

## 2024-08-13 DIAGNOSIS — I25.810 CORONARY ARTERY DISEASE INVOLVING CORONARY BYPASS GRAFT OF NATIVE HEART WITHOUT ANGINA PECTORIS: Primary | ICD-10-CM

## 2024-08-13 DIAGNOSIS — I73.9 PAD (PERIPHERAL ARTERY DISEASE) (HCC): ICD-10-CM

## 2024-08-13 DIAGNOSIS — I10 ESSENTIAL HYPERTENSION: ICD-10-CM

## 2024-08-13 DIAGNOSIS — E78.2 MIXED HYPERLIPIDEMIA: ICD-10-CM

## 2024-08-13 DIAGNOSIS — I48.3 TYPICAL ATRIAL FLUTTER (HCC): ICD-10-CM

## 2024-08-13 PROCEDURE — 99214 OFFICE O/P EST MOD 30 MIN: CPT | Performed by: INTERNAL MEDICINE

## 2024-08-13 PROCEDURE — G8427 DOCREV CUR MEDS BY ELIG CLIN: HCPCS | Performed by: INTERNAL MEDICINE

## 2024-08-13 PROCEDURE — 1036F TOBACCO NON-USER: CPT | Performed by: INTERNAL MEDICINE

## 2024-08-13 PROCEDURE — 1123F ACP DISCUSS/DSCN MKR DOCD: CPT | Performed by: INTERNAL MEDICINE

## 2024-08-13 PROCEDURE — 3074F SYST BP LT 130 MM HG: CPT | Performed by: INTERNAL MEDICINE

## 2024-08-13 PROCEDURE — G8417 CALC BMI ABV UP PARAM F/U: HCPCS | Performed by: INTERNAL MEDICINE

## 2024-08-13 PROCEDURE — 3017F COLORECTAL CA SCREEN DOC REV: CPT | Performed by: INTERNAL MEDICINE

## 2024-08-13 PROCEDURE — 3078F DIAST BP <80 MM HG: CPT | Performed by: INTERNAL MEDICINE

## 2024-08-13 RX ORDER — ASPIRIN 81 MG/1
81 TABLET ORAL DAILY
COMMUNITY

## 2024-08-13 ASSESSMENT — ENCOUNTER SYMPTOMS
HEMATOCHEZIA: 0
ABDOMINAL PAIN: 0
HOARSE VOICE: 0
STRIDOR: 0
WHEEZING: 0
DOUBLE VISION: 0
HEMOPTYSIS: 0
EYE REDNESS: 0
HEMATEMESIS: 0

## 2024-08-13 NOTE — PROGRESS NOTES
therapy and we got him through a 7-day Holter monitor which showed no significant recurrence in atrial fibrillation/flutter so we discontinued Eliquis.    CAD-background: He had a CT scan done of the abdomen in September 2023 and incidentally they found extensive coronary calcifications.  He had dyspnea on exertion-underwent a nuclear stress test that was abnormal and showed inducible ischemia, this was followed by coronary angiography that showed multivessel disease and he underwent bypass surgery-December 2023-three-vessel.  -He is doing well at this point with no significant angina.  Recovering well from his surgery.  Still a little weak but moving in the right direction.  Ambulating with a cane mainly because of peripheral vascular disease.  He is working with home physical therapy at this point.    Hypertension: Remains compliant with his current therapy and denies headaches or blurry vision.  Some borderline elevated pressures today but recheck was better.    Hyperlipidemia-was on atorvastatin at a higher dose but he cut it back recently.  Last LDL was at goal.  He had complaints of right lower extremity discomfort but it looks to be more consistent with claudication pain.    Peripheral arterial disease-pulses in both feet with claudication pain noted in both feet.  Right great toe was amputated.  Has no pain in his feet with rest but has not with walking.    Atrial [id number] to be cardioverted back to sinus rhythm on 12/18/2024-maintaining sinus rhythm now on just metoprolol succinate and he was anticoagulated with Eliquis for thromboembolic prophylaxis for about 6 months before we discontinued it completely after Holter monitor showed that he was maintaining sinus rhythm and all his follow-up exams have shown this.  He monitors his vitals on a daily basis and he has not been tachycardic in any way which was reflective of when he was in atrial flutter previously.    History of DVT-had a DVT

## 2024-08-14 NOTE — PROGRESS NOTES
Previous interventions:  Procedure Date Results   Bilateral gelsyn injections 8/2023    Left knee arthroscopy 1990's                               Previous medication trials: Listed:  Class Medication Results   NSAIDs ***    Oral steroids 325mg    Tylenol     Antiepileptics     Antidepressants     Relaxants     Topicals/transdermaI patches     Narcotics       Previous tests/studies:  Study Date Results   XR bilateral knee (Standing) 7/14/24 Bilateral knee radiographs.     HISTORY: Assess severity of osteoarthritis     Comparison: None.     FINDINGS: Standing views of both knees demonstrate severe osteoarthritis more  pronounced at the medial tibiofemoral compartments of both knees with slight  inner subluxation of the distal femur in relation to the tibia. Scatter  arthrosclerotic disease identified. Spur formation noted at the patella. Small  quadriceps tendon enthesophyte.     IMPRESSION:  Severe degenerative changes noted at both knees more pronounced at  the medial tibiofemoral compartments.   XR bilateral knee (standard) 7/14/24 Bilateral standing knee radiographs.     COMPARISON: None available.     FINDINGS: Moderate to severe tricompartmental osteoarthritis more pronounced at  the medial tibiofemoral compartments, right greater than left. Scattered  atherosclerotic disease is noted. No acute displaced fracture. No suprapatellar  joint effusion.     IMPRESSION:  Moderate to severe tricompartmental osteoarthritis more pronounced  at the medial tibiofemoral compartments, right greater than left.                         Previous therapies:  Type of Therapy Date Results   ***

## 2024-08-16 ENCOUNTER — OFFICE VISIT (OUTPATIENT)
Age: 74
End: 2024-08-16
Payer: MEDICARE

## 2024-08-16 ENCOUNTER — TELEPHONE (OUTPATIENT)
Age: 74
End: 2024-08-16

## 2024-08-16 DIAGNOSIS — M25.561 BILATERAL CHRONIC KNEE PAIN: Primary | ICD-10-CM

## 2024-08-16 DIAGNOSIS — G89.29 BILATERAL CHRONIC KNEE PAIN: Primary | ICD-10-CM

## 2024-08-16 DIAGNOSIS — M25.562 BILATERAL CHRONIC KNEE PAIN: Primary | ICD-10-CM

## 2024-08-16 PROCEDURE — 99204 OFFICE O/P NEW MOD 45 MIN: CPT | Performed by: ANESTHESIOLOGY

## 2024-08-16 PROCEDURE — 1123F ACP DISCUSS/DSCN MKR DOCD: CPT | Performed by: ANESTHESIOLOGY

## 2024-08-16 NOTE — PROGRESS NOTES
Chronic Pain Consult Note      Plan:     A comprehensive pain management plan may consist of the following: Testing, Therapy, Medications, Interventions, Consults, and Follow up.    Bilateral chronic knee pain (left greater than right)  Status post multiple intra-articular steroid injections  Status post multiple rounds of viscosupplementation injections  Schedule left genicular nerve blocks  Bilateral primary osteoarthritis  Patient is a candidate for knee replacement but due to cardiac history has avoided proceeding with at this time    General Recommendations: The pain condition that the patient suffers from is best treated with a multidisciplinary approach that involves an increase in physical activity to prevent de-conditioning and worsening of the pain cycle, as well as psychological counseling (formal and/or informal) to address the co morbid psychological effects of pain. Treatment will often involve judicious use of pain medications and interventional procedures to decrease the pain, allowing the patient to participate in the physical activity that will ultimately produce long-lasting pain reductions. The goal of the multidisciplinary approach is to return the patient to a higher level of overall function and to restore their ability to perform activities of daily living.      Referring Provider: Katia Perez MD  Assessment:      Chief Complaint: No chief complaint on file.      Michael Archie Paduano III is a 73 y.o. male being [facility] for the following diagnoses:    Diagnosis:  No diagnosis found.      Subjective:      HPI:  Mr. Paduano is seen in consultation at the request of Katia Perez MD for evaluation and recommendations regarding the above diagnoses and the below HPI.    HPI on08/16/24: 73-year-old male presents for evaluation treatment of bilateral knee pain.  Left greater than right.  Patient reports pain is been present for years.  Arthritic changes in the

## 2024-08-19 ENCOUNTER — TELEPHONE (OUTPATIENT)
Age: 74
End: 2024-08-19

## 2024-08-22 ENCOUNTER — TELEPHONE (OUTPATIENT)
Dept: RHEUMATOLOGY | Age: 74
End: 2024-08-22

## 2024-08-22 NOTE — TELEPHONE ENCOUNTER
WILL with Dr Skelton office called to let us know of a new DX of OSTEOMYELITIS in RT & LT 2nd toe. Although they are more concerned with the LT 2nd toe pt is on ABX and has appt scheduled with Infection Disease coming up. But wanted to let you know in case you needed to make any changes to his treatment plan. Also said if you need to discuss this further to please call them @ [phone].

## 2024-08-31 ENCOUNTER — TELEPHONE (OUTPATIENT)
Dept: INTERNAL MEDICINE CLINIC | Facility: CLINIC | Age: 74
End: 2024-08-31

## 2024-08-31 DIAGNOSIS — L97.529 DIABETIC ULCER OF TOE OF LEFT FOOT ASSOCIATED WITH TYPE 2 DIABETES MELLITUS, UNSPECIFIED ULCER STAGE (HCC): Primary | ICD-10-CM

## 2024-08-31 DIAGNOSIS — E11.621 DIABETIC ULCER OF TOE OF LEFT FOOT ASSOCIATED WITH TYPE 2 DIABETES MELLITUS, UNSPECIFIED ULCER STAGE (HCC): Primary | ICD-10-CM

## 2024-09-03 NOTE — TELEPHONE ENCOUNTER
Referral to  Wound Clinic as he has been going to [facility] and they'd prefer he stay with [facility] for continuity of care.    Orders Placed This Encounter   Procedures    [facility]     Referral Priority:   Routine     Referral Type:   Eval and Treat     Referral Reason:   Specialty Services Required     Number of Visits Requested:   1     Pt is aware; agreeable.    Julia S Paduano, APRN - CNP

## 2024-09-17 ENCOUNTER — OFFICE VISIT (OUTPATIENT)
Dept: ORTHOPEDIC SURGERY | Age: 74
End: 2024-09-17
Payer: MEDICARE

## 2024-09-17 DIAGNOSIS — M25.562 CHRONIC PAIN OF LEFT KNEE: Primary | ICD-10-CM

## 2024-09-17 DIAGNOSIS — G89.29 CHRONIC PAIN OF LEFT KNEE: Primary | ICD-10-CM

## 2024-09-17 PROCEDURE — 64640 INJECTION TREATMENT OF NERVE: CPT | Performed by: ANESTHESIOLOGY

## 2024-09-19 ENCOUNTER — TELEPHONE (OUTPATIENT)
Age: 74
End: 2024-09-19

## 2024-09-19 DIAGNOSIS — Z79.899 LONG-TERM USE OF PLAQUENIL: ICD-10-CM

## 2024-09-19 DIAGNOSIS — G89.29 CHRONIC PAIN OF LEFT KNEE: Primary | ICD-10-CM

## 2024-09-19 DIAGNOSIS — Z79.899 ON SULFASALAZINE THERAPY: ICD-10-CM

## 2024-09-19 DIAGNOSIS — Z79.631 LONG TERM METHOTREXATE USER: ICD-10-CM

## 2024-09-19 DIAGNOSIS — M25.562 CHRONIC PAIN OF LEFT KNEE: Primary | ICD-10-CM

## 2024-09-19 LAB
ALBUMIN SERPL-MCNC: 3.5 G/DL (ref 3.2–4.6)
ALBUMIN/GLOB SERPL: 1.2 (ref 1–1.9)
ALP SERPL-CCNC: 88 U/L (ref 40–129)
ALT SERPL-CCNC: 23 U/L (ref 12–65)
ANION GAP SERPL CALC-SCNC: 12 MMOL/L (ref 9–18)
AST SERPL-CCNC: 21 U/L (ref 15–37)
BASOPHILS # BLD: 0.1 K/UL (ref 0–0.2)
BASOPHILS NFR BLD: 1 % (ref 0–2)
BILIRUB SERPL-MCNC: <0.2 MG/DL (ref 0–1.2)
BUN SERPL-MCNC: 31 MG/DL (ref 8–23)
CALCIUM SERPL-MCNC: 10.3 MG/DL (ref 8.8–10.2)
CHLORIDE SERPL-SCNC: 111 MMOL/L (ref 98–107)
CO2 SERPL-SCNC: 18 MMOL/L (ref 20–28)
CREAT SERPL-MCNC: 1.18 MG/DL (ref 0.8–1.3)
DIFFERENTIAL METHOD BLD: ABNORMAL
EOSINOPHIL # BLD: 0.3 K/UL (ref 0–0.8)
EOSINOPHIL NFR BLD: 2 % (ref 0.5–7.8)
ERYTHROCYTE [DISTWIDTH] IN BLOOD BY AUTOMATED COUNT: 14.1 % (ref 11.9–14.6)
ERYTHROCYTE [SEDIMENTATION RATE] IN BLOOD: 11 MM/HR
GLOBULIN SER CALC-MCNC: 2.9 G/DL (ref 2.3–3.5)
GLUCOSE SERPL-MCNC: 83 MG/DL (ref 70–99)
HCT VFR BLD AUTO: 38.2 % (ref 41.1–50.3)
HGB BLD-MCNC: 11.6 G/DL (ref 13.6–17.2)
IMM GRANULOCYTES # BLD AUTO: 0.1 K/UL (ref 0–0.5)
IMM GRANULOCYTES NFR BLD AUTO: 1 % (ref 0–5)
LYMPHOCYTES # BLD: 2 K/UL (ref 0.5–4.6)
LYMPHOCYTES NFR BLD: 18 % (ref 13–44)
MCH RBC QN AUTO: 31 PG (ref 26.1–32.9)
MCHC RBC AUTO-ENTMCNC: 30.4 G/DL (ref 31.4–35)
MCV RBC AUTO: 102.1 FL (ref 82–102)
MONOCYTES # BLD: 1.3 K/UL (ref 0.1–1.3)
MONOCYTES NFR BLD: 11 % (ref 4–12)
NEUTS SEG # BLD: 7.4 K/UL (ref 1.7–8.2)
NEUTS SEG NFR BLD: 67 % (ref 43–78)
NRBC # BLD: 0 K/UL (ref 0–0.2)
PLATELET # BLD AUTO: 265 K/UL (ref 150–450)
PMV BLD AUTO: 10.7 FL (ref 9.4–12.3)
POTASSIUM SERPL-SCNC: 5.3 MMOL/L (ref 3.5–5.1)
PROT SERPL-MCNC: 6.4 G/DL (ref 6.3–8.2)
RBC # BLD AUTO: 3.74 M/UL (ref 4.23–5.6)
SODIUM SERPL-SCNC: 141 MMOL/L (ref 136–145)
WBC # BLD AUTO: 11.2 K/UL (ref 4.3–11.1)

## 2024-09-22 LAB — CRP SERPL-MCNC: <1 MG/L (ref 0–10)

## 2024-09-25 ENCOUNTER — HOSPITAL ENCOUNTER (OUTPATIENT)
Dept: WOUND CARE | Age: 74
Discharge: HOME | End: 2024-09-25
Attending: FAMILY MEDICINE
Payer: MEDICARE

## 2024-09-25 VITALS
RESPIRATION RATE: 18 BRPM | TEMPERATURE: 98.3 F | HEART RATE: 66 BPM | DIASTOLIC BLOOD PRESSURE: 65 MMHG | SYSTOLIC BLOOD PRESSURE: 119 MMHG

## 2024-09-25 DIAGNOSIS — Z01.89 ENCOUNTER FOR OTHER SPECIFIED SPECIAL EXAMINATIONS: ICD-10-CM

## 2024-09-25 DIAGNOSIS — M06.00 SERONEGATIVE RHEUMATOID ARTHRITIS (HCC): ICD-10-CM

## 2024-09-25 PROCEDURE — 99213 OFFICE O/P EST LOW 20 MIN: CPT

## 2024-09-30 LAB
M TB IFN-G BLD-IMP: NEGATIVE
M TB IFN-G CD4+ T-CELLS BLD-ACNC: 0.05 IU/ML
M TBIFN-G CD4+ CD8+T-CELLS BLD-ACNC: 0.06 IU/ML
QUANTIFERON CRITERIA: NORMAL
QUANTIFERON MITOGEN VALUE: >10 IU/ML
QUANTIFERON NIL VALUE: 0.06 IU/ML

## 2024-10-07 ENCOUNTER — OFFICE VISIT (OUTPATIENT)
Dept: RHEUMATOLOGY | Age: 74
End: 2024-10-07
Payer: MEDICARE

## 2024-10-07 VITALS
BODY MASS INDEX: 26.63 KG/M2 | SYSTOLIC BLOOD PRESSURE: 124 MMHG | RESPIRATION RATE: 12 BRPM | WEIGHT: 190.2 LBS | HEART RATE: 80 BPM | OXYGEN SATURATION: 96 % | DIASTOLIC BLOOD PRESSURE: 82 MMHG | HEIGHT: 71 IN

## 2024-10-07 DIAGNOSIS — Z79.631 LONG TERM METHOTREXATE USER: ICD-10-CM

## 2024-10-07 DIAGNOSIS — M17.0 BILATERAL PRIMARY OSTEOARTHRITIS OF KNEE: ICD-10-CM

## 2024-10-07 DIAGNOSIS — R19.7 DIARRHEA, UNSPECIFIED TYPE: ICD-10-CM

## 2024-10-07 DIAGNOSIS — M15.4 EROSIVE OSTEOARTHRITIS OF BOTH HANDS: Primary | ICD-10-CM

## 2024-10-07 DIAGNOSIS — Z79.899 LONG-TERM USE OF PLAQUENIL: ICD-10-CM

## 2024-10-07 DIAGNOSIS — M06.00 SERONEGATIVE RHEUMATOID ARTHRITIS (HCC): ICD-10-CM

## 2024-10-07 DIAGNOSIS — Z79.899 ON SULFASALAZINE THERAPY: ICD-10-CM

## 2024-10-07 DIAGNOSIS — E87.5 HYPERKALEMIA: ICD-10-CM

## 2024-10-07 DIAGNOSIS — Z79.899 ON COLCHICINE THERAPY: ICD-10-CM

## 2024-10-07 PROBLEM — L97.512 DIABETIC ULCER OF TOE OF RIGHT FOOT ASSOCIATED WITH TYPE 2 DIABETES MELLITUS, WITH FAT LAYER EXPOSED (HCC): Chronic | Status: ACTIVE | Noted: 2024-10-07

## 2024-10-07 PROBLEM — E11.621 DIABETIC ULCER OF TOE OF RIGHT FOOT ASSOCIATED WITH TYPE 2 DIABETES MELLITUS, WITH FAT LAYER EXPOSED (HCC): Chronic | Status: ACTIVE | Noted: 2024-10-07

## 2024-10-07 PROBLEM — L97.512 DIABETIC ULCER OF TOE OF RIGHT FOOT ASSOCIATED WITH TYPE 2 DIABETES MELLITUS, WITH FAT LAYER EXPOSED (HCC): Status: ACTIVE | Noted: 2024-10-07

## 2024-10-07 PROBLEM — E11.621 DIABETIC ULCER OF TOE OF RIGHT FOOT ASSOCIATED WITH TYPE 2 DIABETES MELLITUS, WITH FAT LAYER EXPOSED (HCC): Status: ACTIVE | Noted: 2024-10-07

## 2024-10-07 PROBLEM — E11.42 TYPE 2 DIABETES MELLITUS WITH DIABETIC POLYNEUROPATHY, WITHOUT LONG-TERM CURRENT USE OF INSULIN (HCC): Chronic | Status: ACTIVE | Noted: 2022-05-09

## 2024-10-07 LAB
BIOMARKER COMMENT: NORMAL
C-REACTIVE PROTEIN: 0.41 MG/L
EGF RESULT: 70 PG/ML
FOOTNOTE/HISTORY: NORMAL
IL-6 RESULT: 8.4 PG/ML
LEPTIN RESULT: 7.1 NG/ML
Lab: NORMAL
Lab: NORMAL
MMP-1 RESULT: 3.3 NG/ML
MMP3 RESULT: 53 NG/ML
RESISTIN RESULT: 6.9 NG/ML
RISK OF RADIOGRAPHIC PROGRESS: 3 %
SAA RESULT: 0.56 UG/ML
TNF-RI RESULT: 1.7 NG/ML
VCAM-1 RESULT: 0.78 UG/ML
VECTRA SCORE: 26
VECTRA(R) LEVEL: NORMAL
VEGF-A RESULT: 310 PG/ML
YKL-40 RESULT: 140 NG/ML

## 2024-10-07 PROCEDURE — G8417 CALC BMI ABV UP PARAM F/U: HCPCS | Performed by: INTERNAL MEDICINE

## 2024-10-07 PROCEDURE — 1036F TOBACCO NON-USER: CPT | Performed by: INTERNAL MEDICINE

## 2024-10-07 PROCEDURE — 99215 OFFICE O/P EST HI 40 MIN: CPT | Performed by: INTERNAL MEDICINE

## 2024-10-07 PROCEDURE — 3074F SYST BP LT 130 MM HG: CPT | Performed by: INTERNAL MEDICINE

## 2024-10-07 PROCEDURE — 1123F ACP DISCUSS/DSCN MKR DOCD: CPT | Performed by: INTERNAL MEDICINE

## 2024-10-07 PROCEDURE — 3079F DIAST BP 80-89 MM HG: CPT | Performed by: INTERNAL MEDICINE

## 2024-10-07 PROCEDURE — 3017F COLORECTAL CA SCREEN DOC REV: CPT | Performed by: INTERNAL MEDICINE

## 2024-10-07 PROCEDURE — G2211 COMPLEX E/M VISIT ADD ON: HCPCS | Performed by: INTERNAL MEDICINE

## 2024-10-07 PROCEDURE — G8484 FLU IMMUNIZE NO ADMIN: HCPCS | Performed by: INTERNAL MEDICINE

## 2024-10-07 PROCEDURE — G8427 DOCREV CUR MEDS BY ELIG CLIN: HCPCS | Performed by: INTERNAL MEDICINE

## 2024-10-07 RX ORDER — METHOTREXATE 2.5 MG/1
2.5 TABLET ORAL WEEKLY
COMMUNITY
End: 2024-10-07 | Stop reason: SDUPTHER

## 2024-10-07 RX ORDER — METHOTREXATE 2.5 MG/1
25 TABLET ORAL WEEKLY
Qty: 120 TABLET | Refills: 0 | Status: SHIPPED | OUTPATIENT
Start: 2024-10-07

## 2024-10-07 ASSESSMENT — ROUTINE ASSESSMENT OF PATIENT INDEX DATA (RAPID3)
ON A SCALE OF ONE TO TEN, CONSIDERING ALL THE WAYS IN WHICH ILLNESS AND HEALTH CONDITIONS MAY AFFECT YOU AT THIS TIME, PLEASE INDICATE BELOW HOW YOU ARE DOING:: 5
WHEN YOU AWAKENED IN THE MORNING OVER THE LAST WEEK, PLEASE INDICATE THE AMOUNT OF TIME IT TAKES UNTIL YOU ARE AS LIMBER AS YOU WILL BE FOR THE DAY: 30 MIN
ON A SCALE OF ONE TO TEN, HOW MUCH PAIN HAVE YOU HAD BECAUSE OF YOUR CONDITION OVER THE PAST WEEK?: 6
ON A SCALE OF ONE TO TEN, HOW DIFFICULT WAS IT FOR YOU TO COMPLETE THE LISTED DAILY PHYSICAL TASKS OVER THE LAST WEEK: 0.9

## 2024-10-07 NOTE — PATIENT INSTRUCTIONS
Voltaren four times daily if needed  Capsaicin - per instructions  Lidocaine patch or spray  Paraffin wax - get this -for hands

## 2024-10-07 NOTE — PROGRESS NOTES
10/7/24      SUBJECTIVE:  Michael A. Paduano is a 74 y.o.male that is here for follow-up of:  Seronegative rheumatoid arthritis Dx ~2016  Erosive osteoarthritis   MTX, HCQ (started Oct 2021)   Simponi aria - started Aug 2023 - May 2023  Failed: humira, simponi aria - improvement in pain but not swelling - changed due to cost/convenience   Knee osteoarthritis - gelsyn injection bilaterally July & Aug 2023   Genicular nerve ablation - 2024    PMH:  - CAD s/p CABG Dec 2023  - CHF EF 45-50%  - R DVT - 2020 - ongoing coumadin use  - Graves s/p ablation  - HTN HL DM2  - S/p R toe amputation on 7/20/2023 for osteomyelitis.   -Peptic ulcer disease   Dx: rheumatoid arthritis ~2015.  ---  Last OV 08/12/24. Maintained off MTX due to osteomyelitis L toe. Maintained on HCQ, SSZ. Maintained off colchicine due to diarrhea. Resumed mtx about 1 week.     Had genicular nerve ablation L knee which briefly helped for about 1 day. Must use knee braces. Wants to do ablation for L side next.     Reviewed ID note 10/2/24 - off antibiotics and resolution of ostemyelitis    Worse joint pain - knees, hands, episodic in feet related to neuropathy. Had eye exam last month - normal.     Diarrhea - slightly better without colchicine.     Am stiffness 30 mins  Next ophtho Sept 2025 with Dr. Bhatt    Reviewed labs 9-    Bilateral knee pain - seeing Dr. Fowler 24h/day now - improvement with activity but no change with time of day.  Experiences cracking noises that are audible to patient and wife, at times has locking episodes.  Reviewed x-rays from 7- showing IMPRESSION:  Severe degenerative changes noted at both knees more pronounced at the medial tibiofemoral compartments.  Pain is significant enough now that patient is willing to consider knee replacement.    Bilateral DIP pain is 24h/day - no change with time of day activity rest  without swelling.     No other infection/fevers     Denies side effects to medications    Last

## 2024-10-09 ENCOUNTER — HOSPITAL ENCOUNTER (OUTPATIENT)
Dept: WOUND CARE | Age: 74
Discharge: HOME | End: 2024-10-09
Attending: FAMILY MEDICINE
Payer: MEDICARE

## 2024-10-09 VITALS
TEMPERATURE: 97.7 F | DIASTOLIC BLOOD PRESSURE: 56 MMHG | BODY MASS INDEX: 26.29 KG/M2 | SYSTOLIC BLOOD PRESSURE: 103 MMHG | HEART RATE: 62 BPM | RESPIRATION RATE: 12 BRPM | WEIGHT: 187.8 LBS | OXYGEN SATURATION: 98 % | HEIGHT: 71 IN

## 2024-10-09 DIAGNOSIS — E11.621 DIABETIC ULCER OF TOE OF RIGHT FOOT ASSOCIATED WITH TYPE 2 DIABETES MELLITUS, WITH FAT LAYER EXPOSED (HCC): Primary | ICD-10-CM

## 2024-10-09 DIAGNOSIS — L97.512 DIABETIC ULCER OF TOE OF RIGHT FOOT ASSOCIATED WITH TYPE 2 DIABETES MELLITUS, WITH FAT LAYER EXPOSED (HCC): Primary | ICD-10-CM

## 2024-10-09 PROCEDURE — 11042 DBRDMT SUBQ TIS 1ST 20SQCM/<: CPT

## 2024-10-09 RX ORDER — LIDOCAINE HYDROCHLORIDE 20 MG/ML
JELLY TOPICAL ONCE
Status: COMPLETED | OUTPATIENT
Start: 2024-10-09 | End: 2024-10-09

## 2024-10-09 RX ORDER — MUPIROCIN 20 MG/G
OINTMENT TOPICAL ONCE
OUTPATIENT
Start: 2024-10-09 | End: 2024-10-09

## 2024-10-09 RX ORDER — NEOMYCIN/BACITRACIN/POLYMYXINB 3.5-400-5K
OINTMENT (GRAM) TOPICAL ONCE
OUTPATIENT
Start: 2024-10-09 | End: 2024-10-09

## 2024-10-09 RX ORDER — LIDOCAINE 50 MG/G
OINTMENT TOPICAL ONCE
OUTPATIENT
Start: 2024-10-09 | End: 2024-10-09

## 2024-10-09 RX ORDER — BACITRACIN ZINC AND POLYMYXIN B SULFATE 500; 1000 [USP'U]/G; [USP'U]/G
OINTMENT TOPICAL ONCE
OUTPATIENT
Start: 2024-10-09 | End: 2024-10-09

## 2024-10-09 RX ORDER — LIDOCAINE HYDROCHLORIDE 40 MG/ML
SOLUTION TOPICAL ONCE
OUTPATIENT
Start: 2024-10-09 | End: 2024-10-09

## 2024-10-09 RX ORDER — SILVER SULFADIAZINE 10 MG/G
CREAM TOPICAL ONCE
OUTPATIENT
Start: 2024-10-09 | End: 2024-10-09

## 2024-10-09 RX ORDER — GENTAMICIN SULFATE 1 MG/G
OINTMENT TOPICAL ONCE
OUTPATIENT
Start: 2024-10-09 | End: 2024-10-09

## 2024-10-09 RX ORDER — BETAMETHASONE DIPROPIONATE 0.5 MG/G
CREAM TOPICAL ONCE
OUTPATIENT
Start: 2024-10-09 | End: 2024-10-09

## 2024-10-09 RX ORDER — SODIUM CHLOR/HYPOCHLOROUS ACID 0.033 %
SOLUTION, IRRIGATION IRRIGATION ONCE
OUTPATIENT
Start: 2024-10-09 | End: 2024-10-09

## 2024-10-09 RX ORDER — LIDOCAINE 40 MG/G
CREAM TOPICAL ONCE
OUTPATIENT
Start: 2024-10-09 | End: 2024-10-09

## 2024-10-09 RX ORDER — SODIUM CHLOR/HYPOCHLOROUS ACID 0.033 %
SOLUTION, IRRIGATION IRRIGATION ONCE
Status: COMPLETED | OUTPATIENT
Start: 2024-10-09 | End: 2024-10-09

## 2024-10-09 RX ORDER — LIDOCAINE HYDROCHLORIDE 20 MG/ML
JELLY TOPICAL ONCE
OUTPATIENT
Start: 2024-10-09 | End: 2024-10-09

## 2024-10-09 RX ORDER — TRIAMCINOLONE ACETONIDE 1 MG/G
OINTMENT TOPICAL ONCE
OUTPATIENT
Start: 2024-10-09 | End: 2024-10-09

## 2024-10-09 RX ORDER — CLOBETASOL PROPIONATE 0.5 MG/G
OINTMENT TOPICAL ONCE
OUTPATIENT
Start: 2024-10-09 | End: 2024-10-09

## 2024-10-09 RX ORDER — GINSENG 100 MG
CAPSULE ORAL ONCE
OUTPATIENT
Start: 2024-10-09 | End: 2024-10-09

## 2024-10-09 RX ADMIN — LIDOCAINE HYDROCHLORIDE: 20 JELLY TOPICAL at 11:44

## 2024-10-09 RX ADMIN — Medication: at 11:44

## 2024-10-09 NOTE — WOUND CARE
Discharge Instructions for  [facility]  [address] [place] [address]  [address]  [place] [address]  Phone [phone]   Fax [phone]      NAME:  Michael Archie Paduano III  YOB: 1950  MEDICAL RECORD NUMBER:  [id number]  DATE:  10/9/2024    Return Appointment:    2weeks  with Chapo Horta DO      Instructions:   Right Great Toe   Cleanse with Normal Saline  Vashe post debridement in wound clinic and with dressing changes; apply gauze moistened gauze to wound bed for 1 minute  Xeroform- apply to wound bed.  Cover with Dry Gauze  Wrap with Rolled Gauze  Dressing change DAILY     Vaseline daily to dry aspects of feet, especially distal aspects of toes.     Patient to offload wound with DIABETIC SHOES & CUSTOM INSERTS while standing or weight bearing.    Bilateral Compression stockings 15-20mmHg to reduce lower leg swelling for optimal wound healing.     Wound healing is greatly slowed when blood glucose levels are greater than 200. Monitor glucose levels daily to ensure tight glucose control.  Follow up with PCP if your glucose levels are frequently greater than 200.  Increase protein intake to promote wound healing. Protein supplements such as Wilfrido and Ensure are great options. Goal: Protein 100 gm daily.           Should you experience increased redness, swelling, pain, foul odor, size of wound(s), or have a temperature over 101 degrees please contact the [facility]aling Center at [phone] or if after hours contact your primary care physician or go to the hospital emergency department.    PLEASE NOTE: IF YOU ARE UNABLE TO OBTAIN WOUND SUPPLIES, CONTINUE TO USE THE SUPPLIES YOU HAVE AVAILABLE UNTIL YOU ARE ABLE TO REACH US. IT IS MOST IMPORTANT TO KEEP THE WOUND COVERED AT ALL TIMES.    Electronically signed Rebeka Patino RN on 10/9/2024 at 11:30 AM

## 2024-10-09 NOTE — DISCHARGE INSTRUCTIONS
Right Great Toe   Cleanse with Normal Saline  Vashe post debridement in wound clinic and with dressing changes; apply gauze moistened gauze to wound bed for 1 minute  Xeroform- apply to wound bed.  Cover with Dry Gauze  Wrap with Rolled Gauze  Dressing change DAILY     Vaseline daily to dry aspects of feet, especially distal aspects of toes.     Patient to offload wound with DIABETIC SHOES & CUSTOM INSERTS while standing or weight bearing.     Wound healing is greatly slowed when blood glucose levels are greater than 200. Monitor glucose levels daily to ensure tight glucose control.  Follow up with PCP if your glucose levels are frequently greater than 200.  Increase protein intake to promote wound healing. Protein supplements such as Wilfrido and Ensure are great options. Goal: Protein 100 gm daily.

## 2024-10-09 NOTE — FLOWSHEET NOTE
Pre and Post Debridement Notes:  Revision of post debridement measurements: 0.5 cm x 0.4 cm x 0.1cm     10/09/24 1138   Right Leg Edema Point of Measurement   Leg circumference 34 cm   Ankle circumference 21 cm   Foot circumference 24.5 cm   Compression Therapy Compression stockings   Left Leg Edema Point of Measurement   Leg circumference 35 cm   Ankle circumference 21.5 cm   Foot circumference 25 cm   Compression Therapy Compression stockings   Wound 09/25/24 Toe (Comment  which one) Right #1 right great toe distal tip   Date First Assessed/Time First Assessed: 09/25/24 0923   Present on Original Admission: Yes  Wound Approximate Age at First Assessment (Weeks): 12 weeks  Primary Wound Type: Diabetic Ulcer  Location: Toe (Comment  which one)  Wound Location Orientatio...   Wound Image     Wound Etiology Diabetic Bruno 1   Dressing Status Intact   Wound Cleansed Cleansed with saline;Vashe   Dressing/Treatment Xeroform   Offloading for Diabetic Foot Ulcers Diabetic shoes/inserts   Wound Length (cm) 0.2 cm   Wound Width (cm) 0.1 cm   Wound Depth (cm) 0.1 cm   Wound Surface Area (cm^2) 0.02 cm^2   Change in Wound Size % (l*w) 0   Wound Volume (cm^3) 0.002 cm^3   Wound Healing % 0   Post-Procedure Length (cm) 0.2 cm   Post-Procedure Width (cm) 0.2 cm   Post-Procedure Depth (cm) 0.1 cm   Post-Procedure Surface Area (cm^2) 0.04 cm^2   Post-Procedure Volume (cm^3) 0.004 cm^3   Wound Assessment Pink/red   Drainage Amount Scant (moist but unmeasurable)   Drainage Description Serosanguinous   Odor None   Sharron-wound Assessment Hyperkeratosis (callous);Hemosiderin staining (brown yellow);Edematous   Margins Attached edges   Wound Thickness Description not for Pressure Injury Full thickness   Wound 09/25/24 Toe (Comment  which one) Distal;Left #2 Left distal 2nd toe   Date First Assessed/Time First Assessed: 09/25/24 0925   Wound Approximate Age at First Assessment (Weeks): 16 weeks  Primary Wound Type: Diabetic Ulcer

## 2024-10-12 SDOH — ECONOMIC STABILITY: FOOD INSECURITY: WITHIN THE PAST 12 MONTHS, YOU WORRIED THAT YOUR FOOD WOULD RUN OUT BEFORE YOU GOT MONEY TO BUY MORE.: NEVER TRUE

## 2024-10-12 SDOH — ECONOMIC STABILITY: FOOD INSECURITY: WITHIN THE PAST 12 MONTHS, THE FOOD YOU BOUGHT JUST DIDN'T LAST AND YOU DIDN'T HAVE MONEY TO GET MORE.: NEVER TRUE

## 2024-10-12 SDOH — ECONOMIC STABILITY: INCOME INSECURITY: HOW HARD IS IT FOR YOU TO PAY FOR THE VERY BASICS LIKE FOOD, HOUSING, MEDICAL CARE, AND HEATING?: NOT HARD AT ALL

## 2024-10-12 SDOH — ECONOMIC STABILITY: TRANSPORTATION INSECURITY
IN THE PAST 12 MONTHS, HAS LACK OF TRANSPORTATION KEPT YOU FROM MEETINGS, WORK, OR FROM GETTING THINGS NEEDED FOR DAILY LIVING?: NO

## 2024-10-15 ENCOUNTER — OFFICE VISIT (OUTPATIENT)
Dept: INTERNAL MEDICINE CLINIC | Facility: CLINIC | Age: 74
End: 2024-10-15
Payer: MEDICARE

## 2024-10-15 VITALS
SYSTOLIC BLOOD PRESSURE: 110 MMHG | BODY MASS INDEX: 26.1 KG/M2 | DIASTOLIC BLOOD PRESSURE: 60 MMHG | WEIGHT: 186.4 LBS | HEIGHT: 71 IN | OXYGEN SATURATION: 98 % | HEART RATE: 75 BPM

## 2024-10-15 DIAGNOSIS — Z00.00 MEDICARE ANNUAL WELLNESS VISIT, SUBSEQUENT: ICD-10-CM

## 2024-10-15 DIAGNOSIS — E11.42 DIABETIC POLYNEUROPATHY ASSOCIATED WITH TYPE 2 DIABETES MELLITUS (HCC): ICD-10-CM

## 2024-10-15 DIAGNOSIS — E11.621 DIABETIC ULCER OF TOE OF LEFT FOOT ASSOCIATED WITH TYPE 2 DIABETES MELLITUS, UNSPECIFIED ULCER STAGE (HCC): ICD-10-CM

## 2024-10-15 DIAGNOSIS — I10 PRIMARY HYPERTENSION: ICD-10-CM

## 2024-10-15 DIAGNOSIS — I73.9 PAD (PERIPHERAL ARTERY DISEASE) (HCC): ICD-10-CM

## 2024-10-15 DIAGNOSIS — M06.09 RHEUMATOID ARTHRITIS OF MULTIPLE SITES WITHOUT RHEUMATOID FACTOR (HCC): ICD-10-CM

## 2024-10-15 DIAGNOSIS — G47.33 OSA ON CPAP: ICD-10-CM

## 2024-10-15 DIAGNOSIS — G89.29 CHRONIC PAIN OF BOTH KNEES: Chronic | ICD-10-CM

## 2024-10-15 DIAGNOSIS — M25.561 CHRONIC PAIN OF BOTH KNEES: Chronic | ICD-10-CM

## 2024-10-15 DIAGNOSIS — E08.65 DIABETES MELLITUS DUE TO UNDERLYING CONDITION WITH HYPERGLYCEMIA, WITHOUT LONG-TERM CURRENT USE OF INSULIN (HCC): Primary | ICD-10-CM

## 2024-10-15 DIAGNOSIS — E78.2 MIXED HYPERLIPIDEMIA: ICD-10-CM

## 2024-10-15 DIAGNOSIS — M25.562 CHRONIC PAIN OF BOTH KNEES: Chronic | ICD-10-CM

## 2024-10-15 DIAGNOSIS — I25.10 CORONARY ARTERY DISEASE INVOLVING NATIVE CORONARY ARTERY OF NATIVE HEART WITHOUT ANGINA PECTORIS: ICD-10-CM

## 2024-10-15 DIAGNOSIS — F33.41 MAJOR DEPRESSIVE DISORDER, RECURRENT, IN PARTIAL REMISSION (HCC): ICD-10-CM

## 2024-10-15 DIAGNOSIS — E89.0 HYPOTHYROIDISM FOLLOWING RADIOIODINE THERAPY: ICD-10-CM

## 2024-10-15 DIAGNOSIS — L97.529 DIABETIC ULCER OF TOE OF LEFT FOOT ASSOCIATED WITH TYPE 2 DIABETES MELLITUS, UNSPECIFIED ULCER STAGE (HCC): ICD-10-CM

## 2024-10-15 PROCEDURE — 1123F ACP DISCUSS/DSCN MKR DOCD: CPT | Performed by: NURSE PRACTITIONER

## 2024-10-15 PROCEDURE — 1036F TOBACCO NON-USER: CPT | Performed by: NURSE PRACTITIONER

## 2024-10-15 PROCEDURE — 3074F SYST BP LT 130 MM HG: CPT | Performed by: NURSE PRACTITIONER

## 2024-10-15 PROCEDURE — G8427 DOCREV CUR MEDS BY ELIG CLIN: HCPCS | Performed by: NURSE PRACTITIONER

## 2024-10-15 PROCEDURE — 3017F COLORECTAL CA SCREEN DOC REV: CPT | Performed by: NURSE PRACTITIONER

## 2024-10-15 PROCEDURE — 2022F DILAT RTA XM EVC RTNOPTHY: CPT | Performed by: NURSE PRACTITIONER

## 2024-10-15 PROCEDURE — 3051F HG A1C>EQUAL 7.0%<8.0%: CPT | Performed by: NURSE PRACTITIONER

## 2024-10-15 PROCEDURE — G0439 PPPS, SUBSEQ VISIT: HCPCS | Performed by: NURSE PRACTITIONER

## 2024-10-15 PROCEDURE — 99214 OFFICE O/P EST MOD 30 MIN: CPT | Performed by: NURSE PRACTITIONER

## 2024-10-15 PROCEDURE — 3078F DIAST BP <80 MM HG: CPT | Performed by: NURSE PRACTITIONER

## 2024-10-15 PROCEDURE — G8484 FLU IMMUNIZE NO ADMIN: HCPCS | Performed by: NURSE PRACTITIONER

## 2024-10-15 PROCEDURE — G8417 CALC BMI ABV UP PARAM F/U: HCPCS | Performed by: NURSE PRACTITIONER

## 2024-10-15 RX ORDER — METOPROLOL SUCCINATE 25 MG/1
25 TABLET, EXTENDED RELEASE ORAL DAILY
Qty: 90 TABLET | Refills: 3 | Status: SHIPPED | OUTPATIENT
Start: 2024-10-15

## 2024-10-15 SDOH — ECONOMIC STABILITY: FOOD INSECURITY: WITHIN THE PAST 12 MONTHS, YOU WORRIED THAT YOUR FOOD WOULD RUN OUT BEFORE YOU GOT MONEY TO BUY MORE.: NEVER TRUE

## 2024-10-15 SDOH — ECONOMIC STABILITY: FOOD INSECURITY: WITHIN THE PAST 12 MONTHS, THE FOOD YOU BOUGHT JUST DIDN'T LAST AND YOU DIDN'T HAVE MONEY TO GET MORE.: NEVER TRUE

## 2024-10-15 SDOH — ECONOMIC STABILITY: INCOME INSECURITY: HOW HARD IS IT FOR YOU TO PAY FOR THE VERY BASICS LIKE FOOD, HOUSING, MEDICAL CARE, AND HEATING?: NOT HARD AT ALL

## 2024-10-15 ASSESSMENT — PATIENT HEALTH QUESTIONNAIRE - PHQ9
10. IF YOU CHECKED OFF ANY PROBLEMS, HOW DIFFICULT HAVE THESE PROBLEMS MADE IT FOR YOU TO DO YOUR WORK, TAKE CARE OF THINGS AT HOME, OR GET ALONG WITH OTHER PEOPLE: NOT DIFFICULT AT ALL
1. LITTLE INTEREST OR PLEASURE IN DOING THINGS: NOT AT ALL
5. POOR APPETITE OR OVEREATING: NOT AT ALL
8. MOVING OR SPEAKING SO SLOWLY THAT OTHER PEOPLE COULD HAVE NOTICED. OR THE OPPOSITE, BEING SO FIGETY OR RESTLESS THAT YOU HAVE BEEN MOVING AROUND A LOT MORE THAN USUAL: NOT AT ALL
6. FEELING BAD ABOUT YOURSELF - OR THAT YOU ARE A FAILURE OR HAVE LET YOURSELF OR YOUR FAMILY DOWN: NOT AT ALL
SUM OF ALL RESPONSES TO PHQ QUESTIONS 1-9: 1
4. FEELING TIRED OR HAVING LITTLE ENERGY: SEVERAL DAYS
SUM OF ALL RESPONSES TO PHQ QUESTIONS 1-9: 1
3. TROUBLE FALLING OR STAYING ASLEEP: NOT AT ALL
SUM OF ALL RESPONSES TO PHQ9 QUESTIONS 1 & 2: 0
SUM OF ALL RESPONSES TO PHQ QUESTIONS 1-9: 1
2. FEELING DOWN, DEPRESSED OR HOPELESS: NOT AT ALL
9. THOUGHTS THAT YOU WOULD BE BETTER OFF DEAD, OR OF HURTING YOURSELF: NOT AT ALL
7. TROUBLE CONCENTRATING ON THINGS, SUCH AS READING THE NEWSPAPER OR WATCHING TELEVISION: NOT AT ALL
SUM OF ALL RESPONSES TO PHQ QUESTIONS 1-9: 1

## 2024-10-15 NOTE — ASSESSMENT & PLAN NOTE
Recently restarted tonic water.  Recommended a trial of gabapentin without improvement. He will let me know.

## 2024-10-15 NOTE — PATIENT INSTRUCTIONS
Learning About Being Active as an Older Adult  Why is being active important as you get older?     Being active is one of the best things you can do for your health. And it's never too late to start. Being active--or getting active, if you aren't already--has definite benefits. It can:  Give you more energy,  Keep your mind sharp.  Improve balance to reduce your risk of falls.  Help you manage chronic illness with fewer medicines.  No matter how old you are, how fit you are, or what health problems you have, there is a form of activity that will work for you. And the more physical activity you can do, the better your overall health will be.  What kinds of activity can help you stay healthy?  Being more active will make your daily activities easier. Physical activity includes planned exercise and things you do in daily life. There are four types of activity:  Aerobic.  Doing aerobic activity makes your heart and lungs strong.  Includes walking, dancing, and gardening.  Aim for at least 2½ hours spread throughout the week.  It improves your energy and can help you sleep better.  Muscle-strengthening.  This type of activity can help maintain muscle and strengthen bones.  Includes climbing stairs, using resistance bands, and lifting or carrying heavy loads.  Aim for at least twice a week.  It can help protect the knees and other joints.  Stretching.  Stretching gives you better range of motion in joints and muscles.  Includes upper arm stretches, calf stretches, and gentle yoga.  Aim for at least twice a week, preferably after your muscles are warmed up from other activities.  It can help you function better in daily life.  Balancing.  This helps you stay coordinated and have good posture.  Includes heel-to-toe walking, livier chi, and certain types of yoga.  Aim for at least 3 days a week.  It can reduce your risk of falling.  Even if you have a hard time meeting the recommendations, it's better to be more active

## 2024-10-15 NOTE — PROGRESS NOTES
hyperlipidemia     Well controlled on atorvastatin.   Orders:    Lipid Panel; Future    STEFFEN on CPAP     Compliant and benefiting from nightly CPAP.       Diabetic ulcer of toe of left foot associated with type 2 diabetes mellitus, unspecified ulcer stage (HCC)     Managed by wound care.       PAD (peripheral artery disease) ([facility])     Managed by vascular surgery; follow up next week.       Major depressive disorder, recurrent, in partial remission (HCC)     Stable on Zoloft.       Chronic pain of both knees     Managed by pain management; he will undergo ablation in December.       Diabetic polyneuropathy associated with type 2 diabetes mellitus (HCC)     Recently restarted tonic water.  Recommended a trial of gabapentin without improvement. He will let me know.        Medicare annual wellness visit, subsequent            Return in about 6 months (around 4/15/2025).    Julia S Paduano, APRN - CNP  Medicare Annual Wellness Visit    Jose Archie Paduano III is here for Follow-up (6 month f/u. ) and Medicare AWV    Assessment & Plan   Diabetes mellitus due to underlying condition with hyperglycemia, without long-term current use of insulin ([facility])  -     Hemoglobin A1C; Future  Hypothyroidism following radioiodine therapy  -     TSH with Reflex; Future  Primary hypertension  Coronary artery disease involving native coronary artery of native heart without angina pectoris  -     metoprolol succinate (TOPROL XL) 25 MG extended release tablet; Take 1 tablet by mouth daily, Disp-90 tablet, R-3Normal  Rheumatoid arthritis of multiple sites without rheumatoid factor (HCC)  Mixed hyperlipidemia  -     Lipid Panel; Future  STEFFEN on CPAP  Medicare annual wellness visit, subsequent    Recommendations for Preventive Services Due: see orders and patient instructions/AVS.  Recommended screening schedule for the next 5-10 years is provided to the patient in written form: see Patient Instructions/AVS.     Return in about 6 months (around

## 2024-10-15 NOTE — ASSESSMENT & PLAN NOTE
Low blood pressure readings.  Discontinue amlodipine; continue metoprolol and lisinopril.   Monitor home BP readings with goal >110/>60 and <140/<90. Instructed to call with readings outside range.

## 2024-10-15 NOTE — ASSESSMENT & PLAN NOTE
-Well controlled on metformin  -Education: Reviewed ‘ABCs’ of diabetes management (respective goals in parentheses):  A1C (<7), blood pressure (<130/80), and cholesterol (LDL <100).  -Diabetic treatment medications and compliance is emphasized to achieve glycemic control and prevent the potential long term diabetic complications (retinopathy, stroke, heart attack, neuropathy, oral disease, increased cancer risk as well as many others)   Encouraged efforts to improve compliance efforts with moderate cardiovascular exercise dietary modifications.  Glycemic control is promoted by cardiovascular exercise (walking, swimming, aerobics) for 30-45 minutes, 3-4 times weekly.  A more mediterranean type diet with legumes, lean meats, fish and poultry, and lean sources of protein and limiting excessive red meat is recommended.   The importance of a healthy lifestyle are discussed.  Avoid high fructose containing foods, frequent fast food meals, overly processed foods.  A healthy BMI will be easier to achieve if by moderating portion sizes.    -Monitor blood sugar at home daily as directed and keep record to bring to next appointment.  -Discussion/Counseling provided today include: interpretation of lab results, blood sugar goals, complications of diabetes mellitus, nutrition and annual eye exams and diabetic foot care precautions.    Orders:    Hemoglobin A1C; Future

## 2024-10-17 DIAGNOSIS — E78.2 MIXED HYPERLIPIDEMIA: ICD-10-CM

## 2024-10-17 DIAGNOSIS — E89.0 HYPOTHYROIDISM FOLLOWING RADIOIODINE THERAPY: ICD-10-CM

## 2024-10-17 DIAGNOSIS — E08.65 DIABETES MELLITUS DUE TO UNDERLYING CONDITION WITH HYPERGLYCEMIA, WITHOUT LONG-TERM CURRENT USE OF INSULIN (HCC): ICD-10-CM

## 2024-10-17 LAB
CHOLEST SERPL-MCNC: 84 MG/DL (ref 0–200)
EST. AVERAGE GLUCOSE BLD GHB EST-MCNC: 161 MG/DL
HBA1C MFR BLD: 7.2 % (ref 0–5.6)
HDLC SERPL-MCNC: 56 MG/DL (ref 40–60)
HDLC SERPL: 1.5 (ref 0–5)
LDLC SERPL CALC-MCNC: 19 MG/DL (ref 0–100)
TRIGL SERPL-MCNC: 45 MG/DL (ref 0–150)
TSH W FREE THYROID IF ABNORMAL: 0.81 UIU/ML (ref 0.27–4.2)
VLDLC SERPL CALC-MCNC: 9 MG/DL (ref 6–23)

## 2024-10-17 PROCEDURE — 36415 COLL VENOUS BLD VENIPUNCTURE: CPT | Performed by: NURSE PRACTITIONER

## 2024-10-17 NOTE — PROGRESS NOTES
Labs drawn on 1st attempt from right AC with 22G needle. Dressing applied.    Julia S Paduano, APRN - CNP

## 2024-10-23 ENCOUNTER — HOSPITAL ENCOUNTER (OUTPATIENT)
Dept: WOUND CARE | Age: 74
Discharge: HOME | End: 2024-10-23
Payer: MEDICARE

## 2024-10-23 VITALS
WEIGHT: 190.8 LBS | SYSTOLIC BLOOD PRESSURE: 122 MMHG | TEMPERATURE: 96.7 F | DIASTOLIC BLOOD PRESSURE: 66 MMHG | HEIGHT: 71 IN | BODY MASS INDEX: 26.71 KG/M2 | RESPIRATION RATE: 16 BRPM

## 2024-10-23 DIAGNOSIS — L97.512 DIABETIC ULCER OF TOE OF RIGHT FOOT ASSOCIATED WITH TYPE 2 DIABETES MELLITUS, WITH FAT LAYER EXPOSED (HCC): Primary | ICD-10-CM

## 2024-10-23 DIAGNOSIS — E11.621 DIABETIC ULCER OF TOE OF RIGHT FOOT ASSOCIATED WITH TYPE 2 DIABETES MELLITUS, WITH FAT LAYER EXPOSED (HCC): Primary | ICD-10-CM

## 2024-10-23 PROCEDURE — 11042 DBRDMT SUBQ TIS 1ST 20SQCM/<: CPT

## 2024-10-23 RX ORDER — LIDOCAINE HYDROCHLORIDE 40 MG/ML
SOLUTION TOPICAL ONCE
OUTPATIENT
Start: 2024-10-23 | End: 2024-10-23

## 2024-10-23 RX ORDER — LIDOCAINE 50 MG/G
OINTMENT TOPICAL ONCE
OUTPATIENT
Start: 2024-10-23 | End: 2024-10-23

## 2024-10-23 RX ORDER — SODIUM CHLOR/HYPOCHLOROUS ACID 0.033 %
SOLUTION, IRRIGATION IRRIGATION ONCE
OUTPATIENT
Start: 2024-10-23 | End: 2024-10-23

## 2024-10-23 RX ORDER — GINSENG 100 MG
CAPSULE ORAL ONCE
OUTPATIENT
Start: 2024-10-23 | End: 2024-10-23

## 2024-10-23 RX ORDER — TRIAMCINOLONE ACETONIDE 1 MG/G
OINTMENT TOPICAL ONCE
OUTPATIENT
Start: 2024-10-23 | End: 2024-10-23

## 2024-10-23 RX ORDER — SILVER SULFADIAZINE 10 MG/G
CREAM TOPICAL ONCE
OUTPATIENT
Start: 2024-10-23 | End: 2024-10-23

## 2024-10-23 RX ORDER — LIDOCAINE 40 MG/G
CREAM TOPICAL ONCE
OUTPATIENT
Start: 2024-10-23 | End: 2024-10-23

## 2024-10-23 RX ORDER — NEOMYCIN/BACITRACIN/POLYMYXINB 3.5-400-5K
OINTMENT (GRAM) TOPICAL ONCE
OUTPATIENT
Start: 2024-10-23 | End: 2024-10-23

## 2024-10-23 RX ORDER — BACITRACIN ZINC AND POLYMYXIN B SULFATE 500; 1000 [USP'U]/G; [USP'U]/G
OINTMENT TOPICAL ONCE
OUTPATIENT
Start: 2024-10-23 | End: 2024-10-23

## 2024-10-23 RX ORDER — MUPIROCIN 20 MG/G
OINTMENT TOPICAL ONCE
OUTPATIENT
Start: 2024-10-23 | End: 2024-10-23

## 2024-10-23 RX ORDER — GENTAMICIN SULFATE 1 MG/G
OINTMENT TOPICAL ONCE
OUTPATIENT
Start: 2024-10-23 | End: 2024-10-23

## 2024-10-23 RX ORDER — CLOBETASOL PROPIONATE 0.5 MG/G
OINTMENT TOPICAL ONCE
OUTPATIENT
Start: 2024-10-23 | End: 2024-10-23

## 2024-10-23 RX ORDER — LIDOCAINE HYDROCHLORIDE 20 MG/ML
JELLY TOPICAL ONCE
OUTPATIENT
Start: 2024-10-23 | End: 2024-10-23

## 2024-10-23 RX ORDER — BETAMETHASONE DIPROPIONATE 0.5 MG/G
CREAM TOPICAL ONCE
OUTPATIENT
Start: 2024-10-23 | End: 2024-10-23

## 2024-10-23 RX ORDER — LIDOCAINE HYDROCHLORIDE 20 MG/ML
JELLY TOPICAL ONCE
Status: COMPLETED | OUTPATIENT
Start: 2024-10-23 | End: 2024-10-23

## 2024-10-23 RX ADMIN — LIDOCAINE HYDROCHLORIDE: 20 JELLY TOPICAL at 09:42

## 2024-10-23 NOTE — WOUND CARE
Discharge Instructions for  [facility]  [address] [place] [address]  [address]  [place] [address]  Phone [phone]   Fax [phone]      NAME:  Michael Archie Paduano III  YOB: 1950  MEDICAL RECORD NUMBER:  [id number]  DATE:  10/23/2024    Return Appointment:    2 weeks  with Chapo Horta DO      Instructions:   Right Great Toe   Cleanse with Normal Saline  Vashe post debridement in wound clinic and with dressing changes; apply gauze moistened gauze to wound bed for 1 minute  Xeroform- apply to wound bed.  Cover with Dry Gauze  Wrap with Rolled Gauze  Dressing change DAILY     Vaseline daily to dry aspects of feet, especially distal aspects of toes.    Suggest, have inserts assessed by facility where you purchased shoes/inserts.     Patient to offload wound with DIABETIC SHOES & CUSTOM INSERTS while standing or weight bearing.    Bilateral Compression stockings 15-20mmHg to reduce lower leg swelling for optimal wound healing.     Wound healing is greatly slowed when blood glucose levels are greater than 200. Monitor glucose levels daily to ensure tight glucose control.  Follow up with PCP if your glucose levels are frequently greater than 200.  Increase protein intake to promote wound healing. Protein supplements such as Wilfrido and Ensure are great options. Goal: Protein 100 gm daily.     Should you experience increased redness, swelling, pain, foul odor, size of wound(s), or have a temperature over 101 degrees please contact the Wound Healing Center at [phone] or if after hours contact your primary care physician or go to the hospital emergency department.    PLEASE NOTE: IF YOU ARE UNABLE TO OBTAIN WOUND SUPPLIES, CONTINUE TO USE THE SUPPLIES YOU HAVE AVAILABLE UNTIL YOU ARE ABLE TO REACH US. IT IS MOST IMPORTANT TO KEEP THE WOUND COVERED AT ALL TIMES.    Electronically signed Judith Vo RN on 10/23/2024 at 9:27 AM

## 2024-10-23 NOTE — FLOWSHEET NOTE
10/23/24 0851   Right Leg Edema Point of Measurement   Leg circumference 35 cm   Ankle circumference 21 cm   Foot circumference 23 cm   Compression Therapy Compression stockings   Wound 09/25/24 Toe (Comment  which one) Right #1 right great toe distal tip   Date First Assessed/Time First Assessed: 09/25/24 0923   Present on Original Admission: Yes  Wound Approximate Age at First Assessment (Weeks): 12 weeks  Primary Wound Type: Diabetic Ulcer  Location: Toe (Comment  which one)  Wound Location Orientatio...   Wound Image     Wound Etiology Diabetic Bruno 1   Dressing Status Intact   Wound Cleansed Cleansed with saline;Vashe   Dressing/Treatment Xeroform   Offloading for Diabetic Foot Ulcers Diabetic shoes/inserts   Wound Length (cm) 0.4 cm   Wound Width (cm) 0.2 cm   Wound Depth (cm) 0.1 cm   Wound Surface Area (cm^2) 0.08 cm^2   Change in Wound Size % (l*w) -300   Wound Volume (cm^3) 0.008 cm^3   Wound Healing % -300   Post-Procedure Length (cm) 0.4 cm   Post-Procedure Width (cm) 0.3 cm   Post-Procedure Depth (cm) 0.1 cm   Post-Procedure Surface Area (cm^2) 0.12 cm^2   Post-Procedure Volume (cm^3) 0.012 cm^3   Wound Assessment Pink/red   Drainage Amount Scant (moist but unmeasurable)   Drainage Description Serosanguinous   Odor None   Sharron-wound Assessment Hyperkeratosis (callous);Hemosiderin staining (brown yellow);Edematous   Margins Attached edges   Wound Thickness Description not for Pressure Injury Full thickness   Pain Assessment   Pain Assessment None - Denies Pain

## 2024-10-24 DIAGNOSIS — R19.7 DIARRHEA, UNSPECIFIED TYPE: ICD-10-CM

## 2024-10-25 ENCOUNTER — OFFICE VISIT (OUTPATIENT)
Dept: VASCULAR SURGERY | Age: 74
End: 2024-10-25
Payer: MEDICARE

## 2024-10-25 VITALS
DIASTOLIC BLOOD PRESSURE: 81 MMHG | OXYGEN SATURATION: 98 % | HEART RATE: 62 BPM | BODY MASS INDEX: 26.74 KG/M2 | WEIGHT: 191 LBS | SYSTOLIC BLOOD PRESSURE: 144 MMHG | HEIGHT: 71 IN

## 2024-10-25 DIAGNOSIS — I73.9 PVD (PERIPHERAL VASCULAR DISEASE) WITH CLAUDICATION (HCC): Primary | ICD-10-CM

## 2024-10-25 PROCEDURE — 1036F TOBACCO NON-USER: CPT | Performed by: SURGERY

## 2024-10-25 PROCEDURE — 3078F DIAST BP <80 MM HG: CPT | Performed by: SURGERY

## 2024-10-25 PROCEDURE — 3017F COLORECTAL CA SCREEN DOC REV: CPT | Performed by: SURGERY

## 2024-10-25 PROCEDURE — G8427 DOCREV CUR MEDS BY ELIG CLIN: HCPCS | Performed by: SURGERY

## 2024-10-25 PROCEDURE — 1159F MED LIST DOCD IN RCRD: CPT | Performed by: SURGERY

## 2024-10-25 PROCEDURE — 99213 OFFICE O/P EST LOW 20 MIN: CPT | Performed by: SURGERY

## 2024-10-25 PROCEDURE — G8484 FLU IMMUNIZE NO ADMIN: HCPCS | Performed by: SURGERY

## 2024-10-25 PROCEDURE — G8417 CALC BMI ABV UP PARAM F/U: HCPCS | Performed by: SURGERY

## 2024-10-25 PROCEDURE — 1123F ACP DISCUSS/DSCN MKR DOCD: CPT | Performed by: SURGERY

## 2024-10-25 PROCEDURE — 3074F SYST BP LT 130 MM HG: CPT | Performed by: SURGERY

## 2024-10-25 NOTE — PROGRESS NOTES
[address] [address]  [phone] FAX: [phone]    Michael Archie Paduano III  : 1950    Chief Complaint:     History of Present Illness:   Patient follows up today for follow-up duplex study.  Patient has a known right popliteal artery stenosis and left common femoral artery disease.  He denies any claudication or rest pain symptoms.    CURRENT MEDICATIONS:  Current Outpatient Medications   Medication Sig Dispense Refill    metoprolol succinate (TOPROL XL) 25 MG extended release tablet Take 1 tablet by mouth daily 90 tablet 3    methotrexate (RHEUMATREX) 2.5 MG chemo tablet Take 10 tablets by mouth once a week 120 tablet 0    aspirin 81 MG EC tablet Take 1 tablet by mouth daily Take on a full stomach.      sulfaSALAzine (AZULFIDINE EN-TABS) 500 MG EC tablet Take 3 tablets by mouth 2 times daily 520 tablet 0    sertraline (ZOLOFT) 100 MG tablet Take 1 tablet by mouth daily 90 tablet 1    alfuzosin (UROXATRAL) 10 MG extended release tablet Take 1 tablet by mouth daily 90 tablet 3    metFORMIN (GLUCOPHAGE) 1000 MG tablet 2000 mg in the morning  And 1000 mg in the evening 180 tablet 3    folic acid (FOLVITE) 1 MG tablet Take 1 tablet by mouth daily Take 1 tablet by mouth once daily 90 tablet 3    hydroxychloroquine (PLAQUENIL) 200 MG tablet Take 1 tablet by mouth 2 times daily 180 tablet 1    levothyroxine (SYNTHROID) 150 MCG tablet Take 1 tablet by mouth every morning (before breakfast) 90 tablet 3    Coenzyme Q10 (COQ10) 50 MG CAPS Take by mouth      levalbuterol (XOPENEX) 0.63 MG/3ML nebulization Take 3 mLs by nebulization every 4 hours as needed for Wheezing 120 each 2    atorvastatin (LIPITOR) 80 MG tablet Take 1 tablet by mouth at bedtime Cardiologist or PCP will continue to prescribe this medication. (Patient taking differently: Take 0.5 tablets by mouth at bedtime Cardiologist or PCP will continue to prescribe this medication.) 90 tablet 3    benzonatate

## 2024-11-07 ENCOUNTER — HOSPITAL ENCOUNTER (OUTPATIENT)
Dept: WOUND CARE | Age: 74
Discharge: HOME | End: 2024-11-07
Payer: MEDICARE

## 2024-11-07 VITALS
HEART RATE: 68 BPM | SYSTOLIC BLOOD PRESSURE: 119 MMHG | WEIGHT: 192.2 LBS | HEIGHT: 71 IN | TEMPERATURE: 98.1 F | RESPIRATION RATE: 18 BRPM | DIASTOLIC BLOOD PRESSURE: 58 MMHG | BODY MASS INDEX: 26.91 KG/M2

## 2024-11-07 DIAGNOSIS — L97.512 DIABETIC ULCER OF TOE OF RIGHT FOOT ASSOCIATED WITH TYPE 2 DIABETES MELLITUS, WITH FAT LAYER EXPOSED (HCC): Primary | Chronic | ICD-10-CM

## 2024-11-07 DIAGNOSIS — E11.621 DIABETIC ULCER OF TOE OF RIGHT FOOT ASSOCIATED WITH TYPE 2 DIABETES MELLITUS, WITH FAT LAYER EXPOSED (HCC): Primary | Chronic | ICD-10-CM

## 2024-11-07 PROCEDURE — 11042 DBRDMT SUBQ TIS 1ST 20SQCM/<: CPT

## 2024-11-07 RX ORDER — LIDOCAINE 40 MG/G
CREAM TOPICAL ONCE
OUTPATIENT
Start: 2024-11-07 | End: 2024-11-07

## 2024-11-07 RX ORDER — TRIAMCINOLONE ACETONIDE 1 MG/G
OINTMENT TOPICAL ONCE
OUTPATIENT
Start: 2024-11-07 | End: 2024-11-07

## 2024-11-07 RX ORDER — LIDOCAINE HYDROCHLORIDE 20 MG/ML
JELLY TOPICAL ONCE
OUTPATIENT
Start: 2024-11-07 | End: 2024-11-07

## 2024-11-07 RX ORDER — LIDOCAINE HYDROCHLORIDE 40 MG/ML
SOLUTION TOPICAL ONCE
OUTPATIENT
Start: 2024-11-07 | End: 2024-11-07

## 2024-11-07 RX ORDER — GINSENG 100 MG
CAPSULE ORAL ONCE
OUTPATIENT
Start: 2024-11-07 | End: 2024-11-07

## 2024-11-07 RX ORDER — BACITRACIN ZINC AND POLYMYXIN B SULFATE 500; 1000 [USP'U]/G; [USP'U]/G
OINTMENT TOPICAL ONCE
OUTPATIENT
Start: 2024-11-07 | End: 2024-11-07

## 2024-11-07 RX ORDER — SODIUM CHLOR/HYPOCHLOROUS ACID 0.033 %
SOLUTION, IRRIGATION IRRIGATION ONCE
OUTPATIENT
Start: 2024-11-07 | End: 2024-11-07

## 2024-11-07 RX ORDER — LIDOCAINE HYDROCHLORIDE 20 MG/ML
JELLY TOPICAL ONCE
Status: COMPLETED | OUTPATIENT
Start: 2024-11-07 | End: 2024-11-07

## 2024-11-07 RX ORDER — CLOBETASOL PROPIONATE 0.5 MG/G
OINTMENT TOPICAL ONCE
OUTPATIENT
Start: 2024-11-07 | End: 2024-11-07

## 2024-11-07 RX ORDER — MUPIROCIN 20 MG/G
OINTMENT TOPICAL ONCE
OUTPATIENT
Start: 2024-11-07 | End: 2024-11-07

## 2024-11-07 RX ORDER — LIDOCAINE 50 MG/G
OINTMENT TOPICAL ONCE
OUTPATIENT
Start: 2024-11-07 | End: 2024-11-07

## 2024-11-07 RX ORDER — GENTAMICIN SULFATE 1 MG/G
OINTMENT TOPICAL ONCE
OUTPATIENT
Start: 2024-11-07 | End: 2024-11-07

## 2024-11-07 RX ORDER — SILVER SULFADIAZINE 10 MG/G
CREAM TOPICAL ONCE
OUTPATIENT
Start: 2024-11-07 | End: 2024-11-07

## 2024-11-07 RX ORDER — BETAMETHASONE DIPROPIONATE 0.5 MG/G
CREAM TOPICAL ONCE
OUTPATIENT
Start: 2024-11-07 | End: 2024-11-07

## 2024-11-07 RX ORDER — NEOMYCIN/BACITRACIN/POLYMYXINB 3.5-400-5K
OINTMENT (GRAM) TOPICAL ONCE
OUTPATIENT
Start: 2024-11-07 | End: 2024-11-07

## 2024-11-07 RX ADMIN — LIDOCAINE HYDROCHLORIDE: 20 JELLY TOPICAL at 10:02

## 2024-11-07 NOTE — WOUND CARE
Discharge Instructions for  [facility]  [address] [facility] [address] [facility]  [address]  [place] [address]  Phone [phone]   Fax [phone]      NAME:  Michael Archie Paduano III  YOB: 1950  MEDICAL RECORD NUMBER:  [id number]  DATE:  11/7/2024    Return Appointment:   2 weeks with Chapo Horta DO      Instructions:   Right Great Toe   Cleanse with Normal Saline  Vashe post debridement in wound clinic and with dressing changes; apply gauze moistened gauze to wound bed for 1 minute  Xeroform- apply to wound bed.  Cover with Dry Gauze  Wrap with Rolled Gauze  Dressing change DAILY     Vaseline daily to dry aspects of feet, especially distal aspects of toes.       Patient to offload wound with DIABETIC SHOES & CUSTOM INSERTS while standing or weight bearing.    Bilateral Compression stockings 15-20mmHg to reduce lower leg swelling for optimal wound healing.     Wound healing is greatly slowed when blood glucose levels are greater than 200. Monitor glucose levels daily to ensure tight glucose control. Increase protein to 100g daily for optimal wound healing. Aim for 30g protein per shake, two shakes a day.  Protein:   Egg ~ 6g  Yogurt ~ 15g  Half cup of cottage cheese ~ 15g  Chicken breast ~ 30g    Follow up with PCP if your glucose levels are frequently greater than 200.  Increase protein intake to promote wound healing. Protein supplements such as Wilfrido and Ensure are great options. Goal: Protein 100 gm daily.     Should you experience increased redness, swelling, pain, foul odor, size of wound(s), or have a temperature over 101 degrees please contact the Wound Healing Center at [phone] or if after hours contact your primary care physician or go to the hospital emergency department.    PLEASE NOTE: IF YOU ARE UNABLE TO OBTAIN WOUND SUPPLIES, CONTINUE TO USE THE SUPPLIES YOU HAVE AVAILABLE UNTIL YOU ARE ABLE TO REACH US. IT IS MOST IMPORTANT TO KEEP THE WOUND COVERED AT ALL

## 2024-11-07 NOTE — FLOWSHEET NOTE
11/07/24 0945   Right Leg Edema Point of Measurement   Leg circumference 35.5 cm   Ankle circumference 25 cm   Foot circumference 24.5 cm   Compression Therapy Compression stockings   Wound 09/25/24 Toe (Comment  which one) Right #1 right great toe distal tip   Date First Assessed/Time First Assessed: 09/25/24 0923   Present on Original Admission: Yes  Wound Approximate Age at First Assessment (Weeks): 12 weeks  Primary Wound Type: Diabetic Ulcer  Location: Toe (Comment  which one)  Wound Location Orientatio...   Wound Image    Wound Etiology Diabetic Burno 2   Dressing Status Intact   Wound Cleansed Cleansed with saline;Vashe   Dressing/Treatment Xeroform   Offloading for Diabetic Foot Ulcers Diabetic shoes/inserts   Wound Length (cm) 0.4 cm   Wound Width (cm) 0.2 cm   Wound Depth (cm) 0.1 cm   Wound Surface Area (cm^2) 0.08 cm^2   Change in Wound Size % (l*w) -300   Wound Volume (cm^3) 0.008 cm^3   Wound Healing % -300   Post-Procedure Length (cm) 0.4 cm   Post-Procedure Width (cm) 0.2 cm   Post-Procedure Depth (cm) 0.1 cm   Post-Procedure Surface Area (cm^2) 0.08 cm^2   Post-Procedure Volume (cm^3) 0.008 cm^3   Wound Assessment Pink/red   Drainage Amount Scant (moist but unmeasurable)   Drainage Description Serosanguinous   Odor None   Sharron-wound Assessment Hyperkeratosis (callous);Hemosiderin staining (brown yellow);Edematous   Margins Attached edges   Wound Thickness Description not for Pressure Injury Full thickness   Pain Assessment   Pain Assessment None - Denies Pain

## 2024-11-09 ENCOUNTER — TELEPHONE (OUTPATIENT)
Dept: INTERNAL MEDICINE CLINIC | Facility: CLINIC | Age: 74
End: 2024-11-09

## 2024-11-09 DIAGNOSIS — I10 ESSENTIAL HYPERTENSION: Primary | ICD-10-CM

## 2024-11-09 RX ORDER — LISINOPRIL 20 MG/1
TABLET ORAL
Qty: 90 TABLET | Refills: 3 | Status: SHIPPED | OUTPATIENT
Start: 2024-11-09 | End: 2024-11-11 | Stop reason: SDUPTHER

## 2024-11-11 RX ORDER — LISINOPRIL 20 MG/1
TABLET ORAL
Qty: 90 TABLET | Refills: 3 | Status: SHIPPED | OUTPATIENT
Start: 2024-11-11

## 2024-11-11 NOTE — TELEPHONE ENCOUNTER
Requested Prescriptions     Signed Prescriptions Disp Refills    lisinopril (PRINIVIL;ZESTRIL) 20 MG tablet 90 tablet 3     Sig: TAKE ONE TABLET BY MOUTH ONE TIME DAILY     Authorizing Provider: PADUANO, JULIA S

## 2024-11-20 ENCOUNTER — HOSPITAL ENCOUNTER (OUTPATIENT)
Dept: WOUND CARE | Age: 74
Discharge: HOME | End: 2024-11-20
Payer: MEDICARE

## 2024-11-20 VITALS
WEIGHT: 192.8 LBS | BODY MASS INDEX: 26.99 KG/M2 | HEIGHT: 71 IN | DIASTOLIC BLOOD PRESSURE: 70 MMHG | SYSTOLIC BLOOD PRESSURE: 149 MMHG | HEART RATE: 66 BPM

## 2024-11-20 DIAGNOSIS — L97.512 DIABETIC ULCER OF TOE OF RIGHT FOOT ASSOCIATED WITH TYPE 2 DIABETES MELLITUS, WITH FAT LAYER EXPOSED (HCC): Primary | ICD-10-CM

## 2024-11-20 DIAGNOSIS — E11.621 DIABETIC ULCER OF TOE OF RIGHT FOOT ASSOCIATED WITH TYPE 2 DIABETES MELLITUS, WITH FAT LAYER EXPOSED (HCC): Primary | ICD-10-CM

## 2024-11-20 PROCEDURE — 11042 DBRDMT SUBQ TIS 1ST 20SQCM/<: CPT

## 2024-11-20 RX ORDER — BACITRACIN ZINC AND POLYMYXIN B SULFATE 500; 1000 [USP'U]/G; [USP'U]/G
OINTMENT TOPICAL ONCE
OUTPATIENT
Start: 2024-11-20 | End: 2024-11-20

## 2024-11-20 RX ORDER — SILVER SULFADIAZINE 10 MG/G
CREAM TOPICAL ONCE
OUTPATIENT
Start: 2024-11-20 | End: 2024-11-20

## 2024-11-20 RX ORDER — BETAMETHASONE DIPROPIONATE 0.5 MG/G
CREAM TOPICAL ONCE
OUTPATIENT
Start: 2024-11-20 | End: 2024-11-20

## 2024-11-20 RX ORDER — LIDOCAINE 50 MG/G
OINTMENT TOPICAL ONCE
OUTPATIENT
Start: 2024-11-20 | End: 2024-11-20

## 2024-11-20 RX ORDER — GENTAMICIN SULFATE 1 MG/G
OINTMENT TOPICAL ONCE
OUTPATIENT
Start: 2024-11-20 | End: 2024-11-20

## 2024-11-20 RX ORDER — GINSENG 100 MG
CAPSULE ORAL ONCE
OUTPATIENT
Start: 2024-11-20 | End: 2024-11-20

## 2024-11-20 RX ORDER — LIDOCAINE HYDROCHLORIDE 20 MG/ML
JELLY TOPICAL ONCE
OUTPATIENT
Start: 2024-11-20 | End: 2024-11-20

## 2024-11-20 RX ORDER — LIDOCAINE 40 MG/G
CREAM TOPICAL ONCE
OUTPATIENT
Start: 2024-11-20 | End: 2024-11-20

## 2024-11-20 RX ORDER — TRIAMCINOLONE ACETONIDE 1 MG/G
OINTMENT TOPICAL ONCE
OUTPATIENT
Start: 2024-11-20 | End: 2024-11-20

## 2024-11-20 RX ORDER — SODIUM CHLOR/HYPOCHLOROUS ACID 0.033 %
SOLUTION, IRRIGATION IRRIGATION ONCE
OUTPATIENT
Start: 2024-11-20 | End: 2024-11-20

## 2024-11-20 RX ORDER — NEOMYCIN/BACITRACIN/POLYMYXINB 3.5-400-5K
OINTMENT (GRAM) TOPICAL ONCE
OUTPATIENT
Start: 2024-11-20 | End: 2024-11-20

## 2024-11-20 RX ORDER — MUPIROCIN 20 MG/G
OINTMENT TOPICAL ONCE
OUTPATIENT
Start: 2024-11-20 | End: 2024-11-20

## 2024-11-20 RX ORDER — LIDOCAINE HYDROCHLORIDE 20 MG/ML
JELLY TOPICAL ONCE
Status: COMPLETED | OUTPATIENT
Start: 2024-11-20 | End: 2024-11-20

## 2024-11-20 RX ORDER — CLOBETASOL PROPIONATE 0.5 MG/G
OINTMENT TOPICAL ONCE
OUTPATIENT
Start: 2024-11-20 | End: 2024-11-20

## 2024-11-20 RX ORDER — LIDOCAINE HYDROCHLORIDE 40 MG/ML
SOLUTION TOPICAL ONCE
OUTPATIENT
Start: 2024-11-20 | End: 2024-11-20

## 2024-11-20 RX ADMIN — LIDOCAINE HYDROCHLORIDE: 20 JELLY TOPICAL at 10:09

## 2024-11-20 NOTE — DISCHARGE INSTRUCTIONS
Discharge Instructions for  [facility]  [address] [place] [address]  Phone [phone]   Fax [phone]      NAME:  Michael Archie Paduano III  YOB: 1950  MEDICAL RECORD NUMBER:  [id number]  DATE:  @ED@    Return Appointment:   2 weeks with Chapo Horta DO      Instructions: ***        Should you experience increased redness, swelling, pain, foul odor, size of wound(s), or have a temperature over 101 degrees please contact the Wound Healing Center at [phone] or if after hours contact your primary care physician or go to the hospital emergency department.    PLEASE NOTE: IF YOU ARE UNABLE TO OBTAIN WOUND SUPPLIES, CONTINUE TO USE THE SUPPLIES YOU HAVE AVAILABLE UNTIL YOU ARE ABLE TO REACH US. IT IS MOST IMPORTANT TO KEEP THE WOUND COVERED AT ALL TIMES.    Electronically signed Cesia Salmeron RN on 11/20/2024 at 10:26 AM

## 2024-11-20 NOTE — FLOWSHEET NOTE
11/20/24 1000   Wound 09/25/24 Toe (Comment  which one) Right #1 right great toe distal tip   Date First Assessed/Time First Assessed: 09/25/24 0923   Present on Original Admission: Yes  Wound Approximate Age at First Assessment (Weeks): 12 weeks  Primary Wound Type: Diabetic Ulcer  Location: Toe (Comment  which one)  Wound Location Orientatio...   Wound Image     Wound Etiology Diabetic Bruno 2   Dressing Status Intact   Wound Cleansed Cleansed with saline;Vashe   Dressing/Treatment Xeroform   Offloading for Diabetic Foot Ulcers Diabetic shoes/inserts   Wound Length (cm) 0.4 cm   Wound Width (cm) 0.2 cm   Wound Depth (cm) 0.1 cm   Wound Surface Area (cm^2) 0.08 cm^2   Change in Wound Size % (l*w) -300   Wound Volume (cm^3) 0.008 cm^3   Wound Healing % -300   Post-Procedure Length (cm) 0.5 cm   Post-Procedure Width (cm) 0.3 cm   Post-Procedure Depth (cm) 0.1 cm   Post-Procedure Surface Area (cm^2) 0.15 cm^2   Post-Procedure Volume (cm^3) 0.015 cm^3   Wound Assessment Pink/red   Drainage Amount Scant (moist but unmeasurable)   Drainage Description Serosanguinous   Odor None   Sharron-wound Assessment Hyperkeratosis (callous);Hemosiderin staining (brown yellow);Edematous   Margins Attached edges   Wound Thickness Description not for Pressure Injury Full thickness     Post debridement

## 2024-11-20 NOTE — WOUND CARE
Discharge Instructions for  [facility]  [address] [facility]  [address]  [place] [address]  Phone [phone]   Fax [phone]      NAME:  Michael Archie Paduano III  YOB: 1950  MEDICAL RECORD NUMBER:  [id number]  DATE:  11/20/2024    Return Appointment:   2 weeks with Chapo Horta DO      Instructions:   Right Great Toe   Cleanse with Normal Saline  Vashe post debridement in wound clinic and with dressing changes; apply gauze moistened gauze to wound bed for 1 minute  Xeroform- apply to wound bed.  Cover with Dry Gauze  Wrap with Rolled Gauze  Dressing change DAILY     Vaseline daily to dry aspects of feet, especially distal aspects of toes.       Patient to offload wound with DIABETIC SHOES & CUSTOM INSERTS while standing or weight bearing.    Bilateral Compression stockings 15-20mmHg to reduce lower leg swelling for optimal wound healing.     Wound healing is greatly slowed when blood glucose levels are greater than 200. Monitor glucose levels daily to ensure tight glucose control. Increase protein to 100g daily for optimal wound healing. Aim for 30g protein per shake, two shakes a day.  Protein:   Egg ~ 6g  Yogurt ~ 15g  Half cup of cottage cheese ~ 15g  Chicken breast ~ 30g    Follow up with PCP if your glucose levels are frequently greater than 200.  Increase protein intake to promote wound healing. Protein supplements such as Wilfrido and Ensure are great options. Goal: Protein 100 gm daily.     Should you experience increased redness, swelling, pain, foul odor, size of wound(s), or have a temperature over 101 degrees please contact the Wound Healing Center at [phone] or if after hours contact your primary care physician or go to the hospital emergency department.    PLEASE NOTE: IF YOU ARE UNABLE TO OBTAIN WOUND SUPPLIES, CONTINUE TO USE THE SUPPLIES YOU HAVE AVAILABLE UNTIL YOU ARE ABLE TO REACH US. IT IS MOST IMPORTANT TO KEEP THE WOUND COVERED AT ALL

## 2024-11-20 NOTE — FLOWSHEET NOTE
11/20/24 1000   Wound 09/25/24 Toe (Comment  which one) Right #1 right great toe distal tip   Date First Assessed/Time First Assessed: 09/25/24 0923   Present on Original Admission: Yes  Wound Approximate Age at First Assessment (Weeks): 12 weeks  Primary Wound Type: Diabetic Ulcer  Location: Toe (Comment  which one)  Wound Location Orientatio...   Wound Image    Wound Etiology Diabetic Bruno 2   Dressing Status Intact   Wound Cleansed Cleansed with saline;Vashe   Dressing/Treatment Xeroform   Offloading for Diabetic Foot Ulcers Diabetic shoes/inserts   Wound Length (cm) 0.4 cm   Wound Width (cm) 0.2 cm   Wound Depth (cm) 0.1 cm   Wound Surface Area (cm^2) 0.08 cm^2   Change in Wound Size % (l*w) -300   Wound Volume (cm^3) 0.008 cm^3   Wound Healing % -300   Wound Assessment Pink/red   Drainage Amount Scant (moist but unmeasurable)   Drainage Description Serosanguinous   Odor None   Sharron-wound Assessment Hyperkeratosis (callous);Hemosiderin staining (brown yellow);Edematous   Margins Attached edges   Wound Thickness Description not for Pressure Injury Full thickness

## 2024-11-25 RX ORDER — METHOTREXATE 2.5 MG/1
25 TABLET ORAL WEEKLY
Qty: 120 TABLET | Refills: 0 | Status: SHIPPED | OUTPATIENT
Start: 2024-11-25

## 2024-12-02 NOTE — PROGRESS NOTES
Procedure Date: December 3, 2024  Location: [facility]    Procedure: LEFT KNEE GENICULAR ABLATION       Time Out performed prior to start of the procedure:       Nam Fowler M.D.  performed the following reviews on Michael Archie Paduano III 1950 prior to the start of the procedure:       patient was identified by name and     agreement on procedure being performed was verified   risks and benefits explained to patient by the provider  procedure site verified as Left  patient was positioned for comfort   consent signed and verified for procedure       Time:  10:45 AM        Procedure performed by:   Nam Fowler M.D.       Patient assisted by:   MILY YA MA

## 2024-12-03 ENCOUNTER — OFFICE VISIT (OUTPATIENT)
Dept: ORTHOPEDIC SURGERY | Age: 74
End: 2024-12-03
Payer: MEDICARE

## 2024-12-03 DIAGNOSIS — G89.29 CHRONIC PAIN OF LEFT KNEE: Primary | ICD-10-CM

## 2024-12-03 DIAGNOSIS — M25.562 CHRONIC PAIN OF LEFT KNEE: Primary | ICD-10-CM

## 2024-12-03 PROCEDURE — 64624 DSTRJ NULYT AGT GNCLR NRV: CPT | Performed by: ANESTHESIOLOGY

## 2024-12-03 NOTE — PROGRESS NOTES
DAGENICUL[facility]FA    NAME: Michael Archie Paduano III   ID:[id number]   :1950    Location: POA    Procedure:left Genicular Nerve Radiofrequency Ablation Under Fluoroscopic Imaging  Lateral Retinacular Nerve Branch of the Sciatic Nerve.  Medial Retinacular Nerve Branch of the Femoral Nerve  lnfrapatellar Nerve Branch of the Saphenous Nerve       Pre-op Diagnosis: Knee Osteoarthritis    Post-op Diagnosis: Same     Anesthesia: Local only     Complications: None    After confirming written and informed consent and discussing the risk, benefits and alternatives for the procedure, the patient had the correct site marked by the physician performing the procedure. The specific risks of bleeding, infection and nerve injury were discussed. The patient was taken to the fluoroscopy suite.    The patient was placed in the supine position. A pulse oximeter was placed, and verbal and visual monitoring were maintained throughout the procedure. The physician cleaned his hands with an alcohol containing  solution. The physician wore a hat and mask, sterile gloves were applied. The skin overlying theleft knee was cleaned with chlorhexadine gluconate. Sterile towels were applied as a drape. A timeout was performed involving the patient, physician and radiology technician.    A20 G, 10 cm cannula with 5 mm active tip was used for each nerve.    The expected path of the lateral retinacular nerve (superiolateral geniculate nerve), a branch of the sciatic nerve. was then identified using fluoroscopy. The skin overlying the superiolateral femoral condyle was anesthetized with 1% lidocaine via a 25 G 1.5 inch needle. Next, using a coaxial technique with intermittent fluoroscopic guidance the radiofrequency cannula was advanced toward the lateral midpoint of the femoral shaft at the junction of the superiolateral femoral condyle and shaft.    Next, the expected path of the medial retinacular nerve, a branch of the femoral nerve

## 2024-12-05 ENCOUNTER — HOSPITAL ENCOUNTER (OUTPATIENT)
Dept: WOUND CARE | Age: 74
Discharge: HOME | End: 2024-12-05
Payer: MEDICARE

## 2024-12-05 VITALS
RESPIRATION RATE: 16 BRPM | BODY MASS INDEX: 27.86 KG/M2 | SYSTOLIC BLOOD PRESSURE: 131 MMHG | HEART RATE: 62 BPM | HEIGHT: 71 IN | DIASTOLIC BLOOD PRESSURE: 73 MMHG | WEIGHT: 199 LBS | TEMPERATURE: 97.9 F

## 2024-12-05 DIAGNOSIS — L97.512 DIABETIC ULCER OF TOE OF RIGHT FOOT ASSOCIATED WITH TYPE 2 DIABETES MELLITUS, WITH FAT LAYER EXPOSED (HCC): Primary | ICD-10-CM

## 2024-12-05 DIAGNOSIS — E11.621 DIABETIC ULCER OF TOE OF RIGHT FOOT ASSOCIATED WITH TYPE 2 DIABETES MELLITUS, WITH FAT LAYER EXPOSED (HCC): Primary | ICD-10-CM

## 2024-12-05 PROCEDURE — 11042 DBRDMT SUBQ TIS 1ST 20SQCM/<: CPT

## 2024-12-05 RX ORDER — LIDOCAINE HYDROCHLORIDE 40 MG/ML
SOLUTION TOPICAL ONCE
OUTPATIENT
Start: 2024-12-05 | End: 2024-12-05

## 2024-12-05 RX ORDER — BETAMETHASONE DIPROPIONATE 0.5 MG/G
CREAM TOPICAL ONCE
OUTPATIENT
Start: 2024-12-05 | End: 2024-12-05

## 2024-12-05 RX ORDER — GENTAMICIN SULFATE 1 MG/G
OINTMENT TOPICAL ONCE
OUTPATIENT
Start: 2024-12-05 | End: 2024-12-05

## 2024-12-05 RX ORDER — LIDOCAINE 50 MG/G
OINTMENT TOPICAL ONCE
OUTPATIENT
Start: 2024-12-05 | End: 2024-12-05

## 2024-12-05 RX ORDER — GINSENG 100 MG
CAPSULE ORAL ONCE
OUTPATIENT
Start: 2024-12-05 | End: 2024-12-05

## 2024-12-05 RX ORDER — NEOMYCIN/BACITRACIN/POLYMYXINB 3.5-400-5K
OINTMENT (GRAM) TOPICAL ONCE
OUTPATIENT
Start: 2024-12-05 | End: 2024-12-05

## 2024-12-05 RX ORDER — LIDOCAINE 40 MG/G
CREAM TOPICAL ONCE
OUTPATIENT
Start: 2024-12-05 | End: 2024-12-05

## 2024-12-05 RX ORDER — BACITRACIN ZINC AND POLYMYXIN B SULFATE 500; 1000 [USP'U]/G; [USP'U]/G
OINTMENT TOPICAL ONCE
OUTPATIENT
Start: 2024-12-05 | End: 2024-12-05

## 2024-12-05 RX ORDER — LIDOCAINE HYDROCHLORIDE 20 MG/ML
JELLY TOPICAL ONCE
OUTPATIENT
Start: 2024-12-05 | End: 2024-12-05

## 2024-12-05 RX ORDER — TRIAMCINOLONE ACETONIDE 1 MG/G
OINTMENT TOPICAL ONCE
OUTPATIENT
Start: 2024-12-05 | End: 2024-12-05

## 2024-12-05 RX ORDER — LIDOCAINE HYDROCHLORIDE 20 MG/ML
JELLY TOPICAL ONCE
Status: COMPLETED | OUTPATIENT
Start: 2024-12-05 | End: 2024-12-05

## 2024-12-05 RX ORDER — CLOBETASOL PROPIONATE 0.5 MG/G
OINTMENT TOPICAL ONCE
OUTPATIENT
Start: 2024-12-05 | End: 2024-12-05

## 2024-12-05 RX ORDER — SODIUM CHLOR/HYPOCHLOROUS ACID 0.033 %
SOLUTION, IRRIGATION IRRIGATION ONCE
OUTPATIENT
Start: 2024-12-05 | End: 2024-12-05

## 2024-12-05 RX ORDER — MUPIROCIN 20 MG/G
OINTMENT TOPICAL ONCE
OUTPATIENT
Start: 2024-12-05 | End: 2024-12-05

## 2024-12-05 RX ORDER — SILVER SULFADIAZINE 10 MG/G
CREAM TOPICAL ONCE
OUTPATIENT
Start: 2024-12-05 | End: 2024-12-05

## 2024-12-05 RX ADMIN — LIDOCAINE HYDROCHLORIDE: 20 JELLY TOPICAL at 09:44

## 2024-12-05 ASSESSMENT — PAIN DESCRIPTION - LOCATION: LOCATION: TOE (COMMENT WHICH ONE)

## 2024-12-05 ASSESSMENT — PAIN DESCRIPTION - ORIENTATION: ORIENTATION: RIGHT

## 2024-12-05 ASSESSMENT — PAIN DESCRIPTION - DESCRIPTORS: DESCRIPTORS: BURNING;ACHING

## 2024-12-05 ASSESSMENT — PAIN SCALES - GENERAL: PAINLEVEL_OUTOF10: 1

## 2024-12-05 NOTE — WOUND CARE
Discharge Instructions for  [facility]  [address] [facility]  [address]  [place] [address]  Phone [phone]   Fax [phone]      NAME:  Michael Archie Paduano III  YOB: 1950  MEDICAL RECORD NUMBER:  [id number]  DATE:  12/5/2024    Return Appointment:   2 weeks with Chapo Horta DO      Instructions:   Right Great Toe   Cleanse with Normal Saline  Vashe post debridement in wound clinic and with dressing changes; apply gauze moistened gauze to wound bed for 1 minute  Xeroform- apply to wound bed.  May cover with gauze / bandaid /  Wrap with Rolled Gauze as needed   Dressing change DAILY     Vaseline daily to dry aspects of feet, especially distal aspects of toes.       Patient to offload wound with DIABETIC SHOES & CUSTOM INSERTS while standing or weight bearing.    Bilateral Compression stockings 15-20mmHg to reduce lower leg swelling for optimal wound healing.     Wound healing is greatly slowed when blood glucose levels are greater than 200. Monitor glucose levels daily to ensure tight glucose control. Increase protein to 100g daily for optimal wound healing. Aim for 30g protein per shake, two shakes a day.  Protein:   Egg ~ 6g  Yogurt ~ 15g  Half cup of cottage cheese ~ 15g  Chicken breast ~ 30g    Follow up with PCP if your glucose levels are frequently greater than 200.  Increase protein intake to promote wound healing. Protein supplements such as Wilfrido and Ensure are great options. Goal: Protein 100 gm daily.     Should you experience increased redness, swelling, pain, foul odor, size of wound(s), or have a temperature over 101 degrees please contact the Wound Healing Center at [phone] or if after hours contact your primary care physician or go to the hospital emergency department.    PLEASE NOTE: IF YOU ARE UNABLE TO OBTAIN WOUND SUPPLIES, CONTINUE TO USE THE SUPPLIES YOU HAVE AVAILABLE UNTIL YOU ARE ABLE TO REACH US. IT IS MOST IMPORTANT TO KEEP THE

## 2024-12-05 NOTE — FLOWSHEET NOTE
12/05/24 0907   Right Leg Edema Point of Measurement   Leg circumference 36 cm   Ankle circumference 22 cm   Foot circumference 25 cm   Wound 09/25/24 Toe (Comment  which one) Right #1 right great toe distal tip   Date First Assessed/Time First Assessed: 09/25/24 0923   Present on Original Admission: Yes  Wound Approximate Age at First Assessment (Weeks): 12 weeks  Primary Wound Type: Diabetic Ulcer  Location: Toe (Comment  which one)  Wound Location Orientatio...   Wound Image     Wound Etiology Diabetic Bruno 2   Dressing Status Intact   Wound Cleansed Cleansed with saline;Vashe   Dressing/Treatment Xeroform   Offloading for Diabetic Foot Ulcers Diabetic shoes/inserts   Wound Length (cm) 0.3 cm   Wound Width (cm) 0.3 cm   Wound Depth (cm) 0.1 cm   Wound Surface Area (cm^2) 0.09 cm^2   Change in Wound Size % (l*w) -350   Wound Volume (cm^3) 0.009 cm^3   Wound Healing % -350   Post-Procedure Length (cm) 0.2 cm   Post-Procedure Width (cm) 0.2 cm   Post-Procedure Depth (cm) 0.3 cm   Post-Procedure Surface Area (cm^2) 0.04 cm^2   Post-Procedure Volume (cm^3) 0.012 cm^3   Wound Assessment Pink/red   Drainage Amount None (dry)   Odor None   Sharron-wound Assessment Hyperkeratosis (callous);Hemosiderin staining (brown yellow);Edematous   Margins Attached edges   Wound Thickness Description not for Pressure Injury Full thickness   Pain Assessment   Pain Assessment 0-10   Pain Level 1   Pain Location Toe (Comment which one)   Pain Orientation Right   Pain Descriptors Burning;Aching

## 2024-12-05 NOTE — DISCHARGE INSTRUCTIONS
Instructions:   Right Great Toe   Cleanse with Normal Saline  Vashe post debridement in wound clinic and with dressing changes; apply gauze moistened gauze to wound bed for 1 minute  Xeroform- apply to wound bed.  May cover with gauze / bandaid /  Wrap with Rolled Gauze as needed   Dressing change DAILY     Vaseline daily to dry aspects of feet, especially distal aspects of toes.        Patient to offload wound with DIABETIC SHOES & CUSTOM INSERTS while standing or weight bearing.     Bilateral Compression stockings 15-20mmHg to reduce lower leg swelling for optimal wound healing.     Wound healing is greatly slowed when blood glucose levels are greater than 200. Monitor glucose levels daily to ensure tight glucose control. Increase protein to 100g daily for optimal wound healing. Aim for 30g protein per shake, two shakes a day.  Protein:   Egg ~ 6g  Yogurt ~ 15g  Half cup of cottage cheese ~ 15g  Chicken breast ~ 30g     Follow up with PCP if your glucose levels are frequently greater than 200.  Increase protein intake to promote wound healing. Protein supplements such as Wilfrido and Ensure are great options. Goal: Protein 100 gm daily.     Should you experience increased redness, swelling, pain, foul odor, size of wound(s), or have a temperature over 101 degrees please contact the [facility] at [phone] or if after hours contact your primary care physician or go to the hospital emergency department.     PLEASE NOTE: IF YOU ARE UNABLE TO OBTAIN WOUND SUPPLIES, CONTINUE TO USE THE SUPPLIES YOU HAVE AVAILABLE UNTIL YOU ARE ABLE TO REACH US. IT IS MOST IMPORTANT TO KEEP THE WOUND COVERED AT ALL TIMES.

## 2024-12-09 RX ORDER — METHOTREXATE 2.5 MG/1
25 TABLET ORAL WEEKLY
Qty: 120 TABLET | Refills: 0 | Status: CANCELLED | OUTPATIENT
Start: 2024-12-09

## 2024-12-09 RX ORDER — METHOTREXATE 2.5 MG/1
25 TABLET ORAL WEEKLY
Qty: 120 TABLET | Refills: 0 | Status: SHIPPED | OUTPATIENT
Start: 2024-12-09

## 2024-12-09 NOTE — TELEPHONE ENCOUNTER
Pt wife called and needs a rx for mtx [facility]. They are no longer using the [facility] pharmacy     I have pended the medication

## 2024-12-09 NOTE — TELEPHONE ENCOUNTER
Pt's wife is requesting that, starting immediately, all prescriptions be sent to [facility] on [address] in [place]. Pt requesting call back from MA regarding pharmacy change.

## 2024-12-19 ENCOUNTER — TELEPHONE (OUTPATIENT)
Dept: ORTHOPEDIC SURGERY | Age: 74
End: 2024-12-19

## 2024-12-19 ENCOUNTER — HOSPITAL ENCOUNTER (OUTPATIENT)
Dept: WOUND CARE | Age: 74
Discharge: HOME | End: 2024-12-19
Payer: MEDICARE

## 2024-12-19 VITALS
WEIGHT: 195 LBS | RESPIRATION RATE: 16 BRPM | SYSTOLIC BLOOD PRESSURE: 139 MMHG | BODY MASS INDEX: 27.3 KG/M2 | TEMPERATURE: 97.2 F | DIASTOLIC BLOOD PRESSURE: 65 MMHG | HEART RATE: 65 BPM | HEIGHT: 71 IN

## 2024-12-19 DIAGNOSIS — E11.621 DIABETIC ULCER OF TOE OF RIGHT FOOT ASSOCIATED WITH TYPE 2 DIABETES MELLITUS, WITH FAT LAYER EXPOSED (HCC): Primary | Chronic | ICD-10-CM

## 2024-12-19 DIAGNOSIS — L97.512 DIABETIC ULCER OF TOE OF RIGHT FOOT ASSOCIATED WITH TYPE 2 DIABETES MELLITUS, WITH FAT LAYER EXPOSED (HCC): Primary | Chronic | ICD-10-CM

## 2024-12-19 PROCEDURE — 99212 OFFICE O/P EST SF 10 MIN: CPT

## 2024-12-19 RX ORDER — SILVER SULFADIAZINE 10 MG/G
CREAM TOPICAL ONCE
OUTPATIENT
Start: 2024-12-19 | End: 2024-12-19

## 2024-12-19 RX ORDER — LIDOCAINE HYDROCHLORIDE 20 MG/ML
JELLY TOPICAL ONCE
Status: COMPLETED | OUTPATIENT
Start: 2024-12-19 | End: 2024-12-19

## 2024-12-19 RX ORDER — CLOBETASOL PROPIONATE 0.5 MG/G
OINTMENT TOPICAL ONCE
OUTPATIENT
Start: 2024-12-19 | End: 2024-12-19

## 2024-12-19 RX ORDER — GENTAMICIN SULFATE 1 MG/G
OINTMENT TOPICAL ONCE
OUTPATIENT
Start: 2024-12-19 | End: 2024-12-19

## 2024-12-19 RX ORDER — LIDOCAINE 40 MG/G
CREAM TOPICAL ONCE
OUTPATIENT
Start: 2024-12-19 | End: 2024-12-19

## 2024-12-19 RX ORDER — GINSENG 100 MG
CAPSULE ORAL ONCE
OUTPATIENT
Start: 2024-12-19 | End: 2024-12-19

## 2024-12-19 RX ORDER — SODIUM CHLOR/HYPOCHLOROUS ACID 0.033 %
SOLUTION, IRRIGATION IRRIGATION ONCE
OUTPATIENT
Start: 2024-12-19 | End: 2024-12-19

## 2024-12-19 RX ORDER — NEOMYCIN/BACITRACIN/POLYMYXINB 3.5-400-5K
OINTMENT (GRAM) TOPICAL ONCE
OUTPATIENT
Start: 2024-12-19 | End: 2024-12-19

## 2024-12-19 RX ORDER — TRIAMCINOLONE ACETONIDE 1 MG/G
OINTMENT TOPICAL ONCE
OUTPATIENT
Start: 2024-12-19 | End: 2024-12-19

## 2024-12-19 RX ORDER — LIDOCAINE HYDROCHLORIDE 40 MG/ML
SOLUTION TOPICAL ONCE
OUTPATIENT
Start: 2024-12-19 | End: 2024-12-19

## 2024-12-19 RX ORDER — BETAMETHASONE DIPROPIONATE 0.5 MG/G
CREAM TOPICAL ONCE
OUTPATIENT
Start: 2024-12-19 | End: 2024-12-19

## 2024-12-19 RX ORDER — LIDOCAINE 50 MG/G
OINTMENT TOPICAL ONCE
OUTPATIENT
Start: 2024-12-19 | End: 2024-12-19

## 2024-12-19 RX ORDER — LIDOCAINE HYDROCHLORIDE 20 MG/ML
JELLY TOPICAL ONCE
OUTPATIENT
Start: 2024-12-19 | End: 2024-12-19

## 2024-12-19 RX ORDER — BACITRACIN ZINC AND POLYMYXIN B SULFATE 500; 1000 [USP'U]/G; [USP'U]/G
OINTMENT TOPICAL ONCE
OUTPATIENT
Start: 2024-12-19 | End: 2024-12-19

## 2024-12-19 RX ORDER — MUPIROCIN 20 MG/G
OINTMENT TOPICAL ONCE
OUTPATIENT
Start: 2024-12-19 | End: 2024-12-19

## 2024-12-19 RX ADMIN — LIDOCAINE HYDROCHLORIDE: 20 JELLY TOPICAL at 09:55

## 2024-12-19 NOTE — FLOWSHEET NOTE
12/19/24 0915   Right Leg Edema Point of Measurement   Leg circumference 35 cm   Ankle circumference 26.5 cm   Foot circumference 24 cm   Compression Therapy Compression stockings   Wound 09/25/24 Toe (Comment  which one) Right #1 right great toe distal tip   Date First Assessed/Time First Assessed: 09/25/24 0923   Present on Original Admission: Yes  Wound Approximate Age at First Assessment (Weeks): 12 weeks  Primary Wound Type: Diabetic Ulcer  Location: Toe (Comment  which one)  Wound Location Orientatio...   Wound Image    Wound Etiology Diabetic Bruno 2   Dressing Status Intact   Wound Cleansed Cleansed with saline;Vashe   Dressing/Treatment Xeroform   Offloading for Diabetic Foot Ulcers Diabetic shoes/inserts   Wound Length (cm) 0.3 cm   Wound Width (cm) 0.2 cm   Wound Depth (cm) 0.2 cm   Wound Surface Area (cm^2) 0.06 cm^2   Change in Wound Size % (l*w) -200   Wound Volume (cm^3) 0.012 cm^3   Wound Healing % -500   Wound Assessment Pink/red   Drainage Amount None (dry)   Odor None   Sharron-wound Assessment Hyperkeratosis (callous);Hemosiderin staining (brown yellow);Edematous   Margins Attached edges   Wound Thickness Description not for Pressure Injury Full thickness   Pain Assessment   Pain Assessment None - Denies Pain

## 2024-12-19 NOTE — WOUND CARE
Discharge Instructions for  [facility]  [address]  [address]  [place] [address]  Phone [phone]   Fax [phone]      NAME:  Michael Archie Paduano III  YOB: 1950  MEDICAL RECORD NUMBER:  [id number]  DATE:  12/19/2024    Return Appointment:   2 weeks with Chapo Horta DO    Instructions:   Right Great Toe   Cleanse with Normal Saline  Vashe post debridement in wound clinic and with dressing changes; apply gauze moistened gauze to wound bed for 1 minute  Xeroform- apply to wound bed.  May cover with gauze / bandaid   Wrap with Rolled Gauze as needed   Dressing change DAILY     Vaseline daily to dry aspects of feet, especially distal aspects of toes.    Referral placed for Dr. Willett for potential surgery.     Patient to offload wound with DIABETIC SHOES & CUSTOM INSERTS while standing or weight bearing.    Bilateral Compression stockings 15-20mmHg to reduce lower leg swelling for optimal wound healing.     Wound healing is greatly slowed when blood glucose levels are greater than 200. Monitor glucose levels daily to ensure tight glucose control. Increase protein to 100g daily for optimal wound healing. Aim for 30g protein per shake, two shakes a day.  Protein:   Egg ~ 6g  Yogurt ~ 15g  Half cup of cottage cheese ~ 15g  Chicken breast ~ 30g    Follow up with PCP if your glucose levels are frequently greater than 200.  Increase protein intake to promote wound healing. Protein supplements such as Wilfrido and Ensure are great options. Goal: Protein 100 gm daily.     Should you experience increased redness, swelling, pain, foul odor, size of wound(s), or have a temperature over 101 degrees please contact the [facility]aling Center at [phone] or if after hours contact your primary care physician or go to the hospital emergency department.    PLEASE NOTE: IF YOU ARE UNABLE TO OBTAIN WOUND SUPPLIES, CONTINUE TO USE THE SUPPLIES YOU HAVE AVAILABLE UNTIL YOU ARE

## 2024-12-19 NOTE — TELEPHONE ENCOUNTER
Referral from wound care   JWW did partial amputation of this toe 7/2023  Now has wound on great toe  Thank you  JWW want to see if MLS agrees with below:   Jayna Huang Barbara A     He probably needs to see vascular surgery to see if there is anything they can do to open up flow into his right lower extremity before us. Per  but you can ask [facility] team they may say the same thing    Please review and advise  Thank you

## 2024-12-21 NOTE — PROGRESS NOTES
to prescribe this medication. (Patient taking differently: Take 0.5 tablets by mouth at bedtime Cardiologist or PCP will continue to prescribe this medication.) 90 tablet 3   • benzonatate (TESSALON) 200 MG capsule Take 1 capsule by mouth 3 times daily as needed for Cough 90 capsule 2   • fluticasone (FLONASE) 50 MCG/ACT nasal spray 1 spray by Nasal route daily     • budesonide (PULMICORT) 0.5 MG/2ML nebulizer suspension Take 2 mLs by nebulization in the morning and 2 mLs in the evening. Rinse out mouth with water (without swallowing) after every dose.. 60 each 0   • acetaminophen (TYLENOL) 325 MG tablet Take 2 tablets by mouth every 6 hours as needed for Pain 120 tablet 3   • ZINC PO Take 1 tablet by mouth Daily with lunch     • vitamin D 25 MCG (1000 UT) CAPS Take 1 capsule by mouth Daily with lunch     • vitamin C (ASCORBIC ACID) 500 MG tablet Take 1 tablet by mouth Daily with lunch     • Multiple Vitamins-Minerals (MULTIVITAMIN MEN 50+ PO) Take by mouth daily       No current facility-administered medications on file prior to encounter.         Written patient dismissal instructions given to patient and signed by patient or POA.         Electronically signed by Chapo Horta DO on 12/21/2024 at 9:20 AM

## 2024-12-30 DIAGNOSIS — Z79.899 LONG-TERM USE OF PLAQUENIL: ICD-10-CM

## 2024-12-30 DIAGNOSIS — Z79.899 ON SULFASALAZINE THERAPY: ICD-10-CM

## 2024-12-30 DIAGNOSIS — Z79.631 LONG TERM METHOTREXATE USER: ICD-10-CM

## 2024-12-30 LAB
ALBUMIN SERPL-MCNC: 3.8 G/DL (ref 3.2–4.6)
ALBUMIN/GLOB SERPL: 1.3 (ref 1–1.9)
ALP SERPL-CCNC: 107 U/L (ref 40–129)
ALT SERPL-CCNC: 27 U/L (ref 8–55)
ANION GAP SERPL CALC-SCNC: 11 MMOL/L (ref 7–16)
AST SERPL-CCNC: 24 U/L (ref 15–37)
BASOPHILS # BLD: 0.1 K/UL (ref 0–0.2)
BASOPHILS NFR BLD: 1 % (ref 0–2)
BILIRUB SERPL-MCNC: 0.3 MG/DL (ref 0–1.2)
BUN SERPL-MCNC: 16 MG/DL (ref 8–23)
CALCIUM SERPL-MCNC: 9.8 MG/DL (ref 8.8–10.2)
CHLORIDE SERPL-SCNC: 103 MMOL/L (ref 98–107)
CO2 SERPL-SCNC: 25 MMOL/L (ref 20–29)
CREAT SERPL-MCNC: 1.06 MG/DL (ref 0.8–1.3)
CRP SERPL-MCNC: <0.3 MG/DL (ref 0–0.4)
DIFFERENTIAL METHOD BLD: ABNORMAL
EOSINOPHIL # BLD: 0.3 K/UL (ref 0–0.8)
EOSINOPHIL NFR BLD: 3 % (ref 0.5–7.8)
ERYTHROCYTE [DISTWIDTH] IN BLOOD BY AUTOMATED COUNT: 15.7 % (ref 11.9–14.6)
ERYTHROCYTE [SEDIMENTATION RATE] IN BLOOD: 9 MM/HR
GLOBULIN SER CALC-MCNC: 2.9 G/DL (ref 2.3–3.5)
GLUCOSE SERPL-MCNC: 168 MG/DL (ref 70–99)
HCT VFR BLD AUTO: 35 % (ref 41.1–50.3)
HGB BLD-MCNC: 10.9 G/DL (ref 13.6–17.2)
IMM GRANULOCYTES # BLD AUTO: 0.1 K/UL (ref 0–0.5)
IMM GRANULOCYTES NFR BLD AUTO: 1 % (ref 0–5)
LYMPHOCYTES # BLD: 1.5 K/UL (ref 0.5–4.6)
LYMPHOCYTES NFR BLD: 16 % (ref 13–44)
MCH RBC QN AUTO: 31.2 PG (ref 26.1–32.9)
MCHC RBC AUTO-ENTMCNC: 31.1 G/DL (ref 31.4–35)
MCV RBC AUTO: 100.3 FL (ref 82–102)
MONOCYTES # BLD: 1.3 K/UL (ref 0.1–1.3)
MONOCYTES NFR BLD: 13 % (ref 4–12)
NEUTS SEG # BLD: 6.4 K/UL (ref 1.7–8.2)
NEUTS SEG NFR BLD: 66 % (ref 43–78)
NRBC # BLD: 0 K/UL (ref 0–0.2)
PLATELET # BLD AUTO: 346 K/UL (ref 150–450)
PMV BLD AUTO: 10.2 FL (ref 9.4–12.3)
POTASSIUM SERPL-SCNC: 4.2 MMOL/L (ref 3.5–5.1)
PROT SERPL-MCNC: 6.6 G/DL (ref 6.3–8.2)
RBC # BLD AUTO: 3.49 M/UL (ref 4.23–5.6)
SODIUM SERPL-SCNC: 139 MMOL/L (ref 136–145)
WBC # BLD AUTO: 9.7 K/UL (ref 4.3–11.1)

## 2025-01-02 ENCOUNTER — TELEPHONE (OUTPATIENT)
Dept: RHEUMATOLOGY | Age: 75
End: 2025-01-02

## 2025-01-03 ENCOUNTER — HOSPITAL ENCOUNTER (OUTPATIENT)
Dept: WOUND CARE | Age: 75
Discharge: HOME | End: 2025-01-03
Payer: MEDICARE

## 2025-01-03 VITALS
TEMPERATURE: 97.5 F | BODY MASS INDEX: 27.3 KG/M2 | HEIGHT: 71 IN | RESPIRATION RATE: 18 BRPM | DIASTOLIC BLOOD PRESSURE: 69 MMHG | WEIGHT: 195 LBS | SYSTOLIC BLOOD PRESSURE: 123 MMHG | HEART RATE: 62 BPM

## 2025-01-03 DIAGNOSIS — E11.621 DIABETIC ULCER OF TOE OF RIGHT FOOT ASSOCIATED WITH TYPE 2 DIABETES MELLITUS, WITH FAT LAYER EXPOSED (HCC): Primary | Chronic | ICD-10-CM

## 2025-01-03 DIAGNOSIS — I10 ESSENTIAL HYPERTENSION: Primary | ICD-10-CM

## 2025-01-03 DIAGNOSIS — L97.512 DIABETIC ULCER OF TOE OF RIGHT FOOT ASSOCIATED WITH TYPE 2 DIABETES MELLITUS, WITH FAT LAYER EXPOSED (HCC): Primary | Chronic | ICD-10-CM

## 2025-01-03 PROCEDURE — 11042 DBRDMT SUBQ TIS 1ST 20SQCM/<: CPT

## 2025-01-03 RX ORDER — GINSENG 100 MG
CAPSULE ORAL ONCE
OUTPATIENT
Start: 2025-01-03 | End: 2025-01-03

## 2025-01-03 RX ORDER — LIDOCAINE 40 MG/G
CREAM TOPICAL ONCE
OUTPATIENT
Start: 2025-01-03 | End: 2025-01-03

## 2025-01-03 RX ORDER — SODIUM CHLOR/HYPOCHLOROUS ACID 0.033 %
SOLUTION, IRRIGATION IRRIGATION ONCE
OUTPATIENT
Start: 2025-01-03 | End: 2025-01-03

## 2025-01-03 RX ORDER — TRIAMCINOLONE ACETONIDE 1 MG/G
OINTMENT TOPICAL ONCE
OUTPATIENT
Start: 2025-01-03 | End: 2025-01-03

## 2025-01-03 RX ORDER — MUPIROCIN 20 MG/G
OINTMENT TOPICAL ONCE
OUTPATIENT
Start: 2025-01-03 | End: 2025-01-03

## 2025-01-03 RX ORDER — NEOMYCIN/BACITRACIN/POLYMYXINB 3.5-400-5K
OINTMENT (GRAM) TOPICAL ONCE
OUTPATIENT
Start: 2025-01-03 | End: 2025-01-03

## 2025-01-03 RX ORDER — SODIUM CHLOR/HYPOCHLOROUS ACID 0.033 %
SOLUTION, IRRIGATION IRRIGATION ONCE
Status: COMPLETED | OUTPATIENT
Start: 2025-01-03 | End: 2025-01-03

## 2025-01-03 RX ORDER — LIDOCAINE HYDROCHLORIDE 20 MG/ML
JELLY TOPICAL ONCE
Status: COMPLETED | OUTPATIENT
Start: 2025-01-03 | End: 2025-01-03

## 2025-01-03 RX ORDER — BACITRACIN ZINC AND POLYMYXIN B SULFATE 500; 1000 [USP'U]/G; [USP'U]/G
OINTMENT TOPICAL ONCE
OUTPATIENT
Start: 2025-01-03 | End: 2025-01-03

## 2025-01-03 RX ORDER — CLOBETASOL PROPIONATE 0.5 MG/G
OINTMENT TOPICAL ONCE
OUTPATIENT
Start: 2025-01-03 | End: 2025-01-03

## 2025-01-03 RX ORDER — BETAMETHASONE DIPROPIONATE 0.5 MG/G
CREAM TOPICAL ONCE
OUTPATIENT
Start: 2025-01-03 | End: 2025-01-03

## 2025-01-03 RX ORDER — SILVER SULFADIAZINE 10 MG/G
CREAM TOPICAL ONCE
OUTPATIENT
Start: 2025-01-03 | End: 2025-01-03

## 2025-01-03 RX ORDER — LIDOCAINE HYDROCHLORIDE 20 MG/ML
JELLY TOPICAL ONCE
OUTPATIENT
Start: 2025-01-03 | End: 2025-01-03

## 2025-01-03 RX ORDER — LIDOCAINE 50 MG/G
OINTMENT TOPICAL ONCE
OUTPATIENT
Start: 2025-01-03 | End: 2025-01-03

## 2025-01-03 RX ORDER — AMLODIPINE BESYLATE 2.5 MG/1
2.5 TABLET ORAL DAILY
Qty: 90 TABLET | Refills: 1 | Status: SHIPPED | OUTPATIENT
Start: 2025-01-03

## 2025-01-03 RX ORDER — GENTAMICIN SULFATE 1 MG/G
OINTMENT TOPICAL ONCE
OUTPATIENT
Start: 2025-01-03 | End: 2025-01-03

## 2025-01-03 RX ORDER — LIDOCAINE HYDROCHLORIDE 40 MG/ML
SOLUTION TOPICAL ONCE
OUTPATIENT
Start: 2025-01-03 | End: 2025-01-03

## 2025-01-03 RX ADMIN — LIDOCAINE HYDROCHLORIDE: 20 JELLY TOPICAL at 09:30

## 2025-01-03 RX ADMIN — Medication: at 09:30

## 2025-01-03 NOTE — WOUND CARE
Discharge Instructions for  [facility]  [address]  [address] [place] [id number]  Phone [phone]   Fax [phone]      NAME:  Michael Archie Paduano III  YOB: 1950  MEDICAL RECORD NUMBER:  [id number]  DATE:  1/3/2025    Return Appointment:   2 weeks with Chapo Horta DO    Instructions:   Right Great Toe   Cleanse with Normal Saline  Vashe post debridement in wound clinic and with dressing changes; apply gauze moistened gauze to wound bed for 1 minute  Xeroform- apply to wound bed.  May cover with gauze / bandaid   Wrap with Rolled Gauze as needed   Dressing change DAILY    Silver Nitrate used this visit.      Vaseline daily to dry aspects of feet, especially distal aspects of toes.    Referral placed for Dr. Uribe for surgery.     Patient to offload wound with DIABETIC SHOES & CUSTOM INSERTS while standing or weight bearing.    Bilateral Compression stockings 15-20mmHg to reduce lower leg swelling for optimal wound healing.     Wound healing is greatly slowed when blood glucose levels are greater than 200. Monitor glucose levels daily to ensure tight glucose control. Increase protein to 100g daily for optimal wound healing. Aim for 30g protein per shake, two shakes a day.  Protein:   Egg ~ 6g  Yogurt ~ 15g  Half cup of cottage cheese ~ 15g  Chicken breast ~ 30g    Follow up with PCP if your glucose levels are frequently greater than 200.  Increase protein intake to promote wound healing. Protein supplements such as Wilfrido and Ensure are great options. Goal: Protein 100 gm daily.     Should you experience increased redness, swelling, pain, foul odor, size of wound(s), or have a temperature over 101 degrees please contact the [facility]aling Center at [phone] or if after hours contact your primary care physician or go to the hospital emergency department.    PLEASE NOTE: IF YOU ARE UNABLE TO OBTAIN WOUND SUPPLIES, CONTINUE TO USE THE SUPPLIES YOU HAVE

## 2025-01-03 NOTE — TELEPHONE ENCOUNTER
Requested Prescriptions     Signed Prescriptions Disp Refills    amLODIPine (NORVASC) 2.5 MG tablet 90 tablet 1     Sig: Take 1 tablet by mouth daily     Authorizing Provider: PADUANO, JULIA S

## 2025-01-03 NOTE — PROGRESS NOTES
[facility]   [facility]   Medical Staff Progress Note     Michael Archie Paduano III  MEDICAL RECORD NUMBER:  [id number]  AGE: 74 y.o.   GENDER: male  : 1950  EPISODE DATE:  1/3/2025    Chief complaint and reason for visit:     Chief Complaint   Patient presents with    Wound Check     Wound on right great distal toe      Patient presenting for follow up evaluation of wound(s) per chief complaint.  Awaiting orthopedic opinion.  Patient's previous surgery that remove the distal toe apparently minimizes diabetic foot ulcers.  Is associate may be able to assist.  Patient has been evaluated by vascular due to previous vascular treatment 2 months ago and vascular status was good.    Subjective and ROS: Symptoms, wound related issues, or other pertinent wound history since last visit: no new wound care issues. Tolerating current treatment. No systemic complaints above baseline.    History of Wound Context: Per original history and physical on this patient. Changes in history since last evaluation: none    Medical Decision Making:     Problem List Items Addressed This Visit          Endocrine    * (Principal) Diabetic ulcer of toe of right foot associated with type 2 diabetes mellitus, with fat layer exposed (HCC) - Primary (Chronic)    Relevant Orders    Initiate Outpatient Wound Care Protocol       Wounds and Treatment Plan:  Wound is minimal but persistent.  Large amount of callus still formed.  I believe the proximal phalanx of the great toe that is residual needs to be removed so this will not be a long-term problem.  No sign of infection today.  I think this is long-term in patient's best interest.  He continues to make callus even now using appropriate inserts and shoes.  Will await orthopedic opinion.  I believe we could quickly get this to heal up completely in total contact casting.  Does have some risks with the right foot on driving.  I think

## 2025-01-03 NOTE — FLOWSHEET NOTE
01/03/25 0908   Right Leg Edema Point of Measurement   Leg circumference 34 cm   Ankle circumference 27 cm   Foot circumference 24 cm   Compression Therapy Compression stockings   Wound 09/25/24 Toe (Comment  which one) Right #1 right great toe distal tip   Date First Assessed/Time First Assessed: 09/25/24 0923   Present on Original Admission: Yes  Wound Approximate Age at First Assessment (Weeks): 12 weeks  Primary Wound Type: Diabetic Ulcer  Location: Toe (Comment  which one)  Wound Location Orientatio...   Wound Image     Wound Etiology Diabetic Bruno 2   Dressing Status Intact   Wound Cleansed Cleansed with saline;Vashe   Dressing/Treatment Xeroform   Offloading for Diabetic Foot Ulcers Diabetic shoes/inserts   Wound Length (cm) 0.3 cm   Wound Width (cm) 0.1 cm   Wound Depth (cm) 0.2 cm   Wound Surface Area (cm^2) 0.03 cm^2   Change in Wound Size % (l*w) -50   Wound Volume (cm^3) 0.006 cm^3   Wound Healing % -200   Wound Assessment Pink/red   Drainage Amount Scant (moist but unmeasurable)   Drainage Description Serosanguinous   Odor None   Sharron-wound Assessment Hyperkeratosis (callous);Hemosiderin staining (brown yellow);Edematous   Margins Attached edges   Wound Thickness Description not for Pressure Injury Full thickness   Pain Assessment   Pain Assessment None - Denies Pain

## 2025-01-03 NOTE — TELEPHONE ENCOUNTER
Please see message below, pt called last Thurs 1/2, He may need correct eye exam for refill, ididnt route refill, wanted you and Dr Perez to review, thanks      Pt requesting Plaq refill for 90 days [facility] [address]  Last eye exam 9/17/24 scanned in media 10/17/24 after the last visit 10/7/24, I dont see that it was reviewed-no initials and I see no mentioning again of the Plaq therapy or that correct eye test done, the below paragraph was in Dr Perez last OV from 10/7/24 and no refill for Plaq was given at that visit. Last labs 12/30//24.     Per Chris last note--  Last eye exam was 9/15/2023 @ [facility] -diabetic screening-personally reviewed today again. Previously contacted ophthalmology office to discuss Plaquenil as there is no mention of HCQ use but no updated information shared.     Component      Latest Ref Rng 12/30/2024   WBC      4.3 - 11.1 K/uL 9.7    RBC      4.23 - 5.6 M/uL 3.49 (L)    Hemoglobin Quant      13.6 - 17.2 g/dL 10.9 (L)    Hematocrit      41.1 - 50.3 % 35.0 (L)    MCV      82 - 102 .3    MCH      26.1 - 32.9 PG 31.2    MCHC      31.4 - 35.0 g/dL 31.1 (L)    RDW      11.9 - 14.6 % 15.7 (H)    Platelet Count      150 - 450 K/uL 346    MPV      9.4 - 12.3 FL 10.2    Nucleated Red Blood Cells      0.0 - 0.2 K/uL 0.00    Differential Type        AUTOMATED    Neutrophils %      43 - 78 % 66    Lymphocyte %      13 - 44 % 16    Monocytes %      4.0 - 12.0 % 13 (H)    Eosinophils %      0.5 - 7.8 % 3    Basophils %      0.0 - 2.0 % 1    Immature Granulocytes %      0.0 - 5.0 % 1    Neutrophils Absolute      1.7 - 8.2 K/UL 6.4    Lymphocytes Absolute      0.5 - 4.6 K/UL 1.5    Monocytes Absolute      0.1 - 1.3 K/UL 1.3    Eosinophils Absolute      0.0 - 0.8 K/UL 0.3    Basophils Absolute      0.0 - 0.2 K/UL 0.1    Immature Granulocytes Absolute      0.0 - 0.5 K/UL 0.1    Sodium      136 - 145 mmol/L 139    Potassium      3.5 - 5.1 mmol/L 4.2    Chloride      98 - 107 mmol/L

## 2025-01-06 ENCOUNTER — OFFICE VISIT (OUTPATIENT)
Dept: UROLOGY | Age: 75
End: 2025-01-06
Payer: MEDICARE

## 2025-01-06 DIAGNOSIS — R32 URINARY INCONTINENCE, UNSPECIFIED TYPE: ICD-10-CM

## 2025-01-06 DIAGNOSIS — N40.1 BENIGN PROSTATIC HYPERPLASIA WITH NOCTURIA: Primary | ICD-10-CM

## 2025-01-06 DIAGNOSIS — R35.1 BENIGN PROSTATIC HYPERPLASIA WITH NOCTURIA: Primary | ICD-10-CM

## 2025-01-06 LAB
BILIRUBIN, URINE, POC: NEGATIVE
BLOOD URINE, POC: NEGATIVE
GLUCOSE URINE, POC: NEGATIVE MG/DL
KETONES, URINE, POC: NEGATIVE MG/DL
LEUKOCYTE ESTERASE, URINE, POC: NEGATIVE
NITRITE, URINE, POC: NEGATIVE
PH, URINE, POC: 5.5 (ref 4.6–8)
PROTEIN,URINE, POC: NORMAL MG/DL
SPECIFIC GRAVITY, URINE, POC: 1.02 (ref 1–1.03)
URINALYSIS CLARITY, POC: NORMAL
URINALYSIS COLOR, POC: NORMAL
UROBILINOGEN, POC: NORMAL MG/DL

## 2025-01-06 PROCEDURE — M1308 PR FLU IMMUNIZE NO ADMIN: HCPCS | Performed by: NURSE PRACTITIONER

## 2025-01-06 PROCEDURE — 1036F TOBACCO NON-USER: CPT | Performed by: NURSE PRACTITIONER

## 2025-01-06 PROCEDURE — 81003 URINALYSIS AUTO W/O SCOPE: CPT | Performed by: NURSE PRACTITIONER

## 2025-01-06 PROCEDURE — 3017F COLORECTAL CA SCREEN DOC REV: CPT | Performed by: NURSE PRACTITIONER

## 2025-01-06 PROCEDURE — 1123F ACP DISCUSS/DSCN MKR DOCD: CPT | Performed by: NURSE PRACTITIONER

## 2025-01-06 PROCEDURE — G8417 CALC BMI ABV UP PARAM F/U: HCPCS | Performed by: NURSE PRACTITIONER

## 2025-01-06 PROCEDURE — 99214 OFFICE O/P EST MOD 30 MIN: CPT | Performed by: NURSE PRACTITIONER

## 2025-01-06 PROCEDURE — G8428 CUR MEDS NOT DOCUMENT: HCPCS | Performed by: NURSE PRACTITIONER

## 2025-01-06 ASSESSMENT — ENCOUNTER SYMPTOMS
BACK PAIN: 0
NAUSEA: 0

## 2025-01-06 NOTE — PROGRESS NOTES
Activity: Inactive (10/15/2024)    Exercise Vital Sign     Days of Exercise per Week: 0 days     Minutes of Exercise per Session: 0 min   Stress: Not on file   Social Connections: Unknown (6/28/2024)    Received from [employer]    Social Connections     Frequency of Communication with Friends and Family: Not asked     Frequency of Social Gatherings with Friends and Family: Not asked   Intimate Partner Violence: Unknown (6/28/2024)    Received from [employer]    Intimate Partner Violence     Fear of Current or Ex-Partner: Not asked     Emotionally Abused: Not asked     Physically Abused: Not asked     Sexually Abused: Not asked   Housing Stability: Unknown (10/15/2024)    Housing Stability Vital Sign     Unable to Pay for Housing in the Last Year: Not on file     Number of Times Moved in the Last Year: Not on file     Homeless in the Last Year: No     Family History   Problem Relation Age of Onset    Hypertension Mother     Osteoarthritis Mother     Osteoarthritis Father     Hypertension Father     Diabetes Father     Lung Cancer Father     Hypertension Sister     Hypertension Brother     Hypertension Daughter     Hypertension Son     Other Son         esophagus stretched    Hypertension Son     Graves Disease Son        Review of Systems  Constitutional:   Negative for fever.  GI:  Negative for nausea.  Musculoskeletal:  Negative for back pain.      Urinalysis  UA - Dipstick  Results for orders placed or performed in visit on 01/06/25   AMB POC URINALYSIS DIP STICK AUTO W/O MICRO    Collection Time: 01/06/25  9:41 AM   Result Value Ref Range    Color (UA POC)      Clarity (UA POC)      Glucose, Urine, POC Negative Negative mg/dL    Bilirubin, Urine, POC Negative Negative    KETONES, Urine, POC Negative Negative mg/dL    Specific Gravity, Urine, POC 1.025 1.001 - 1.035    Blood (UA POC) Negative     pH, Urine, POC 5.5 4.6 - 8.0    Protein, Urine, POC Trace Negative mg/dL

## 2025-01-07 ENCOUNTER — OFFICE VISIT (OUTPATIENT)
Dept: RHEUMATOLOGY | Age: 75
End: 2025-01-07
Payer: MEDICARE

## 2025-01-07 VITALS
HEART RATE: 68 BPM | SYSTOLIC BLOOD PRESSURE: 112 MMHG | WEIGHT: 195 LBS | DIASTOLIC BLOOD PRESSURE: 64 MMHG | OXYGEN SATURATION: 98 % | BODY MASS INDEX: 27.3 KG/M2 | RESPIRATION RATE: 12 BRPM | HEIGHT: 71 IN

## 2025-01-07 DIAGNOSIS — Z79.899 ON SULFASALAZINE THERAPY: ICD-10-CM

## 2025-01-07 DIAGNOSIS — D64.9 NORMOCYTIC ANEMIA: ICD-10-CM

## 2025-01-07 DIAGNOSIS — M17.0 BILATERAL PRIMARY OSTEOARTHRITIS OF KNEE: Primary | ICD-10-CM

## 2025-01-07 DIAGNOSIS — Z79.899 LONG-TERM USE OF PLAQUENIL: ICD-10-CM

## 2025-01-07 DIAGNOSIS — M06.00 SERONEGATIVE RHEUMATOID ARTHRITIS (HCC): ICD-10-CM

## 2025-01-07 DIAGNOSIS — Z79.631 LONG TERM METHOTREXATE USER: ICD-10-CM

## 2025-01-07 PROCEDURE — 3078F DIAST BP <80 MM HG: CPT | Performed by: INTERNAL MEDICINE

## 2025-01-07 PROCEDURE — 1159F MED LIST DOCD IN RCRD: CPT | Performed by: INTERNAL MEDICINE

## 2025-01-07 PROCEDURE — G2211 COMPLEX E/M VISIT ADD ON: HCPCS | Performed by: INTERNAL MEDICINE

## 2025-01-07 PROCEDURE — 3017F COLORECTAL CA SCREEN DOC REV: CPT | Performed by: INTERNAL MEDICINE

## 2025-01-07 PROCEDURE — 3074F SYST BP LT 130 MM HG: CPT | Performed by: INTERNAL MEDICINE

## 2025-01-07 PROCEDURE — G8427 DOCREV CUR MEDS BY ELIG CLIN: HCPCS | Performed by: INTERNAL MEDICINE

## 2025-01-07 PROCEDURE — 1036F TOBACCO NON-USER: CPT | Performed by: INTERNAL MEDICINE

## 2025-01-07 PROCEDURE — M1308 PR FLU IMMUNIZE NO ADMIN: HCPCS | Performed by: INTERNAL MEDICINE

## 2025-01-07 PROCEDURE — 1160F RVW MEDS BY RX/DR IN RCRD: CPT | Performed by: INTERNAL MEDICINE

## 2025-01-07 PROCEDURE — G8417 CALC BMI ABV UP PARAM F/U: HCPCS | Performed by: INTERNAL MEDICINE

## 2025-01-07 PROCEDURE — 1123F ACP DISCUSS/DSCN MKR DOCD: CPT | Performed by: INTERNAL MEDICINE

## 2025-01-07 PROCEDURE — 99215 OFFICE O/P EST HI 40 MIN: CPT | Performed by: INTERNAL MEDICINE

## 2025-01-07 RX ORDER — HYDROXYCHLOROQUINE SULFATE 200 MG/1
200 TABLET, FILM COATED ORAL 2 TIMES DAILY
Qty: 180 TABLET | Refills: 1 | Status: SHIPPED | OUTPATIENT
Start: 2025-01-07

## 2025-01-07 RX ORDER — METHOTREXATE 2.5 MG/1
25 TABLET ORAL WEEKLY
Qty: 120 TABLET | Refills: 0 | Status: SHIPPED | OUTPATIENT
Start: 2025-01-07

## 2025-01-07 RX ORDER — SULFASALAZINE 500 MG/1
1500 TABLET, DELAYED RELEASE ORAL 2 TIMES DAILY
Qty: 520 TABLET | Refills: 0 | Status: SHIPPED | OUTPATIENT
Start: 2025-01-07

## 2025-01-07 ASSESSMENT — ROUTINE ASSESSMENT OF PATIENT INDEX DATA (RAPID3)
ON A SCALE OF ONE TO TEN, HOW DIFFICULT WAS IT FOR YOU TO COMPLETE THE LISTED DAILY PHYSICAL TASKS OVER THE LAST WEEK: 0.9
ON A SCALE OF ONE TO TEN, CONSIDERING ALL THE WAYS IN WHICH ILLNESS AND HEALTH CONDITIONS MAY AFFECT YOU AT THIS TIME, PLEASE INDICATE BELOW HOW YOU ARE DOING:: 5
WHEN YOU AWAKENED IN THE MORNING OVER THE LAST WEEK, PLEASE INDICATE THE AMOUNT OF TIME IT TAKES UNTIL YOU ARE AS LIMBER AS YOU WILL BE FOR THE DAY: 30 MIN
ON A SCALE OF ONE TO TEN, HOW MUCH PAIN HAVE YOU HAD BECAUSE OF YOUR CONDITION OVER THE PAST WEEK?: 6
ON A SCALE OF ONE TO TEN, HOW MUCH OF A PROBLEM HAS UNUSUAL FATIGUE OR TIREDNESS BEEN FOR YOU OVER THE PAST WEEK?: 6

## 2025-01-07 NOTE — PATIENT INSTRUCTIONS
Ask about Dr. Mackay or Frances or Jace    Anti-inflammatory diet  Turmeric  Fish oil  Medial  knee brace  Glucosamine and chondroitin - 3 mo trial    Slow fe -try daily    Celine Anand MA

## 2025-01-07 NOTE — PROGRESS NOTES
1/7/25      SUBJECTIVE:  Michael A. Paduano is a 74 y.o.male that is here for follow-up of:  Seronegative rheumatoid arthritis Dx ~2016  Erosive osteoarthritis   MTX, HCQ (started Oct 2021)   Simponi aria - started Aug 2023 - May 2023  Failed: gely segura - improvement in pain but not swelling - changed due to cost/convenience   Knee osteoarthritis - gelsyn injection bilaterally July & Aug 2023   Genicular nerve ablation - 2024    PMH:  - CAD s/p CABG Dec 2023  - CHF EF 45-50%  - R DVT - 2020 - ongoing coumadin use  - Graves s/p ablation  - HTN HL DM2  - S/p R toe amputation on 7/20/2023 for osteomyelitis.   -Peptic ulcer disease   Dx: rheumatoid arthritis ~2015.  ---  Last OV 10/7/2024. Continuing to see Dr. Horta 1/3/25 - planning to see Dr. Uribe to review further toe amputation R big toe. Maintained on  MTX due to osteomyelitis L toe. Maintained on HCQ, SSZ. Maintained off colchicine due to diarrhea. Resumed mtx about 1 week.     Had genicular nerve injection L knee which wasn't helped. Major complaint is in bilateral knee pain - this is worse than ever.    Am stiffness 30 mins  Next ophtho Sept 15 2024 with Dr. Bhatt    Reviewed labs 9-    Bilateral knee pain - seeing Dr. Fowler 24h/day now - improvement with activity but no change with time of day.  Experiences cracking noises that are audible to patient and wife, at times has locking episodes.  Reviewed x-rays from 7- showing IMPRESSION:  Severe degenerative changes noted at both knees more pronounced at the medial tibiofemoral compartments.  Pain is significant enough now that patient is willing to consider knee replacement.    No major swelling    No other infection/fevers     Denies side effects to medications    Last eye exam was 9/15/2023 @ [facility] -diabetic screening-personally reviewed today again.  Previously contacted ophthalmology office to discuss Plaquenil as there is no mention of HCQ use but no updated

## 2025-01-08 RX ORDER — SERTRALINE HYDROCHLORIDE 100 MG/1
100 TABLET, FILM COATED ORAL DAILY
Qty: 90 TABLET | Refills: 1 | Status: SHIPPED | OUTPATIENT
Start: 2025-01-08

## 2025-01-08 NOTE — TELEPHONE ENCOUNTER
Requested Prescriptions     Signed Prescriptions Disp Refills    sertraline (ZOLOFT) 100 MG tablet 90 tablet 1     Sig: Take 1 tablet by mouth daily     Authorizing Provider: PADUANO, JULIA S

## 2025-01-13 ENCOUNTER — TELEPHONE (OUTPATIENT)
Dept: RHEUMATOLOGY | Age: 75
End: 2025-01-13

## 2025-01-13 NOTE — TELEPHONE ENCOUNTER
Pt called and states he was taken off of sulfasalazine last year and it was refilled last week, he would like to know if he needs to start taking it again

## 2025-01-15 NOTE — TELEPHONE ENCOUNTER
Spoke to pt and he said he will start taking it again     Katia Perez MD  You       I do not see where sulfasalazine was ever discontinued from my previous notes.  I would recommend resuming this.

## 2025-01-17 ENCOUNTER — HOSPITAL ENCOUNTER (OUTPATIENT)
Dept: WOUND CARE | Age: 75
Discharge: HOME | End: 2025-01-17
Payer: MEDICARE

## 2025-01-17 VITALS
BODY MASS INDEX: 27.16 KG/M2 | HEART RATE: 64 BPM | WEIGHT: 194 LBS | SYSTOLIC BLOOD PRESSURE: 141 MMHG | HEIGHT: 71 IN | DIASTOLIC BLOOD PRESSURE: 73 MMHG

## 2025-01-17 DIAGNOSIS — E11.621 DIABETIC ULCER OF TOE OF RIGHT FOOT ASSOCIATED WITH TYPE 2 DIABETES MELLITUS, WITH FAT LAYER EXPOSED (HCC): Primary | Chronic | ICD-10-CM

## 2025-01-17 DIAGNOSIS — L97.512 DIABETIC ULCER OF TOE OF RIGHT FOOT ASSOCIATED WITH TYPE 2 DIABETES MELLITUS, WITH FAT LAYER EXPOSED (HCC): Primary | Chronic | ICD-10-CM

## 2025-01-17 PROCEDURE — 11042 DBRDMT SUBQ TIS 1ST 20SQCM/<: CPT

## 2025-01-17 NOTE — FLOWSHEET NOTE
01/17/25 0916   Wound 09/25/24 Toe (Comment  which one) Right #1 right great toe distal tip   Date First Assessed/Time First Assessed: 09/25/24 0923   Present on Original Admission: Yes  Wound Approximate Age at First Assessment (Weeks): 12 weeks  Primary Wound Type: Diabetic Ulcer  Location: Toe (Comment  which one)  Wound Location Orientatio...   Wound Image     Wound Etiology Diabetic Bruno 2   Dressing Status Intact   Wound Cleansed Cleansed with saline;Vashe   Dressing/Treatment Xeroform   Offloading for Diabetic Foot Ulcers Diabetic shoes/inserts   Wound Length (cm) 0.3 cm   Wound Width (cm) 0.3 cm   Wound Depth (cm) 0.2 cm   Wound Surface Area (cm^2) 0.09 cm^2   Change in Wound Size % (l*w) -350   Wound Volume (cm^3) 0.018 cm^3   Wound Healing % -800   Post-Procedure Length (cm) 0.2 cm   Post-Procedure Width (cm) 0.2 cm   Post-Procedure Depth (cm) 0.1 cm   Post-Procedure Surface Area (cm^2) 0.04 cm^2   Post-Procedure Volume (cm^3) 0.004 cm^3   Wound Assessment Pink/red   Drainage Amount Scant (moist but unmeasurable)   Drainage Description Serosanguinous   Odor None   Sharron-wound Assessment Hyperkeratosis (callous);Hemosiderin staining (brown yellow);Edematous   Wound Thickness Description not for Pressure Injury Full thickness     Post debridment

## 2025-01-17 NOTE — FLOWSHEET NOTE
01/17/25 0916   Wound 09/25/24 Toe (Comment  which one) Right #1 right great toe distal tip   Date First Assessed/Time First Assessed: 09/25/24 0923   Present on Original Admission: Yes  Wound Approximate Age at First Assessment (Weeks): 12 weeks  Primary Wound Type: Diabetic Ulcer  Location: Toe (Comment  which one)  Wound Location Orientatio...   Wound Image    Wound Etiology Diabetic Bruno 2   Dressing Status Intact   Wound Cleansed Cleansed with saline;Vashe   Dressing/Treatment Xeroform   Offloading for Diabetic Foot Ulcers Diabetic shoes/inserts   Wound Length (cm) 0.3 cm   Wound Width (cm) 0.3 cm   Wound Depth (cm) 0.2 cm   Wound Surface Area (cm^2) 0.09 cm^2   Change in Wound Size % (l*w) -350   Wound Volume (cm^3) 0.018 cm^3   Wound Healing % -800   Wound Assessment Pink/red   Drainage Amount Scant (moist but unmeasurable)   Drainage Description Serosanguinous   Odor None   Sharron-wound Assessment Hyperkeratosis (callous);Hemosiderin staining (brown yellow);Edematous   Wound Thickness Description not for Pressure Injury Full thickness

## 2025-01-17 NOTE — WOUND CARE
Discharge Instructions for  [facility]  [address]  [address]  [place] [address]  Phone [phone]   Fax [phone]      NAME:  Michael Archie Paduano III  YOB: 1950  MEDICAL RECORD NUMBER:  [id number]  DATE:  1/17/2025    Return Appointment:   2 weeks with Chapo Horta DO    Instructions:   Right Great Toe   Cleanse with Normal Saline  Vashe post debridement in wound clinic and with dressing changes; apply gauze moistened gauze to wound bed for 1 minute  Xeroform- apply to wound bed.  May cover with gauze / bandaid   Wrap with Rolled Gauze as needed   Dressing change DAILY    Silver Nitrate used this visit.      Vaseline daily to dry aspects of feet, especially distal aspects of toes.    Referral placed for Dr. Uribe for surgery.     Patient to offload wound with DIABETIC SHOES & CUSTOM INSERTS while standing or weight bearing.    Bilateral Compression stockings 15-20mmHg to reduce lower leg swelling for optimal wound healing.     Wound healing is greatly slowed when blood glucose levels are greater than 200. Monitor glucose levels daily to ensure tight glucose control. Increase protein to 100g daily for optimal wound healing. Aim for 30g protein per shake, two shakes a day.  Protein:   Egg ~ 6g  Yogurt ~ 15g  Half cup of cottage cheese ~ 15g  Chicken breast ~ 30g    Follow up with PCP if your glucose levels are frequently greater than 200.  Increase protein intake to promote wound healing. Protein supplements such as Wilfrido and Ensure are great options. Goal: Protein 100 gm daily.     Should you experience increased redness, swelling, pain, foul odor, size of wound(s), or have a temperature over 101 degrees please contact the [facility] Healing Center at [phone] or if after hours contact your primary care physician or go to the hospital emergency department.    PLEASE NOTE: IF YOU ARE UNABLE TO OBTAIN WOUND SUPPLIES, CONTINUE TO USE THE SUPPLIES YOU HAVE

## 2025-01-17 NOTE — DISCHARGE INSTRUCTIONS
Discharge Instructions for  [facility]  [address] [place] [address]  Phone [phone]   Fax [phone]      NAME:  Michael Archie Paduano III  YOB: 1950  MEDICAL RECORD NUMBER:  [id number]  DATE:  @ED@    Return Appointment:   2 weeks with Chapo Horta DO      Instructions: ***        Should you experience increased redness, swelling, pain, foul odor, size of wound(s), or have a temperature over 101 degrees please contact the [facility] Healing Center at [phone] or if after hours contact your primary care physician or go to the hospital emergency department.    PLEASE NOTE: IF YOU ARE UNABLE TO OBTAIN WOUND SUPPLIES, CONTINUE TO USE THE SUPPLIES YOU HAVE AVAILABLE UNTIL YOU ARE ABLE TO REACH US. IT IS MOST IMPORTANT TO KEEP THE WOUND COVERED AT ALL TIMES.    Electronically signed Cesia Salmeron RN on 1/17/2025 at 9:43 AM

## 2025-01-18 ENCOUNTER — TELEPHONE (OUTPATIENT)
Dept: INTERNAL MEDICINE CLINIC | Facility: CLINIC | Age: 75
End: 2025-01-18

## 2025-01-18 DIAGNOSIS — E78.2 MIXED HYPERLIPIDEMIA: ICD-10-CM

## 2025-01-18 DIAGNOSIS — I10 PRIMARY HYPERTENSION: ICD-10-CM

## 2025-01-18 DIAGNOSIS — D64.9 NORMOCYTIC ANEMIA: ICD-10-CM

## 2025-01-18 DIAGNOSIS — E89.0 HYPOTHYROIDISM FOLLOWING RADIOIODINE THERAPY: ICD-10-CM

## 2025-01-18 DIAGNOSIS — E08.65 DIABETES MELLITUS DUE TO UNDERLYING CONDITION WITH HYPERGLYCEMIA, WITHOUT LONG-TERM CURRENT USE OF INSULIN (HCC): Primary | ICD-10-CM

## 2025-01-18 NOTE — TELEPHONE ENCOUNTER
Orders Placed This Encounter   Procedures    Ferritin     Standing Status:   Future     Standing Expiration Date:   1/18/2026    Iron and TIBC     Standing Status:   Future     Standing Expiration Date:   1/18/2026    Lipid Panel     Standing Status:   Future     Standing Expiration Date:   1/18/2026    Comprehensive Metabolic Panel     Standing Status:   Future     Standing Expiration Date:   1/18/2026    TSH     Standing Status:   Future     Standing Expiration Date:   1/18/2026    Hemoglobin A1C     Standing Status:   Future     Standing Expiration Date:   1/18/2026    Albumin/Creatinine Ratio, Urine     Standing Status:   Future     Standing Expiration Date:   1/18/2026    CBC with Auto Differential     Standing Status:   Future     Standing Expiration Date:   1/18/2026    Vitamin B12     Standing Status:   Future     Standing Expiration Date:   1/18/2026     Labs for upcoming appt.    Julia S Paduano, APRN - CNP

## 2025-01-20 ENCOUNTER — TELEPHONE (OUTPATIENT)
Dept: ORTHOPEDIC SURGERY | Age: 75
End: 2025-01-20

## 2025-01-20 ENCOUNTER — TELEPHONE (OUTPATIENT)
Dept: VASCULAR SURGERY | Age: 75
End: 2025-01-20

## 2025-01-20 NOTE — TELEPHONE ENCOUNTER
He saw FRANKLINJARETH before for his toe. The podiatrist thinks he needs to have the rest of the toe removed. He is a diabetic. Will YASMIN see him again? They were told YASMIN does not do diabetic feet surgery. They would like to see YASMIN. They are trying to find out if he needs to see the vascular doctor before seeing POA. Please call his wife at [phone].

## 2025-01-20 NOTE — TELEPHONE ENCOUNTER
Pt  need rest of toe amputation per Podiatrist      **per Mony NP he needs another u/s LE arterial and see DTW    -informed her of this and someone from our office will call to florentin these appt

## 2025-01-21 ENCOUNTER — OFFICE VISIT (OUTPATIENT)
Dept: VASCULAR SURGERY | Age: 75
End: 2025-01-21
Payer: MEDICARE

## 2025-01-21 VITALS
DIASTOLIC BLOOD PRESSURE: 78 MMHG | HEART RATE: 74 BPM | SYSTOLIC BLOOD PRESSURE: 158 MMHG | BODY MASS INDEX: 27.44 KG/M2 | OXYGEN SATURATION: 98 % | HEIGHT: 71 IN | WEIGHT: 196 LBS

## 2025-01-21 DIAGNOSIS — I73.9 PVD (PERIPHERAL VASCULAR DISEASE) WITH CLAUDICATION (HCC): Primary | ICD-10-CM

## 2025-01-21 PROCEDURE — 99204 OFFICE O/P NEW MOD 45 MIN: CPT | Performed by: SURGERY

## 2025-01-21 PROCEDURE — 3077F SYST BP >= 140 MM HG: CPT | Performed by: SURGERY

## 2025-01-21 PROCEDURE — G2211 COMPLEX E/M VISIT ADD ON: HCPCS | Performed by: SURGERY

## 2025-01-21 PROCEDURE — 1159F MED LIST DOCD IN RCRD: CPT | Performed by: SURGERY

## 2025-01-21 PROCEDURE — 1123F ACP DISCUSS/DSCN MKR DOCD: CPT | Performed by: SURGERY

## 2025-01-21 PROCEDURE — G8427 DOCREV CUR MEDS BY ELIG CLIN: HCPCS | Performed by: SURGERY

## 2025-01-21 PROCEDURE — G8417 CALC BMI ABV UP PARAM F/U: HCPCS | Performed by: SURGERY

## 2025-01-21 PROCEDURE — 3078F DIAST BP <80 MM HG: CPT | Performed by: SURGERY

## 2025-01-21 PROCEDURE — 3017F COLORECTAL CA SCREEN DOC REV: CPT | Performed by: SURGERY

## 2025-01-21 PROCEDURE — 1036F TOBACCO NON-USER: CPT | Performed by: SURGERY

## 2025-01-21 NOTE — PROGRESS NOTES
interphalangeal joint, right second proximal interphalangeal resection arthroplasty and metatarsophalangeal capsulotomy with angular soft tissue correction performed by Carlito Willett III, MD at  [facility]    TONSILLECTOMY      TRANSESOPHAGEAL ECHOCARDIOGRAM N/A 2023    TRANSESOPHAGEAL ECHOCARDIOGRAM performed by Shlomo Winters MD at  [facility]     Social History     Tobacco Use    Smoking status: Former     Current packs/day: 0.00     Average packs/day: 4.5 packs/day for 25.0 years (112.5 ttl pk-yrs)     Types: Cigarettes     Start date:      Quit date: 2013     Years since quittin.0    Smokeless tobacco: Never    Tobacco comments:     Quit smoking: former smoker quit    Substance Use Topics    Alcohol use: No        Review of Systems  Constitutional: Negative for fever and chills.   HENT: Negative for congestion and sore throat.    Skin: Negative for rash and itching.  Eyes: Negative for blurred vision and double vision.   Respiratory: Negative for cough and shortness of breath.    Cardiovascular: Negative for chest pain, palpitations, claudication and leg swelling.   Gastrointestinal: Negative for nausea, vomiting and abdominal pain.   Genitourinary: Negative for dysuria, hematuria and flank pain.  Musculoskeletal: Negative for joint pain and falls.  Neurological: Negative for dizziness, sensory change, focal weakness, loss of consciousness and headaches.     PHYSICAL EXAM   [unfilled]     Constitutional: he appears well-developed. No distress.   HENT: Hearing intact.  Head: Atraumatic.   Eyes: Pupils are equal, round, and reactive to light.   Neck: Normal range of motion.   Cardiovascular: Regular rhythm.    Pulmonary/Chest: Effort normal and breath sounds normal. No respiratory distress.   Abdominal: Soft. Bowel sounds are normal. he exhibits no distension. There is no tenderness. There is no guarding. No hernia.   Musculoskeletal: Normal range of motion.   Neurological: He is alert. CN

## 2025-01-21 NOTE — TELEPHONE ENCOUNTER
Spoke with Neeta to let her know that  would be happy to see Jose tomorrow. She stated the vascular doctor said his toe was good and he didn't need to follow up. She will call us if they need our assistance in the future.

## 2025-01-29 ENCOUNTER — TELEPHONE (OUTPATIENT)
Dept: WOUND CARE | Age: 75
End: 2025-01-29

## 2025-01-29 DIAGNOSIS — E11.621 DIABETIC ULCER OF TOE OF RIGHT FOOT ASSOCIATED WITH TYPE 2 DIABETES MELLITUS, WITH FAT LAYER EXPOSED (HCC): Primary | Chronic | ICD-10-CM

## 2025-01-29 DIAGNOSIS — L97.512 DIABETIC ULCER OF TOE OF RIGHT FOOT ASSOCIATED WITH TYPE 2 DIABETES MELLITUS, WITH FAT LAYER EXPOSED (HCC): Primary | Chronic | ICD-10-CM

## 2025-01-29 NOTE — TELEPHONE ENCOUNTER
Patient's wife called and asked if appointment should be kept this coming Friday post vascular appointment. Patient instruct to attend wound center appointment this Friday.    ambulatory

## 2025-01-31 ENCOUNTER — TELEPHONE (OUTPATIENT)
Dept: ORTHOPEDIC SURGERY | Age: 75
End: 2025-01-31

## 2025-01-31 ENCOUNTER — HOSPITAL ENCOUNTER (OUTPATIENT)
Dept: WOUND CARE | Age: 75
Discharge: HOME | End: 2025-01-31
Payer: MEDICARE

## 2025-01-31 VITALS — TEMPERATURE: 96.7 F | RESPIRATION RATE: 16 BRPM | WEIGHT: 196 LBS | HEIGHT: 71 IN | BODY MASS INDEX: 27.44 KG/M2

## 2025-01-31 DIAGNOSIS — E11.621 DIABETIC ULCER OF TOE OF RIGHT FOOT ASSOCIATED WITH TYPE 2 DIABETES MELLITUS, WITH FAT LAYER EXPOSED (HCC): Primary | ICD-10-CM

## 2025-01-31 DIAGNOSIS — L97.512 DIABETIC ULCER OF TOE OF RIGHT FOOT ASSOCIATED WITH TYPE 2 DIABETES MELLITUS, WITH FAT LAYER EXPOSED (HCC): Primary | ICD-10-CM

## 2025-01-31 PROCEDURE — 99212 OFFICE O/P EST SF 10 MIN: CPT

## 2025-01-31 RX ORDER — TRIAMCINOLONE ACETONIDE 1 MG/G
OINTMENT TOPICAL ONCE
OUTPATIENT
Start: 2025-01-31 | End: 2025-01-31

## 2025-01-31 RX ORDER — LIDOCAINE 50 MG/G
OINTMENT TOPICAL ONCE
OUTPATIENT
Start: 2025-01-31 | End: 2025-01-31

## 2025-01-31 RX ORDER — NEOMYCIN/BACITRACIN/POLYMYXINB 3.5-400-5K
OINTMENT (GRAM) TOPICAL ONCE
OUTPATIENT
Start: 2025-01-31 | End: 2025-01-31

## 2025-01-31 RX ORDER — BETAMETHASONE DIPROPIONATE 0.5 MG/G
CREAM TOPICAL ONCE
OUTPATIENT
Start: 2025-01-31 | End: 2025-01-31

## 2025-01-31 RX ORDER — GINSENG 100 MG
CAPSULE ORAL ONCE
OUTPATIENT
Start: 2025-01-31 | End: 2025-01-31

## 2025-01-31 RX ORDER — SILVER SULFADIAZINE 10 MG/G
CREAM TOPICAL ONCE
OUTPATIENT
Start: 2025-01-31 | End: 2025-01-31

## 2025-01-31 RX ORDER — BACITRACIN ZINC AND POLYMYXIN B SULFATE 500; 1000 [USP'U]/G; [USP'U]/G
OINTMENT TOPICAL ONCE
OUTPATIENT
Start: 2025-01-31 | End: 2025-01-31

## 2025-01-31 RX ORDER — LIDOCAINE HYDROCHLORIDE 20 MG/ML
JELLY TOPICAL ONCE
OUTPATIENT
Start: 2025-01-31 | End: 2025-01-31

## 2025-01-31 RX ORDER — LIDOCAINE 40 MG/G
CREAM TOPICAL ONCE
OUTPATIENT
Start: 2025-01-31 | End: 2025-01-31

## 2025-01-31 RX ORDER — SODIUM CHLOR/HYPOCHLOROUS ACID 0.033 %
SOLUTION, IRRIGATION IRRIGATION ONCE
OUTPATIENT
Start: 2025-01-31 | End: 2025-01-31

## 2025-01-31 RX ORDER — GENTAMICIN SULFATE 1 MG/G
OINTMENT TOPICAL ONCE
OUTPATIENT
Start: 2025-01-31 | End: 2025-01-31

## 2025-01-31 RX ORDER — LIDOCAINE HYDROCHLORIDE 20 MG/ML
JELLY TOPICAL ONCE
Status: COMPLETED | OUTPATIENT
Start: 2025-01-31 | End: 2025-01-31

## 2025-01-31 RX ORDER — LIDOCAINE HYDROCHLORIDE 40 MG/ML
SOLUTION TOPICAL ONCE
OUTPATIENT
Start: 2025-01-31 | End: 2025-01-31

## 2025-01-31 RX ORDER — MUPIROCIN 20 MG/G
OINTMENT TOPICAL ONCE
OUTPATIENT
Start: 2025-01-31 | End: 2025-01-31

## 2025-01-31 RX ORDER — CLOBETASOL PROPIONATE 0.5 MG/G
OINTMENT TOPICAL ONCE
OUTPATIENT
Start: 2025-01-31 | End: 2025-01-31

## 2025-01-31 RX ADMIN — LIDOCAINE HYDROCHLORIDE: 20 JELLY TOPICAL at 09:32

## 2025-01-31 NOTE — FLOWSHEET NOTE
01/31/25 0848   Right Leg Edema Point of Measurement   Leg circumference 35 cm   Ankle circumference 22 cm   Foot circumference 24 cm   Compression Therapy Compression stockings   Wound 09/25/24 Toe (Comment  which one) Right #1 right great toe distal tip   Date First Assessed/Time First Assessed: 09/25/24 0923   Present on Original Admission: Yes  Wound Approximate Age at First Assessment (Weeks): 12 weeks  Primary Wound Type: Diabetic Ulcer  Location: Toe (Comment  which one)  Wound Location Orientatio...   Wound Image    Wound Etiology Diabetic Bruno 2   Dressing Status Intact   Wound Cleansed Cleansed with saline;Vashe   Dressing/Treatment Xeroform   Offloading for Diabetic Foot Ulcers Diabetic shoes/inserts   Wound Length (cm) 0.3 cm   Wound Width (cm) 0.3 cm   Wound Depth (cm) 0 cm   Wound Surface Area (cm^2) 0.09 cm^2   Change in Wound Size % (l*w) -350   Wound Volume (cm^3) 0 cm^3   Wound Healing % 100   Post-Procedure Length (cm) 0.3 cm   Post-Procedure Width (cm) 0.3 cm   Post-Procedure Depth (cm) 0.1 cm   Post-Procedure Surface Area (cm^2) 0.09 cm^2   Post-Procedure Volume (cm^3) 0.009 cm^3   Wound Assessment Fibrin   Drainage Amount None (dry)   Odor None   Sharron-wound Assessment Hyperkeratosis (callous);Hemosiderin staining (brown yellow);Edematous   Wound Thickness Description not for Pressure Injury Full thickness   Pain Assessment   Pain Assessment None - Denies Pain

## 2025-01-31 NOTE — TELEPHONE ENCOUNTER
Patient saw a Vascular MD on 01/21. Wife would like to schedule a appt now. Note in chart. Please review and advise.

## 2025-01-31 NOTE — WOUND CARE
Discharge Instructions for  [facility]  [address]  [address]  [place] [address]  Phone [phone]   Fax [phone]      NAME:  Michael Archie Paduano III  YOB: 1950  MEDICAL RECORD NUMBER:  [id number]  DATE:  1/31/2025    Return Appointment:   2 weeks with Chapo Horta DO    Instructions:   Right Great Toe   Cleanse with Normal Saline  Vashe post debridement in wound clinic and with dressing changes; apply gauze moistened gauze to wound bed for 1 minute  Xeroform- apply to wound bed.  May cover with gauze / bandaid   Wrap with Rolled Gauze as needed   Dressing change DAILY      Vaseline daily to dry aspects of feet, especially distal aspects of toes.    Referral placed for Dr. Uribe for surgery. Call office and make an appointment.    Patient to offload wound with DIABETIC SHOES & CUSTOM INSERTS while standing or weight bearing.    Bilateral Compression stockings 15-20mmHg to reduce lower leg swelling for optimal wound healing.     Wound healing is greatly slowed when blood glucose levels are greater than 200. Monitor glucose levels daily to ensure tight glucose control. Increase protein to 100g daily for optimal wound healing. Aim for 30g protein per shake, two shakes a day.  Protein:   Egg ~ 6g  Yogurt ~ 15g  Half cup of cottage cheese ~ 15g  Chicken breast ~ 30g    Follow up with PCP if your glucose levels are frequently greater than 200.  Increase protein intake to promote wound healing. Protein supplements such as Wilfrido and Ensure are great options. Goal: Protein 100 gm daily.     Should you experience increased redness, swelling, pain, foul odor, size of wound(s), or have a temperature over 101 degrees please contact the [facility] Healing Center at [phone] or if after hours contact your primary care physician or go to the hospital emergency department.    PLEASE NOTE: IF YOU ARE UNABLE TO OBTAIN WOUND SUPPLIES, CONTINUE TO USE THE SUPPLIES YOU HAVE

## 2025-02-04 ENCOUNTER — TELEPHONE (OUTPATIENT)
Dept: RHEUMATOLOGY | Age: 75
End: 2025-02-04

## 2025-02-04 NOTE — TELEPHONE ENCOUNTER
Dr Perez' pt. Next ov 04/16/25 w/ Dr Santos and last ov was 01/07/25 w/ Dr Perez.     Pt left vm that he needs a refill for his Metformin sent to [facility].     Your PCP or Endo should be writing for this.

## 2025-02-04 NOTE — TELEPHONE ENCOUNTER
PHONE CALL to pt, advised them that I think that they should be getting the Metformin from the PCP or Endo if he has one.

## 2025-02-06 DIAGNOSIS — E08.65 DIABETES MELLITUS DUE TO UNDERLYING CONDITION WITH HYPERGLYCEMIA, WITHOUT LONG-TERM CURRENT USE OF INSULIN (HCC): ICD-10-CM

## 2025-02-06 NOTE — TELEPHONE ENCOUNTER
Requested Prescriptions     Signed Prescriptions Disp Refills    metFORMIN (GLUCOPHAGE) 1000 MG tablet 180 tablet 3     Sig: Take 1 tablet by mouth 2 times daily (with meals)     Authorizing Provider: PADUANO, JULIA S

## 2025-02-10 ENCOUNTER — OFFICE VISIT (OUTPATIENT)
Dept: ORTHOPEDIC SURGERY | Age: 75
End: 2025-02-10
Payer: MEDICARE

## 2025-02-10 DIAGNOSIS — M20.5X1 ACQUIRED CLAW TOE OF RIGHT FOOT: Primary | ICD-10-CM

## 2025-02-10 PROCEDURE — 1036F TOBACCO NON-USER: CPT | Performed by: ORTHOPAEDIC SURGERY

## 2025-02-10 PROCEDURE — 3017F COLORECTAL CA SCREEN DOC REV: CPT | Performed by: ORTHOPAEDIC SURGERY

## 2025-02-10 PROCEDURE — 1123F ACP DISCUSS/DSCN MKR DOCD: CPT | Performed by: ORTHOPAEDIC SURGERY

## 2025-02-10 PROCEDURE — 99214 OFFICE O/P EST MOD 30 MIN: CPT | Performed by: ORTHOPAEDIC SURGERY

## 2025-02-10 PROCEDURE — G8417 CALC BMI ABV UP PARAM F/U: HCPCS | Performed by: ORTHOPAEDIC SURGERY

## 2025-02-10 PROCEDURE — G8428 CUR MEDS NOT DOCUMENT: HCPCS | Performed by: ORTHOPAEDIC SURGERY

## 2025-02-10 NOTE — PROGRESS NOTES
Name: Michael Archie Paduano III  YOB: 1950  Gender: male  MRN: [id number]    Summary:   Right great toe amputation with toe tip wound, right third mallet toe, right fifth fixed claw toe deformity       CC: Follow-up (Right foot )       HPI: Michael Archie Paduano III is a 74 y.o. male who presents with Follow-up (Right foot )  .  This patient presents back to the office today with a longstanding history of right foot pain and deformity.  He seen multiple providers for his right foot after we did an IP joint amputation of the great toe.  He has been seen by wound care by podiatry as well on the side.  He has vascular studies which show reasonable flow on the side from vascular surgery.  His main complaints today are the deformity into the third toe which resulted after tenotomy that was performed in the office by podiatrist as well as his fixed fifth claw toe deformity.    History was obtained by Patient and his wife    ROS/Meds/PSH/PMH/FH/SH: I personally reviewed the patients standard intake form.  Below are the pertinents    Tobacco:  reports that he quit smoking about 12 years ago. His smoking use included cigarettes. He started smoking about 37 years ago. He has a 112.5 pack-year smoking history. He has never used smokeless tobacco.  Diabetes: None      Physical Examination:  Exam of the right lower extremity shows a palpable pulses with good warmth of the foot.  He has a fixed mallet toe deformity of the third after his tenotomy as well as a fixed fifth claw toe deformity with this side.  There is a very small ulceration at the tip of the great toe with no sign of ulceration deep nor sign of infection noted.      Imaging:   I independently interpreted an MRI ordered by a different physician, taken from an outside facility of the of the right foot which shows no evidence of osteomyelitis           EMERSON KIRKLAND III, MD           Assessment:   Right third toe mallet toe deformity status post

## 2025-02-11 ENCOUNTER — OFFICE VISIT (OUTPATIENT)
Age: 75
End: 2025-02-11
Payer: MEDICARE

## 2025-02-11 VITALS
DIASTOLIC BLOOD PRESSURE: 76 MMHG | SYSTOLIC BLOOD PRESSURE: 136 MMHG | HEIGHT: 71 IN | HEART RATE: 63 BPM | WEIGHT: 194 LBS | BODY MASS INDEX: 27.16 KG/M2

## 2025-02-11 DIAGNOSIS — I48.3 TYPICAL ATRIAL FLUTTER (HCC): ICD-10-CM

## 2025-02-11 DIAGNOSIS — I25.810 CORONARY ARTERY DISEASE INVOLVING CORONARY BYPASS GRAFT OF NATIVE HEART WITHOUT ANGINA PECTORIS: Primary | ICD-10-CM

## 2025-02-11 DIAGNOSIS — E78.2 MIXED HYPERLIPIDEMIA: ICD-10-CM

## 2025-02-11 DIAGNOSIS — D64.9 NORMOCYTIC ANEMIA: ICD-10-CM

## 2025-02-11 DIAGNOSIS — E89.0 HYPOTHYROIDISM FOLLOWING RADIOIODINE THERAPY: ICD-10-CM

## 2025-02-11 DIAGNOSIS — I10 PRIMARY HYPERTENSION: ICD-10-CM

## 2025-02-11 DIAGNOSIS — E08.65 DIABETES MELLITUS DUE TO UNDERLYING CONDITION WITH HYPERGLYCEMIA, WITHOUT LONG-TERM CURRENT USE OF INSULIN (HCC): ICD-10-CM

## 2025-02-11 DIAGNOSIS — Z95.1 S/P CABG X 3: ICD-10-CM

## 2025-02-11 DIAGNOSIS — I73.9 PAD (PERIPHERAL ARTERY DISEASE) (HCC): ICD-10-CM

## 2025-02-11 DIAGNOSIS — I10 ESSENTIAL HYPERTENSION: ICD-10-CM

## 2025-02-11 LAB
ALBUMIN SERPL-MCNC: 3.8 G/DL (ref 3.2–4.6)
ALBUMIN/GLOB SERPL: 1.2 (ref 1–1.9)
ALP SERPL-CCNC: 108 U/L (ref 40–129)
ALT SERPL-CCNC: 32 U/L (ref 8–55)
ANION GAP SERPL CALC-SCNC: 11 MMOL/L (ref 7–16)
AST SERPL-CCNC: 21 U/L (ref 15–37)
BASOPHILS # BLD: 0.15 K/UL (ref 0–0.2)
BASOPHILS NFR BLD: 1.4 % (ref 0–2)
BILIRUB SERPL-MCNC: 0.2 MG/DL (ref 0–1.2)
BUN SERPL-MCNC: 23 MG/DL (ref 8–23)
CALCIUM SERPL-MCNC: 10.2 MG/DL (ref 8.8–10.2)
CHLORIDE SERPL-SCNC: 108 MMOL/L (ref 98–107)
CHOLEST SERPL-MCNC: 86 MG/DL (ref 0–200)
CO2 SERPL-SCNC: 23 MMOL/L (ref 20–29)
CREAT SERPL-MCNC: 1.04 MG/DL (ref 0.8–1.3)
DIFFERENTIAL METHOD BLD: ABNORMAL
EOSINOPHIL # BLD: 0.25 K/UL (ref 0–0.8)
EOSINOPHIL NFR BLD: 2.3 % (ref 0.5–7.8)
ERYTHROCYTE [DISTWIDTH] IN BLOOD BY AUTOMATED COUNT: 14.6 % (ref 11.9–14.6)
EST. AVERAGE GLUCOSE BLD GHB EST-MCNC: 194 MG/DL
FERRITIN SERPL-MCNC: 60 NG/ML (ref 8–388)
GLOBULIN SER CALC-MCNC: 3.3 G/DL (ref 2.3–3.5)
GLUCOSE SERPL-MCNC: 186 MG/DL (ref 70–99)
HBA1C MFR BLD: 8.4 % (ref 0–5.6)
HCT VFR BLD AUTO: 39.2 % (ref 41.1–50.3)
HDLC SERPL-MCNC: 56 MG/DL (ref 40–60)
HDLC SERPL: 1.5 (ref 0–5)
HGB BLD-MCNC: 12.2 G/DL (ref 13.6–17.2)
IMM GRANULOCYTES # BLD AUTO: 0.04 K/UL (ref 0–0.5)
IMM GRANULOCYTES NFR BLD AUTO: 0.4 % (ref 0–5)
IRON SATN MFR SERPL: 18 % (ref 20–50)
IRON SERPL-MCNC: 65 UG/DL (ref 35–100)
LDLC SERPL CALC-MCNC: 17 MG/DL (ref 0–100)
LYMPHOCYTES # BLD: 1.62 K/UL (ref 0.5–4.6)
LYMPHOCYTES NFR BLD: 15.1 % (ref 13–44)
MCH RBC QN AUTO: 30.8 PG (ref 26.1–32.9)
MCHC RBC AUTO-ENTMCNC: 31.1 G/DL (ref 31.4–35)
MCV RBC AUTO: 99 FL (ref 82–102)
MONOCYTES # BLD: 0.95 K/UL (ref 0.1–1.3)
MONOCYTES NFR BLD: 8.9 % (ref 4–12)
NEUTS SEG # BLD: 7.7 K/UL (ref 1.7–8.2)
NEUTS SEG NFR BLD: 71.9 % (ref 43–78)
NRBC # BLD: 0 K/UL (ref 0–0.2)
PLATELET # BLD AUTO: 305 K/UL (ref 150–450)
PMV BLD AUTO: 10.1 FL (ref 9.4–12.3)
POTASSIUM SERPL-SCNC: 5.3 MMOL/L (ref 3.5–5.1)
PROT SERPL-MCNC: 7.1 G/DL (ref 6.3–8.2)
RBC # BLD AUTO: 3.96 M/UL (ref 4.23–5.6)
SODIUM SERPL-SCNC: 142 MMOL/L (ref 136–145)
TIBC SERPL-MCNC: 355 UG/DL (ref 240–450)
TRIGL SERPL-MCNC: 64 MG/DL (ref 0–150)
TSH, 3RD GENERATION: 1.28 UIU/ML (ref 0.27–4.2)
UIBC SERPL-MCNC: 290 UG/DL (ref 112–347)
VIT B12 SERPL-MCNC: 526 PG/ML (ref 193–986)
VLDLC SERPL CALC-MCNC: 13 MG/DL (ref 6–23)
WBC # BLD AUTO: 10.7 K/UL (ref 4.3–11.1)

## 2025-02-11 PROCEDURE — G8417 CALC BMI ABV UP PARAM F/U: HCPCS | Performed by: INTERNAL MEDICINE

## 2025-02-11 PROCEDURE — 1159F MED LIST DOCD IN RCRD: CPT | Performed by: INTERNAL MEDICINE

## 2025-02-11 PROCEDURE — 1123F ACP DISCUSS/DSCN MKR DOCD: CPT | Performed by: INTERNAL MEDICINE

## 2025-02-11 PROCEDURE — 1126F AMNT PAIN NOTED NONE PRSNT: CPT | Performed by: INTERNAL MEDICINE

## 2025-02-11 PROCEDURE — 3078F DIAST BP <80 MM HG: CPT | Performed by: INTERNAL MEDICINE

## 2025-02-11 PROCEDURE — 3017F COLORECTAL CA SCREEN DOC REV: CPT | Performed by: INTERNAL MEDICINE

## 2025-02-11 PROCEDURE — G8427 DOCREV CUR MEDS BY ELIG CLIN: HCPCS | Performed by: INTERNAL MEDICINE

## 2025-02-11 PROCEDURE — 3075F SYST BP GE 130 - 139MM HG: CPT | Performed by: INTERNAL MEDICINE

## 2025-02-11 PROCEDURE — 99214 OFFICE O/P EST MOD 30 MIN: CPT | Performed by: INTERNAL MEDICINE

## 2025-02-11 PROCEDURE — 1036F TOBACCO NON-USER: CPT | Performed by: INTERNAL MEDICINE

## 2025-02-11 PROCEDURE — 93000 ELECTROCARDIOGRAM COMPLETE: CPT | Performed by: INTERNAL MEDICINE

## 2025-02-11 ASSESSMENT — ENCOUNTER SYMPTOMS
WHEEZING: 0
HEMATOCHEZIA: 0
HEMOPTYSIS: 0
ABDOMINAL PAIN: 0
STRIDOR: 0
HEMATEMESIS: 0
EYE REDNESS: 0
HOARSE VOICE: 0
DOUBLE VISION: 0

## 2025-02-11 NOTE — PROGRESS NOTES
[facility]  [address] [place] [address], [address]  [place] [address]  PHONE: [phone]          25    NAME:  Michael Archie Paduano III  : 1950  MRN: [id number]         SUBJECTIVE:   Michael Archie Paduano III is a 74 y.o. male seen for a visit regarding the following:     Chief Complaint   Patient presents with    Coronary Artery Disease           HPI:    Cardiac problem list:  1.  Coronary artery disease-noted on CT scan of the chest and abdomen from 2023-extensive coronary calcifications  -Coronary angiography 2023 with multivessel disease- S/p LIMA to LAD, SVG to OM 1, SVG to OM 2 on 2023   Dyspnea on exertion  -NST-2023 with moderate reversible inferior ischemia, EF 53%  -Echo from 2023 showed an EF at 60 to 65% with moderate concentric LVH, diastolic dysfunction, mild MR, mildly dilated left atrium, mildly dilated aortic root at 4.2 cm  2.  Hypertension  3.  Hyperlipidemia  4.  Obstructive sleep apnea  5.  Type 2 diabetes mellitus  6.  History of DVT-right popliteal vein in   7.  Peripheral arterial disease-lost his right great toe-amputated  -PAD- Lower extremity arterial Doppler-2023-moderate to severe proximal right popliteal artery stenosis but with normal VANESA of 0.99.  Moderate left distal common femoral artery stenosis with VANESA of 1.5  8. Atrial flutter-cardioverted to sinus rhythm-2023  3-day Holter-2024-sinus rhythm with average heart rate of 73 bpm, 10 short runs of SVT-the longest lasting 10 beats, no evidence of atrial fibrillation or flutter-stop Eliquis  9. Rheumatoid arthritis    Dear Jasmyn,  I saw Mr. Paduano who is a pleasant 73-year-old gentleman in cardiovascular follow-up for coronary artery disease s/p CABG, postoperative atrial flutter with known hypertension, hyperlipidemia, PAD, STEFFEN and type 2 diabetes mellitus.    When we last met with him, we made no significant changes with his medical therapy . We previously

## 2025-02-13 ENCOUNTER — HOSPITAL ENCOUNTER (OUTPATIENT)
Dept: WOUND CARE | Age: 75
Discharge: HOME | End: 2025-02-13
Payer: MEDICARE

## 2025-02-13 VITALS — WEIGHT: 193 LBS | HEIGHT: 71 IN | BODY MASS INDEX: 27.02 KG/M2

## 2025-02-13 DIAGNOSIS — E11.621 DIABETIC ULCER OF TOE OF RIGHT FOOT ASSOCIATED WITH TYPE 2 DIABETES MELLITUS, WITH FAT LAYER EXPOSED (HCC): Primary | Chronic | ICD-10-CM

## 2025-02-13 DIAGNOSIS — L97.512 DIABETIC ULCER OF TOE OF RIGHT FOOT ASSOCIATED WITH TYPE 2 DIABETES MELLITUS, WITH FAT LAYER EXPOSED (HCC): Primary | Chronic | ICD-10-CM

## 2025-02-13 PROCEDURE — 11042 DBRDMT SUBQ TIS 1ST 20SQCM/<: CPT

## 2025-02-13 RX ORDER — TRIAMCINOLONE ACETONIDE 1 MG/G
OINTMENT TOPICAL ONCE
OUTPATIENT
Start: 2025-02-13 | End: 2025-02-13

## 2025-02-13 RX ORDER — CLOBETASOL PROPIONATE 0.5 MG/G
OINTMENT TOPICAL ONCE
OUTPATIENT
Start: 2025-02-13 | End: 2025-02-13

## 2025-02-13 RX ORDER — LIDOCAINE HYDROCHLORIDE 20 MG/ML
JELLY TOPICAL ONCE
OUTPATIENT
Start: 2025-02-13 | End: 2025-02-13

## 2025-02-13 RX ORDER — LIDOCAINE HYDROCHLORIDE 40 MG/ML
SOLUTION TOPICAL ONCE
OUTPATIENT
Start: 2025-02-13 | End: 2025-02-13

## 2025-02-13 RX ORDER — SODIUM CHLOR/HYPOCHLOROUS ACID 0.033 %
SOLUTION, IRRIGATION IRRIGATION ONCE
OUTPATIENT
Start: 2025-02-13 | End: 2025-02-13

## 2025-02-13 RX ORDER — MUPIROCIN 20 MG/G
OINTMENT TOPICAL ONCE
OUTPATIENT
Start: 2025-02-13 | End: 2025-02-13

## 2025-02-13 RX ORDER — LIDOCAINE 50 MG/G
OINTMENT TOPICAL ONCE
OUTPATIENT
Start: 2025-02-13 | End: 2025-02-13

## 2025-02-13 RX ORDER — BETAMETHASONE DIPROPIONATE 0.5 MG/G
CREAM TOPICAL ONCE
OUTPATIENT
Start: 2025-02-13 | End: 2025-02-13

## 2025-02-13 RX ORDER — LIDOCAINE 40 MG/G
CREAM TOPICAL ONCE
OUTPATIENT
Start: 2025-02-13 | End: 2025-02-13

## 2025-02-13 RX ORDER — GINSENG 100 MG
CAPSULE ORAL ONCE
OUTPATIENT
Start: 2025-02-13 | End: 2025-02-13

## 2025-02-13 RX ORDER — BACITRACIN ZINC AND POLYMYXIN B SULFATE 500; 1000 [USP'U]/G; [USP'U]/G
OINTMENT TOPICAL ONCE
OUTPATIENT
Start: 2025-02-13 | End: 2025-02-13

## 2025-02-13 RX ORDER — NEOMYCIN/BACITRACIN/POLYMYXINB 3.5-400-5K
OINTMENT (GRAM) TOPICAL ONCE
OUTPATIENT
Start: 2025-02-13 | End: 2025-02-13

## 2025-02-13 RX ORDER — LIDOCAINE HYDROCHLORIDE 20 MG/ML
JELLY TOPICAL ONCE
Status: COMPLETED | OUTPATIENT
Start: 2025-02-13 | End: 2025-02-13

## 2025-02-13 RX ORDER — GENTAMICIN SULFATE 1 MG/G
OINTMENT TOPICAL ONCE
OUTPATIENT
Start: 2025-02-13 | End: 2025-02-13

## 2025-02-13 RX ORDER — SILVER SULFADIAZINE 10 MG/G
CREAM TOPICAL ONCE
OUTPATIENT
Start: 2025-02-13 | End: 2025-02-13

## 2025-02-13 RX ADMIN — LIDOCAINE HYDROCHLORIDE: 20 JELLY TOPICAL at 10:39

## 2025-02-13 NOTE — FLOWSHEET NOTE
02/13/25 1025   Wound 09/25/24 Toe (Comment  which one) Right #1 right great toe distal tip   Date First Assessed/Time First Assessed: 09/25/24 0923   Present on Original Admission: Yes  Wound Approximate Age at First Assessment (Weeks): 12 weeks  Primary Wound Type: Diabetic Ulcer  Location: Toe (Comment  which one)  Wound Location Orientatio...   Wound Image     Wound Etiology Diabetic Bruno 2   Dressing Status Intact   Wound Cleansed Cleansed with saline;Vashe   Dressing/Treatment Xeroform   Offloading for Diabetic Foot Ulcers Diabetic shoes/inserts   Wound Length (cm) 0.2 cm   Wound Width (cm) 0.2 cm   Wound Depth (cm) 0.2 cm   Wound Surface Area (cm^2) 0.04 cm^2   Change in Wound Size % (l*w) -100   Wound Volume (cm^3) 0.008 cm^3   Wound Healing % -300   Post-Procedure Length (cm) 0.3 cm   Post-Procedure Width (cm) 0.3 cm   Post-Procedure Depth (cm) 0.2 cm   Post-Procedure Surface Area (cm^2) 0.09 cm^2   Post-Procedure Volume (cm^3) 0.018 cm^3   Wound Assessment Pink/red   Drainage Amount None (dry)   Odor None   Sharron-wound Assessment Hyperkeratosis (callous)   Wound Thickness Description not for Pressure Injury Full thickness     Post debridement

## 2025-02-13 NOTE — FLOWSHEET NOTE
02/13/25 1025   Wound 09/25/24 Toe (Comment  which one) Right #1 right great toe distal tip   Date First Assessed/Time First Assessed: 09/25/24 0923   Present on Original Admission: Yes  Wound Approximate Age at First Assessment (Weeks): 12 weeks  Primary Wound Type: Diabetic Ulcer  Location: Toe (Comment  which one)  Wound Location Orientatio...   Wound Image    Wound Etiology Diabetic Bruno 2   Dressing Status Intact   Wound Cleansed Cleansed with saline;Vashe   Dressing/Treatment Xeroform   Offloading for Diabetic Foot Ulcers Diabetic shoes/inserts   Wound Length (cm) 0.2 cm   Wound Width (cm) 0.2 cm   Wound Depth (cm) 0.2 cm   Wound Surface Area (cm^2) 0.04 cm^2   Change in Wound Size % (l*w) -100   Wound Volume (cm^3) 0.008 cm^3   Wound Healing % -300   Wound Assessment Pink/red   Drainage Amount None (dry)   Odor None   Sharron-wound Assessment Hyperkeratosis (callous)   Wound Thickness Description not for Pressure Injury Full thickness

## 2025-02-13 NOTE — WOUND CARE
Discharge Instructions for  [facility]  [address]  [address]  [place] [address]  Phone [phone]   Fax [phone]      NAME:  Michael Archie Paduano III  YOB: 1950  MEDICAL RECORD NUMBER:  [id number]  DATE:  2/13/2025    Return Appointment:   2 weeks with Chapo Horta DO    Instructions:   Right Great Toe   Cleanse with Normal Saline  Vashe post debridement in wound clinic and with dressing changes; apply gauze moistened gauze to wound bed for 1 minute  Xeroform- apply to wound bed.  May cover with gauze / bandaid   Wrap with Rolled Gauze as needed   Dressing change DAILY      Vaseline daily to dry aspects of feet, especially distal aspects of toes.        Patient to offload wound with DIABETIC SHOES & CUSTOM INSERTS while standing or weight bearing.    Bilateral Compression stockings 15-20mmHg to reduce lower leg swelling for optimal wound healing.     Wound healing is greatly slowed when blood glucose levels are greater than 200. Monitor glucose levels daily to ensure tight glucose control. Increase protein to 100g daily for optimal wound healing. Aim for 30g protein per shake, two shakes a day.  Protein:   Egg ~ 6g  Yogurt ~ 15g  Half cup of cottage cheese ~ 15g  Chicken breast ~ 30g    Follow up with PCP if your glucose levels are frequently greater than 200.  Increase protein intake to promote wound healing. Protein supplements such as Wilfrido and Ensure are great options. Goal: Protein 100 gm daily.     Should you experience increased redness, swelling, pain, foul odor, size of wound(s), or have a temperature over 101 degrees please contact the [facility] Healing Center at [phone] or if after hours contact your primary care physician or go to the hospital emergency department.    PLEASE NOTE: IF YOU ARE UNABLE TO OBTAIN WOUND SUPPLIES, CONTINUE TO USE THE SUPPLIES YOU HAVE AVAILABLE UNTIL YOU ARE ABLE TO REACH US. IT IS MOST IMPORTANT TO KEEP THE

## 2025-02-17 RX ORDER — HYDROXYCHLOROQUINE SULFATE 200 MG/1
TABLET, FILM COATED ORAL
Qty: 180 TABLET | Refills: 0 | OUTPATIENT
Start: 2025-02-17

## 2025-02-28 ENCOUNTER — HOSPITAL ENCOUNTER (OUTPATIENT)
Dept: WOUND CARE | Age: 75
Discharge: HOME | End: 2025-02-28
Payer: MEDICARE

## 2025-02-28 VITALS
WEIGHT: 194 LBS | SYSTOLIC BLOOD PRESSURE: 106 MMHG | HEART RATE: 66 BPM | DIASTOLIC BLOOD PRESSURE: 58 MMHG | HEIGHT: 71 IN | BODY MASS INDEX: 27.16 KG/M2

## 2025-02-28 DIAGNOSIS — E11.621 DIABETIC ULCER OF TOE OF RIGHT FOOT ASSOCIATED WITH TYPE 2 DIABETES MELLITUS, WITH FAT LAYER EXPOSED (HCC): Primary | Chronic | ICD-10-CM

## 2025-02-28 DIAGNOSIS — L97.512 DIABETIC ULCER OF TOE OF RIGHT FOOT ASSOCIATED WITH TYPE 2 DIABETES MELLITUS, WITH FAT LAYER EXPOSED (HCC): Primary | Chronic | ICD-10-CM

## 2025-02-28 PROCEDURE — 11042 DBRDMT SUBQ TIS 1ST 20SQCM/<: CPT

## 2025-02-28 NOTE — DISCHARGE INSTRUCTIONS
Discharge Instructions for  [facility]  [address]  [address]  [place] [address]  Phone [phone]   Fax [phone]      NAME:  Michael Archie Paduano III  YOB: 1950  MEDICAL RECORD NUMBER:  [id number]  DATE:  @ED@    Return Appointment:   1 week with Sherrie Rush NP      Instructions: Right Great Toe   Cleanse with Normal Saline  Vashe post debridement in wound clinic and with dressing changes; apply gauze moistened gauze to wound bed for 1 minute  Xeroform- apply to wound bed.  May cover with gauze / bandaid   Wrap with Rolled Gauze as needed   Dressing change DAILY       Vaseline daily to dry aspects of feet, especially distal aspects of toes.           Patient to offload wound with DIABETIC SHOES & CUSTOM INSERTS while standing or weight bearing.     Bilateral Compression stockings 15-20mmHg to reduce lower leg swelling for optimal wound healing.     Wound healing is greatly slowed when blood glucose levels are greater than 200. Monitor glucose levels daily to ensure tight glucose control. Increase protein to 100g daily for optimal wound healing. Aim for 30g protein per shake, two shakes a day.  Protein:   Egg ~ 6g  Yogurt ~ 15g  Half cup of cottage cheese ~ 15g  Chicken breast ~ 30g     Follow up with PCP if your glucose levels are frequently greater than 200.  Increase protein intake to promote wound healing. Protein supplements such as Wilfrido and Ensure are great options. Goal: Protein 100 gm daily.           Should you experience increased redness, swelling, pain, foul odor, size of wound(s), or have a temperature over 101 degrees please contact the Wound Healing Center at [phone] or if after hours contact your primary care physician or go to the hospital emergency department.    PLEASE NOTE: IF YOU ARE UNABLE TO OBTAIN WOUND SUPPLIES, CONTINUE TO USE THE SUPPLIES YOU HAVE AVAILABLE UNTIL YOU ARE ABLE TO REACH US. IT IS MOST IMPORTANT TO KEEP THE

## 2025-02-28 NOTE — WOUND CARE
Discharge Instructions for  [facility]  [address] [facility] [address] [facility]  [address]  [place] [address]  Phone [phone]   Fax [phone]      NAME:  Michael Archie Paduano III  YOB: 1950  MEDICAL RECORD NUMBER:  [id number]  DATE:  2/28/2025    Return Appointment:   1 week with Sherrie Rush NP    Instructions:   Right Great Toe   Cleanse with Normal Saline  Vashe post debridement in wound clinic and with dressing changes; apply gauze moistened gauze to wound bed for 1 minute  Xeroform- apply to wound bed.  May cover with gauze / bandaid   Wrap with Rolled Gauze as needed   Dressing change DAILY      Vaseline daily to dry aspects of feet, especially distal aspects of toes.        Patient to offload wound with DIABETIC SHOES & CUSTOM INSERTS while standing or weight bearing.    Bilateral Compression stockings 15-20mmHg to reduce lower leg swelling for optimal wound healing.     Wound healing is greatly slowed when blood glucose levels are greater than 200. Monitor glucose levels daily to ensure tight glucose control. Increase protein to 100g daily for optimal wound healing. Aim for 30g protein per shake, two shakes a day.  Protein:   Egg ~ 6g  Yogurt ~ 15g  Half cup of cottage cheese ~ 15g  Chicken breast ~ 30g    Follow up with PCP if your glucose levels are frequently greater than 200.  Increase protein intake to promote wound healing. Protein supplements such as Wilfrido and Ensure are great options. Goal: Protein 100 gm daily.     Should you experience increased redness, swelling, pain, foul odor, size of wound(s), or have a temperature over 101 degrees please contact the Wound Healing Center at [phone] or if after hours contact your primary care physician or go to the hospital emergency department.    PLEASE NOTE: IF YOU ARE UNABLE TO OBTAIN WOUND SUPPLIES, CONTINUE TO USE THE SUPPLIES YOU HAVE AVAILABLE UNTIL YOU ARE ABLE TO REACH US. IT IS MOST IMPORTANT TO KEEP THE WOUND

## 2025-02-28 NOTE — FLOWSHEET NOTE
02/28/25 0850   Wound 09/25/24 Toe (Comment  which one) Right #1 right great toe distal tip   Date First Assessed/Time First Assessed: 09/25/24 0923   Present on Original Admission: Yes  Wound Approximate Age at First Assessment (Weeks): 12 weeks  Primary Wound Type: Diabetic Ulcer  Location: Toe (Comment  which one)  Wound Location Orientatio...   Wound Image    Wound Etiology Diabetic Bruno 2   Dressing Status Intact   Wound Cleansed Cleansed with saline;Vashe   Dressing/Treatment Xeroform   Offloading for Diabetic Foot Ulcers Diabetic shoes/inserts   Wound Length (cm) 0.3 cm   Wound Width (cm) 0.5 cm   Wound Depth (cm) 0.2 cm   Wound Surface Area (cm^2) 0.15 cm^2   Change in Wound Size % (l*w) -650   Wound Volume (cm^3) 0.03 cm^3   Wound Healing % -1400   Wound Assessment Pink/red   Drainage Amount None (dry)   Odor None   Sharron-wound Assessment Hyperkeratosis (callous)   Wound Thickness Description not for Pressure Injury Full thickness

## 2025-02-28 NOTE — FLOWSHEET NOTE
02/28/25 0850   Wound 09/25/24 Toe (Comment  which one) Right #1 right great toe distal tip   Date First Assessed/Time First Assessed: 09/25/24 0923   Present on Original Admission: Yes  Wound Approximate Age at First Assessment (Weeks): 12 weeks  Primary Wound Type: Diabetic Ulcer  Location: Toe (Comment  which one)  Wound Location Orientatio...   Wound Image     Wound Etiology Diabetic Bruno 2   Dressing Status Intact   Wound Cleansed Cleansed with saline;Vashe   Dressing/Treatment Xeroform   Offloading for Diabetic Foot Ulcers Diabetic shoes/inserts   Wound Length (cm) 0.3 cm   Wound Width (cm) 0.5 cm   Wound Depth (cm) 0.2 cm   Wound Surface Area (cm^2) 0.15 cm^2   Change in Wound Size % (l*w) -650   Wound Volume (cm^3) 0.03 cm^3   Wound Healing % -1400   Post-Procedure Length (cm) 0.2 cm   Post-Procedure Width (cm) 0.3 cm   Post-Procedure Depth (cm) 0.1 cm   Post-Procedure Surface Area (cm^2) 0.06 cm^2   Post-Procedure Volume (cm^3) 0.006 cm^3   Wound Assessment Pink/red   Drainage Amount None (dry)   Odor None   Sharron-wound Assessment Hyperkeratosis (callous)   Wound Thickness Description not for Pressure Injury Full thickness     Post debridement

## 2025-03-07 ENCOUNTER — HOSPITAL ENCOUNTER (OUTPATIENT)
Dept: WOUND CARE | Age: 75
Discharge: HOME | End: 2025-03-07
Payer: MEDICARE

## 2025-03-07 VITALS
WEIGHT: 194 LBS | TEMPERATURE: 98 F | HEIGHT: 71 IN | DIASTOLIC BLOOD PRESSURE: 69 MMHG | RESPIRATION RATE: 16 BRPM | SYSTOLIC BLOOD PRESSURE: 130 MMHG | HEART RATE: 74 BPM | BODY MASS INDEX: 27.16 KG/M2

## 2025-03-07 DIAGNOSIS — L97.512 DIABETIC ULCER OF TOE OF RIGHT FOOT ASSOCIATED WITH TYPE 2 DIABETES MELLITUS, WITH FAT LAYER EXPOSED (HCC): Primary | ICD-10-CM

## 2025-03-07 DIAGNOSIS — E11.621 DIABETIC ULCER OF TOE OF RIGHT FOOT ASSOCIATED WITH TYPE 2 DIABETES MELLITUS, WITH FAT LAYER EXPOSED (HCC): Primary | ICD-10-CM

## 2025-03-07 PROCEDURE — 97597 DBRDMT OPN WND 1ST 20 CM/<: CPT

## 2025-03-07 RX ORDER — NEOMYCIN/BACITRACIN/POLYMYXINB 3.5-400-5K
OINTMENT (GRAM) TOPICAL ONCE
OUTPATIENT
Start: 2025-03-07 | End: 2025-03-07

## 2025-03-07 RX ORDER — BETAMETHASONE DIPROPIONATE 0.5 MG/G
CREAM TOPICAL ONCE
OUTPATIENT
Start: 2025-03-07 | End: 2025-03-07

## 2025-03-07 RX ORDER — SODIUM CHLOR/HYPOCHLOROUS ACID 0.033 %
SOLUTION, IRRIGATION IRRIGATION ONCE
OUTPATIENT
Start: 2025-03-07 | End: 2025-03-07

## 2025-03-07 RX ORDER — BACITRACIN ZINC AND POLYMYXIN B SULFATE 500; 1000 [USP'U]/G; [USP'U]/G
OINTMENT TOPICAL ONCE
OUTPATIENT
Start: 2025-03-07 | End: 2025-03-07

## 2025-03-07 RX ORDER — GINSENG 100 MG
CAPSULE ORAL ONCE
OUTPATIENT
Start: 2025-03-07 | End: 2025-03-07

## 2025-03-07 RX ORDER — TRIAMCINOLONE ACETONIDE 1 MG/G
OINTMENT TOPICAL ONCE
OUTPATIENT
Start: 2025-03-07 | End: 2025-03-07

## 2025-03-07 RX ORDER — LIDOCAINE 40 MG/G
CREAM TOPICAL ONCE
OUTPATIENT
Start: 2025-03-07 | End: 2025-03-07

## 2025-03-07 RX ORDER — LIDOCAINE HYDROCHLORIDE 20 MG/ML
JELLY TOPICAL ONCE
Status: COMPLETED | OUTPATIENT
Start: 2025-03-07 | End: 2025-03-07

## 2025-03-07 RX ORDER — MUPIROCIN 20 MG/G
OINTMENT TOPICAL ONCE
OUTPATIENT
Start: 2025-03-07 | End: 2025-03-07

## 2025-03-07 RX ORDER — LIDOCAINE HYDROCHLORIDE 20 MG/ML
JELLY TOPICAL ONCE
OUTPATIENT
Start: 2025-03-07 | End: 2025-03-07

## 2025-03-07 RX ORDER — SILVER SULFADIAZINE 10 MG/G
CREAM TOPICAL ONCE
OUTPATIENT
Start: 2025-03-07 | End: 2025-03-07

## 2025-03-07 RX ORDER — LIDOCAINE 50 MG/G
OINTMENT TOPICAL ONCE
OUTPATIENT
Start: 2025-03-07 | End: 2025-03-07

## 2025-03-07 RX ORDER — CLOBETASOL PROPIONATE 0.5 MG/G
OINTMENT TOPICAL ONCE
OUTPATIENT
Start: 2025-03-07 | End: 2025-03-07

## 2025-03-07 RX ORDER — LIDOCAINE HYDROCHLORIDE 40 MG/ML
SOLUTION TOPICAL ONCE
OUTPATIENT
Start: 2025-03-07 | End: 2025-03-07

## 2025-03-07 RX ORDER — GENTAMICIN SULFATE 1 MG/G
OINTMENT TOPICAL ONCE
OUTPATIENT
Start: 2025-03-07 | End: 2025-03-07

## 2025-03-07 RX ORDER — SODIUM CHLOR/HYPOCHLOROUS ACID 0.033 %
SOLUTION, IRRIGATION IRRIGATION ONCE
Status: COMPLETED | OUTPATIENT
Start: 2025-03-07 | End: 2025-03-07

## 2025-03-07 RX ADMIN — Medication: at 10:27

## 2025-03-07 RX ADMIN — LIDOCAINE HYDROCHLORIDE: 20 JELLY TOPICAL at 10:27

## 2025-03-07 NOTE — FLOWSHEET NOTE
03/07/25 1000   Right Leg Edema Point of Measurement   Leg circumference 34.5 cm   Ankle circumference 22 cm   Foot circumference 24 cm   Compression Therapy Compression stockings   Wound 09/25/24 Toe (Comment  which one) Right #1 right great toe distal tip   Date First Assessed/Time First Assessed: 09/25/24 0923   Present on Original Admission: Yes  Wound Approximate Age at First Assessment (Weeks): 12 weeks  Primary Wound Type: Diabetic Ulcer  Location: Toe (Comment  which one)  Wound Location Orientatio...   Wound Image     Wound Etiology Diabetic Bruno 2   Dressing Status Intact   Wound Cleansed Vashe   Dressing/Treatment Xeroform   Offloading for Diabetic Foot Ulcers Diabetic shoes/inserts   Wound Length (cm) 0.1 cm   Wound Width (cm) 0.1 cm   Wound Depth (cm) 0.1 cm   Wound Surface Area (cm^2) 0.01 cm^2   Change in Wound Size % (l*w) 50   Wound Volume (cm^3) 0.001 cm^3   Wound Healing % 50   Post-Procedure Length (cm) 0 cm   Post-Procedure Width (cm) 0 cm   Post-Procedure Depth (cm) 0 cm   Post-Procedure Surface Area (cm^2) 0 cm^2   Post-Procedure Volume (cm^3) 0 cm^3   Wound Assessment Epithelialization;Pink/red   Drainage Amount Scant (moist but unmeasurable)   Drainage Description Serosanguinous   Sharron-wound Assessment Hyperkeratosis (callous)   Wound Thickness Description not for Pressure Injury Full thickness

## 2025-03-07 NOTE — WOUND CARE
Discharge Instructions for  [facility]  [address]  [address]  [place] [address]  Phone [phone]   Fax [phone]      NAME:  Michael Archie Paduano III  YOB: 1950  MEDICAL RECORD NUMBER:  [id number]  DATE:  3/7/2025    Return Appointment:   1 week with Chapo Horta DO    Instructions:   Right Great Toe   Cleanse with Normal Saline  Vashe post debridement in wound clinic and with dressing changes; apply gauze moistened gauze to wound bed for 1 minute  Xeroform- apply to wound bed.  May cover with gauze / bandaid   Wrap with Rolled Gauze as needed   Dressing change DAILY      Vaseline daily to dry aspects of feet, especially distal aspects of toes.        Patient to offload wound with DIABETIC SHOES & CUSTOM INSERTS while standing or weight bearing.    Bilateral Compression stockings 15-20mmHg to reduce lower leg swelling for optimal wound healing.     Wound healing is greatly slowed when blood glucose levels are greater than 200. Monitor glucose levels daily to ensure tight glucose control. Increase protein to 100g daily for optimal wound healing. Aim for 30g protein per shake, two shakes a day.  Protein:   Egg ~ 6g  Yogurt ~ 15g  Half cup of cottage cheese ~ 15g  Chicken breast ~ 30g    Follow up with PCP if your glucose levels are frequently greater than 200.  Increase protein intake to promote wound healing. Protein supplements such as Wilfrido and Ensure are great options. Goal: Protein 100 gm daily.     Should you experience increased redness, swelling, pain, foul odor, size of wound(s), or have a temperature over 101 degrees please contact the Wound Healing Center at [phone] or if after hours contact your primary care physician or go to the hospital emergency department.    PLEASE NOTE: IF YOU ARE UNABLE TO OBTAIN WOUND SUPPLIES, CONTINUE TO USE THE SUPPLIES YOU HAVE AVAILABLE UNTIL YOU ARE ABLE TO REACH US. IT IS MOST IMPORTANT TO KEEP THE WOUND

## 2025-03-14 ENCOUNTER — HOSPITAL ENCOUNTER (OUTPATIENT)
Dept: WOUND CARE | Age: 75
Discharge: HOME | End: 2025-03-14
Payer: MEDICARE

## 2025-03-14 VITALS
WEIGHT: 195 LBS | RESPIRATION RATE: 16 BRPM | DIASTOLIC BLOOD PRESSURE: 61 MMHG | BODY MASS INDEX: 27.3 KG/M2 | TEMPERATURE: 97.7 F | HEIGHT: 71 IN | SYSTOLIC BLOOD PRESSURE: 135 MMHG | HEART RATE: 71 BPM

## 2025-03-14 DIAGNOSIS — E11.621 DIABETIC ULCER OF TOE OF RIGHT FOOT ASSOCIATED WITH TYPE 2 DIABETES MELLITUS, WITH FAT LAYER EXPOSED (HCC): Primary | ICD-10-CM

## 2025-03-14 DIAGNOSIS — L97.512 DIABETIC ULCER OF TOE OF RIGHT FOOT ASSOCIATED WITH TYPE 2 DIABETES MELLITUS, WITH FAT LAYER EXPOSED (HCC): Primary | ICD-10-CM

## 2025-03-14 PROCEDURE — 11042 DBRDMT SUBQ TIS 1ST 20SQCM/<: CPT

## 2025-03-14 RX ORDER — CLOBETASOL PROPIONATE 0.5 MG/G
OINTMENT TOPICAL ONCE
OUTPATIENT
Start: 2025-03-14 | End: 2025-03-14

## 2025-03-14 RX ORDER — GINSENG 100 MG
CAPSULE ORAL ONCE
OUTPATIENT
Start: 2025-03-14 | End: 2025-03-14

## 2025-03-14 RX ORDER — SILVER SULFADIAZINE 10 MG/G
CREAM TOPICAL ONCE
OUTPATIENT
Start: 2025-03-14 | End: 2025-03-14

## 2025-03-14 RX ORDER — NEOMYCIN/BACITRACIN/POLYMYXINB 3.5-400-5K
OINTMENT (GRAM) TOPICAL ONCE
OUTPATIENT
Start: 2025-03-14 | End: 2025-03-14

## 2025-03-14 RX ORDER — LIDOCAINE HYDROCHLORIDE 40 MG/ML
SOLUTION TOPICAL ONCE
OUTPATIENT
Start: 2025-03-14 | End: 2025-03-14

## 2025-03-14 RX ORDER — BACITRACIN ZINC AND POLYMYXIN B SULFATE 500; 1000 [USP'U]/G; [USP'U]/G
OINTMENT TOPICAL ONCE
OUTPATIENT
Start: 2025-03-14 | End: 2025-03-14

## 2025-03-14 RX ORDER — LIDOCAINE 40 MG/G
CREAM TOPICAL ONCE
OUTPATIENT
Start: 2025-03-14 | End: 2025-03-14

## 2025-03-14 RX ORDER — SODIUM CHLOR/HYPOCHLOROUS ACID 0.033 %
SOLUTION, IRRIGATION IRRIGATION ONCE
OUTPATIENT
Start: 2025-03-14 | End: 2025-03-14

## 2025-03-14 RX ORDER — GENTAMICIN SULFATE 1 MG/G
OINTMENT TOPICAL ONCE
OUTPATIENT
Start: 2025-03-14 | End: 2025-03-14

## 2025-03-14 RX ORDER — BETAMETHASONE DIPROPIONATE 0.5 MG/G
CREAM TOPICAL ONCE
OUTPATIENT
Start: 2025-03-14 | End: 2025-03-14

## 2025-03-14 RX ORDER — LIDOCAINE HYDROCHLORIDE 20 MG/ML
JELLY TOPICAL ONCE
OUTPATIENT
Start: 2025-03-14 | End: 2025-03-14

## 2025-03-14 RX ORDER — MUPIROCIN 20 MG/G
OINTMENT TOPICAL ONCE
OUTPATIENT
Start: 2025-03-14 | End: 2025-03-14

## 2025-03-14 RX ORDER — LIDOCAINE HYDROCHLORIDE 20 MG/ML
JELLY TOPICAL ONCE
Status: COMPLETED | OUTPATIENT
Start: 2025-03-14 | End: 2025-03-14

## 2025-03-14 RX ORDER — LIDOCAINE 50 MG/G
OINTMENT TOPICAL ONCE
OUTPATIENT
Start: 2025-03-14 | End: 2025-03-14

## 2025-03-14 RX ORDER — TRIAMCINOLONE ACETONIDE 1 MG/G
OINTMENT TOPICAL ONCE
OUTPATIENT
Start: 2025-03-14 | End: 2025-03-14

## 2025-03-14 RX ADMIN — LIDOCAINE HYDROCHLORIDE: 20 JELLY TOPICAL at 10:17

## 2025-03-14 NOTE — FLOWSHEET NOTE
03/14/25 0946   Wound 09/25/24 Toe (Comment  which one) Right #1 right great toe distal tip   Date First Assessed/Time First Assessed: 09/25/24 0923   Present on Original Admission: Yes  Wound Approximate Age at First Assessment (Weeks): 12 weeks  Primary Wound Type: Diabetic Ulcer  Location: Toe (Comment  which one)  Wound Location Orientatio...   Wound Image     Wound Etiology Diabetic Bruno 2   Dressing Status Intact   Wound Cleansed Vashe   Dressing/Treatment Xeroform   Offloading for Diabetic Foot Ulcers Diabetic shoes/inserts   Wound Length (cm) 0.4 cm   Wound Width (cm) 0.5 cm   Wound Depth (cm) 0.1 cm   Wound Surface Area (cm^2) 0.2 cm^2   Change in Wound Size % (l*w) -900   Wound Volume (cm^3) 0.02 cm^3   Wound Healing % -900   Post-Procedure Length (cm) 0.4 cm   Post-Procedure Width (cm) 0.5 cm   Post-Procedure Depth (cm) 0.1 cm   Post-Procedure Surface Area (cm^2) 0.2 cm^2   Post-Procedure Volume (cm^3) 0.02 cm^3   Wound Assessment Epithelialization;Pink/red   Drainage Amount None (dry)   Drainage Description Serosanguinous   Odor None   Sharron-wound Assessment Hyperkeratosis (callous)   Wound Thickness Description not for Pressure Injury Full thickness   Pain Assessment   Pain Assessment None - Denies Pain

## 2025-03-14 NOTE — WOUND CARE
Discharge Instructions for  [facility]  [address]  [address]  [place] [address]  Phone [phone]   Fax [phone]      NAME:  Michael Archie Paduano III  YOB: 1950  MEDICAL RECORD NUMBER:  [id number]  DATE:  3/14/2025    Return Appointment:   1 month with Chapo Horta DO    Instructions:   Right Great Toe   Cleanse with Normal Saline  Vashe post debridement in wound clinic and with dressing changes; apply gauze moistened gauze to wound bed for 1 minute  Xeroform- apply to wound bed.  May cover with gauze / bandaid   Wrap with Rolled Gauze as needed   Dressing change DAILY      Vaseline daily to dry aspects of feet, especially distal aspects of toes.    Patient to offload wound with DIABETIC SHOES & CUSTOM INSERTS while standing or weight bearing.    Bilateral Compression stockings 15-20mmHg to reduce lower leg swelling for optimal wound healing.     Wound healing is greatly slowed when blood glucose levels are greater than 200. Monitor glucose levels daily to ensure tight glucose control. Increase protein to 100g daily for optimal wound healing. Aim for 30g protein per shake, two shakes a day.  Protein:   Egg ~ 6g  Yogurt ~ 15g  Half cup of cottage cheese ~ 15g  Chicken breast ~ 30g    Follow up with PCP if your glucose levels are frequently greater than 200.  Increase protein intake to promote wound healing. Protein supplements such as Wilfrido and Ensure are great options. Goal: Protein 100 gm daily.     Should you experience increased redness, swelling, pain, foul odor, size of wound(s), or have a temperature over 101 degrees please contact the Wound Healing Center at [phone] or if after hours contact your primary care physician or go to the hospital emergency department.    PLEASE NOTE: IF YOU ARE UNABLE TO OBTAIN WOUND SUPPLIES, CONTINUE TO USE THE SUPPLIES YOU HAVE AVAILABLE UNTIL YOU ARE ABLE TO REACH US. IT IS MOST IMPORTANT TO KEEP THE WOUND

## 2025-03-21 NOTE — PERIOP NOTE
Patient verified name and .  Order for consent  was not found in EHR and does not match case posting; patient verifies procedure.   Type 1B surgery, phone assessment complete.  Orders not received.  Labs per surgeon: none  Labs per anesthesia protocol: Hgb   Patient coming to [address] [place] [address] Dr. [address] Monday 3/24/25 around 10:00 for lab collection     Patient answered medical/surgical history questions at their best of ability. All prior to admission medications documented in EPIC.    Patient instructed to continue taking all prescription medications up to the day of surgery but to take only the following medications the day of surgery according to anesthesia guidelines with a small sip of water: Aspirin, Sertraline (Zoloft) , Metformin (Glucophage), hydroxychloroquine (Plaquenil), Levothyroxine (Synthroid) Also, patient is requested to take 2 Tylenol in the morning and then again before bed on the day before surgery. Regular or extra strength may be used.       Patient informed that all vitamins and supplements should be held 7 days prior to surgery and NSAIDS 5 days prior to surgery. Prescription meds to hold:lisinopril     Patient instructed on the following:    > Arrive at OPC Entrance, time of arrival to be called the day before by 1700  > No food after midnight, patient may drink clear liquids up until 2 hours prior to arrival. No gum, candy, mints.   > Responsible adult must drive patient to the hospital, stay during surgery, and patient will need supervision 24 hours after anesthesia  > Use non moisturizing soap in shower the night before surgery and on the morning of surgery  > All piercings must be removed prior to arrival.    > Leave all valuables (money and jewelry) at home but bring insurance card and ID on DOS.   > You may be required to pay a deductible or co-pay on the day of your procedure. You can pre-pay by calling [phone] if your surgery is at the [facility] or [phone] if your

## 2025-03-24 ENCOUNTER — OFFICE VISIT (OUTPATIENT)
Dept: ORTHOPEDIC SURGERY | Age: 75
End: 2025-03-24
Payer: MEDICARE

## 2025-03-24 ENCOUNTER — HOSPITAL ENCOUNTER (OUTPATIENT)
Dept: LAB | Age: 75
Discharge: HOME | End: 2025-03-27
Payer: MEDICARE

## 2025-03-24 DIAGNOSIS — M20.5X1 ACQUIRED CLAW TOE OF RIGHT FOOT: ICD-10-CM

## 2025-03-24 DIAGNOSIS — Z01.818 PRE-OP TESTING: ICD-10-CM

## 2025-03-24 DIAGNOSIS — M79.671 RIGHT FOOT PAIN: Primary | ICD-10-CM

## 2025-03-24 LAB — HGB BLD-MCNC: 11.5 G/DL (ref 13.6–17.2)

## 2025-03-24 PROCEDURE — 36415 COLL VENOUS BLD VENIPUNCTURE: CPT

## 2025-03-24 PROCEDURE — M5002 MISC BOOT SOCK: HCPCS | Performed by: ORTHOPAEDIC SURGERY

## 2025-03-24 PROCEDURE — L4361 PNEUMA/VAC WALK BOOT PRE OTS: HCPCS | Performed by: ORTHOPAEDIC SURGERY

## 2025-03-24 PROCEDURE — 99214 OFFICE O/P EST MOD 30 MIN: CPT | Performed by: ORTHOPAEDIC SURGERY

## 2025-03-24 PROCEDURE — 3017F COLORECTAL CA SCREEN DOC REV: CPT | Performed by: ORTHOPAEDIC SURGERY

## 2025-03-24 PROCEDURE — 85018 HEMOGLOBIN: CPT

## 2025-03-24 PROCEDURE — 1036F TOBACCO NON-USER: CPT | Performed by: ORTHOPAEDIC SURGERY

## 2025-03-24 PROCEDURE — G8428 CUR MEDS NOT DOCUMENT: HCPCS | Performed by: ORTHOPAEDIC SURGERY

## 2025-03-24 PROCEDURE — 1123F ACP DISCUSS/DSCN MKR DOCD: CPT | Performed by: ORTHOPAEDIC SURGERY

## 2025-03-24 PROCEDURE — G8417 CALC BMI ABV UP PARAM F/U: HCPCS | Performed by: ORTHOPAEDIC SURGERY

## 2025-03-24 NOTE — PROGRESS NOTES
The patient was prescribed a walker boot for the patient's right foot. The patient wears a size 12 shoe and I fitted them with a L size boot. The patient was fitted and instructed on the use of prescribed walker boot by a Registered Orthopedic Technician. I explained how to fit themselves and that the plastic flexible piece should always be on the front of the boot and secured by the Velcro straps on top. The air bladder in the boot was adjusted according to proper fit and comfort. The patient walked a short distance and acknowledged satisfaction with current fit. I also explained that they need a heel lift or a higher heeled shoe for the uninvolved LE to help normalize gait and avoid excessive low back stress/strain due to leg length inequality created from walker boot.    The patient was also prescribed and given an extra boot sock.     Patient read and signed documenting they understand and agree to [facility]'s current DME return policy.

## 2025-03-24 NOTE — PROGRESS NOTES
Name: Michael Archie Paduano III  YOB: 1950  Gender: male  MRN: [id number]    Summary:   Right third mallet toe, right fixed claw toe deformity, right IP joint great toe wound       CC: Follow-up (Right third toe mallet toe deformity status post tenotomy, right fifth fixed claw toe deformity, right very superficial great toe wound/Patient is scheduled for surgery on 03/27/2025)       HPI: Michael Archie Paduano III is a 74 y.o. male who presents with Follow-up (Right third toe mallet toe deformity status post tenotomy, right fifth fixed claw toe deformity, right very superficial great toe wound/Patient is scheduled for surgery on 03/27/2025)  .  This patient presents to the office today with continued complaints of third and fifth toe pain but in addition to that has been back in wound care for an ulceration at the great toe as well.    History was obtained by Patient and his wife    ROS/Meds/PSH/PMH/FH/SH: I personally reviewed the patients standard intake form.  Below are the pertinents    Tobacco:  reports that he quit smoking about 12 years ago. His smoking use included cigarettes. He started smoking about 37 years ago. He has a 112.5 pack-year smoking history. He has never used smokeless tobacco.  Diabetes: None      Physical Examination:    Exam of the right lower extremity shows palpable pulses and diminished sensation.  He does have a fixed fifth claw toe deformity as well as an enduring ulcer at the great toe in addition to the third toe mallet toe correction.    Imaging:   Interpretation of imaging  Right foot XR: AP, Lateral, Oblique views     ICD-10-CM    1. Right foot pain  M79.671 XR FOOT RIGHT (MIN 3 VIEWS)      2. Acquired claw toe of right foot  M20.5X1          Report: AP, lateral, oblique x-ray of the right foot demonstrates no evidence of osteo    Impression: No evidence of osteo   EMERSON KIRKLAND III, MD           Assessment:   Right great toe wound, right third toe wound wound,

## 2025-03-26 ENCOUNTER — ANESTHESIA EVENT (OUTPATIENT)
Dept: SURGERY | Age: 75
End: 2025-03-26
Payer: MEDICARE

## 2025-03-26 DIAGNOSIS — E11.65 TYPE 2 DIABETES MELLITUS WITH HYPERGLYCEMIA, WITHOUT LONG-TERM CURRENT USE OF INSULIN (HCC): Primary | ICD-10-CM

## 2025-03-26 RX ORDER — ACYCLOVIR 400 MG/1
TABLET ORAL
Qty: 9 EACH | Refills: 3 | Status: SHIPPED | OUTPATIENT
Start: 2025-03-26

## 2025-03-26 NOTE — PERIOP NOTE
Preop department called to notify patient of arrival time for scheduled procedure. Instructions given to   - Arrive at OPC Entrance [address].  - Nothing to eat after midnight unless otherwise indicated. No gum, mints, or ice chips. You may have clear liquids two hours prior to arrival to the hospital.   - Have a responsible adult to drive patient to the hospital, stay during surgery, and patient will need supervision 24 hours after anesthesia.   - Use antibacterial soap in shower the night before surgery and on the morning of surgery.       Was patient contacted: Yes  Voicemail left: NA  Numbers contacted: [phone]   Arrival time: 0630  Time to stop clear liquids: 0430

## 2025-03-27 ENCOUNTER — ANESTHESIA (OUTPATIENT)
Dept: SURGERY | Age: 75
End: 2025-03-27
Payer: MEDICARE

## 2025-03-27 ENCOUNTER — APPOINTMENT (OUTPATIENT)
Dept: GENERAL RADIOLOGY | Age: 75
End: 2025-03-27
Attending: ORTHOPAEDIC SURGERY
Payer: MEDICARE

## 2025-03-27 ENCOUNTER — HOSPITAL ENCOUNTER (OUTPATIENT)
Age: 75
Setting detail: OUTPATIENT SURGERY
Discharge: HOME | End: 2025-03-27
Attending: ORTHOPAEDIC SURGERY | Admitting: ORTHOPAEDIC SURGERY
Payer: MEDICARE

## 2025-03-27 VITALS
OXYGEN SATURATION: 97 % | WEIGHT: 204 LBS | RESPIRATION RATE: 14 BRPM | HEIGHT: 71 IN | DIASTOLIC BLOOD PRESSURE: 57 MMHG | SYSTOLIC BLOOD PRESSURE: 109 MMHG | BODY MASS INDEX: 28.56 KG/M2 | HEART RATE: 53 BPM | TEMPERATURE: 97.7 F

## 2025-03-27 DIAGNOSIS — G89.18 ACUTE POSTOPERATIVE PAIN: ICD-10-CM

## 2025-03-27 DIAGNOSIS — Z01.818 PRE-OP TESTING: Primary | ICD-10-CM

## 2025-03-27 LAB
GLUCOSE BLD STRIP.AUTO-MCNC: 137 MG/DL (ref 65–100)
SERVICE CMNT-IMP: ABNORMAL

## 2025-03-27 PROCEDURE — 3600000012 HC SURGERY LEVEL 2 ADDTL 15MIN: Performed by: ORTHOPAEDIC SURGERY

## 2025-03-27 PROCEDURE — 28313 REPAIR DEFORMITY OF TOE: CPT | Performed by: ORTHOPAEDIC SURGERY

## 2025-03-27 PROCEDURE — 28820 AMPUTATION OF TOE: CPT | Performed by: ORTHOPAEDIC SURGERY

## 2025-03-27 PROCEDURE — 6360000002 HC RX W HCPCS: Performed by: NURSE ANESTHETIST, CERTIFIED REGISTERED

## 2025-03-27 PROCEDURE — 28285 REPAIR OF HAMMERTOE: CPT | Performed by: ORTHOPAEDIC SURGERY

## 2025-03-27 PROCEDURE — 3700000000 HC ANESTHESIA ATTENDED CARE: Performed by: ORTHOPAEDIC SURGERY

## 2025-03-27 PROCEDURE — 82962 GLUCOSE BLOOD TEST: CPT

## 2025-03-27 PROCEDURE — 7100000001 HC PACU RECOVERY - ADDTL 15 MIN: Performed by: ORTHOPAEDIC SURGERY

## 2025-03-27 PROCEDURE — 2500000003 HC RX 250 WO HCPCS: Performed by: NURSE ANESTHETIST, CERTIFIED REGISTERED

## 2025-03-27 PROCEDURE — 6360000002 HC RX W HCPCS: Performed by: NURSE PRACTITIONER

## 2025-03-27 PROCEDURE — 3700000001 HC ADD 15 MINUTES (ANESTHESIA): Performed by: ORTHOPAEDIC SURGERY

## 2025-03-27 PROCEDURE — 3600000002 HC SURGERY LEVEL 2 BASE: Performed by: ORTHOPAEDIC SURGERY

## 2025-03-27 PROCEDURE — 64450 NJX AA&/STRD OTHER PN/BRANCH: CPT | Performed by: SURGERY

## 2025-03-27 PROCEDURE — C1713 ANCHOR/SCREW BN/BN,TIS/BN: HCPCS | Performed by: ORTHOPAEDIC SURGERY

## 2025-03-27 PROCEDURE — 6370000000 HC RX 637 (ALT 250 FOR IP): Performed by: SURGERY

## 2025-03-27 PROCEDURE — 7100000000 HC PACU RECOVERY - FIRST 15 MIN: Performed by: ORTHOPAEDIC SURGERY

## 2025-03-27 PROCEDURE — 2580000003 HC RX 258: Performed by: SURGERY

## 2025-03-27 PROCEDURE — 2709999900 HC NON-CHARGEABLE SUPPLY: Performed by: ORTHOPAEDIC SURGERY

## 2025-03-27 PROCEDURE — 7100000010 HC PHASE II RECOVERY - FIRST 15 MIN: Performed by: ORTHOPAEDIC SURGERY

## 2025-03-27 PROCEDURE — 6360000002 HC RX W HCPCS: Performed by: SURGERY

## 2025-03-27 DEVICE — WIRE ORTH 1.1MM DIA 229MM SMOOTH DBL BAYNT TIP S STL K: Type: IMPLANTABLE DEVICE | Site: FIFTH TOE | Status: FUNCTIONAL

## 2025-03-27 RX ORDER — ROPIVACAINE HYDROCHLORIDE 5 MG/ML
INJECTION, SOLUTION EPIDURAL; INFILTRATION; PERINEURAL
Status: DISCONTINUED | OUTPATIENT
Start: 2025-03-27 | End: 2025-03-27 | Stop reason: SDUPTHER

## 2025-03-27 RX ORDER — SODIUM CHLORIDE 9 MG/ML
INJECTION, SOLUTION INTRAVENOUS PRN
Status: DISCONTINUED | OUTPATIENT
Start: 2025-03-27 | End: 2025-03-27 | Stop reason: HOSPADM

## 2025-03-27 RX ORDER — MIDAZOLAM HYDROCHLORIDE 2 MG/2ML
2 INJECTION, SOLUTION INTRAMUSCULAR; INTRAVENOUS
Status: DISCONTINUED | OUTPATIENT
Start: 2025-03-27 | End: 2025-03-27 | Stop reason: HOSPADM

## 2025-03-27 RX ORDER — FENTANYL CITRATE 50 UG/ML
100 INJECTION, SOLUTION INTRAMUSCULAR; INTRAVENOUS
Status: DISCONTINUED | OUTPATIENT
Start: 2025-03-27 | End: 2025-03-27 | Stop reason: HOSPADM

## 2025-03-27 RX ORDER — PROCHLORPERAZINE EDISYLATE 5 MG/ML
5 INJECTION INTRAMUSCULAR; INTRAVENOUS
Status: DISCONTINUED | OUTPATIENT
Start: 2025-03-27 | End: 2025-03-27 | Stop reason: HOSPADM

## 2025-03-27 RX ORDER — LIDOCAINE HYDROCHLORIDE 20 MG/ML
INJECTION, SOLUTION EPIDURAL; INFILTRATION; INTRACAUDAL; PERINEURAL
Status: DISCONTINUED | OUTPATIENT
Start: 2025-03-27 | End: 2025-03-27 | Stop reason: SDUPTHER

## 2025-03-27 RX ORDER — CEPHALEXIN 500 MG/1
500 CAPSULE ORAL 4 TIMES DAILY
Qty: 12 CAPSULE | Refills: 0 | Status: SHIPPED | OUTPATIENT
Start: 2025-03-27

## 2025-03-27 RX ORDER — OXYCODONE HYDROCHLORIDE 5 MG/1
5 TABLET ORAL
Status: DISCONTINUED | OUTPATIENT
Start: 2025-03-27 | End: 2025-03-27 | Stop reason: HOSPADM

## 2025-03-27 RX ORDER — SODIUM CHLORIDE, SODIUM LACTATE, POTASSIUM CHLORIDE, CALCIUM CHLORIDE 600; 310; 30; 20 MG/100ML; MG/100ML; MG/100ML; MG/100ML
INJECTION, SOLUTION INTRAVENOUS CONTINUOUS
Status: DISCONTINUED | OUTPATIENT
Start: 2025-03-27 | End: 2025-03-27 | Stop reason: HOSPADM

## 2025-03-27 RX ORDER — SODIUM CHLORIDE 0.9 % (FLUSH) 0.9 %
5-40 SYRINGE (ML) INJECTION PRN
Status: DISCONTINUED | OUTPATIENT
Start: 2025-03-27 | End: 2025-03-27 | Stop reason: HOSPADM

## 2025-03-27 RX ORDER — DEXMEDETOMIDINE HYDROCHLORIDE 100 UG/ML
INJECTION, SOLUTION INTRAVENOUS
Status: DISCONTINUED | OUTPATIENT
Start: 2025-03-27 | End: 2025-03-27 | Stop reason: SDUPTHER

## 2025-03-27 RX ORDER — LABETALOL HYDROCHLORIDE 5 MG/ML
10 INJECTION, SOLUTION INTRAVENOUS
Status: DISCONTINUED | OUTPATIENT
Start: 2025-03-27 | End: 2025-03-27 | Stop reason: HOSPADM

## 2025-03-27 RX ORDER — OXYCODONE HYDROCHLORIDE 5 MG/1
5 TABLET ORAL EVERY 6 HOURS PRN
Qty: 20 TABLET | Refills: 0 | Status: SHIPPED | OUTPATIENT
Start: 2025-03-27 | End: 2025-04-01

## 2025-03-27 RX ORDER — ACETAMINOPHEN 500 MG
1000 TABLET ORAL ONCE
Status: COMPLETED | OUTPATIENT
Start: 2025-03-27 | End: 2025-03-27

## 2025-03-27 RX ORDER — SODIUM CHLORIDE 0.9 % (FLUSH) 0.9 %
5-40 SYRINGE (ML) INJECTION EVERY 12 HOURS SCHEDULED
Status: DISCONTINUED | OUTPATIENT
Start: 2025-03-27 | End: 2025-03-27 | Stop reason: HOSPADM

## 2025-03-27 RX ORDER — PROPOFOL 10 MG/ML
INJECTION, EMULSION INTRAVENOUS
Status: DISCONTINUED | OUTPATIENT
Start: 2025-03-27 | End: 2025-03-27 | Stop reason: SDUPTHER

## 2025-03-27 RX ORDER — NALOXONE HYDROCHLORIDE 0.4 MG/ML
INJECTION, SOLUTION INTRAMUSCULAR; INTRAVENOUS; SUBCUTANEOUS PRN
Status: DISCONTINUED | OUTPATIENT
Start: 2025-03-27 | End: 2025-03-27 | Stop reason: HOSPADM

## 2025-03-27 RX ORDER — IPRATROPIUM BROMIDE AND ALBUTEROL SULFATE 2.5; .5 MG/3ML; MG/3ML
1 SOLUTION RESPIRATORY (INHALATION)
Status: DISCONTINUED | OUTPATIENT
Start: 2025-03-27 | End: 2025-03-27 | Stop reason: HOSPADM

## 2025-03-27 RX ORDER — HALOPERIDOL 5 MG/ML
1 INJECTION INTRAMUSCULAR
Status: DISCONTINUED | OUTPATIENT
Start: 2025-03-27 | End: 2025-03-27 | Stop reason: HOSPADM

## 2025-03-27 RX ORDER — LIDOCAINE HYDROCHLORIDE 10 MG/ML
1 INJECTION, SOLUTION INFILTRATION; PERINEURAL
Status: DISCONTINUED | OUTPATIENT
Start: 2025-03-27 | End: 2025-03-27 | Stop reason: HOSPADM

## 2025-03-27 RX ADMIN — Medication 2000 MG: at 07:31

## 2025-03-27 RX ADMIN — ROPIVACAINE HYDROCHLORIDE 30 ML: 5 INJECTION, SOLUTION EPIDURAL; INFILTRATION; PERINEURAL at 06:55

## 2025-03-27 RX ADMIN — PROPOFOL 20 MG: 10 INJECTION, EMULSION INTRAVENOUS at 07:48

## 2025-03-27 RX ADMIN — PROPOFOL 20 MG: 10 INJECTION, EMULSION INTRAVENOUS at 07:46

## 2025-03-27 RX ADMIN — ACETAMINOPHEN 1000 MG: 500 TABLET, FILM COATED ORAL at 06:41

## 2025-03-27 RX ADMIN — PROPOFOL 50 MG: 10 INJECTION, EMULSION INTRAVENOUS at 07:34

## 2025-03-27 RX ADMIN — PROPOFOL 100 MCG/KG/MIN: 10 INJECTION, EMULSION INTRAVENOUS at 07:35

## 2025-03-27 RX ADMIN — SODIUM CHLORIDE, SODIUM LACTATE, POTASSIUM CHLORIDE, AND CALCIUM CHLORIDE: .6; .31; .03; .02 INJECTION, SOLUTION INTRAVENOUS at 06:41

## 2025-03-27 RX ADMIN — PROPOFOL 20 MG: 10 INJECTION, EMULSION INTRAVENOUS at 06:51

## 2025-03-27 RX ADMIN — PROPOFOL 10 MG: 10 INJECTION, EMULSION INTRAVENOUS at 06:53

## 2025-03-27 RX ADMIN — LIDOCAINE HYDROCHLORIDE 60 MG: 20 INJECTION, SOLUTION EPIDURAL; INFILTRATION; INTRACAUDAL; PERINEURAL at 07:34

## 2025-03-27 RX ADMIN — DEXMEDETOMIDINE 40 MCG: 100 INJECTION, SOLUTION, CONCENTRATE INTRAVENOUS at 07:31

## 2025-03-27 ASSESSMENT — PAIN - FUNCTIONAL ASSESSMENT: PAIN_FUNCTIONAL_ASSESSMENT: 0-10

## 2025-03-27 NOTE — ANESTHESIA POSTPROCEDURE EVALUATION
Department of Anesthesiology  Postprocedure Note    Patient: Michael Archie Paduano III  MRN: [id number]  YOB: 1950  Date of evaluation: 3/27/2025    Procedure Summary       Date: 03/27/25 Room / Location: [facility] OP OR 02 / [facility]    Anesthesia Start: 0722 Anesthesia Stop: 0823    Procedure: \"Right great toe amputation through MTP joint, right third toe amputation through MTP joint, right fifth PIP RA and MTP release (Right: Toes) Diagnosis:       Acquired claw toe of right foot      (Acquired claw toe of right foot [M20.5X1])    Surgeons: Carlito Willett III, MD Responsible Provider: Ismael Hudson MD    Anesthesia Type: TIVA ASA Status: 3            Anesthesia Type: TIVA    Ramona Phase I: Ramona Score: 8    Ramona Phase II:      Anesthesia Post Evaluation    Patient location during evaluation: PACU  Patient participation: complete - patient participated  Level of consciousness: awake and alert  Airway patency: patent  Nausea & Vomiting: no nausea and no vomiting  Cardiovascular status: hemodynamically stable  Respiratory status: acceptable, nonlabored ventilation and spontaneous ventilation  Hydration status: euvolemic  Comments: BP (!) 86/50   Pulse 54   Temp 97.7 °F (36.5 °C) (Temporal)   Resp 14   Ht 1.803 m (5' 11\")   Wt 92.5 kg (204 lb)   SpO2 96%   BMI 28.45 kg/m²     Multimodal analgesia pain management approach  Pain management: adequate and satisfactory to patient    No notable events documented.

## 2025-03-27 NOTE — PROGRESS NOTES
Spiritual Health History and Assessment/Progress Note  [facility]    Pre-Op,  ,  ,      Name: Michael Archie Paduano III MRN: [id number]    Age: 74 y.o.     Sex: male   Language: English   Spiritism: Taoism   Acquired claw toe of right foot     Date: 3/27/2025            Total Time Calculated: 21 min              Spiritual Assessment began in [facility]        Referral/Consult From: Patient, Nurse   Encounter Overview/Reason: Pre-Op  Service Provided For: Patient    Tawny, Belief, Meaning:   Patient identifies as spiritual, is connected with a tawny tradition or spiritual practice, and has beliefs or practices that help with coping during difficult times  Family/Friends No family/friends present      Importance and Influence:  Patient has spiritual/personal beliefs that influence decisions regarding their health  Family/Friends No family/friends present    Community:  Patient feels well-supported. Support system includes: Spouse/Partner and Friends  Family/Friends No family/friends present    Assessment and Plan of Care:     Patient Interventions include: Facilitated expression of thoughts and feelings, Explored spiritual coping/struggle/distress, Affirmed coping skills/support systems, and Facilitated life review and/ or legacy  Family/Friends Interventions include: No family/friends present    Patient Plan of Care: Spiritual Care available upon further referral  Family/Friends Plan of Care: No family/friends present    Electronically signed by GEMA VIRK on 3/27/2025 at 11:21 AM     Pre-Surgery Prayer. Patient was in bed preparing for surgery. Patient shared about health and life including long hospitalization last year. Patient expressed importance of Family and Tawny and gratitude to God. Patient expressed comfort in prayer. Patient is Taoism.  offered prayer and support.    Rev. TATY VirkDiv.

## 2025-03-27 NOTE — ANESTHESIA PROCEDURE NOTES
Peripheral Block    Patient location during procedure: pre-op  Reason for block: post-op pain management and at surgeon's request  Start time: 3/27/2025 6:51 AM  End time: 3/27/2025 6:55 AM  Staffing  Performed: anesthesiologist   Anesthesiologist: Ismael Hudson MD  Performed by: Ismael Hudson MD  Authorized by: Ismael Hudson MD    Preanesthetic Checklist  Completed: patient identified, IV checked, site marked, risks and benefits discussed, surgical/procedural consents, equipment checked, pre-op evaluation, timeout performed, anesthesia consent given, oxygen available, monitors applied/VS acknowledged, fire risk safety assessment completed and verbalized and blood product R/B/A discussed and consented  Peripheral Block   Patient position: supine  Prep: ChloraPrep  Provider prep: mask and sterile gloves  Patient monitoring: cardiac monitor, continuous pulse ox, frequent blood pressure checks, IV access and oxygen  Block type: Ankle  Laterality: right  Injection technique: single-shot  Guidance: other and [employer] based    Needle   Needle gauge: 25G.  Needle localization: anatomical landmarks  Needle length: 4cm.  Assessment   Injection assessment: negative aspiration for heme, no paresthesia on injection, local visualized surrounding nerve on ultrasound and no intravascular symptoms  Paresthesia pain: none  Slow fractionated injection: yes  Hemodynamics: stable  Outcomes: patient tolerated procedure well and uncomplicated    Additional Notes  -Block placed for post op pain at surgeon's request.     Medications Administered  ropivacaine (NAROPIN) injection 0.5% - Perineural   30 mL - 3/27/2025 6:55:00 AM

## 2025-03-27 NOTE — OP NOTE
Operative Note    Patient:Michael Archie Paduano III  MRN: [id number]    Date Of Surgery: 3/27/2025    Surgeon: Carlito Willett MD    Assistant Surgeon: None    Pre Op Diagnosis:  Pre-Op Diagnosis Codes:      * Acquired claw toe of right foot [M20.5X1]      Post Op Diagnosis:   same    Procedures Performed:  Right great toe amputation to metatarsal phalangeal joint, 90391  Right third toe amputation to metatarsal phalangeal joint, 74993  Right fifth PIP resection arthroplasty, 04879  Right fifth MTP release with angular soft tissue correction, 34940    Implants:   Implant Name Type Inv. Item Serial No.  Lot No. LRB No. Used Action   WIRE ORTH 1.1MM JACE 229MM SMOOTH DBL BAYNT TIP S STL K - [id number]  WIRE ORTH 1.1MM JACE 229MM SMOOTH DBL BAYNT TIP S STL K  [employer] [id number] Right 1 Implanted       Anesthesia:  Monitor Anesthesia Care    Blood Loss:  minimal    Tourniquet:  Estimated calf 20 minutes    Pre Operative Abx:   Ancef 2g            Pre Operative Course:  Michael Archie Paduano III is a 74 y.o. male who has a history of ulceration with fixed claw toe deformities of the great and third toes as well as a fixed fifth claw toe deformity.    Operation In Detail:  Patient was evaluated in the preoperative area.  We had a long discussion about the procedure and postoperative protocols.  The patient was then brought back to the operating room suite and placed in the operating room table.  A timeout was taken to identify the patient, procedure being performed, and laterality.  After this the patient was prepped and draped in the normal sterile fashion using a Betadine solution and/or a ChloraPrep solution.  A timeout was then taken to identify the patient his name, date of birth, laterality, and procedure being performed.  We also identified allergies and any concerns about the operation.  Attention was then placed to the operative extremity.  An ankle block was placed by the department of

## 2025-03-27 NOTE — H&P
Outpatient Surgery History and Physical:  Michael Archie Paduano III was seen and examined.    CHIEF COMPLAINT:   right foot.     PE:   BP (!) 151/68   Pulse 76   Temp 97.4 °F (36.3 °C) (Oral)   Resp 18   Ht 1.803 m (5' 11\")   Wt 88 kg (194 lb)   SpO2 96%   BMI 27.06 kg/m²     Heart:   Regular rhythm      Lungs:  Are clear      Past Medical History:    Past Medical History:   Diagnosis Date    Acquired hypothyroidism 3/27/2016    after treatment for Graves, levothyroxine    Acute deep vein thrombosis (DVT) (HCC) 2022    right leg, off blood thinners    Anemia     denies hx of blood transfusions    Arthritis     Atherosclerotic heart disease of native coronary artery with unstable angina pectoris (HCC) 12/07/2023    Bilateral knee pain 05/2020    Dr. Cespedes     CAD (coronary artery disease)     scheduled CABG 12/14/23    Cataracts, bilateral     Chronic deep vein thrombosis (DVT) of popliteal vein of right lower extremity ([facility]) 4/12/2021    Chronic pain of both knees 4/12/2021    COVID-19 2023    COVID-19 vaccine series declined 2021    Depression 3/20/2015    zoloft daily    Diabetes (HCC)     oral meds- A1C 8.4  2/11/25, hypo s/sx <70    Diabetic eye exam ([facility]) 08/26/2020    [facility]    Diverticulosis     Duodenal ulcer 11/22/2023    via EGD (Dr. Olmstead)    Eye exam, routine 09/2020    [facility]    Former smoker, stopped smoking in distant past     quit ~2013- 1-1.5 ppd x52 years- denies dx COPD    Gastritis 11/22/2023    via EGD (Dr. Wilmar Olmstead)    GERD (gastroesophageal reflux disease)     daily rx, well controlled    Graves disease     S/p Ablation therapy-nuclear med    H/O echocardiogram 12/07/2023    LVEF 45-50%    Hypertension     managed with meds    Hypothyroid     Kidney stone 10/10/2023    x1    Obesity (BMI 30.0-34.9) 3/27/2016    STEFFEN on CPAP 7/25/2018    CPAP    Psychiatric disorder     anxiety    Rheumatoid arthritis (HCC)     Uncontrolled diabetes mellitus     oral agents; FBS avg  140;

## 2025-03-27 NOTE — ANESTHESIA PRE PROCEDURE
Rinse out mouth with water (without swallowing) after every dose. Patient taking PRN. 1/10/24   Jackson, Marylee C, PA   acetaminophen (TYLENOL) 325 MG tablet Take 2 tablets by mouth every 6 hours as needed for Pain 12/20/23   Mony Gonzales PA       Current medications:    Current Facility-Administered Medications   Medication Dose Route Frequency Provider Last Rate Last Admin    ceFAZolin (ANCEF) 2000 mg in sterile water 20 mL IV syringe  2,000 mg IntraVENous On Call to OR Roz Sharp APRN - CNP        lactated ringers infusion   IntraVENous Continuous Roz Sharp, APRN - CNP        sodium chloride flush 0.9 % injection 5-40 mL  5-40 mL IntraVENous 2 times per day Roz Sharp, APRN - CNP        sodium chloride flush 0.9 % injection 5-40 mL  5-40 mL IntraVENous PRN Roz Sharp, APRASHLEY - CNP        0.9 % sodium chloride infusion   IntraVENous PRN Roz Sharp APRN - LILIANA        lidocaine 1 % injection 1 mL  1 mL IntraDERmal Once PRN sImael Hudson MD        acetaminophen (TYLENOL) tablet 1,000 mg  1,000 mg Oral Once Ismael Hudson MD        fentaNYL (SUBLIMAZE) injection 100 mcg  100 mcg IntraVENous Once PRN Ismael Hudson MD        lactated ringers infusion   IntraVENous Continuous Ismael Hudson MD        sodium chloride flush 0.9 % injection 5-40 mL  5-40 mL IntraVENous 2 times per day Ismael Hudson MD        sodium chloride flush 0.9 % injection 5-40 mL  5-40 mL IntraVENous PRN Ismael Hudson MD        0.9 % sodium chloride infusion   IntraVENous PRN Ismael Hudson MD        midazolam PF (VERSED) injection 2 mg  2 mg IntraVENous Once PRN Ismael Hudson MD           Allergies:  No Known Allergies    Problem List:    Patient Active Problem List   Diagnosis Code    STEFFEN on CPAP G47.33    Hypertension I10    Hyperlipidemia E78.5    Class 1 obesity E66.811    BPH associated with nocturia N40.1, R35.1    Chronic pain of both knees

## 2025-04-14 ENCOUNTER — OFFICE VISIT (OUTPATIENT)
Dept: ORTHOPEDIC SURGERY | Age: 75
End: 2025-04-14

## 2025-04-14 DIAGNOSIS — M20.5X1 ACQUIRED CLAW TOE OF RIGHT FOOT: Primary | ICD-10-CM

## 2025-04-14 PROCEDURE — 99024 POSTOP FOLLOW-UP VISIT: CPT | Performed by: NURSE PRACTITIONER

## 2025-04-14 NOTE — PROGRESS NOTES
Name: Michael Archie Paduano III  YOB: 1950  Gender: male  MRN: [id number]    Procedure Performed:  Right great toe amputation to metatarsal phalangeal joint  Right third toe amputation to metatarsal phalangeal joint  Right fifth PIP resection arthroplasty  Right fifth MTP release with angular soft tissue correction        Date of Procedure: 03/27/2025      Subjective: Patient seems to be doing okay today.  His pain seems to be well-managed under control.  He is aware of the K wire backing out of the fifth phalanx.      Physical Examination: The incisional areas look okay today and do appear to be healing well without any signs of infection at this time.  K wires intact in the fifth phalanx.  He has palpable pulses and intact sensation to the foot.  There is expected swelling to the extremity.  He really notes no discomfort with palpation to the plantar aspect of the MTP joints.         Imaging:   No imaging reviewed          Assessment:   Status post right great toe and third toe amputations through the MTP joint with fifth PIP RA and MTP soft tissue corrections.  We discussed progression care today as well as expectations until next follow-up visit.  Question concerns were addressed, he verbalized understanding of today's conversation.  He will continue to monitor for signs and symptoms of infection, we did discuss these today.      Plan:   3 This is stable chronic illness/condition  Treatment at this time: Sutures removed, Steri-Strips were placed.  K wire restrained in the fifth phalanx.  Patient will continue to monitor protect the K wire and [place] pinball intact in the fifth phalanx.  He will continue wear the postop shoe as a matter medical necessity where he will continue bear weight on the affected extremity as he can tolerate and swelling allows.  Activities will continue to be limited at this time excluding high-impact.  Elevation was encouraged regularly.  He may now get the affected

## 2025-04-16 ENCOUNTER — TELEPHONE (OUTPATIENT)
Dept: ORTHOPEDIC SURGERY | Age: 75
End: 2025-04-16

## 2025-04-16 NOTE — TELEPHONE ENCOUNTER
Left voicemail letting patient know he can come today before 11am or between 1pm and 3:30 pm to the [facility]  office. If he has the ball for his pin he should bring it with him.

## 2025-04-22 ENCOUNTER — OFFICE VISIT (OUTPATIENT)
Dept: INTERNAL MEDICINE CLINIC | Facility: CLINIC | Age: 75
End: 2025-04-22
Payer: MEDICARE

## 2025-04-22 VITALS
HEIGHT: 71 IN | WEIGHT: 193.4 LBS | DIASTOLIC BLOOD PRESSURE: 68 MMHG | BODY MASS INDEX: 27.07 KG/M2 | OXYGEN SATURATION: 96 % | HEART RATE: 70 BPM | SYSTOLIC BLOOD PRESSURE: 120 MMHG

## 2025-04-22 DIAGNOSIS — E89.0 HYPOTHYROIDISM FOLLOWING RADIOIODINE THERAPY: ICD-10-CM

## 2025-04-22 DIAGNOSIS — E08.65 DIABETES MELLITUS DUE TO UNDERLYING CONDITION WITH HYPERGLYCEMIA, WITHOUT LONG-TERM CURRENT USE OF INSULIN (HCC): Primary | Chronic | ICD-10-CM

## 2025-04-22 DIAGNOSIS — I10 PRIMARY HYPERTENSION: ICD-10-CM

## 2025-04-22 DIAGNOSIS — E78.2 MIXED HYPERLIPIDEMIA: ICD-10-CM

## 2025-04-22 DIAGNOSIS — Z87.891 PERSONAL HISTORY OF TOBACCO USE: Chronic | ICD-10-CM

## 2025-04-22 DIAGNOSIS — G47.33 OSA ON CPAP: ICD-10-CM

## 2025-04-22 DIAGNOSIS — I25.10 CORONARY ARTERY DISEASE INVOLVING NATIVE CORONARY ARTERY OF NATIVE HEART WITHOUT ANGINA PECTORIS: ICD-10-CM

## 2025-04-22 DIAGNOSIS — M06.09 RHEUMATOID ARTHRITIS OF MULTIPLE SITES WITHOUT RHEUMATOID FACTOR (HCC): ICD-10-CM

## 2025-04-22 DIAGNOSIS — Z12.5 SCREENING PSA (PROSTATE SPECIFIC ANTIGEN): ICD-10-CM

## 2025-04-22 PROCEDURE — 3074F SYST BP LT 130 MM HG: CPT | Performed by: NURSE PRACTITIONER

## 2025-04-22 PROCEDURE — G8417 CALC BMI ABV UP PARAM F/U: HCPCS | Performed by: NURSE PRACTITIONER

## 2025-04-22 PROCEDURE — 3078F DIAST BP <80 MM HG: CPT | Performed by: NURSE PRACTITIONER

## 2025-04-22 PROCEDURE — 99214 OFFICE O/P EST MOD 30 MIN: CPT | Performed by: NURSE PRACTITIONER

## 2025-04-22 PROCEDURE — G2211 COMPLEX E/M VISIT ADD ON: HCPCS | Performed by: NURSE PRACTITIONER

## 2025-04-22 PROCEDURE — 1123F ACP DISCUSS/DSCN MKR DOCD: CPT | Performed by: NURSE PRACTITIONER

## 2025-04-22 PROCEDURE — 1036F TOBACCO NON-USER: CPT | Performed by: NURSE PRACTITIONER

## 2025-04-22 PROCEDURE — 3017F COLORECTAL CA SCREEN DOC REV: CPT | Performed by: NURSE PRACTITIONER

## 2025-04-22 PROCEDURE — G8427 DOCREV CUR MEDS BY ELIG CLIN: HCPCS | Performed by: NURSE PRACTITIONER

## 2025-04-22 PROCEDURE — 1159F MED LIST DOCD IN RCRD: CPT | Performed by: NURSE PRACTITIONER

## 2025-04-22 PROCEDURE — G0296 VISIT TO DETERM LDCT ELIG: HCPCS | Performed by: NURSE PRACTITIONER

## 2025-04-22 PROCEDURE — 1160F RVW MEDS BY RX/DR IN RCRD: CPT | Performed by: NURSE PRACTITIONER

## 2025-04-22 RX ORDER — ATORVASTATIN CALCIUM 80 MG/1
80 TABLET, FILM COATED ORAL NIGHTLY
Qty: 90 TABLET | Refills: 3 | Status: SHIPPED | OUTPATIENT
Start: 2025-04-22

## 2025-04-22 SDOH — ECONOMIC STABILITY: FOOD INSECURITY: WITHIN THE PAST 12 MONTHS, THE FOOD YOU BOUGHT JUST DIDN'T LAST AND YOU DIDN'T HAVE MONEY TO GET MORE.: NEVER TRUE

## 2025-04-22 SDOH — ECONOMIC STABILITY: FOOD INSECURITY: WITHIN THE PAST 12 MONTHS, YOU WORRIED THAT YOUR FOOD WOULD RUN OUT BEFORE YOU GOT MONEY TO BUY MORE.: NEVER TRUE

## 2025-04-22 ASSESSMENT — PATIENT HEALTH QUESTIONNAIRE - PHQ9
SUM OF ALL RESPONSES TO PHQ QUESTIONS 1-9: 0
2. FEELING DOWN, DEPRESSED OR HOPELESS: NOT AT ALL
SUM OF ALL RESPONSES TO PHQ QUESTIONS 1-9: 0
SUM OF ALL RESPONSES TO PHQ QUESTIONS 1-9: 0
1. LITTLE INTEREST OR PLEASURE IN DOING THINGS: NOT AT ALL
SUM OF ALL RESPONSES TO PHQ QUESTIONS 1-9: 0

## 2025-04-22 NOTE — PROGRESS NOTES
Michael Archie Paduano III (: 1950)     History of Present Illness  The patient presents for evaluation of foot pain, diabetes, hyperlipidemia, arthritis, and dry skin.    Foot pain is effectively managed with Tylenol as needed, and only one additional analgesic was required, which did not significantly alter pain levels. Clearance from the podiatrist is awaited to resume gym activities.    Blood glucose levels have been consistently within the range of 140 to 160, and metformin continues to be taken.    Concerns about potential dementia risk have led to a desire to discontinue atorvastatin, despite no adverse effects experienced from the medication. Currently, a reduced dose of 40 mg is taken by halving the 80 mg tablets.    The use of Xopenex nebulizers and methotrexate has been discontinued as per Dr. Moss's instructions, with no discernible difference in condition following the cessation of methotrexate. The course of Keflex has been completed, and uncertainty exists regarding the current use of sulfasalazine. A follow-up appointment with Dr. Santos is scheduled for 2025.    Arthritic pain has increased and is managed with Voltaren cream.    No chest pain or shortness of breath is reported. There is a history of smoking, but smoking is not current.    Dry skin is present.    PAST SURGICAL HISTORY:  Coronary artery bypass graft (CABG)    SOCIAL HISTORY  He is a former smoker.    Chief Complaint   Patient presents with    Follow-up     6 month f/u      Patient Active Problem List   Diagnosis    STEFFEN on CPAP    Hypertension    Hyperlipidemia    Class 1 obesity    BPH associated with nocturia    Chronic pain of both knees    Rheumatoid arthritis of multiple sites without rheumatoid factor (HCC)    Hypothyroidism following radioiodine therapy    Diabetic polyneuropathy associated with type 2 diabetes mellitus    Normocytic anemia    Major depressive disorder, recurrent, in partial remission

## 2025-04-22 NOTE — ASSESSMENT & PLAN NOTE
Monitored by specialist- no acute findings meriting change in the plan  Well controlled on lisinopril and metoprolol.

## 2025-04-22 NOTE — PATIENT INSTRUCTIONS
Follow-Up Instructions:  Continue taking Tylenol as needed for foot pain.  Monitor blood glucose levels regularly.  Take atorvastatin 40 mg daily (half of an 80 mg tablet).  Apply Voltaren cream to manage arthritis pain.  Apply Lubriderm to dry skin.  Schedule and attend the low-dose CT scan of the lungs in early May.  Follow up with Dr. Santos on 05/16/2025.  Add Flax Seed oil or Red Yeast Rice supplement.     Learning About Lung Cancer Screening  What is screening for lung cancer?     Lung cancer screening is a way to find some lung cancers early, before a person has any symptoms of the cancer.  Lung cancer screening may help those who have the highest risk for lung cancer--people age 50 and older who are or were heavy smokers. For most people, who aren't at increased risk, screening for lung cancer probably isn't helpful.  Screening won't prevent cancer. And it may not find all lung cancers. Lung cancer screening may lower the risk of dying from lung cancer in a small number of people.  How is it done?  Lung cancer screening is done with a low-dose CT (computed tomography) scan. A CT scan uses X-rays, or radiation, to make detailed pictures of your body. Experts recommend that screening be done in medical centers that focus on finding and treating lung cancer.  Who is screening recommended for?  Lung cancer screening is recommended for people age 50 and older who are or were heavy smokers. That means people with a smoking history of at least 20 pack years. A pack year is a way to measure how heavy a smoker you are or were.  To figure out your pack years, multiply how many packs a day on average (assuming 20 cigarettes per pack) you have smoked by how many years you have smoked. For example:  If you smoked 1 pack a day for 20 years, that's 1 times 20. So you have a smoking history of 20 pack years.  If you smoked 2 packs a day for 10 years, that's 2 times 10. So you have a smoking history of 20 pack

## 2025-04-22 NOTE — ASSESSMENT & PLAN NOTE
His cholesterol panel shows satisfactory results with an HDL level of 56 and an LDL level of 17. He will maintain his current regimen of atorvastatin 40 mg daily. A prescription for atorvastatin 80 mg, to be taken as half a tablet daily, has been provided and sent to [facility].  Orders:    Lipid Panel; Future    atorvastatin (LIPITOR) 80 MG tablet; Take 1 tablet by mouth at bedtime

## 2025-04-28 ENCOUNTER — OFFICE VISIT (OUTPATIENT)
Dept: ORTHOPEDIC SURGERY | Age: 75
End: 2025-04-28

## 2025-04-28 DIAGNOSIS — M20.5X1 ACQUIRED CLAW TOE OF RIGHT FOOT: Primary | ICD-10-CM

## 2025-04-28 PROCEDURE — 99024 POSTOP FOLLOW-UP VISIT: CPT | Performed by: NURSE PRACTITIONER

## 2025-04-28 NOTE — PROGRESS NOTES
Name: Michael Archie Paduano III  YOB: 1950  Gender: male  MRN: [id number]    Procedure Performed:  Right great toe amputation to metatarsal phalangeal joint  Right third toe amputation to metatarsal phalangeal joint  Right fifth PIP resection arthroplasty  Right fifth MTP release with angular soft tissue correction           Date of Procedure: 03/27/2025      Subjective: Patient reports overall that he feels like he is doing pretty well.  He notes that the foot does look better than it did at his last visit.    Physical Examination: K wires intact in the fifth phalanx.  The incisional areas are currently scabbed at the great and third MTP joints with mild erythema present.  There are really no other indications of infection at this time other than the erythema localized around the incisions.  There is mild swelling noted to the dorsal foot as well as to the fifth phalanx.  He has palpable pulses with intact sensation to the foot.  He can wiggle the second fourth phalanx without difficulty or pain.  He has no pain through today's examination.        Imaging:   Interpretation of imaging  Right foot XR: AP, Lateral, Oblique views     ICD-10-CM    1. Acquired claw toe of right foot  M20.5X1 XR FOOT RIGHT (MIN 3 VIEWS)         Report: AP, lateral, oblique x-ray of the right foot demonstrates claw toe correction with no other acute findings or fractures    Impression: Fifth claw toe correction and significant midfoot arthritis with no other acute findings or fractures.   Roz Sharp, APRN - CNP           Assessment:   Status post right great and third toe amputations to the MTP joint with fifth PIP RA and MTP release.      Plan:   3 This is stable chronic illness/condition  Treatment at this time: Sutures were removed, Steri-Strips were placed.  Patient may begin to wean from the assistive device that they can tolerate can tolerate and swelling allows.  He Will continue to elevate the affected

## 2025-04-29 DIAGNOSIS — Z79.899 ON SULFASALAZINE THERAPY: ICD-10-CM

## 2025-04-29 DIAGNOSIS — Z79.631 LONG TERM METHOTREXATE USER: ICD-10-CM

## 2025-04-29 DIAGNOSIS — M06.00 SERONEGATIVE RHEUMATOID ARTHRITIS (HCC): Primary | ICD-10-CM

## 2025-04-29 DIAGNOSIS — M17.0 BILATERAL PRIMARY OSTEOARTHRITIS OF KNEE: ICD-10-CM

## 2025-04-29 DIAGNOSIS — Z79.899 LONG-TERM USE OF PLAQUENIL: ICD-10-CM

## 2025-04-29 ASSESSMENT — ENCOUNTER SYMPTOMS
VOMITING: 0
NAUSEA: 0

## 2025-05-05 ENCOUNTER — HOSPITAL ENCOUNTER (OUTPATIENT)
Dept: CT IMAGING | Age: 75
Discharge: HOME | End: 2025-05-07
Payer: MEDICARE

## 2025-05-05 ENCOUNTER — RESULTS FOLLOW-UP (OUTPATIENT)
Dept: INTERNAL MEDICINE CLINIC | Facility: CLINIC | Age: 75
End: 2025-05-05

## 2025-05-05 DIAGNOSIS — Z87.891 PERSONAL HISTORY OF TOBACCO USE: Chronic | ICD-10-CM

## 2025-05-05 PROCEDURE — 71271 CT THORAX LUNG CANCER SCR C-: CPT

## 2025-05-06 DIAGNOSIS — E89.0 HYPOTHYROIDISM FOLLOWING RADIOIODINE THERAPY: ICD-10-CM

## 2025-05-06 RX ORDER — LEVOTHYROXINE SODIUM 150 UG/1
150 TABLET ORAL
Qty: 90 TABLET | Refills: 3 | Status: SHIPPED | OUTPATIENT
Start: 2025-05-06

## 2025-05-06 NOTE — TELEPHONE ENCOUNTER
Requested Prescriptions     Signed Prescriptions Disp Refills    levothyroxine (SYNTHROID) 150 MCG tablet 90 tablet 3     Sig: Take 1 tablet by mouth every morning (before breakfast)     Authorizing Provider: PADUANO, JULIA S

## 2025-05-12 DIAGNOSIS — Z12.5 SCREENING PSA (PROSTATE SPECIFIC ANTIGEN): ICD-10-CM

## 2025-05-12 DIAGNOSIS — M17.0 BILATERAL PRIMARY OSTEOARTHRITIS OF KNEE: ICD-10-CM

## 2025-05-12 DIAGNOSIS — Z79.899 ON SULFASALAZINE THERAPY: ICD-10-CM

## 2025-05-12 DIAGNOSIS — E78.2 MIXED HYPERLIPIDEMIA: ICD-10-CM

## 2025-05-12 DIAGNOSIS — E89.0 HYPOTHYROIDISM FOLLOWING RADIOIODINE THERAPY: ICD-10-CM

## 2025-05-12 DIAGNOSIS — Z79.631 LONG TERM METHOTREXATE USER: ICD-10-CM

## 2025-05-12 DIAGNOSIS — Z79.899 LONG-TERM USE OF PLAQUENIL: ICD-10-CM

## 2025-05-12 DIAGNOSIS — M06.00 SERONEGATIVE RHEUMATOID ARTHRITIS (HCC): ICD-10-CM

## 2025-05-12 DIAGNOSIS — E08.65 DIABETES MELLITUS DUE TO UNDERLYING CONDITION WITH HYPERGLYCEMIA, WITHOUT LONG-TERM CURRENT USE OF INSULIN (HCC): Chronic | ICD-10-CM

## 2025-05-12 LAB
ALBUMIN SERPL-MCNC: 3.8 G/DL (ref 3.2–4.6)
ALBUMIN/GLOB SERPL: 1.2 (ref 1–1.9)
ALP SERPL-CCNC: 93 U/L (ref 40–129)
ALT SERPL-CCNC: 19 U/L (ref 8–55)
ANION GAP SERPL CALC-SCNC: 12 MMOL/L (ref 7–16)
AST SERPL-CCNC: 20 U/L (ref 15–37)
BASOPHILS # BLD: 0.13 K/UL (ref 0–0.2)
BASOPHILS NFR BLD: 1.6 % (ref 0–2)
BILIRUB SERPL-MCNC: 0.3 MG/DL (ref 0–1.2)
BUN SERPL-MCNC: 26 MG/DL (ref 8–23)
CALCIUM SERPL-MCNC: 9.5 MG/DL (ref 8.8–10.2)
CHLORIDE SERPL-SCNC: 105 MMOL/L (ref 98–107)
CHOLEST SERPL-MCNC: 94 MG/DL (ref 0–200)
CO2 SERPL-SCNC: 24 MMOL/L (ref 20–29)
CREAT SERPL-MCNC: 1.19 MG/DL (ref 0.8–1.3)
CRP SERPL-MCNC: <0.3 MG/DL (ref 0–0.4)
DIFFERENTIAL METHOD BLD: ABNORMAL
EOSINOPHIL # BLD: 0.28 K/UL (ref 0–0.8)
EOSINOPHIL NFR BLD: 3.4 % (ref 0.5–7.8)
ERYTHROCYTE [DISTWIDTH] IN BLOOD BY AUTOMATED COUNT: 14.8 % (ref 11.9–14.6)
ERYTHROCYTE [SEDIMENTATION RATE] IN BLOOD: 9 MM/HR
EST. AVERAGE GLUCOSE BLD GHB EST-MCNC: 172 MG/DL
GLOBULIN SER CALC-MCNC: 3.3 G/DL (ref 2.3–3.5)
GLUCOSE SERPL-MCNC: 196 MG/DL (ref 70–99)
HBA1C MFR BLD: 7.6 % (ref 0–5.6)
HCT VFR BLD AUTO: 36.7 % (ref 41.1–50.3)
HDLC SERPL-MCNC: 52 MG/DL (ref 40–60)
HDLC SERPL: 1.8 (ref 0–5)
HGB BLD-MCNC: 11.8 G/DL (ref 13.6–17.2)
IMM GRANULOCYTES # BLD AUTO: 0.05 K/UL (ref 0–0.5)
IMM GRANULOCYTES NFR BLD AUTO: 0.6 % (ref 0–5)
LDLC SERPL CALC-MCNC: 33 MG/DL (ref 0–100)
LYMPHOCYTES # BLD: 1.78 K/UL (ref 0.5–4.6)
LYMPHOCYTES NFR BLD: 21.7 % (ref 13–44)
MCH RBC QN AUTO: 30.6 PG (ref 26.1–32.9)
MCHC RBC AUTO-ENTMCNC: 32.2 G/DL (ref 31.4–35)
MCV RBC AUTO: 95.1 FL (ref 82–102)
MONOCYTES # BLD: 0.91 K/UL (ref 0.1–1.3)
MONOCYTES NFR BLD: 11.1 % (ref 4–12)
NEUTS SEG # BLD: 5.05 K/UL (ref 1.7–8.2)
NEUTS SEG NFR BLD: 61.6 % (ref 43–78)
NRBC # BLD: 0 K/UL (ref 0–0.2)
PLATELET # BLD AUTO: 328 K/UL (ref 150–450)
PMV BLD AUTO: 10.5 FL (ref 9.4–12.3)
POTASSIUM SERPL-SCNC: 4.5 MMOL/L (ref 3.5–5.1)
PROT SERPL-MCNC: 7.1 G/DL (ref 6.3–8.2)
PSA SERPL-MCNC: 2 NG/ML (ref 0–4)
RBC # BLD AUTO: 3.86 M/UL (ref 4.23–5.6)
SODIUM SERPL-SCNC: 141 MMOL/L (ref 136–145)
TRIGL SERPL-MCNC: 40 MG/DL (ref 0–150)
TSH, 3RD GENERATION: 1.28 UIU/ML (ref 0.27–4.2)
VLDLC SERPL CALC-MCNC: 8 MG/DL (ref 6–23)
WBC # BLD AUTO: 8.2 K/UL (ref 4.3–11.1)

## 2025-05-16 ENCOUNTER — OFFICE VISIT (OUTPATIENT)
Dept: RHEUMATOLOGY | Age: 75
End: 2025-05-16

## 2025-05-16 ENCOUNTER — TELEPHONE (OUTPATIENT)
Dept: RHEUMATOLOGY | Age: 75
End: 2025-05-16

## 2025-05-16 VITALS
HEIGHT: 71 IN | DIASTOLIC BLOOD PRESSURE: 74 MMHG | BODY MASS INDEX: 27.58 KG/M2 | WEIGHT: 197 LBS | SYSTOLIC BLOOD PRESSURE: 138 MMHG | HEART RATE: 67 BPM

## 2025-05-16 DIAGNOSIS — M15.4 EROSIVE OSTEOARTHRITIS OF BOTH HANDS: Chronic | ICD-10-CM

## 2025-05-16 DIAGNOSIS — M06.00 SERONEGATIVE RHEUMATOID ARTHRITIS (HCC): Primary | ICD-10-CM

## 2025-05-16 DIAGNOSIS — Z71.85 VACCINE COUNSELING: Chronic | ICD-10-CM

## 2025-05-16 DIAGNOSIS — Z79.899 LONG-TERM USE OF HIGH-RISK MEDICATION: Chronic | ICD-10-CM

## 2025-05-16 DIAGNOSIS — M06.00 SERONEGATIVE RHEUMATOID ARTHRITIS (HCC): Primary | Chronic | ICD-10-CM

## 2025-05-16 RX ORDER — ADALIMUMAB 40MG/0.4ML
40 KIT SUBCUTANEOUS
Qty: 0.8 ML | Refills: 3 | Status: ACTIVE | OUTPATIENT
Start: 2025-05-16

## 2025-05-16 RX ORDER — HYDROXYCHLOROQUINE SULFATE 200 MG/1
200 TABLET, FILM COATED ORAL 2 TIMES DAILY
Qty: 180 TABLET | Refills: 0 | Status: SHIPPED | OUTPATIENT
Start: 2025-05-16 | End: 2025-05-16

## 2025-05-16 ASSESSMENT — ROUTINE ASSESSMENT OF PATIENT INDEX DATA (RAPID3)
WHEN YOU AWAKENED IN THE MORNING OVER THE LAST WEEK, PLEASE INDICATE THE AMOUNT OF TIME IT TAKES UNTIL YOU ARE AS LIMBER AS YOU WILL BE FOR THE DAY: 45 MIN
ON A SCALE OF ONE TO TEN, HOW MUCH OF A PROBLEM HAS UNUSUAL FATIGUE OR TIREDNESS BEEN FOR YOU OVER THE PAST WEEK?: 5
ON A SCALE OF ONE TO TEN, HOW MUCH PAIN HAVE YOU HAD BECAUSE OF YOUR CONDITION OVER THE PAST WEEK?: 6
ON A SCALE OF ONE TO TEN, CONSIDERING ALL THE WAYS IN WHICH ILLNESS AND HEALTH CONDITIONS MAY AFFECT YOU AT THIS TIME, PLEASE INDICATE BELOW HOW YOU ARE DOING:: 6
ON A SCALE OF ONE TO TEN, HOW DIFFICULT WAS IT FOR YOU TO COMPLETE THE LISTED DAILY PHYSICAL TASKS OVER THE LAST WEEK: 1.2

## 2025-05-16 NOTE — TELEPHONE ENCOUNTER
----- Message from Dr. Karthik Santos DO sent at 5/16/2025  9:05 AM EDT -----  Regarding: imelda Gonzalez,    This patient has seronegative RA. I would like to restart him on humira 40 mg sc every other week for this. He was on it the past, he had to change to TNFi infusion b/c could not afford injectable humira.    Thanks.

## 2025-05-16 NOTE — PATIENT INSTRUCTIONS
Repeat labs in 3 months  Continue plaquenil at current dose, schedule eye exam in 9/2025 and have them send to me  Try to restart the humira 40 mg under the skin every other week  I will speak to lorin about trying tramadol for joint pain  Try to increase quadriceps to help with stability in the legs, ask podiatry if you can go back to the gym

## 2025-05-16 NOTE — TELEPHONE ENCOUNTER
Gloira ordered by Dr. Santos. Rx entered and pended to go to [facility]. Will most likely require a biosimilar.   May be subject to the $2000 [facility] D cap if drug plan is through [facility].

## 2025-05-16 NOTE — PROGRESS NOTES
Since last visit patient has been feeling: Fair    Any Rheumatology medication side effects: No       Hospitalizations: No, just Big toe and 3rd toe amputation         Infections: No     Vaccinations: No    Dr Perez wanted him to stop Mtx for about 2 weeks but he stayed off because it wasn't helping and SSZ was also stopped-doesn't work for him        5/16/2025     7:00 AM   DMARD/Biologic   AM Stiffness 45 min   Pain 6   Fatigue 5   MDHAQ 1.2   Patient Global Score 6   Medication Name Plaquenil          REVIEW OF SYSTEMS:  A total of 10 systems (musculoskeletal system as stated in the HPI and the following 9 systems) were reviewed with patient today and were negative except as stated in the HPI and except for the following (depicted with an \"X\"):        \"X\" Constitutional  \"X\" HEENT /Mouth  \"X\" Cardiovascular and  Respiratory (2 systems)  \"X\" Gastrointestinal    Fever/chills   Hair loss   Shortness of breath   Upset stomach    Falls   Dry mouth   Coughing   Diarrhea / constipation    Wt loss   Mouth sores   Wheezing   Heartburn    Wt gain   Ringing ears   Chest pain   Dark or bloody stools    Night sweats   Diff. swallowing  X None of above   Nausea or vomiting   X None of above  X None of above     X None of above                \"X\" Integumentary  \"X\" Neurological  \"X\" Genitourinary  \"X\" Psychiatric   X Easy bruising   Numbness/ tingling   Female problems   Depression    Rashes  X Weakness   Problems with urination   Feeling anxious    Sun sensitivity   Headaches  X None of above   Problems sleeping    None of above   None of above     X None of above            
Filt Rate 05/12/2025 64  >60 ml/min/1.73m2 Final    Comment:   Pediatric calculator link: https://www.kidney.org/professionals/kdoqi/gfr_calculatorped    These results are not intended for use in patients <18 years of age.    eGFR results are calculated without a race factor using  the 2021 CKD-EPI equation. Careful clinical correlation is recommended, particularly when comparing to results calculated using previous equations.  The CKD-EPI equation is less accurate in patients with extremes of muscle mass, extra-renal metabolism of creatinine, excessive creatine ingestion, or following therapy that affects renal tubular secretion.      Calcium 05/12/2025 9.5  8.8 - 10.2 MG/DL Final    Total Bilirubin 05/12/2025 0.3  0.0 - 1.2 MG/DL Final    ALT 05/12/2025 19  8 - 55 U/L Final    AST 05/12/2025 20  15 - 37 U/L Final    Alk Phosphatase 05/12/2025 93  40 - 129 U/L Final    Total Protein 05/12/2025 7.1  6.3 - 8.2 g/dL Final    Albumin 05/12/2025 3.8  3.2 - 4.6 g/dL Final    Globulin 05/12/2025 3.3  2.3 - 3.5 g/dL Final    Albumin/Globulin Ratio 05/12/2025 1.2  1.0 - 1.9   Final    WBC 05/12/2025 8.2  4.3 - 11.1 K/uL Final    RBC 05/12/2025 3.86 (L)  4.23 - 5.6 M/uL Final    Hemoglobin 05/12/2025 11.8 (L)  13.6 - 17.2 g/dL Final    Hematocrit 05/12/2025 36.7 (L)  41.1 - 50.3 % Final    MCV 05/12/2025 95.1  82 - 102 FL Final    MCH 05/12/2025 30.6  26.1 - 32.9 PG Final    MCHC 05/12/2025 32.2  31.4 - 35.0 g/dL Final    RDW 05/12/2025 14.8 (H)  11.9 - 14.6 % Final    Platelets 05/12/2025 328  150 - 450 K/uL Final    MPV 05/12/2025 10.5  9.4 - 12.3 FL Final    nRBC 05/12/2025 0.00  0.0 - 0.2 K/uL Final    **Note: Absolute NRBC parameter is now reported with Hemogram**    Differential Type 05/12/2025 AUTOMATED    Final    Neutrophils % 05/12/2025 61.6  43.0 - 78.0 % Final    Lymphocytes % 05/12/2025 21.7  13.0 - 44.0 % Final    Monocytes % 05/12/2025 11.1  4.0 - 12.0 % Final    Eosinophils % 05/12/2025 3.4  0.5 - 7.8 % Final

## 2025-05-22 RX ORDER — HYDROXYCHLOROQUINE SULFATE 200 MG/1
200 TABLET, FILM COATED ORAL 2 TIMES DAILY
Qty: 60 TABLET | Refills: 2 | Status: SHIPPED | OUTPATIENT
Start: 2025-05-22

## 2025-05-22 NOTE — TELEPHONE ENCOUNTER
Primary Rheumatologist: Dr. Santos     Last OV:5/16/2025   Next OV:8/12/2025    Medication Requested: HCQ  Rx last written: 01/07/2025     Last eye exam:    Plan: Continue plaquenil at current dose, schedule eye exam in 9/2025 and have them send to me     Recent Labs:    Lab Results   Component Value Date    WBC 8.2 05/12/2025    HGB 11.8 (L) 05/12/2025    HCT 36.7 (L) 05/12/2025    MCV 95.1 05/12/2025     05/12/2025    LYMPHOPCT 21.7 05/12/2025    RBC 3.86 (L) 05/12/2025    MCH 30.6 05/12/2025    MCHC 32.2 05/12/2025    RDW 14.8 (H) 05/12/2025     Lab Results   Component Value Date     05/12/2025    K 4.5 05/12/2025     05/12/2025    CO2 24 05/12/2025    BUN 26 (H) 05/12/2025    CREATININE 1.19 05/12/2025    GLUCOSE 196 (H) 05/12/2025    CALCIUM 9.5 05/12/2025    BILITOT 0.3 05/12/2025    ALKPHOS 93 05/12/2025    AST 20 05/12/2025    ALT 19 05/12/2025    LABGLOM 64 05/12/2025    GFRAA >60 08/01/2022    AGRATIO 2.3 (H) 05/09/2022    GLOB 3.3 05/12/2025     Lab Results   Component Value Date    CRP <0.3 05/12/2025      Lab Results   Component Value Date    SEDRATE 9 (L) 05/12/2025      No results found for: \"VITD25\"

## 2025-05-23 PROBLEM — M15.4 EROSIVE OSTEOARTHRITIS OF BOTH HANDS: Status: ACTIVE | Noted: 2025-05-23

## 2025-05-23 PROBLEM — Z71.85 VACCINE COUNSELING: Status: ACTIVE | Noted: 2025-05-23

## 2025-05-23 PROBLEM — Z79.899 LONG-TERM USE OF HIGH-RISK MEDICATION: Chronic | Status: ACTIVE | Noted: 2025-05-23

## 2025-06-01 RX ORDER — ALFUZOSIN HYDROCHLORIDE 10 MG/1
10 TABLET, EXTENDED RELEASE ORAL DAILY
Qty: 90 TABLET | Refills: 0 | Status: SHIPPED | OUTPATIENT
Start: 2025-06-01

## 2025-06-02 ENCOUNTER — OFFICE VISIT (OUTPATIENT)
Dept: ORTHOPEDIC SURGERY | Age: 75
End: 2025-06-02

## 2025-06-02 DIAGNOSIS — M20.5X1 ACQUIRED CLAW TOE OF RIGHT FOOT: Primary | ICD-10-CM

## 2025-06-02 PROCEDURE — 99024 POSTOP FOLLOW-UP VISIT: CPT | Performed by: NURSE PRACTITIONER

## 2025-06-02 NOTE — PROGRESS NOTES
Name: Michael Archie Paduano III  YOB: 1950  Gender: male  MRN: [id number]    Procedure Performed:  Right great toe amputation to metatarsal phalangeal joint  Right third toe amputation to metatarsal phalangeal joint  Right fifth PIP resection arthroplasty  Right fifth MTP release with angular soft tissue correction           Date of Procedure: 03/27/2025      Subjective: Patient reports overall that he is doing well.  He continues to soak with the Epsom salts to help with incisional healing.      Physical Examination: The incisional areas to the great and third toe amputation areas are continuing to heal, are currently scabbed without any signs of drainage or other indications of infection.  He has palpable pulses and intact sensation to the foot.  He has no pain with palpation to the plantar aspect of the MTP joints.        Imaging:   No imaging reviewed          Assessment:   Status post right great toe and third phalanx amputation through the MTP joint with fifth PIP RA and MTP release.      Plan:   3 This is stable chronic illness/condition  Treatment at this time: Time with no intervention, he was encouraged to continue soaking the affected extremity with Epsom salts.  A remaining stitch was removed from the third MTP joint incision.  Patient will follow-up on a as needed basis.  Studies ordered: NO XR needed @ Next Visit    Weight-bearing status: WBAT        Return to work/work restrictions: none  No medications given

## 2025-06-26 DIAGNOSIS — I10 ESSENTIAL HYPERTENSION: ICD-10-CM

## 2025-06-26 RX ORDER — AMLODIPINE BESYLATE 2.5 MG/1
2.5 TABLET ORAL DAILY
Qty: 90 TABLET | Refills: 1 | Status: SHIPPED | OUTPATIENT
Start: 2025-06-26

## 2025-06-26 RX ORDER — SERTRALINE HYDROCHLORIDE 100 MG/1
100 TABLET, FILM COATED ORAL DAILY
Qty: 90 TABLET | Refills: 1 | Status: SHIPPED | OUTPATIENT
Start: 2025-06-26

## 2025-06-26 NOTE — TELEPHONE ENCOUNTER
Requested Prescriptions     Signed Prescriptions Disp Refills    sertraline (ZOLOFT) 100 MG tablet 90 tablet 1     Sig: Take 1 tablet by mouth daily     Authorizing Provider: PADUANO, JULIA S    amLODIPine (NORVASC) 2.5 MG tablet 90 tablet 1     Sig: Take 1 tablet by mouth daily     Authorizing Provider: PADUANO, JULIA S

## 2025-06-30 RX ORDER — METHOTREXATE 2.5 MG/1
TABLET ORAL
Qty: 120 TABLET | Refills: 0 | OUTPATIENT
Start: 2025-06-30

## 2025-07-07 ENCOUNTER — OFFICE VISIT (OUTPATIENT)
Dept: UROLOGY | Age: 75
End: 2025-07-07
Payer: MEDICARE

## 2025-07-07 DIAGNOSIS — R35.1 BENIGN PROSTATIC HYPERPLASIA WITH NOCTURIA: Primary | ICD-10-CM

## 2025-07-07 DIAGNOSIS — N40.1 BENIGN PROSTATIC HYPERPLASIA WITH NOCTURIA: Primary | ICD-10-CM

## 2025-07-07 LAB
BILIRUBIN, URINE, POC: NEGATIVE
BLOOD URINE, POC: NEGATIVE
GLUCOSE URINE, POC: NEGATIVE MG/DL
KETONES, URINE, POC: NEGATIVE MG/DL
LEUKOCYTE ESTERASE, URINE, POC: NEGATIVE
NITRITE, URINE, POC: NEGATIVE
PH, URINE, POC: 5.5 (ref 4.6–8)
PROTEIN,URINE, POC: 30 MG/DL
SPECIFIC GRAVITY, URINE, POC: 1.02 (ref 1–1.03)
URINALYSIS CLARITY, POC: NORMAL
URINALYSIS COLOR, POC: NORMAL
UROBILINOGEN, POC: NORMAL MG/DL

## 2025-07-07 PROCEDURE — 1123F ACP DISCUSS/DSCN MKR DOCD: CPT | Performed by: NURSE PRACTITIONER

## 2025-07-07 PROCEDURE — 99214 OFFICE O/P EST MOD 30 MIN: CPT | Performed by: NURSE PRACTITIONER

## 2025-07-07 PROCEDURE — 81003 URINALYSIS AUTO W/O SCOPE: CPT | Performed by: NURSE PRACTITIONER

## 2025-07-07 PROCEDURE — 1036F TOBACCO NON-USER: CPT | Performed by: NURSE PRACTITIONER

## 2025-07-07 PROCEDURE — G8428 CUR MEDS NOT DOCUMENT: HCPCS | Performed by: NURSE PRACTITIONER

## 2025-07-07 PROCEDURE — G8417 CALC BMI ABV UP PARAM F/U: HCPCS | Performed by: NURSE PRACTITIONER

## 2025-07-07 PROCEDURE — 3017F COLORECTAL CA SCREEN DOC REV: CPT | Performed by: NURSE PRACTITIONER

## 2025-07-07 RX ORDER — ALFUZOSIN HYDROCHLORIDE 10 MG/1
10 TABLET, EXTENDED RELEASE ORAL DAILY
Qty: 90 TABLET | Refills: 3 | Status: SHIPPED | OUTPATIENT
Start: 2025-07-07

## 2025-07-07 ASSESSMENT — ENCOUNTER SYMPTOMS
BACK PAIN: 0
NAUSEA: 0

## 2025-07-07 NOTE — PROGRESS NOTES
Urobilinogen, POC 0.2 mg/dL     Nitrite, Urine, POC Negative     Leukocyte Esterase, Urine, POC Negative      PHYSICAL EXAM    General appearance - well appearing and in no distress  Mental status - alert, oriented to person, place, and time  Chest - Easy work of breathing.  Neck - supple, no significant adenopathy   Skin - normal coloration and turgor, no rashes  Ambulates with cane.    Assessment and Plan    ICD-10-CM    1. Benign prostatic hyperplasia with nocturia  N40.1 AMB POC URINALYSIS DIP STICK AUTO W/O MICRO    R35.1 PSA, Diagnostic        BPH-  Currently well-controlled with Uroxatrol 10 mg p.o. nightly.  I will make sure refills are sent good for a year.  We discussed PSA.  PSA is within normal range at 2.0.  He will be due a PSA in 1 year.  I will order that as well.    Orders Placed This Encounter    PSA, Diagnostic     Standing Status:   Future     Expected Date:   7/10/2026     Expiration Date:   1/7/2027    AMB POC URINALYSIS DIP STICK AUTO W/O MICRO    alfuzosin (UROXATRAL) 10 MG extended release tablet     Sig: Take 1 tablet by mouth daily     Dispense:  90 tablet     Refill:  3       Return in about 1 year (around 7/7/2026) for follow with PSA prior.    Total billable time associated with this visit was 30  minutes, during which I coordinated care, counseled the patient and/or family, documented information in the electronic medical record, independently interpreted results, obtained history and performed a medically appropriate examination, ordered medications/tests, reviewed tests and communicated with other health care professionals regarding this patient.     CHEVY Mcgregor    [facility]   [address] [place] [address]   [id number]  Phone: [phone]  Fax: [phone]     Dr. Elmore was supervising physician today and approves plan of care.    Elements of this note have been dictated using speech recognition software.  Although reviewed, errors of speech

## 2025-08-12 ENCOUNTER — OFFICE VISIT (OUTPATIENT)
Dept: RHEUMATOLOGY | Age: 75
End: 2025-08-12
Payer: MEDICARE

## 2025-08-12 VITALS
HEIGHT: 71 IN | WEIGHT: 194 LBS | DIASTOLIC BLOOD PRESSURE: 68 MMHG | HEART RATE: 56 BPM | SYSTOLIC BLOOD PRESSURE: 120 MMHG | OXYGEN SATURATION: 95 % | BODY MASS INDEX: 27.16 KG/M2

## 2025-08-12 DIAGNOSIS — Z79.899 LONG-TERM USE OF HIGH-RISK MEDICATION: Chronic | ICD-10-CM

## 2025-08-12 DIAGNOSIS — M06.00 SERONEGATIVE RHEUMATOID ARTHRITIS (HCC): Primary | Chronic | ICD-10-CM

## 2025-08-12 DIAGNOSIS — M15.4 EROSIVE OSTEOARTHRITIS OF BOTH HANDS: Chronic | ICD-10-CM

## 2025-08-12 DIAGNOSIS — Z71.85 VACCINE COUNSELING: Chronic | ICD-10-CM

## 2025-08-12 PROCEDURE — 1125F AMNT PAIN NOTED PAIN PRSNT: CPT | Performed by: INTERNAL MEDICINE

## 2025-08-12 PROCEDURE — 3078F DIAST BP <80 MM HG: CPT | Performed by: INTERNAL MEDICINE

## 2025-08-12 PROCEDURE — 3074F SYST BP LT 130 MM HG: CPT | Performed by: INTERNAL MEDICINE

## 2025-08-12 PROCEDURE — G8427 DOCREV CUR MEDS BY ELIG CLIN: HCPCS | Performed by: INTERNAL MEDICINE

## 2025-08-12 PROCEDURE — 1123F ACP DISCUSS/DSCN MKR DOCD: CPT | Performed by: INTERNAL MEDICINE

## 2025-08-12 PROCEDURE — 1160F RVW MEDS BY RX/DR IN RCRD: CPT | Performed by: INTERNAL MEDICINE

## 2025-08-12 PROCEDURE — 1159F MED LIST DOCD IN RCRD: CPT | Performed by: INTERNAL MEDICINE

## 2025-08-12 PROCEDURE — 1036F TOBACCO NON-USER: CPT | Performed by: INTERNAL MEDICINE

## 2025-08-12 PROCEDURE — 99214 OFFICE O/P EST MOD 30 MIN: CPT | Performed by: INTERNAL MEDICINE

## 2025-08-12 PROCEDURE — 3017F COLORECTAL CA SCREEN DOC REV: CPT | Performed by: INTERNAL MEDICINE

## 2025-08-12 PROCEDURE — G2211 COMPLEX E/M VISIT ADD ON: HCPCS | Performed by: INTERNAL MEDICINE

## 2025-08-12 PROCEDURE — G8417 CALC BMI ABV UP PARAM F/U: HCPCS | Performed by: INTERNAL MEDICINE

## 2025-08-12 RX ORDER — TRAMADOL HYDROCHLORIDE 50 MG/1
50 TABLET ORAL NIGHTLY
Qty: 30 TABLET | Refills: 2 | Status: SHIPPED | OUTPATIENT
Start: 2025-08-12 | End: 2025-11-10

## 2025-08-12 RX ORDER — HYDROXYCHLOROQUINE SULFATE 200 MG/1
200 TABLET, FILM COATED ORAL 2 TIMES DAILY
Qty: 60 TABLET | Refills: 2 | Status: SHIPPED | OUTPATIENT
Start: 2025-08-12

## 2025-08-12 ASSESSMENT — JOINT PAIN
TOTAL NUMBER OF TENDER JOINTS: 2
TOTAL NUMBER OF SWOLLEN JOINTS: 0

## 2025-08-12 ASSESSMENT — ROUTINE ASSESSMENT OF PATIENT INDEX DATA (RAPID3)
WHEN YOU AWAKENED IN THE MORNING OVER THE LAST WEEK, PLEASE INDICATE THE AMOUNT OF TIME IT TAKES UNTIL YOU ARE AS LIMBER AS YOU WILL BE FOR THE DAY: 30 MIN
ON A SCALE OF ONE TO TEN, HOW DIFFICULT WAS IT FOR YOU TO COMPLETE THE LISTED DAILY PHYSICAL TASKS OVER THE LAST WEEK: 0.8
ON A SCALE OF ONE TO TEN, HOW MUCH OF A PROBLEM HAS UNUSUAL FATIGUE OR TIREDNESS BEEN FOR YOU OVER THE PAST WEEK?: 5
ON A SCALE OF ONE TO TEN, CONSIDERING ALL THE WAYS IN WHICH ILLNESS AND HEALTH CONDITIONS MAY AFFECT YOU AT THIS TIME, PLEASE INDICATE BELOW HOW YOU ARE DOING:: 6
ON A SCALE OF ONE TO TEN, HOW MUCH PAIN HAVE YOU HAD BECAUSE OF YOUR CONDITION OVER THE PAST WEEK?: 6

## 2025-08-14 ENCOUNTER — OFFICE VISIT (OUTPATIENT)
Age: 75
End: 2025-08-14
Payer: MEDICARE

## 2025-08-14 VITALS
WEIGHT: 196 LBS | BODY MASS INDEX: 27.44 KG/M2 | HEART RATE: 60 BPM | HEIGHT: 71 IN | DIASTOLIC BLOOD PRESSURE: 56 MMHG | SYSTOLIC BLOOD PRESSURE: 128 MMHG

## 2025-08-14 DIAGNOSIS — I73.9 PAD (PERIPHERAL ARTERY DISEASE): ICD-10-CM

## 2025-08-14 DIAGNOSIS — Z95.1 S/P CABG X 3: ICD-10-CM

## 2025-08-14 DIAGNOSIS — G47.33 OSA ON CPAP: ICD-10-CM

## 2025-08-14 DIAGNOSIS — I10 ESSENTIAL HYPERTENSION: ICD-10-CM

## 2025-08-14 DIAGNOSIS — E78.2 MIXED HYPERLIPIDEMIA: ICD-10-CM

## 2025-08-14 DIAGNOSIS — I48.3 TYPICAL ATRIAL FLUTTER (HCC): ICD-10-CM

## 2025-08-14 DIAGNOSIS — I25.810 CORONARY ARTERY DISEASE INVOLVING CORONARY BYPASS GRAFT OF NATIVE HEART WITHOUT ANGINA PECTORIS: Primary | ICD-10-CM

## 2025-08-14 PROCEDURE — 3017F COLORECTAL CA SCREEN DOC REV: CPT | Performed by: INTERNAL MEDICINE

## 2025-08-14 PROCEDURE — 1123F ACP DISCUSS/DSCN MKR DOCD: CPT | Performed by: INTERNAL MEDICINE

## 2025-08-14 PROCEDURE — 1125F AMNT PAIN NOTED PAIN PRSNT: CPT | Performed by: INTERNAL MEDICINE

## 2025-08-14 PROCEDURE — G8417 CALC BMI ABV UP PARAM F/U: HCPCS | Performed by: INTERNAL MEDICINE

## 2025-08-14 PROCEDURE — 99214 OFFICE O/P EST MOD 30 MIN: CPT | Performed by: INTERNAL MEDICINE

## 2025-08-14 PROCEDURE — 1036F TOBACCO NON-USER: CPT | Performed by: INTERNAL MEDICINE

## 2025-08-14 PROCEDURE — 3074F SYST BP LT 130 MM HG: CPT | Performed by: INTERNAL MEDICINE

## 2025-08-14 PROCEDURE — G8427 DOCREV CUR MEDS BY ELIG CLIN: HCPCS | Performed by: INTERNAL MEDICINE

## 2025-08-14 PROCEDURE — 1160F RVW MEDS BY RX/DR IN RCRD: CPT | Performed by: INTERNAL MEDICINE

## 2025-08-14 PROCEDURE — 3078F DIAST BP <80 MM HG: CPT | Performed by: INTERNAL MEDICINE

## 2025-08-14 PROCEDURE — 1159F MED LIST DOCD IN RCRD: CPT | Performed by: INTERNAL MEDICINE

## 2025-08-14 ASSESSMENT — ENCOUNTER SYMPTOMS
HEMATEMESIS: 0
HEMATOCHEZIA: 0
HOARSE VOICE: 0
EYE REDNESS: 0
WHEEZING: 0
HEMOPTYSIS: 0
DOUBLE VISION: 0
STRIDOR: 0
ABDOMINAL PAIN: 0

## 2025-09-04 RX ORDER — ALFUZOSIN HYDROCHLORIDE 10 MG/1
10 TABLET, EXTENDED RELEASE ORAL DAILY
Qty: 90 TABLET | Refills: 3 | Status: SHIPPED | OUTPATIENT
Start: 2025-09-04

## (undated) DEVICE — GLOVE SURG SZ 65 L12IN FNGR THK79MIL GRN LTX FREE

## (undated) DEVICE — PRECISION THIN, OFFSET (5.5 X 0.38 X 25.0MM)

## (undated) DEVICE — BANDAGE GZ W2XL75IN ST RAYON POLY CNFRM STRTCH LTWT

## (undated) DEVICE — BANDAGE,GAUZE,CONFORMING,2"X75",STRL,LF: Brand: MEDLINE INDUSTRIES, INC.

## (undated) DEVICE — CATHETER DIAG 5FR L100CM LUMN ID0.047IN JL3.5 CRV 0 SIDE H

## (undated) DEVICE — SUTURE ETHILON SZ 3-0 L18IN NONABSORBABLE BLK PS-2 L19MM 3/8 1669H

## (undated) DEVICE — GLOVE EXAM SZ 6.5 SM CHLOROPRENE PINK NSTRL PF LF

## (undated) DEVICE — CATHETER DIAG AD 5FR L110CM 145DEG COR GRY HYDRPHLC NYL

## (undated) DEVICE — GOWN,SIRUS,NONRNF,SETINSLV,XL,20/CS: Brand: MEDLINE

## (undated) DEVICE — BANDAGE COMPR L5YDXW2IN FOAM CO FLX

## (undated) DEVICE — PAD,ABDOMINAL,5"X9",ST,LF,25/BX: Brand: MEDLINE INDUSTRIES, INC.

## (undated) DEVICE — GLIDESHEATH SLENDER STAINLESS STEEL KIT: Brand: GLIDESHEATH SLENDER

## (undated) DEVICE — GLOVE SURG SZ 65 CRM LTX FREE POLYISOPRENE POLYMER BEAD ANTI

## (undated) DEVICE — FOOT & ANKLE SOFT DR WOMACK: Brand: MEDLINE INDUSTRIES, INC.

## (undated) DEVICE — DRESSING PETRO W3XL8IN OIL EMUL N ADH GZ KNIT IMPREG CELOS

## (undated) DEVICE — BANDAGE,ELASTIC,ESMARK,STERILE,4"X9',LF: Brand: MEDLINE

## (undated) DEVICE — GUIDEWIRE 035IN 210CM PTFE COAT FIX COR J TIP 15MM FIRM BODY

## (undated) DEVICE — GLOVE SURG SZ 8 L12IN FNGR THK79MIL GRN LTX FREE

## (undated) DEVICE — NEPTUNE E-SEP SMOKE EVACUATION PENCIL, COATED, 70MM BLADE, PUSH BUTTON SWITCH: Brand: NEPTUNE E-SEP

## (undated) DEVICE — SUTURE MONOCRYL SZ 3-0 L27IN ABSRB UD L19MM PS-2 3/8 CIR PRIM Y427H

## (undated) DEVICE — ZIMMER® STERILE DISPOSABLE TOURNIQUET CUFF WITH PLC, DUAL PORT, SINGLE BLADDER, 18 IN. (46 CM)

## (undated) DEVICE — FOOT ANKLE PACK: Brand: MEDLINE INDUSTRIES, INC.

## (undated) DEVICE — CATHETER COR DIAG 4.0 6FR 110CM 2 SIDE H

## (undated) DEVICE — SOLUTION IRRIG 1000ML 09% SOD CHL USP PIC PLAS CONTAINER

## (undated) DEVICE — SOLUTION IRRIG 1000ML 0.9% SOD CHL USP POUR PLAS BTL

## (undated) DEVICE — SUTURE ETHLN SZ 3-0 L18IN NONABSORBABLE BLK PS-2 L19MM 3/8 1669H

## (undated) DEVICE — BAND COMPR L24CM REG CLR PLAS HEMSTAT EXT HK AND LOOP RETEN